# Patient Record
Sex: FEMALE | Race: WHITE | Employment: FULL TIME | RURAL
[De-identification: names, ages, dates, MRNs, and addresses within clinical notes are randomized per-mention and may not be internally consistent; named-entity substitution may affect disease eponyms.]

---

## 2017-01-01 PROBLEM — R79.89 POSITIVE D DIMER: Status: ACTIVE | Noted: 2017-01-01

## 2017-01-02 PROBLEM — D64.9 ANEMIA: Status: ACTIVE | Noted: 2017-01-02

## 2017-01-02 PROBLEM — R93.89 ABNORMAL CT SCAN, CHEST: Status: ACTIVE | Noted: 2017-01-02

## 2017-01-04 RX ORDER — GABAPENTIN 300 MG/1
600 CAPSULE ORAL 3 TIMES DAILY
Qty: 90 CAPSULE | Refills: 0 | Status: SHIPPED | OUTPATIENT
Start: 2017-01-04 | End: 2017-01-11 | Stop reason: DRUGHIGH

## 2017-01-05 ENCOUNTER — HOSPITAL ENCOUNTER (OUTPATIENT)
Dept: PHYSICAL THERAPY | Age: 52
Discharge: HOME | End: 2017-01-05
Admitting: ORTHOPAEDIC SURGERY

## 2017-01-09 ENCOUNTER — HOSPITAL ENCOUNTER (OUTPATIENT)
Dept: PHYSICAL THERAPY | Age: 52
Discharge: HOME | End: 2017-01-09
Admitting: ORTHOPAEDIC SURGERY

## 2017-01-09 RX ORDER — GABAPENTIN 100 MG/1
CAPSULE ORAL
Qty: 120 CAPSULE | Refills: 2 | OUTPATIENT
Start: 2017-01-09

## 2017-01-11 ENCOUNTER — OFFICE VISIT (OUTPATIENT)
Dept: ORTHOPEDIC SURGERY | Facility: CLINIC | Age: 52
End: 2017-01-11

## 2017-01-11 VITALS — RESPIRATION RATE: 16 BRPM | WEIGHT: 115.8 LBS | HEIGHT: 66 IN | BODY MASS INDEX: 18.61 KG/M2

## 2017-01-11 DIAGNOSIS — G57.71 COMPLEX REGIONAL PAIN SYNDROME TYPE 2 OF RIGHT LOWER EXTREMITY: ICD-10-CM

## 2017-01-11 DIAGNOSIS — M17.32 POST-TRAUMATIC OSTEOARTHRITIS OF LEFT KNEE: ICD-10-CM

## 2017-01-11 DIAGNOSIS — Z96.651 STATUS POST TOTAL RIGHT KNEE REPLACEMENT USING CEMENT: Primary | ICD-10-CM

## 2017-01-11 DIAGNOSIS — G62.9 NEUROPATHY: ICD-10-CM

## 2017-01-11 DIAGNOSIS — M51.36 LUMBAR DEGENERATIVE DISC DISEASE: ICD-10-CM

## 2017-01-11 PROCEDURE — 99024 POSTOP FOLLOW-UP VISIT: CPT | Performed by: ORTHOPAEDIC SURGERY

## 2017-01-11 RX ORDER — MELOXICAM 7.5 MG/1
TABLET ORAL
Refills: 1 | COMMUNITY
Start: 2016-12-08 | End: 2017-02-28

## 2017-01-11 RX ORDER — OXYCODONE HYDROCHLORIDE 10 MG/1
10 TABLET ORAL EVERY 8 HOURS PRN
Qty: 60 TABLET | Refills: 0 | Status: SHIPPED | OUTPATIENT
Start: 2017-01-11 | End: 2017-01-23 | Stop reason: SDUPTHER

## 2017-01-11 RX ORDER — FOLIC ACID 1 MG/1
TABLET ORAL
COMMUNITY
Start: 2017-01-02 | End: 2017-02-28

## 2017-01-11 RX ORDER — LISINOPRIL 20 MG/1
TABLET ORAL
Refills: 0 | COMMUNITY
Start: 2017-01-03 | End: 2017-02-28

## 2017-01-11 RX ORDER — OXYCODONE AND ACETAMINOPHEN 10; 325 MG/1; MG/1
TABLET ORAL
COMMUNITY
End: 2017-02-20

## 2017-01-11 RX ORDER — GABAPENTIN 100 MG/1
CAPSULE ORAL
Refills: 2 | COMMUNITY
Start: 2016-12-08 | End: 2017-01-11 | Stop reason: DRUGHIGH

## 2017-01-11 RX ORDER — ATENOLOL 50 MG/1
TABLET ORAL
COMMUNITY
Start: 2017-01-02

## 2017-01-11 RX ORDER — GABAPENTIN 100 MG/1
CAPSULE ORAL
COMMUNITY
Start: 2014-11-12 | End: 2017-01-11 | Stop reason: DRUGHIGH

## 2017-01-11 RX ORDER — INDOMETHACIN 25 MG/1
CAPSULE ORAL
Refills: 0 | COMMUNITY
Start: 2016-12-20 | End: 2017-02-28

## 2017-01-11 RX ORDER — AMLODIPINE BESYLATE 5 MG/1
TABLET ORAL
COMMUNITY
Start: 2017-01-02 | End: 2017-02-28

## 2017-01-11 RX ORDER — GABAPENTIN 400 MG/1
400 CAPSULE ORAL 3 TIMES DAILY
Qty: 90 CAPSULE | Refills: 0 | Status: SHIPPED | OUTPATIENT
Start: 2017-01-11 | End: 2017-02-28

## 2017-01-11 RX ORDER — FLUOXETINE 10 MG/1
CAPSULE ORAL
COMMUNITY
End: 2017-04-10

## 2017-01-11 RX ORDER — LISINOPRIL 20 MG/1
TABLET ORAL
COMMUNITY
Start: 2017-01-02 | End: 2017-04-10

## 2017-01-11 RX ORDER — AMLODIPINE BESYLATE 5 MG/1
TABLET ORAL
Refills: 0 | COMMUNITY
Start: 2017-01-03 | End: 2017-02-28

## 2017-01-11 NOTE — MR AVS SNAPSHOT
Anca Andrade   1/11/2017 11:15 AM   Office Visit    Dept Phone:  [phone]   Encounter #:  [id number]    Provider:  Pete Willis MD   Department:  [facility]IC[facility]                Your Full Care Plan              Today's Medication Changes          These changes are accurate as of: 1/11/17 11:45 AM.  If you have any questions, ask your nurse or doctor.               Medication(s)that have changed:     gabapentin 400 MG capsule   Commonly known as:  NEURONTIN   Take 1 capsule by mouth 3 (Three) Times a Day.   What changed:    - medication strength  - how much to take  - Another medication with the same name was removed. Continue taking this medication, and follow the directions you see here.   Changed by:  Pete Willis MD       oxyCODONE 10 MG tablet   Commonly known as:  ROXICODONE   Take 1 tablet by mouth Every 8 (Eight) Hours As Needed (prn pain).   What changed:    - how much to take  - how to take this  - when to take this  - reasons to take this   Changed by:  Pete Willis MD            Where to Get Your Medications      These medications were sent to [facility] - [address] - [phone]  - [phone] [address] [address]     Phone:  [phone]     gabapentin 400 MG capsule         You can get these medications from any pharmacy     Bring a paper prescription for each of these medications     oxyCODONE 10 MG tablet                  Your Updated Medication List          This list is accurate as of: 1/11/17 11:45 AM.  Always use your most recent med list.                * amLODIPine 5 MG tablet   Commonly known as:  NORVASC       * amLODIPine 5 MG tablet   Commonly known as:  NORVASC       * atenolol 50 MG tablet   Commonly known as:  TENORMIN       * atenolol 50 MG tablet   Commonly known as:  TENORMIN       dicyclomine 20 MG tablet   Commonly known as:   BENTYL       estradiol 2 MG tablet   Commonly known as:  ESTRACE       * FLUoxetine 20 MG capsule   Commonly known as:  PROzac       * FLUoxetine 10 MG capsule   Commonly known as:  PROzac       * folic acid 400 MCG tablet   Commonly known as:  FOLVITE       * folic acid 1 MG tablet   Commonly known as:  FOLVITE       gabapentin 400 MG capsule   Commonly known as:  NEURONTIN   Take 1 capsule by mouth 3 (Three) Times a Day.       hydrOXYzine 25 MG capsule   Commonly known as:  VISTARIL       indomethacin 25 MG capsule   Commonly known as:  INDOCIN       * lisinopril 20 MG tablet   Commonly known as:  PRINIVIL,ZESTRIL       * lisinopril 20 MG tablet   Commonly known as:  PRINIVIL,ZESTRIL       lisinopril-hydrochlorothiazide 20-12.5 MG per tablet   Commonly known as:  PRINZIDE,ZESTORETIC       meloxicam 7.5 MG tablet   Commonly known as:  MOBIC       methocarbamol 750 MG tablet   Commonly known as:  ROBAXIN   Take 1 tablet by mouth 2 (Two) Times a Day As Needed for muscle spasms.       metoclopramide 10 MG tablet   Commonly known as:  REGLAN       ondansetron ODT 4 MG disintegrating tablet   Commonly known as:  ZOFRAN-ODT       oxyCODONE 10 MG tablet   Commonly known as:  ROXICODONE   Take 1 tablet by mouth Every 8 (Eight) Hours As Needed (prn pain).       * oxyCODONE-acetaminophen  MG per tablet   Commonly known as:  PERCOCET   Take 1 tablet by mouth Every 8 (Eight) Hours As Needed (PRN PAIN).       * oxyCODONE-acetaminophen  MG per tablet   Commonly known as:  PERCOCET       pantoprazole 40 MG EC tablet   Commonly known as:  PROTONIX       * Notice:  This list has 12 medication(s) that are the same as other medications prescribed for you. Read the directions carefully, and ask your doctor or other care provider to review them with you.            We Performed the Following     EMG 2 Limbs     NM Bone Scan 3 Phase       You Were Diagnosed With        Codes Comments    Status post total right knee  replacement using cement    -  Primary ICD-10-CM: Z96.651  ICD-9-CM: V43.65     Complex regional pain syndrome type 2 of right lower extremity     ICD-10-CM: G57.71  ICD-9-CM: 355.71     Lumbar degenerative disc disease     ICD-10-CM: M51.36  ICD-9-CM: 722.52     Neuropathy     ICD-10-CM: G62.9  ICD-9-CM: 355.9     Post-traumatic osteoarthritis of left knee     ICD-10-CM: M17.32  ICD-9-CM: 715.26       Instructions     None    Patient Instructions History      Upcoming Appointments     Visit Type Date Time Department    OFFICE VISIT 2017 11:15 AM [facility]    OFFICE VISIT 2017  1:00 PM [facility]    OFFICE VISIT 3/6/2017  1:30 PM [facility]      MyChart Signup     [facility] [employer] allows you to send messages to your doctor, view your test results, renew your prescriptions, schedule appointments, and more. To sign up, go to [employer] and click on the Sign Up Now link in the New User? box. Enter your [employer] Activation Code exactly as it appears below along with the last four digits of your Social Security Number and your Date of Birth () to complete the sign-up process. If you do not sign up before the expiration date, you must request a new code.    [employer] Activation Code: [email or web address]  Expires: 2017  3:08 PM    If you have questions, you can email [email or web address] or call [phone] to talk to our [employer] staff. Remember, [employer] is NOT to be used for urgent needs. For medical emergencies, dial 911.               Other Info from Your Visit           Your Appointments     2017  1:00 PM EST   Office Visit with Pete Willis MD   [facility] (--)    [address] [address]   [phone]           Arrive 15 minutes prior to appointment.            Mar 06, 2017  1:30 PM EST   Office Visit with Pete Willis MD   [facility]  "[facility] (--)    [address] [address]   [phone]           Arrive 15 minutes prior to appointment.              Allergies     Penicillins        Reason for Visit     Right Knee - Pain           Vital Signs     Respirations Height Weight Body Mass Index Smoking Status       16 66\" (167.6 cm) 115 lb 12.8 oz (52.5 kg) 18.69 kg/m2 Current Every Day Smoker       Problems and Diagnoses Noted     Status post total right knee replacement using cement    -  Primary    Complex regional pain syndrome type 2 of right lower extremity        Degeneration of lumbar or lumbosacral intervertebral disc        Neuropathy        Degenerative joint disease of lower leg            "

## 2017-01-11 NOTE — PROGRESS NOTES
"Subjective   Patient ID: Anca Andrade is a 51 y.o. female is here today for a post-operative visit  Pain of the Right Knee        History of Present Illness     Pain controlled: [] no   [x] yes, continued better pain control   Medication refill requested: [] no   [x] yes    Patient compliant with instructions: [] no   [x] yes   Other: Discussed plans to taper medication starting with gabapentin decrease to 400 mg po tid.  She does report a one-time falling episode 1-9-16 and went to [facility] ER and knee assessment and radiographs were fine.     Past Medical History   Diagnosis Date   • Asthma    • GERD (gastroesophageal reflux disease)    • Gout    • H/O corticosteroid therapy      right knee   • Hypertension    • Osteoarthritis    • Rheumatoid arthritis         Past Surgical History   Procedure Laterality Date   • Wrist surgery       Left wrist procedure   • Other surgical history       ORIF right forearm   • Cholecystectomy     • Tooth extraction     • Parathyroidectomy     • Hysterectomy     • Knee acl reconstruction Left    • Knee arthroscopy       Multiple procedures (left x 4 and right x 2)    • Total knee arthroplasty Right 10/18/2016       Allergies   Allergen Reactions   • Penicillins        Review of Systems   Constitutional: Negative for fever.   HENT: Negative for voice change.    Eyes: Negative for visual disturbance.   Respiratory: Negative for shortness of breath.    Cardiovascular: Negative for chest pain.   Gastrointestinal: Negative for abdominal distention and abdominal pain.   Genitourinary: Negative for dysuria.   Musculoskeletal: Positive for arthralgias, back pain and myalgias. Negative for gait problem and joint swelling.   Skin: Negative for rash.   Neurological: Positive for numbness. Negative for speech difficulty.   Hematological: Does not bruise/bleed easily.   Psychiatric/Behavioral: Negative for confusion.       Objective   Visit Vitals   • Resp 16   • Ht 66\" (167.6 " cm)   • Wt 115 lb 12.8 oz (52.5 kg)   • BMI 18.69 kg/m2         Signs of infection: [x] no                    [] yes   Drainage: [x] no                    [] yes   Incision: [] healing well     [x]healed well   Motor exam intact: [] no                    [x] yes   Neurovascular exam intact: [] no                    [x] yes, except diffuse hyperesthesias and causalgia right leg mostly anterolateral lower leg at this point.   Signs of compartment syndrome: [x] no                    [] yes   Signs of DVT: [x] no                    [] yes   Other:      Physical Exam   Constitutional: She appears well-developed. No distress.   Musculoskeletal: She exhibits no deformity.        Right knee: She exhibits no effusion.   Neurological: She is alert. Gait normal.   Skin: Skin is warm and dry. No rash noted. No erythema.   Psychiatric: She has a normal mood and affect. Her speech is normal.   Vitals reviewed.    Right Knee Exam     Tenderness   The patient is experiencing tenderness in the lateral retinaculum.    Range of Motion   Right knee extension: 3 degrees.   Right knee flexion: 119 degrees.     Muscle Strength     The patient has normal right knee strength.    Tests   Varus: negative  Valgus: positive  Patellar Apprehension: negative    Other   Erythema: absent  Right knee scars: well healed.  Sensation: decreased (diffuse anterolateral lower leg hyperesthesias)  Pulse: present  Swelling: mild  Other tests: no effusion present      Left Knee Exam     Tenderness   The patient is experiencing no tenderness (no acute left knee changes.).             Extremity DVT signs are Negative on physical exam with negative Silvia sign, with no calf pain, with no palpable cords and with no skin tone change  Neurologic Exam     Mental Status   Speech: speech is normal   Level of consciousness: alert  Knowledge: good.     Motor Exam   Overall muscle tone: normal    Gait, Coordination, and Reflexes     Gait  Gait: normal (only slight  residual right knee extensor limp.)    Right Knee Exam     Muscle Strength   Normal right knee strength        Assessment/Plan   Independent Review of Radiographic Studies:    No new imaging done today.  Reviewed at a prior visit.  and 1-9-17 knee imaging from [facility] as noted above.  Laboratory and Other Studies:  No new results reviewed today.  Recent results were stable and reviewed with patient.   Medical Decision Making:    Ongoing with residual symptoms.  Continue current management and any additional treatments and workup as outlined in plan.  Sequelae of possible CRPS - complex regional pain syndrome.  Awaiting appointment for possible lumbar sympathetic block, and also planning EMG/NCS and 3PBS evaluations next month.  Patient somewhat better and will taper gabapentin today and plan pain medication taper as possible.   Procedures  [x] No procedures were performed in office today.     Anca was seen today for pain.    Diagnoses and all orders for this visit:    Status post total right knee replacement using cement  -     NM Bone Scan 3 Phase  -     EMG 2 Limbs    Complex regional pain syndrome type 2 of right lower extremity  -     NM Bone Scan 3 Phase  -     EMG 2 Limbs    Lumbar degenerative disc disease  -     NM Bone Scan 3 Phase  -     EMG 2 Limbs    Neuropathy  -     NM Bone Scan 3 Phase  -     EMG 2 Limbs    Post-traumatic osteoarthritis of left knee  -     NM Bone Scan 3 Phase  -     EMG 2 Limbs    Other orders  -     oxyCODONE (ROXICODONE) 10 MG tablet; Take 1 tablet by mouth Every 8 (Eight) Hours As Needed (prn pain).  -     gabapentin (NEURONTIN) 400 MG capsule; Take 1 capsule by mouth 3 (Three) Times a Day.         Recommendations/Plan:     Staples/Sutures removed: []At today's visit     [x]At previous visit   Brace: [x]not provided        []pt provided with:   Physical therapy: []not at this time    []home-based    [x]outpatient referral   Ultrasound: [x]not ordered          []order given to patient   Labs: [x]not ordered         []order given to patient   Weight Bearing status: [x]Full []WBAT []PWB []NWB []Other   Prescriptions: []None given   [x]As Noted   Imaging at next appt: []Yes  [x]No        Additional instructions: Patient agreeable to return sooner for any new concern.     Regular exercise as tolerated  Orthopedic activities reviewed and patient expressed appreciation  Discussion of orthopedic goals  Risk, benefits, and merits of treatment alternatives reviewed with the patient and questions answered  Work status form completed and provided to patient.  We discussed her delayed return to work due to the sequelae and symptoms of her knee replacement recovery.  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  We have filled out forms before and offered to continue to assist her as needed.  Ice, heat, and/or modalities as beneficial  After care and dental prophylaxis for joint replacement prosthesis    Exercise, medications, injections, other patient advice, and return appointment as noted.  Brace: No brace was given at today's visit  Referral: Pain management  Test/Studies: Three phase bone scan both knees (evaluate for first phase increased uptake possibly consistent with reflex sympathetic dystrophy (RSD) or complex regional pain syndrome (CRPS), and EMG both lower extremities to evaluate for CRPS, right sciatica or possible right peroneal neuritis.  Surgery: No surgery proposed at this visit.  Work/Activity Status: May perform usual activities as tolerated, Sedentary duty, No use of involved extremity and No strenuous activity.  Patient states as she continues to improve, she would like to look into at her work the option of regular duty though starting out at 4 hours per day and feels she might be ready for that level of  work in about a month.  Patient is encouraged to call or return for any issues or concerns.    Return in about 2 months (around 3/11/2017) for Review 3PBS and EMG, Recheck.  Patient agreeable to call or return sooner for any concerns.

## 2017-01-12 ENCOUNTER — HOSPITAL ENCOUNTER (OUTPATIENT)
Dept: PHYSICAL THERAPY | Age: 52
Discharge: HOME | End: 2017-01-12
Admitting: ORTHOPAEDIC SURGERY

## 2017-01-16 ENCOUNTER — HOSPITAL ENCOUNTER (OUTPATIENT)
Dept: PHYSICAL THERAPY | Age: 52
Discharge: HOME | End: 2017-01-16
Admitting: ORTHOPAEDIC SURGERY

## 2017-01-19 ENCOUNTER — HOSPITAL ENCOUNTER (OUTPATIENT)
Dept: PHYSICAL THERAPY | Age: 52
Discharge: HOME | End: 2017-01-19
Admitting: ORTHOPAEDIC SURGERY

## 2017-01-23 RX ORDER — OXYCODONE HYDROCHLORIDE 10 MG/1
10 TABLET ORAL EVERY 12 HOURS PRN
Qty: 40 TABLET | Refills: 0 | Status: SHIPPED | OUTPATIENT
Start: 2017-01-23 | End: 2017-02-08 | Stop reason: SDUPTHER

## 2017-01-26 ENCOUNTER — HOSPITAL ENCOUNTER (OUTPATIENT)
Dept: PHYSICAL THERAPY | Age: 52
Discharge: HOME | End: 2017-01-26
Admitting: ORTHOPAEDIC SURGERY

## 2017-01-30 ENCOUNTER — HOSPITAL ENCOUNTER (OUTPATIENT)
Dept: PHYSICAL THERAPY | Age: 52
Discharge: HOME | End: 2017-01-30
Admitting: ORTHOPAEDIC SURGERY

## 2017-02-02 ENCOUNTER — HOSPITAL ENCOUNTER (OUTPATIENT)
Dept: PHYSICAL THERAPY | Age: 52
Discharge: HOME | End: 2017-02-02
Admitting: ORTHOPAEDIC SURGERY

## 2017-02-06 ENCOUNTER — HOSPITAL ENCOUNTER (OUTPATIENT)
Dept: PHYSICAL THERAPY | Age: 52
Discharge: HOME | End: 2017-02-06
Admitting: ORTHOPAEDIC SURGERY

## 2017-02-08 ENCOUNTER — HOSPITAL ENCOUNTER (OUTPATIENT)
Dept: OTHER | Age: 52
Discharge: HOME | End: 2017-02-08
Attending: ORTHOPAEDIC SURGERY | Admitting: ORTHOPAEDIC SURGERY

## 2017-02-08 ENCOUNTER — OFFICE VISIT (OUTPATIENT)
Dept: ORTHOPEDIC SURGERY | Facility: CLINIC | Age: 52
End: 2017-02-08

## 2017-02-08 VITALS — WEIGHT: 117 LBS | RESPIRATION RATE: 18 BRPM | BODY MASS INDEX: 18.8 KG/M2 | HEIGHT: 66 IN

## 2017-02-08 DIAGNOSIS — Z96.651 STATUS POST TOTAL RIGHT KNEE REPLACEMENT USING CEMENT: Primary | ICD-10-CM

## 2017-02-08 DIAGNOSIS — M51.36 LUMBAR DEGENERATIVE DISC DISEASE: ICD-10-CM

## 2017-02-08 DIAGNOSIS — M25.561 ARTHRALGIA OF KNEE, RIGHT: ICD-10-CM

## 2017-02-08 DIAGNOSIS — G57.71 COMPLEX REGIONAL PAIN SYNDROME TYPE 2 OF RIGHT LOWER EXTREMITY: ICD-10-CM

## 2017-02-08 DIAGNOSIS — G62.9 NEUROPATHY: ICD-10-CM

## 2017-02-08 DIAGNOSIS — M17.32 POST-TRAUMATIC OSTEOARTHRITIS OF LEFT KNEE: ICD-10-CM

## 2017-02-08 LAB
BASOPHILS ABSOLUTE: 0 K/UL (ref 0–0.1)
BASOPHILS RELATIVE PERCENT: 0.3 %
C-REACTIVE PROTEIN: 16.8 MG/L (ref 0–5.1)
EOSINOPHILS ABSOLUTE: 0.2 K/UL (ref 0–0.4)
EOSINOPHILS RELATIVE PERCENT: 2.9 %
HCT VFR BLD CALC: 36.7 % (ref 37–47)
HEMOGLOBIN: 11.4 G/DL (ref 11.5–16.5)
LYMPHOCYTES ABSOLUTE: 1.5 K/UL (ref 1.5–4)
LYMPHOCYTES RELATIVE PERCENT: 24.4 % (ref 20–40)
MCH RBC QN AUTO: 28.8 PG (ref 27–32)
MCHC RBC AUTO-ENTMCNC: 31.1 G/DL (ref 31–35)
MCV RBC AUTO: 92.7 FL (ref 80–100)
MONOCYTES ABSOLUTE: 0.3 K/UL (ref 0.2–0.8)
MONOCYTES RELATIVE PERCENT: 4.9 % (ref 3–10)
NEUTROPHILS ABSOLUTE: 4.2 K/UL (ref 2–7.5)
NEUTROPHILS RELATIVE PERCENT: 67.5 %
PDW BLD-RTO: 13.1 % (ref 11–16)
PLATELET # BLD: 287 K/UL (ref 150–400)
PMV BLD AUTO: 9.3 FL (ref 6–10)
RBC # BLD: 3.96 M/UL (ref 3.8–5.8)
SEDIMENTATION RATE, ERYTHROCYTE: 67 MM/HR (ref 0–20)
WBC # BLD: 6.2 K/UL (ref 4–11)

## 2017-02-08 PROCEDURE — 99213 OFFICE O/P EST LOW 20 MIN: CPT | Performed by: ORTHOPAEDIC SURGERY

## 2017-02-08 RX ORDER — GABAPENTIN 400 MG/1
400 CAPSULE ORAL 3 TIMES DAILY
Qty: 90 CAPSULE | Refills: 1 | Status: SHIPPED | OUTPATIENT
Start: 2017-02-08 | End: 2017-03-14 | Stop reason: SDUPTHER

## 2017-02-08 NOTE — PROGRESS NOTES
Subjective   Patient ID: Anca Andrade is a 51 y.o. female is here today for orthopaedic evaluation of recovery progress with treatment. and is here today for a treatment planning discussion.  Pain and Post-op of the Right Knee       History of Present Illness    Progress Note:  Patient reports that with treatments and since the last visit, overall pain is unchanged, strength is improved, and range of motion is unchanged.  Patient is currently still requiring pain medications though both hydrocodone and gabapentin medication have been slowly tapered.  We discussed the options of longer acting pain medication such as lower dose Oxycontin until there is further recovery, and also the option for her to return to pain management clinic.   Physical therapy has been very helpful, and therapists were commended.  Injection therapy has not been given..   Since last visit, patient has been on continued work restrictions.  However she believes she can return to work regular duty in the coming weeks and requests and was provided a letter for return to her usual prior regular duty work on March 27, 2017 as tolerated.  Patient does not  present with labs or studies for review at today’s examination.  Three phase bone scan to further evaluate for complex regional pain syndrome, and EMG with nerve conduction studies are planned.  We also discussed repeating laboratory tests for assurance and her prior serial lab test evaluations have been stable.    Past Medical History   Diagnosis Date   • Asthma    • GERD (gastroesophageal reflux disease)    • Gout    • H/O corticosteroid therapy      right knee   • Hypertension    • Osteoarthritis    • Rheumatoid arthritis         Past Surgical History   Procedure Laterality Date   • Wrist surgery       Left wrist procedure   • Other surgical history       ORIF right forearm   • Cholecystectomy     • Tooth extraction     • Parathyroidectomy     • Hysterectomy     • Knee acl reconstruction  "Left    • Knee arthroscopy       Multiple procedures (left x 4 and right x 2)    • Total knee arthroplasty Right 10/18/2016        Family History   Problem Relation Age of Onset   • Cancer Other    • Other Other      cholesterol problems   • Hyperlipidemia Other    • Cancer Father    • Cancer Brother         Social History     Social History   • Marital status:      Spouse name: N/A   • Number of children: N/A   • Years of education: N/A     Occupational History   • Not on file.     Social History Main Topics   • Smoking status: Current Every Day Smoker     Packs/day: 0.50     Years: 30.00   • Smokeless tobacco: Never Used   • Alcohol use Yes      Comment: socially   • Drug use: No   • Sexual activity: Defer     Other Topics Concern   • Not on file     Social History Narrative       Allergies   Allergen Reactions   • Penicillins        Review of Systems   Constitutional: Negative for fever.   HENT: Negative for voice change.    Eyes: Negative for visual disturbance.   Respiratory: Negative for shortness of breath.    Cardiovascular: Negative for chest pain.   Gastrointestinal: Negative for abdominal distention and abdominal pain.   Genitourinary: Negative for dysuria.   Musculoskeletal: Positive for arthralgias, back pain and myalgias. Negative for gait problem and joint swelling.   Skin: Negative for rash.   Neurological: Negative for speech difficulty.   Hematological: Does not bruise/bleed easily.   Psychiatric/Behavioral: Negative for confusion.         Objective   Visit Vitals   • Resp 18   • Ht 66\" (167.6 cm)   • Wt 117 lb (53.1 kg)   • BMI 18.88 kg/m2       Physical Exam   Constitutional: She appears well-developed. No distress.   Musculoskeletal:        Right knee: She exhibits no effusion.   Neurological: She is alert. Gait normal.   Skin: Skin is warm and dry. No rash noted. No erythema.   Psychiatric: She has a normal mood and affect. Her speech is normal.   Vitals reviewed.    Right Knee Exam "     Tenderness   Right knee tenderness location: diffuse global pain and more anterior than posterior pain.    Range of Motion   Right knee extension: 2-3 degree slight extensor lag.   Right knee flexion: 118 degrees.     Tests   Varus: negative  Valgus: negative  Patellar Apprehension: negative    Other   Erythema: absent  Scars: present (well healed)  Sensation: decreased (nonspecific, nondermatomal and with hyperesthesia and causalgia.)  Pulse: present  Swelling: mild  Other tests: no effusion present      Back Exam     Muscle Strength   Right Quadriceps:  4/5   Left Quadriceps:  5/5   Right Hamstrings:  5/5   Left Hamstrings:  5/5         Extremity DVT signs are Negative on physical exam with negative Silvia sign, with no calf pain, with no palpable cords and with no skin tone change   Neurologic Exam     Mental Status   Attention: normal.   Speech: speech is normal   Level of consciousness: alert  Knowledge: good.     Motor Exam   Overall muscle tone: normal    Strength   Right strength: Right quad gradually improving to 4+/5 with therapy efforts.  Right quadriceps: 4/5  Left quadriceps: 5/5  Right hamstrin/5  Left hamstrin/5    Gait, Coordination, and Reflexes     Gait  Gait: normal    Ortho Exam    Assessment/Plan   Independent Review of Radiographic Studies:    No new imaging done today.  Reviewed at a prior visit.  Laboratory and Other Studies:  No new results reviewed today.   Medical Decision Making:    No complications of procedure and treatments.  Limited progress with persistent and/or progressive symptoms.  Continue current management and any additional treatments and workup as outlined in plan.    Procedures  [x]  No procedures were performed in office today.    Anca was seen today for pain and post-op.    Diagnoses and all orders for this visit:    Status post total right knee replacement using cement  -     CBC & Differential; Future  -     C-reactive Protein; Future  -     Sedimentation  Rate; Future    Complex regional pain syndrome type 2 of right lower extremity  -     CBC & Differential; Future  -     C-reactive Protein; Future  -     Sedimentation Rate; Future    Lumbar degenerative disc disease  -     CBC & Differential; Future  -     C-reactive Protein; Future  -     Sedimentation Rate; Future    Neuropathy  -     CBC & Differential; Future  -     C-reactive Protein; Future  -     Sedimentation Rate; Future    Post-traumatic osteoarthritis of left knee  -     CBC & Differential; Future  -     C-reactive Protein; Future  -     Sedimentation Rate; Future    Arthralgia of knee, right  -     CBC & Differential; Future  -     C-reactive Protein; Future  -     Sedimentation Rate; Future    Other orders  -     gabapentin (NEURONTIN) 400 MG capsule; Take 1 capsule by mouth 3 (Three) Times a Day.  -     oxyCODONE ER (oxyCONTIN) 15 MG tablet extended-release 12 hour; Take 1 tablet by mouth Every 12 (Twelve) Hours As Needed for moderate pain (4-6).  -     oxyCODONE (ROXICODONE) 10 MG tablet; Take 1 tablet by mouth Daily As Needed (prn pain).       Regular exercise as tolerated  Orthopedic activities reviewed and patient expressed appreciation  Discussion of orthopedic goals  Risk, benefits, and merits of treatment alternatives reviewed with the patient and questions answered  Work status form completed and provided to patient  Ice, heat, and/or modalities as beneficial  After care and dental prophylaxis for joint replacement prosthesis    Recommendations/Plan:  Exercise, medications, injections, other patient advice, and return appointment as noted.  Brace: No brace was given at today's visit  Referral: No referrals made at today's visit  Test/Studies: Three phase bone scan knees and EMG both lower extremities  Surgery: No surgery proposed at this visit.  Work/Activity Status: May perform usual activities as tolerated, No strenuous activity and Work Status form completed today with plans for upcoming  return to work regular duty as tolerated.  Patient is encouraged to call or return for any issues or concerns.     Return in about 3 months (around 5/8/2017) for Recheck.  Patient agreeable to call or return sooner for any concerns.

## 2017-02-09 ENCOUNTER — HOSPITAL ENCOUNTER (OUTPATIENT)
Dept: PHYSICAL THERAPY | Age: 52
Discharge: HOME | End: 2017-02-09
Admitting: ORTHOPAEDIC SURGERY

## 2017-02-10 ENCOUNTER — HOSPITAL ENCOUNTER (OUTPATIENT)
Dept: GENERAL RADIOLOGY | Age: 52
Discharge: HOME | End: 2017-02-10
Attending: NURSE PRACTITIONER | Admitting: NURSE PRACTITIONER

## 2017-02-10 DIAGNOSIS — R91.8 OTHER NONSPECIFIC ABNORMAL FINDING OF LUNG FIELD: ICD-10-CM

## 2017-02-10 DIAGNOSIS — R91.8 ABNORMAL CT SCAN OF LUNG: ICD-10-CM

## 2017-02-10 LAB
BUN BLDV-MCNC: 17 MG/DL (ref 6–20)
CREAT SERPL-MCNC: 0.9 MG/DL (ref 0.4–1.2)
GFR AFRICAN AMERICAN: >59
GFR NON-AFRICAN AMERICAN: >60

## 2017-02-14 RX ORDER — OXYCODONE HYDROCHLORIDE 15 MG/1
15 TABLET, FILM COATED, EXTENDED RELEASE ORAL EVERY 12 HOURS PRN
Qty: 60 TABLET | Refills: 0
Start: 2017-02-14 | End: 2017-02-20

## 2017-02-14 RX ORDER — OXYCODONE HYDROCHLORIDE 10 MG/1
10 TABLET ORAL DAILY PRN
Qty: 30 TABLET | Refills: 0
Start: 2017-02-14 | End: 2017-02-20

## 2017-02-15 ENCOUNTER — TELEPHONE (OUTPATIENT)
Dept: ORTHOPEDIC SURGERY | Facility: CLINIC | Age: 52
End: 2017-02-15

## 2017-02-16 ENCOUNTER — HOSPITAL ENCOUNTER (OUTPATIENT)
Dept: PHYSICAL THERAPY | Age: 52
Discharge: HOME | End: 2017-02-16
Admitting: ORTHOPAEDIC SURGERY

## 2017-02-17 NOTE — TELEPHONE ENCOUNTER
Sierra,    Please tell patient I was not aware that the medication requires a precert.  Johann may refill Percocet 7.5mg/325mg in the meantime and I will return to work Monday and help out with the precert at that time.  Thank you for helping this patient and hopefully she will be ok with plan.    Danny    Cc: Johann

## 2017-02-20 ENCOUNTER — LAB REQUISITION (OUTPATIENT)
Dept: LAB | Facility: HOSPITAL | Age: 52
End: 2017-02-20

## 2017-02-20 ENCOUNTER — OFFICE VISIT (OUTPATIENT)
Dept: ORTHOPEDIC SURGERY | Facility: CLINIC | Age: 52
End: 2017-02-20

## 2017-02-20 VITALS — RESPIRATION RATE: 18 BRPM | WEIGHT: 116.6 LBS | BODY MASS INDEX: 18.74 KG/M2 | HEIGHT: 66 IN

## 2017-02-20 DIAGNOSIS — Z96.651 STATUS POST TOTAL RIGHT KNEE REPLACEMENT USING CEMENT: ICD-10-CM

## 2017-02-20 DIAGNOSIS — G57.71 CAUSALGIA OF RIGHT LOWER EXTREMITY: ICD-10-CM

## 2017-02-20 DIAGNOSIS — G62.9 NEUROPATHY: ICD-10-CM

## 2017-02-20 DIAGNOSIS — M17.32 POST-TRAUMATIC OSTEOARTHRITIS OF LEFT KNEE: ICD-10-CM

## 2017-02-20 DIAGNOSIS — G57.71 COMPLEX REGIONAL PAIN SYNDROME TYPE 2 OF RIGHT LOWER EXTREMITY: ICD-10-CM

## 2017-02-20 DIAGNOSIS — Z96.651 PRESENCE OF RIGHT ARTIFICIAL KNEE JOINT: ICD-10-CM

## 2017-02-20 DIAGNOSIS — M51.36 LUMBAR DEGENERATIVE DISC DISEASE: ICD-10-CM

## 2017-02-20 DIAGNOSIS — M51.36 OTHER INTERVERTEBRAL DISC DEGENERATION, LUMBAR REGION: ICD-10-CM

## 2017-02-20 DIAGNOSIS — M25.461 EFFUSION OF RIGHT KNEE: Primary | ICD-10-CM

## 2017-02-20 LAB
APPEARANCE FLD: ABNORMAL
COLOR FLD: ABNORMAL
CRYSTALS FLD MICRO: NORMAL
GLUCOSE FLD-MCNC: 70 MG/DL
GRAM STN SPEC: NORMAL
MONOS+MACROS NFR FLD: 23 %
NEUTROPHILS NFR FLD MANUAL: 77 %
PROT FLD-MCNC: 7.3 G/DL
RBC # FLD AUTO: ABNORMAL /MM3 (ref 0–200000)
WBC # FLD: 1226 /MM3 (ref 0–1000)

## 2017-02-20 PROCEDURE — 87147 CULTURE TYPE IMMUNOLOGIC: CPT

## 2017-02-20 PROCEDURE — 87070 CULTURE OTHR SPECIMN AEROBIC: CPT

## 2017-02-20 PROCEDURE — 20610 DRAIN/INJ JOINT/BURSA W/O US: CPT | Performed by: ORTHOPAEDIC SURGERY

## 2017-02-20 PROCEDURE — 99213 OFFICE O/P EST LOW 20 MIN: CPT | Performed by: ORTHOPAEDIC SURGERY

## 2017-02-20 PROCEDURE — 87077 CULTURE AEROBIC IDENTIFY: CPT

## 2017-02-20 PROCEDURE — 87015 SPECIMEN INFECT AGNT CONCNTJ: CPT

## 2017-02-20 PROCEDURE — 87186 SC STD MICRODIL/AGAR DIL: CPT

## 2017-02-20 RX ORDER — OXYCODONE AND ACETAMINOPHEN 7.5; 325 MG/1; MG/1
TABLET ORAL
Refills: 0 | COMMUNITY
Start: 2017-02-15 | End: 2017-02-20

## 2017-02-20 RX ORDER — OXYCODONE AND ACETAMINOPHEN 10; 325 MG/1; MG/1
1 TABLET ORAL EVERY 8 HOURS PRN
Qty: 40 TABLET | Refills: 0 | Status: SHIPPED | OUTPATIENT
Start: 2017-02-20 | End: 2017-03-01 | Stop reason: SDUPTHER

## 2017-02-21 ENCOUNTER — PROCEDURE VISIT (OUTPATIENT)
Dept: ORTHOPEDIC SURGERY | Facility: CLINIC | Age: 52
End: 2017-02-21

## 2017-02-21 ENCOUNTER — LAB REQUISITION (OUTPATIENT)
Dept: LAB | Facility: HOSPITAL | Age: 52
End: 2017-02-21

## 2017-02-21 ENCOUNTER — HOSPITAL ENCOUNTER (OUTPATIENT)
Dept: NUCLEAR MEDICINE | Facility: HOSPITAL | Age: 52
Discharge: HOME | End: 2017-02-21
Attending: ORTHOPAEDIC SURGERY

## 2017-02-21 DIAGNOSIS — G57.81 SAPHENOUS NEURALGIA, RIGHT: ICD-10-CM

## 2017-02-21 DIAGNOSIS — Z96.651 PRESENCE OF RIGHT ARTIFICIAL KNEE JOINT: ICD-10-CM

## 2017-02-21 DIAGNOSIS — M79.604 RIGHT LEG PAIN: ICD-10-CM

## 2017-02-21 PROCEDURE — A9503 TC99M MEDRONATE: HCPCS

## 2017-02-21 PROCEDURE — 95910 NRV CNDJ TEST 7-8 STUDIES: CPT | Performed by: PHYSICAL THERAPIST

## 2017-02-21 PROCEDURE — 0 TECHNETIUM MEDRONATE KIT

## 2017-02-21 PROCEDURE — 95886 MUSC TEST DONE W/N TEST COMP: CPT | Performed by: PHYSICAL THERAPIST

## 2017-02-21 RX ORDER — TC 99M MEDRONATE 20 MG/10ML
27.2 INJECTION, POWDER, LYOPHILIZED, FOR SOLUTION INTRAVENOUS
Status: DISCONTINUED | OUTPATIENT
Start: 2017-02-21 | End: 2017-02-22 | Stop reason: HOSPADM

## 2017-02-24 LAB
BACTERIA FLD CULT: ABNORMAL
BACTERIA SPEC ANAEROBE CULT: NORMAL

## 2017-02-24 RX ORDER — DOXYCYCLINE HYCLATE 100 MG/1
100 CAPSULE ORAL 2 TIMES DAILY
Qty: 20 CAPSULE | Refills: 0 | Status: SHIPPED | OUTPATIENT
Start: 2017-02-24 | End: 2017-03-28

## 2017-02-24 RX ORDER — RIFAMPIN 300 MG/1
300 CAPSULE ORAL DAILY
Qty: 10 CAPSULE | Refills: 0 | Status: SHIPPED | OUTPATIENT
Start: 2017-02-24 | End: 2017-03-28

## 2017-02-24 NOTE — TELEPHONE ENCOUNTER
Dr. Willis    I received a rejection on Pre Auth for Anca Andrade Oxycontin. A written appeal is requested.

## 2017-02-28 ENCOUNTER — OFFICE VISIT (OUTPATIENT)
Dept: FAMILY MEDICINE CLINIC | Facility: CLINIC | Age: 52
End: 2017-02-28

## 2017-02-28 VITALS
BODY MASS INDEX: 18.64 KG/M2 | RESPIRATION RATE: 16 BRPM | WEIGHT: 116 LBS | OXYGEN SATURATION: 100 % | SYSTOLIC BLOOD PRESSURE: 134 MMHG | HEART RATE: 66 BPM | TEMPERATURE: 97.5 F | DIASTOLIC BLOOD PRESSURE: 69 MMHG | HEIGHT: 66 IN

## 2017-02-28 DIAGNOSIS — T84.53XA INFECTION OF PROSTHETIC RIGHT KNEE JOINT (HCC): Primary | ICD-10-CM

## 2017-02-28 LAB
ALBUMIN SERPL-MCNC: 4.2 G/DL (ref 3.5–5)
ALBUMIN/GLOB SERPL: 1.1 G/DL (ref 1–2)
ALP SERPL-CCNC: 128 U/L (ref 38–126)
ALT SERPL-CCNC: 26 U/L (ref 13–69)
AST SERPL-CCNC: 18 U/L (ref 15–46)
BASOPHILS # BLD AUTO: 0.05 10*3/MM3 (ref 0–0.2)
BASOPHILS NFR BLD AUTO: 1 % (ref 0–2.5)
BILIRUB SERPL-MCNC: 0.4 MG/DL (ref 0.2–1.3)
BUN SERPL-MCNC: 20 MG/DL (ref 7–20)
BUN/CREAT SERPL: 22.2 (ref 7.1–23.5)
CALCIUM SERPL-MCNC: 9.8 MG/DL (ref 8.4–10.2)
CHLORIDE SERPL-SCNC: 100 MMOL/L (ref 98–107)
CO2 SERPL-SCNC: 30 MMOL/L (ref 26–30)
CREAT SERPL-MCNC: 0.9 MG/DL (ref 0.6–1.3)
CRP SERPL-MCNC: 1.5 MG/DL (ref 0–1)
EOSINOPHIL # BLD AUTO: 0.22 10*3/MM3 (ref 0–0.7)
EOSINOPHIL NFR BLD AUTO: 4.3 % (ref 0–7)
ERYTHROCYTE [DISTWIDTH] IN BLOOD BY AUTOMATED COUNT: 12.8 % (ref 11.5–14.5)
ERYTHROCYTE [SEDIMENTATION RATE] IN BLOOD BY WESTERGREN METHOD: 35 MM/HR (ref 0–20)
GLOBULIN SER CALC-MCNC: 3.9 GM/DL
GLUCOSE SERPL-MCNC: 98 MG/DL (ref 74–98)
HCT VFR BLD AUTO: 40.2 % (ref 37–47)
HGB BLD-MCNC: 12.6 G/DL (ref 12–16)
IMM GRANULOCYTES # BLD: 0.01 10*3/MM3 (ref 0–0.06)
IMM GRANULOCYTES NFR BLD: 0.2 % (ref 0–0.6)
LYMPHOCYTES # BLD AUTO: 1.78 10*3/MM3 (ref 0.6–3.4)
LYMPHOCYTES NFR BLD AUTO: 35.2 % (ref 10–50)
MCH RBC QN AUTO: 28.8 PG (ref 27–31)
MCHC RBC AUTO-ENTMCNC: 31.3 G/DL (ref 30–37)
MCV RBC AUTO: 91.8 FL (ref 81–99)
MONOCYTES # BLD AUTO: 0.33 10*3/MM3 (ref 0–0.9)
MONOCYTES NFR BLD AUTO: 6.5 % (ref 0–12)
NEUTROPHILS # BLD AUTO: 2.67 10*3/MM3 (ref 2–6.9)
NEUTROPHILS NFR BLD AUTO: 52.8 % (ref 37–80)
NRBC BLD AUTO-RTO: 0 /100 WBC (ref 0–0)
PLATELET # BLD AUTO: 301 10*3/MM3 (ref 130–400)
POTASSIUM SERPL-SCNC: 5 MMOL/L (ref 3.5–5.1)
PROT SERPL-MCNC: 8.1 G/DL (ref 6.3–8.2)
RBC # BLD AUTO: 4.38 10*6/MM3 (ref 4.2–5.4)
SODIUM SERPL-SCNC: 141 MMOL/L (ref 137–145)
WBC # BLD AUTO: 5.06 10*3/MM3 (ref 4.8–10.8)

## 2017-02-28 PROCEDURE — 99213 OFFICE O/P EST LOW 20 MIN: CPT | Performed by: INTERNAL MEDICINE

## 2017-03-01 ENCOUNTER — RESULTS ENCOUNTER (OUTPATIENT)
Dept: FAMILY MEDICINE CLINIC | Facility: CLINIC | Age: 52
End: 2017-03-01

## 2017-03-01 ENCOUNTER — HOSPITAL ENCOUNTER (OUTPATIENT)
Dept: OTHER | Age: 52
Discharge: HOME | End: 2017-03-01
Attending: ORTHOPAEDIC SURGERY | Admitting: ORTHOPAEDIC SURGERY

## 2017-03-01 ENCOUNTER — OFFICE VISIT (OUTPATIENT)
Dept: ORTHOPEDIC SURGERY | Facility: CLINIC | Age: 52
End: 2017-03-01

## 2017-03-01 VITALS — WEIGHT: 117.9 LBS | TEMPERATURE: 98.7 F | RESPIRATION RATE: 18 BRPM | HEIGHT: 66 IN | BODY MASS INDEX: 18.95 KG/M2

## 2017-03-01 DIAGNOSIS — T84.53XA INFECTION OF PROSTHETIC RIGHT KNEE JOINT (HCC): ICD-10-CM

## 2017-03-01 DIAGNOSIS — G57.71 COMPLEX REGIONAL PAIN SYNDROME TYPE 2 OF RIGHT LOWER EXTREMITY: ICD-10-CM

## 2017-03-01 DIAGNOSIS — M51.36 LUMBAR DEGENERATIVE DISC DISEASE: ICD-10-CM

## 2017-03-01 DIAGNOSIS — M25.561 ARTHRALGIA OF KNEE, RIGHT: ICD-10-CM

## 2017-03-01 DIAGNOSIS — Z96.651 STATUS POST TOTAL RIGHT KNEE REPLACEMENT USING CEMENT: Primary | ICD-10-CM

## 2017-03-01 PROCEDURE — 99213 OFFICE O/P EST LOW 20 MIN: CPT | Performed by: ORTHOPAEDIC SURGERY

## 2017-03-01 RX ORDER — OXYCODONE AND ACETAMINOPHEN 10; 325 MG/1; MG/1
1 TABLET ORAL EVERY 8 HOURS PRN
Qty: 60 TABLET | Refills: 0 | Status: SHIPPED | OUTPATIENT
Start: 2017-03-01 | End: 2017-03-01 | Stop reason: ALTCHOICE

## 2017-03-01 RX ORDER — FLUCONAZOLE 150 MG/1
150 TABLET ORAL WEEKLY
Qty: 4 TABLET | Refills: 0 | Status: SHIPPED | OUTPATIENT
Start: 2017-03-01 | End: 2017-04-10

## 2017-03-01 RX ORDER — OXYCODONE HYDROCHLORIDE 10 MG/1
10 TABLET ORAL EVERY 8 HOURS PRN
Qty: 60 TABLET | Refills: 0 | Status: SHIPPED | OUTPATIENT
Start: 2017-03-01 | End: 2017-03-20 | Stop reason: SDUPTHER

## 2017-03-01 RX ORDER — OXYCODONE HYDROCHLORIDE 10 MG/1
10 TABLET ORAL EVERY 8 HOURS PRN
Qty: 60 TABLET | Refills: 0 | Status: CANCELLED
Start: 2017-03-01

## 2017-03-02 ENCOUNTER — HOSPITAL ENCOUNTER (OUTPATIENT)
Dept: OTHER | Age: 52
Discharge: HOME | End: 2017-03-02
Attending: ORTHOPAEDIC SURGERY | Admitting: ORTHOPAEDIC SURGERY

## 2017-03-02 NOTE — PROGRESS NOTES
Subjective   Anca Andrade is a 51 y.o. female.     Chief Complaint   Patient presents with   • Prosthetic Joint Infection     Patient was referred by Dr. Willis for infection in right knee post knee replacement in October       History of Present Illness   Patient is a 52 yo F with Osteoarthritis, who underwent right total knee replacement on October 2016. Patient c/o persisting pain on the right knee since after surgery radiating to right leg and hip.  She has also noticed intermittent edema and erythema of the right knee. She underwent right knee aspiration on 2/20 with abnormal synovial fluid analysis showing 1226 WBC, 77% N. Synovial fluid culture grew Staphylococcus epidermidis.  Patient was referred to Infectious Disease for further evaluation.    The following portions of the patient's history were reviewed and updated as appropriate: allergies, current medications, past family history, past medical history, past social history, past surgical history and problem list.    Past Medical History   Diagnosis Date   • Asthma    • GERD (gastroesophageal reflux disease)    • Gout    • H/O corticosteroid therapy      right knee   • Hypertension    • Osteoarthritis    • Rheumatoid arthritis        Past Surgical History   Procedure Laterality Date   • Wrist surgery       Left wrist procedure   • Other surgical history       ORIF right forearm   • Cholecystectomy     • Tooth extraction     • Parathyroidectomy     • Hysterectomy     • Knee acl reconstruction Left    • Knee arthroscopy       Multiple procedures (left x 4 and right x 2)    • Total knee arthroplasty Right 10/18/2016       Current Outpatient Prescriptions on File Prior to Visit   Medication Sig Dispense Refill   • atenolol (TENORMIN) 50 MG tablet Take 0.5 tablets by mouth daily     • doxycycline (VIBRAMYCIN) 100 MG capsule Take 1 capsule by mouth 2 (Two) Times a Day. 20 capsule 0   • estradiol (ESTRACE) 2 MG tablet   12   • FLUoxetine (PROzac) 10 MG  "capsule Take 10 mg by mouth daily     • gabapentin (NEURONTIN) 400 MG capsule Take 1 capsule by mouth 3 (Three) Times a Day. 90 capsule 1   • lisinopril (PRINIVIL,ZESTRIL) 20 MG tablet Take 1 tablet by mouth daily     • rifAMPin (RIFADIN) 300 MG capsule Take 1 capsule by mouth Daily. 10 capsule 0     No current facility-administered medications on file prior to visit.        Social History     Social History   • Marital status:      Spouse name: N/A   • Number of children: N/A   • Years of education: N/A     Occupational History   • Not on file.     Social History Main Topics   • Smoking status: Current Every Day Smoker     Packs/day: 0.50     Years: 30.00   • Smokeless tobacco: Never Used   • Alcohol use Yes      Comment: socially   • Drug use: No   • Sexual activity: Defer     Other Topics Concern   • Not on file     Social History Narrative       Review of Systems   Constitutional: Negative for chills, fatigue and fever.   HENT: Negative for congestion, ear pain, rhinorrhea, sinus pressure and sore throat.    Eyes: Negative for visual disturbance.   Respiratory: Negative for cough, chest tightness, shortness of breath and wheezing.    Cardiovascular: Negative for chest pain, palpitations and leg swelling.   Gastrointestinal: Negative for abdominal pain, blood in stool, constipation, diarrhea, nausea and vomiting.   Endocrine: Negative for polydipsia and polyuria.   Genitourinary: Negative for dysuria and hematuria.   Musculoskeletal: Positive for arthralgias. Negative for back pain.        Right knee pain   Skin: Negative for rash.   Neurological: Negative for dizziness, light-headedness, numbness and headaches.   Psychiatric/Behavioral: Negative for dysphoric mood and sleep disturbance. The patient is not nervous/anxious.        Objective   Blood pressure 134/69, pulse 66, temperature 97.5 °F (36.4 °C), temperature source Oral, resp. rate 16, height 66\" (167.6 cm), weight 116 lb (52.6 kg), SpO2 100 " %.    Physical Exam   Constitutional: She is oriented to person, place, and time. She appears well-nourished. No distress.   HENT:   Head: Atraumatic.   Right Ear: External ear normal.   Left Ear: External ear normal.   Eyes: Conjunctivae are normal. Right eye exhibits no discharge. Left eye exhibits no discharge.   Neck: Normal range of motion. Neck supple.   Cardiovascular: Normal rate and regular rhythm.    No murmur heard.  Pulmonary/Chest: Effort normal and breath sounds normal. She has no wheezes. She has no rales.   Abdominal: Soft. Bowel sounds are normal. She exhibits no distension. There is no tenderness.   Musculoskeletal:   Right knee tenderness, normal range of motion   Neurological: She is alert and oriented to person, place, and time.   Skin: No rash noted. She is not diaphoretic.   Psychiatric: She has a normal mood and affect.   Nursing note and vitals reviewed.      Assessment/Plan   Anca was seen today for prosthetic joint infection.    Diagnoses and all orders for this visit:    Infection of prosthetic right knee joint  -     Sedimentation Rate; Future  -     C-reactive protein; Future  -     CBC w AUTO Differential; Future  -     Comprehensive Metabolic Panel  -     Sedimentation Rate  -     C-reactive protein  -     CBC w AUTO Differential  -     Comprehensive Metabolic Panel        - Prosthetic right knee infection  Will arrange for PICC line placement and start    Will start Vancomycin IV for a duration of six weeks in combination with Rifampin.  Labs today.  Patient to return in 4 weeks.      Return in about 4 weeks (around 3/28/2017).    There are no Patient Instructions on file for this visit.

## 2017-03-05 ENCOUNTER — RESULTS ENCOUNTER (OUTPATIENT)
Dept: FAMILY MEDICINE CLINIC | Facility: CLINIC | Age: 52
End: 2017-03-05

## 2017-03-05 DIAGNOSIS — T84.53XA INFECTION OF PROSTHETIC RIGHT KNEE JOINT (HCC): ICD-10-CM

## 2017-03-06 ENCOUNTER — OFFICE VISIT (OUTPATIENT)
Dept: ORTHOPEDIC SURGERY | Facility: CLINIC | Age: 52
End: 2017-03-06

## 2017-03-06 VITALS — HEIGHT: 66 IN | RESPIRATION RATE: 18 BRPM | WEIGHT: 118 LBS | BODY MASS INDEX: 18.96 KG/M2

## 2017-03-06 DIAGNOSIS — Z96.651 STATUS POST TOTAL RIGHT KNEE REPLACEMENT USING CEMENT: Primary | ICD-10-CM

## 2017-03-06 DIAGNOSIS — M25.561 ARTHRALGIA OF KNEE, RIGHT: ICD-10-CM

## 2017-03-06 DIAGNOSIS — G57.71 COMPLEX REGIONAL PAIN SYNDROME TYPE 2 OF RIGHT LOWER EXTREMITY: ICD-10-CM

## 2017-03-06 DIAGNOSIS — M51.36 LUMBAR DEGENERATIVE DISC DISEASE: ICD-10-CM

## 2017-03-06 DIAGNOSIS — G62.9 NEUROPATHY: ICD-10-CM

## 2017-03-06 PROCEDURE — 99212 OFFICE O/P EST SF 10 MIN: CPT | Performed by: ORTHOPAEDIC SURGERY

## 2017-03-07 ENCOUNTER — TELEPHONE (OUTPATIENT)
Dept: FAMILY MEDICINE CLINIC | Facility: CLINIC | Age: 52
End: 2017-03-07

## 2017-03-07 ENCOUNTER — HOSPITAL ENCOUNTER (OUTPATIENT)
Dept: OTHER | Age: 52
Discharge: HOME | End: 2017-03-07
Attending: INTERNAL MEDICINE | Admitting: INTERNAL MEDICINE

## 2017-03-07 DIAGNOSIS — I82.622 DEEP VEIN THROMBOSIS (DVT) OF LEFT UPPER EXTREMITY, UNSPECIFIED CHRONICITY, UNSPECIFIED VEIN (HCC): ICD-10-CM

## 2017-03-07 NOTE — TELEPHONE ENCOUNTER
Starla with [facility] called her Vancomycin trough was 27.8 and patient is ahving pain in arm where PICC is per Dr. Harsh hoang for ultrasound of the arm and also hold Vanc for 2 days and have Vanc trough redrawn on Thursday

## 2017-03-09 ENCOUNTER — HOSPITAL ENCOUNTER (OUTPATIENT)
Dept: OTHER | Age: 52
Discharge: HOME | End: 2017-03-09
Attending: INTERNAL MEDICINE | Admitting: INTERNAL MEDICINE

## 2017-03-09 ENCOUNTER — TELEPHONE (OUTPATIENT)
Dept: FAMILY MEDICINE CLINIC | Facility: CLINIC | Age: 52
End: 2017-03-09

## 2017-03-09 LAB
A/G RATIO: 1.3 (ref 0.8–2)
ALBUMIN SERPL-MCNC: 4.3 G/DL (ref 3.4–4.8)
ALP BLD-CCNC: 125 U/L (ref 25–100)
ALT SERPL-CCNC: 15 U/L (ref 4–36)
ANION GAP SERPL CALCULATED.3IONS-SCNC: 10 MMOL/L (ref 3–16)
AST SERPL-CCNC: 20 U/L (ref 8–33)
BILIRUB SERPL-MCNC: <0.2 MG/DL (ref 0.3–1.2)
BUN BLDV-MCNC: 17 MG/DL (ref 6–20)
CALCIUM SERPL-MCNC: 9.9 MG/DL (ref 8.5–10.5)
CHLORIDE BLD-SCNC: 95 MMOL/L (ref 98–107)
CO2: 32 MMOL/L (ref 20–30)
CREAT SERPL-MCNC: 0.9 MG/DL (ref 0.4–1.2)
GFR AFRICAN AMERICAN: >59
GFR NON-AFRICAN AMERICAN: >60
GLOBULIN: 3.3 G/DL
GLUCOSE BLD-MCNC: 84 MG/DL (ref 74–106)
HCT VFR BLD CALC: 41.7 % (ref 37–47)
HEMOGLOBIN: 12.9 G/DL (ref 11.5–16.5)
MCH RBC QN AUTO: 28.3 PG (ref 27–32)
MCHC RBC AUTO-ENTMCNC: 30.9 G/DL (ref 31–35)
MCV RBC AUTO: 91.4 FL (ref 80–100)
PDW BLD-RTO: 13.6 % (ref 11–16)
PLATELET # BLD: 306 K/UL (ref 150–400)
PMV BLD AUTO: 9.1 FL (ref 6–10)
POTASSIUM SERPL-SCNC: 4.5 MMOL/L (ref 3.4–5.1)
RBC # BLD: 4.56 M/UL (ref 3.8–5.8)
SODIUM BLD-SCNC: 137 MMOL/L (ref 136–145)
TOTAL PROTEIN: 7.6 G/DL (ref 6.4–8.3)
VANCOMYCIN TROUGH: 4.8 UG/ML (ref 5–15)
WBC # BLD: 6.2 K/UL (ref 4–11)

## 2017-03-13 ENCOUNTER — TELEPHONE (OUTPATIENT)
Dept: FAMILY MEDICINE CLINIC | Facility: CLINIC | Age: 52
End: 2017-03-13

## 2017-03-13 NOTE — TELEPHONE ENCOUNTER
Patient's Vanc trough was 28.0 BUN 24 discussed with Dr. House patient to hold Vanc for 2 days and recheck level on Wed 3/15   Patient and hospital informed

## 2017-03-14 RX ORDER — GABAPENTIN 400 MG/1
CAPSULE ORAL
Qty: 90 CAPSULE | Refills: 1 | Status: SHIPPED | OUTPATIENT
Start: 2017-03-14 | End: 2017-04-07 | Stop reason: SDUPTHER

## 2017-03-20 ENCOUNTER — OFFICE VISIT (OUTPATIENT)
Dept: ORTHOPEDIC SURGERY | Facility: CLINIC | Age: 52
End: 2017-03-20

## 2017-03-20 VITALS — BODY MASS INDEX: 19.13 KG/M2 | TEMPERATURE: 98 F | HEIGHT: 66 IN | RESPIRATION RATE: 17 BRPM | WEIGHT: 119 LBS

## 2017-03-20 DIAGNOSIS — G62.9 NEUROPATHY: ICD-10-CM

## 2017-03-20 DIAGNOSIS — S86.111A RUPTURE OF RIGHT POSTERIOR TIBIALIS TENDON, INITIAL ENCOUNTER: ICD-10-CM

## 2017-03-20 DIAGNOSIS — Z96.651 STATUS POST TOTAL RIGHT KNEE REPLACEMENT USING CEMENT: ICD-10-CM

## 2017-03-20 DIAGNOSIS — M79.671 RIGHT FOOT PAIN: Primary | ICD-10-CM

## 2017-03-20 DIAGNOSIS — M17.32 POST-TRAUMATIC OSTEOARTHRITIS OF LEFT KNEE: ICD-10-CM

## 2017-03-20 DIAGNOSIS — G57.71 COMPLEX REGIONAL PAIN SYNDROME TYPE 2 OF RIGHT LOWER EXTREMITY: ICD-10-CM

## 2017-03-20 DIAGNOSIS — M51.36 LUMBAR DEGENERATIVE DISC DISEASE: ICD-10-CM

## 2017-03-20 PROCEDURE — 73630 X-RAY EXAM OF FOOT: CPT | Performed by: ORTHOPAEDIC SURGERY

## 2017-03-20 PROCEDURE — 99214 OFFICE O/P EST MOD 30 MIN: CPT | Performed by: ORTHOPAEDIC SURGERY

## 2017-03-20 RX ORDER — LISINOPRIL AND HYDROCHLOROTHIAZIDE 20; 12.5 MG/1; MG/1
TABLET ORAL
Refills: 1 | COMMUNITY
Start: 2017-03-10 | End: 2017-09-20

## 2017-03-20 RX ORDER — OXYCODONE HYDROCHLORIDE 10 MG/1
10 TABLET ORAL EVERY 8 HOURS PRN
Qty: 60 TABLET | Refills: 0 | Status: SHIPPED | OUTPATIENT
Start: 2017-03-20 | End: 2017-04-10 | Stop reason: SDUPTHER

## 2017-03-22 ENCOUNTER — HOSPITAL ENCOUNTER (OUTPATIENT)
Dept: MRI IMAGING | Age: 52
Discharge: HOME | End: 2017-03-22
Attending: ORTHOPAEDIC SURGERY | Admitting: ORTHOPAEDIC SURGERY

## 2017-03-22 DIAGNOSIS — M79.671 RIGHT FOOT PAIN: ICD-10-CM

## 2017-03-22 DIAGNOSIS — S86.111A RUPTURE OF POSTERIOR TIBIALIS TENDON, RIGHT, INITIAL ENCOUNTER: ICD-10-CM

## 2017-03-22 DIAGNOSIS — M79.671 PAIN OF RIGHT FOOT: ICD-10-CM

## 2017-03-28 ENCOUNTER — OFFICE VISIT (OUTPATIENT)
Dept: FAMILY MEDICINE CLINIC | Facility: CLINIC | Age: 52
End: 2017-03-28

## 2017-03-28 VITALS
DIASTOLIC BLOOD PRESSURE: 84 MMHG | HEART RATE: 70 BPM | HEIGHT: 66 IN | OXYGEN SATURATION: 100 % | TEMPERATURE: 97.6 F | WEIGHT: 119 LBS | BODY MASS INDEX: 19.13 KG/M2 | SYSTOLIC BLOOD PRESSURE: 140 MMHG | RESPIRATION RATE: 16 BRPM

## 2017-03-28 DIAGNOSIS — T84.53XA INFECTION OF PROSTHETIC RIGHT KNEE JOINT (HCC): Primary | ICD-10-CM

## 2017-03-28 PROCEDURE — 99213 OFFICE O/P EST LOW 20 MIN: CPT | Performed by: INTERNAL MEDICINE

## 2017-03-29 LAB
ALBUMIN SERPL-MCNC: 4.3 G/DL (ref 3.5–5)
ALBUMIN/GLOB SERPL: 1.2 G/DL (ref 1–2)
ALP SERPL-CCNC: 98 U/L (ref 38–126)
ALT SERPL-CCNC: 18 U/L (ref 13–69)
AST SERPL-CCNC: 22 U/L (ref 15–46)
BASOPHILS # BLD AUTO: 0.03 10*3/MM3 (ref 0–0.2)
BASOPHILS NFR BLD AUTO: 0.6 % (ref 0–2.5)
BILIRUB SERPL-MCNC: 0.5 MG/DL (ref 0.2–1.3)
BUN SERPL-MCNC: 19 MG/DL (ref 7–20)
BUN/CREAT SERPL: 14.6 (ref 7.1–23.5)
CALCIUM SERPL-MCNC: 9.5 MG/DL (ref 8.4–10.2)
CHLORIDE SERPL-SCNC: 99 MMOL/L (ref 98–107)
CK SERPL-CCNC: 101 U/L (ref 30–170)
CO2 SERPL-SCNC: 30 MMOL/L (ref 26–30)
CREAT SERPL-MCNC: 1.3 MG/DL (ref 0.6–1.3)
CRP SERPL-MCNC: 2.3 MG/DL (ref 0–1)
EOSINOPHIL # BLD AUTO: 0.26 10*3/MM3 (ref 0–0.7)
EOSINOPHIL NFR BLD AUTO: 4.9 % (ref 0–7)
ERYTHROCYTE [DISTWIDTH] IN BLOOD BY AUTOMATED COUNT: 12.8 % (ref 11.5–14.5)
ERYTHROCYTE [SEDIMENTATION RATE] IN BLOOD BY WESTERGREN METHOD: 45 MM/HR (ref 0–20)
GLOBULIN SER CALC-MCNC: 3.7 GM/DL
GLUCOSE SERPL-MCNC: 108 MG/DL (ref 74–98)
HCT VFR BLD AUTO: 34 % (ref 37–47)
HGB BLD-MCNC: 10.8 G/DL (ref 12–16)
IMM GRANULOCYTES # BLD: 0.02 10*3/MM3 (ref 0–0.06)
IMM GRANULOCYTES NFR BLD: 0.4 % (ref 0–0.6)
LYMPHOCYTES # BLD AUTO: 1.69 10*3/MM3 (ref 0.6–3.4)
LYMPHOCYTES NFR BLD AUTO: 31.8 % (ref 10–50)
MCH RBC QN AUTO: 28.9 PG (ref 27–31)
MCHC RBC AUTO-ENTMCNC: 31.8 G/DL (ref 30–37)
MCV RBC AUTO: 90.9 FL (ref 81–99)
MONOCYTES # BLD AUTO: 0.43 10*3/MM3 (ref 0–0.9)
MONOCYTES NFR BLD AUTO: 8.1 % (ref 0–12)
NEUTROPHILS # BLD AUTO: 2.88 10*3/MM3 (ref 2–6.9)
NEUTROPHILS NFR BLD AUTO: 54.2 % (ref 37–80)
NRBC BLD AUTO-RTO: 0 /100 WBC (ref 0–0)
PLATELET # BLD AUTO: 222 10*3/MM3 (ref 130–400)
POTASSIUM SERPL-SCNC: 4.8 MMOL/L (ref 3.5–5.1)
PROT SERPL-MCNC: 8 G/DL (ref 6.3–8.2)
RBC # BLD AUTO: 3.74 10*6/MM3 (ref 4.2–5.4)
SODIUM SERPL-SCNC: 137 MMOL/L (ref 137–145)
WBC # BLD AUTO: 5.31 10*3/MM3 (ref 4.8–10.8)

## 2017-03-30 NOTE — PROGRESS NOTES
Subjective   Anca Andrade is a 51 y.o. female.     Chief Complaint   Patient presents with   • Follow-up     Patient here to follow-up on infected knee and IV antibiotics       History of Present Illness   Patient comes in for f/u.  She is currently on Daptomycin for right prosthetic knee infection (4 th week of IV antibiotic therapy).  She still c/o pain on the right knee and ankle.  Denies right knee swelling. No fever, chills.    The following portions of the patient's history were reviewed and updated as appropriate: allergies, current medications, past family history, past medical history, past social history, past surgical history and problem list.    Past Medical History:   Diagnosis Date   • Asthma    • GERD (gastroesophageal reflux disease)    • Gout    • H/O corticosteroid therapy     right knee   • Hypertension    • Osteoarthritis    • Rheumatoid arthritis        Past Surgical History:   Procedure Laterality Date   • CHOLECYSTECTOMY     • HYSTERECTOMY     • KNEE ACL RECONSTRUCTION Left    • KNEE ARTHROSCOPY      Multiple procedures (left x 4 and right x 2)    • OTHER SURGICAL HISTORY      ORIF right forearm   • PARATHYROIDECTOMY     • TOOTH EXTRACTION     • TOTAL KNEE ARTHROPLASTY Right 10/18/2016   • WRIST SURGERY      Left wrist procedure       Current Outpatient Prescriptions on File Prior to Visit   Medication Sig Dispense Refill   • atenolol (TENORMIN) 50 MG tablet Take 0.5 tablets by mouth daily     • estradiol (ESTRACE) 2 MG tablet   12   • fluconazole (DIFLUCAN) 150 MG tablet Take 1 tablet by mouth 1 (One) Time Per Week. 4 tablet 0   • FLUoxetine (PROzac) 10 MG capsule Take 10 mg by mouth daily     • gabapentin (NEURONTIN) 400 MG capsule TAKE 1 CAPSULE BY MOUTH 3 TIMES A DAY. 90 capsule 1   • lisinopril (PRINIVIL,ZESTRIL) 20 MG tablet Take 1 tablet by mouth daily     • lisinopril-hydrochlorothiazide (PRINZIDE,ZESTORETIC) 20-12.5 MG per tablet   1   • oxyCODONE (ROXICODONE) 10 MG tablet  "Take 1 tablet by mouth Every 8 (Eight) Hours As Needed (prn pain). 60 tablet 0     No current facility-administered medications on file prior to visit.        Social History     Social History   • Marital status:      Spouse name: N/A   • Number of children: N/A   • Years of education: N/A     Occupational History   • Not on file.     Social History Main Topics   • Smoking status: Current Every Day Smoker     Packs/day: 0.50     Years: 30.00   • Smokeless tobacco: Never Used   • Alcohol use Yes      Comment: socially   • Drug use: No   • Sexual activity: Defer     Other Topics Concern   • Not on file     Social History Narrative       Review of Systems   Constitutional: Negative for chills, fatigue and fever.   HENT: Negative for congestion, ear pain, rhinorrhea, sinus pressure and sore throat.    Eyes: Negative for visual disturbance.   Respiratory: Negative for cough, chest tightness, shortness of breath and wheezing.    Cardiovascular: Negative for chest pain, palpitations and leg swelling.   Gastrointestinal: Negative for abdominal pain, blood in stool, constipation, diarrhea, nausea and vomiting.   Endocrine: Negative for polydipsia and polyuria.   Genitourinary: Negative for dysuria and hematuria.   Musculoskeletal: Positive for arthralgias. Negative for back pain.        Right knee pain   Skin: Negative for rash.   Neurological: Negative for dizziness, light-headedness, numbness and headaches.   Psychiatric/Behavioral: Negative for dysphoric mood and sleep disturbance. The patient is not nervous/anxious.        Objective   Blood pressure 140/84, pulse 70, temperature 97.6 °F (36.4 °C), temperature source Oral, resp. rate 16, height 66\" (167.6 cm), weight 119 lb (54 kg), SpO2 100 %.    Physical Exam   Constitutional: She is oriented to person, place, and time. She appears well-nourished. No distress.   HENT:   Head: Atraumatic.   Right Ear: External ear normal.   Left Ear: External ear normal.   Eyes: " Conjunctivae are normal. Right eye exhibits no discharge. Left eye exhibits no discharge.   Neck: Normal range of motion. Neck supple.   Cardiovascular: Normal rate and regular rhythm.    No murmur heard.  Pulmonary/Chest: Effort normal and breath sounds normal. She has no wheezes. She has no rales.   Abdominal: Soft. Bowel sounds are normal. She exhibits no distension. There is no tenderness.   Musculoskeletal:   Right knee tenderness    Neurological: She is alert and oriented to person, place, and time.   Skin: No rash noted. She is not diaphoretic.   Psychiatric: She has a normal mood and affect.   Nursing note and vitals reviewed.    Assessment/Plan   Anca was seen today for follow-up.    Diagnoses and all orders for this visit:    Infection of prosthetic right knee joint  -     CBC w AUTO Differential  -     Comprehensive Metabolic Panel  -     CK  -     Sedimentation Rate  -     C-reactive protein    Will complete 6 weeks of IV antibiotic therapy with Daptomycin (Cx grew Staph epidermidis).  Will f/u in 2 weeks.            Return in about 2 weeks (around 4/11/2017).    There are no Patient Instructions on file for this visit.

## 2017-04-10 ENCOUNTER — OFFICE VISIT (OUTPATIENT)
Dept: FAMILY MEDICINE CLINIC | Facility: CLINIC | Age: 52
End: 2017-04-10

## 2017-04-10 VITALS
HEART RATE: 68 BPM | TEMPERATURE: 98.1 F | DIASTOLIC BLOOD PRESSURE: 70 MMHG | WEIGHT: 117 LBS | RESPIRATION RATE: 16 BRPM | HEIGHT: 66 IN | OXYGEN SATURATION: 100 % | SYSTOLIC BLOOD PRESSURE: 124 MMHG | BODY MASS INDEX: 18.8 KG/M2

## 2017-04-10 DIAGNOSIS — T84.53XA INFECTION OF PROSTHETIC RIGHT KNEE JOINT (HCC): Primary | ICD-10-CM

## 2017-04-10 PROCEDURE — 99213 OFFICE O/P EST LOW 20 MIN: CPT | Performed by: INTERNAL MEDICINE

## 2017-04-10 RX ORDER — GABAPENTIN 400 MG/1
400 CAPSULE ORAL 3 TIMES DAILY
Qty: 90 CAPSULE | Refills: 1 | Status: SHIPPED | OUTPATIENT
Start: 2017-04-10 | End: 2017-05-19 | Stop reason: SDUPTHER

## 2017-04-10 RX ORDER — OXYCODONE HYDROCHLORIDE 10 MG/1
10 TABLET ORAL EVERY 12 HOURS PRN
Qty: 40 TABLET | Refills: 0 | Status: SHIPPED | OUTPATIENT
Start: 2017-04-10 | End: 2017-04-17 | Stop reason: SDUPTHER

## 2017-04-10 RX ORDER — DOXYCYCLINE HYCLATE 100 MG
100 TABLET ORAL 2 TIMES DAILY
Qty: 30 TABLET | Refills: 2 | Status: SHIPPED | OUTPATIENT
Start: 2017-04-10 | End: 2017-06-14

## 2017-04-10 RX ORDER — FLUOXETINE HYDROCHLORIDE 20 MG/1
CAPSULE ORAL
Refills: 12 | COMMUNITY
Start: 2017-03-31 | End: 2017-06-14

## 2017-04-11 ENCOUNTER — TELEPHONE (OUTPATIENT)
Dept: ORTHOPEDIC SURGERY | Facility: CLINIC | Age: 52
End: 2017-04-11

## 2017-04-12 NOTE — PROGRESS NOTES
Subjective   Anca Andrade is a 51 y.o. female.     Chief Complaint   Patient presents with   • Follow-up     Patient here to follow-up on right knee joint       History of Present Illness   Patient is here for f/u. Patient has completed 6 weeks of IV antibiotic therapy (Daptomycin) for treatment of right prosthetic knee infection (Synovial fluid cultures grew Staph Epidermidis).  Patient reports resolution of right knee swelling, redness and some improvement of right knee pain.   The following portions of the patient's history were reviewed and updated as appropriate: allergies, current medications, past family history, past medical history, past social history, past surgical history and problem list.    Past Medical History:   Diagnosis Date   • Asthma    • GERD (gastroesophageal reflux disease)    • Gout    • H/O corticosteroid therapy     right knee   • Hypertension    • Osteoarthritis    • Rheumatoid arthritis        Past Surgical History:   Procedure Laterality Date   • CHOLECYSTECTOMY     • HYSTERECTOMY     • KNEE ACL RECONSTRUCTION Left    • KNEE ARTHROSCOPY      Multiple procedures (left x 4 and right x 2)    • OTHER SURGICAL HISTORY      ORIF right forearm   • PARATHYROIDECTOMY     • TOOTH EXTRACTION     • TOTAL KNEE ARTHROPLASTY Right 10/18/2016   • WRIST SURGERY      Left wrist procedure       Current Outpatient Prescriptions on File Prior to Visit   Medication Sig Dispense Refill   • atenolol (TENORMIN) 50 MG tablet Take 0.5 tablets by mouth daily     • estradiol (ESTRACE) 2 MG tablet   12   • gabapentin (NEURONTIN) 400 MG capsule Take 1 capsule by mouth 3 (Three) Times a Day. 90 capsule 1   • lisinopril-hydrochlorothiazide (PRINZIDE,ZESTORETIC) 20-12.5 MG per tablet   1     No current facility-administered medications on file prior to visit.        Social History     Social History   • Marital status:      Spouse name: N/A   • Number of children: N/A   • Years of education: N/A  "    Occupational History   • Not on file.     Social History Main Topics   • Smoking status: Current Every Day Smoker     Packs/day: 0.50     Years: 30.00   • Smokeless tobacco: Never Used   • Alcohol use Yes      Comment: socially   • Drug use: No   • Sexual activity: Defer     Other Topics Concern   • Not on file     Social History Narrative       Review of Systems   Constitutional: Negative for chills, fatigue and fever.   HENT: Negative for congestion, ear pain, rhinorrhea, sinus pressure and sore throat.    Eyes: Negative for visual disturbance.   Respiratory: Negative for cough, chest tightness, shortness of breath and wheezing.    Cardiovascular: Negative for chest pain, palpitations and leg swelling.   Gastrointestinal: Negative for abdominal pain, blood in stool, constipation, diarrhea, nausea and vomiting.   Endocrine: Negative for polydipsia and polyuria.   Genitourinary: Negative for dysuria and hematuria.   Musculoskeletal: Positive for arthralgias. Negative for back pain.        Right knee pain   Skin: Negative for rash.   Neurological: Negative for dizziness, light-headedness, numbness and headaches.   Psychiatric/Behavioral: Negative for dysphoric mood and sleep disturbance. The patient is not nervous/anxious.        Objective   Blood pressure 124/70, pulse 68, temperature 98.1 °F (36.7 °C), temperature source Oral, resp. rate 16, height 66\" (167.6 cm), weight 117 lb (53.1 kg), SpO2 100 %.    Physical Exam   Constitutional: She is oriented to person, place, and time. She appears well-nourished. No distress.   HENT:   Head: Atraumatic.   Right Ear: External ear normal.   Left Ear: External ear normal.   Eyes: Conjunctivae are normal. Right eye exhibits no discharge. Left eye exhibits no discharge.   Neck: Normal range of motion. Neck supple.   Cardiovascular: Normal rate and regular rhythm.    No murmur heard.  Pulmonary/Chest: Effort normal and breath sounds normal. She has no wheezes. She has no " rales.   Abdominal: Soft. Bowel sounds are normal. She exhibits no distension. There is no tenderness.   Musculoskeletal:   Right knee surgical incision healed, no edema or erythema   Neurological: She is alert and oriented to person, place, and time.   Skin: She is not diaphoretic.   Psychiatric: She has a normal mood and affect.   Nursing note and vitals reviewed.    Assessment/Plan   Anca was seen today for follow-up.    Diagnoses and all orders for this visit:    Infection of prosthetic right knee joint    Other orders  -     doxycycline (VIBRAMYICN) 100 MG tablet; Take 1 tablet by mouth 2 (Two) Times a Day.        S/P completion of 6 weeks of IV Daptomycin therapy.  PICC line was removed in the office.  Patient to f/u with Dr. Willis as scheduled.  Return to ID clinic for f/u in 2 weeks.         Return in about 2 weeks (around 4/24/2017).    There are no Patient Instructions on file for this visit.

## 2017-04-17 ENCOUNTER — OFFICE VISIT (OUTPATIENT)
Dept: ORTHOPEDIC SURGERY | Facility: CLINIC | Age: 52
End: 2017-04-17

## 2017-04-17 VITALS — RESPIRATION RATE: 18 BRPM | HEIGHT: 66 IN | WEIGHT: 118 LBS | BODY MASS INDEX: 18.96 KG/M2

## 2017-04-17 DIAGNOSIS — Z96.651 STATUS POST TOTAL RIGHT KNEE REPLACEMENT USING CEMENT: Primary | ICD-10-CM

## 2017-04-17 DIAGNOSIS — M79.671 RIGHT FOOT PAIN: ICD-10-CM

## 2017-04-17 DIAGNOSIS — G62.9 NEUROPATHY: ICD-10-CM

## 2017-04-17 DIAGNOSIS — M79.604 RIGHT LEG PAIN: ICD-10-CM

## 2017-04-17 DIAGNOSIS — G57.71 COMPLEX REGIONAL PAIN SYNDROME TYPE 2 OF RIGHT LOWER EXTREMITY: ICD-10-CM

## 2017-04-17 DIAGNOSIS — M25.561 RIGHT KNEE PAIN, UNSPECIFIED CHRONICITY: Primary | ICD-10-CM

## 2017-04-17 DIAGNOSIS — M51.36 LUMBAR DEGENERATIVE DISC DISEASE: ICD-10-CM

## 2017-04-17 DIAGNOSIS — S86.111A RUPTURE OF RIGHT POSTERIOR TIBIALIS TENDON, INITIAL ENCOUNTER: ICD-10-CM

## 2017-04-17 PROCEDURE — 73560 X-RAY EXAM OF KNEE 1 OR 2: CPT | Performed by: ORTHOPAEDIC SURGERY

## 2017-04-17 PROCEDURE — 99213 OFFICE O/P EST LOW 20 MIN: CPT | Performed by: ORTHOPAEDIC SURGERY

## 2017-04-17 RX ORDER — OXYCODONE HYDROCHLORIDE 10 MG/1
10 TABLET ORAL EVERY 12 HOURS PRN
Qty: 40 TABLET | Refills: 0 | Status: SHIPPED | OUTPATIENT
Start: 2017-04-17 | End: 2017-05-10 | Stop reason: SDUPTHER

## 2017-04-17 RX ORDER — DOXYCYCLINE HYCLATE 100 MG/1
CAPSULE ORAL
Refills: 2 | COMMUNITY
Start: 2017-04-10 | End: 2017-06-14

## 2017-04-17 RX ORDER — OXYCODONE HYDROCHLORIDE 5 MG/1
5 TABLET ORAL DAILY PRN
Qty: 30 TABLET | Refills: 0 | Status: SHIPPED | OUTPATIENT
Start: 2017-04-17 | End: 2017-05-10 | Stop reason: SDUPTHER

## 2017-04-17 NOTE — PROGRESS NOTES
Subjective   Patient ID: Anca Andrade is a 51 y.o. female is here today for orthopaedic evaluation of recovery progress with treatment. and is here today for a treatment planning discussion.  Follow-up and Pain of the Right Knee       History of Present Illness    Progress Note:  Patient reports that with treatments and since the last visit, overall pain is decreased, strength is unchanged, and range of motion is improving.  Patient is currently using pain medication that has been slowly tapered.   Physical therapy for her knee and foot has been effective.  Since last visit, patient has been able to return to regular duty work that started 3-27-17.  She states that has been a form of exercise for her knee being active at work, and fortunately with no increased pain, swelling or difficulty at this point.  Patient does  present with MRI of the right foot for review at today.  Studies reviewed and patient's questions answered.    Past Medical History:   Diagnosis Date   • Asthma    • GERD (gastroesophageal reflux disease)    • Gout    • H/O corticosteroid therapy     right knee   • Hypertension    • Osteoarthritis    • Rheumatoid arthritis         Past Surgical History:   Procedure Laterality Date   • CHOLECYSTECTOMY     • HYSTERECTOMY     • KNEE ACL RECONSTRUCTION Left    • KNEE ARTHROSCOPY      Multiple procedures (left x 4 and right x 2)    • OTHER SURGICAL HISTORY      ORIF right forearm   • PARATHYROIDECTOMY     • TOOTH EXTRACTION     • TOTAL KNEE ARTHROPLASTY Right 10/18/2016   • WRIST SURGERY      Left wrist procedure        Family History   Problem Relation Age of Onset   • Cancer Other    • Other Other      cholesterol problems   • Hyperlipidemia Other    • Cancer Father    • Cancer Brother         Social History     Social History   • Marital status:      Spouse name: N/A   • Number of children: N/A   • Years of education: N/A     Occupational History   • Not on file.     Social History Main  "Topics   • Smoking status: Current Every Day Smoker     Packs/day: 0.50     Years: 30.00   • Smokeless tobacco: Never Used   • Alcohol use Yes      Comment: socially   • Drug use: No   • Sexual activity: Defer     Other Topics Concern   • Not on file     Social History Narrative       Allergies   Allergen Reactions   • Morphine And Related      Headaches due to morphine only, related meds ok   • Penicillins        Review of Systems   Constitutional: Negative for fever.   HENT: Negative for voice change.    Eyes: Negative for visual disturbance.   Respiratory: Negative for shortness of breath.    Cardiovascular: Negative for chest pain.   Gastrointestinal: Negative for abdominal distention and abdominal pain.   Genitourinary: Negative for dysuria.   Musculoskeletal: Positive for arthralgias. Negative for gait problem and joint swelling.   Skin: Negative for rash.   Neurological: Negative for speech difficulty.   Hematological: Does not bruise/bleed easily.   Psychiatric/Behavioral: Negative for confusion.         Objective   Resp 18  Ht 66\" (167.6 cm)  Wt 118 lb (53.5 kg)  BMI 19.05 kg/m2    Physical Exam   Constitutional: She appears well-developed. No distress.   Musculoskeletal:        Right knee: She exhibits no effusion.   Neurological: She is alert. Gait normal.   Skin: Skin is warm and dry. No rash noted. No erythema.   Psychiatric: She has a normal mood and affect. Her speech is normal.   Vitals reviewed.    Right Knee Exam     Tenderness   Right knee tenderness location: anterior.    Range of Motion   Extension: 0   Right knee flexion: 115 degrees.     Muscle Strength     The patient has normal right knee strength.    Tests   Drawer:       Anterior - 1+    Posterior - 2+ (there is a little posterior translation of the rigth proximal tibia that might suggest posterior cruciate ligament insufficiency that can contribute to pain and stiffness in knee flexion.  She is encouraged on strenghening exercised and " we will monitor)  Varus: negative  Valgus: negative  Patellar Apprehension: negative    Other   Erythema: absent  Scars: present (well healed)  Pulse: present  Swelling: none  Other tests: no effusion present        Extremity DVT signs are Negative on physical exam with negative Silvia sign, with no calf pain, with no palpable cords, with no increased pain with passive stretch/extension and with no skin tone change   Neurologic Exam     Mental Status   Attention: normal.   Speech: speech is normal   Level of consciousness: alert  Knowledge: good.     Motor Exam   Overall muscle tone: normal    Gait, Coordination, and Reflexes     Gait  Gait: normal    Right Knee Exam     Muscle Strength   Normal right knee strength        Assessment/Plan   Independent Review of Radiographic Studies:    Indication to evaluate status of prosthesis and joint, and compared with prior imaging, shows no acute fracture or dislocation. Good position and alignment of prosthesis with no radiographic signs of loosening.   Laboratory and Other Studies:  MRI right foot from 3-20-17 shows a partial tear of the posterior tibialis tendon as suspected.  Her right foot has improved gradually with time and therapy.   Medical Decision Making:    Ongoing with residual symptoms.  Continue current management and any additional treatments and workup as outlined in plan.    Procedures  [x]  No procedures were performed in office today.    Anca was seen today for follow-up and pain.    Diagnoses and all orders for this visit:    Status post total right knee replacement using cement    Complex regional pain syndrome type 2 of right lower extremity    Lumbar degenerative disc disease    Rupture of right posterior tibialis tendon, initial encounter    Neuropathy    Right leg pain    Right foot pain    Other orders  -     oxyCODONE (ROXICODONE) 10 MG tablet; Take 1 tablet by mouth Every 12 (Twelve) Hours As Needed (prn pain). NOTICE:  NOT due to be filled  until 4-23-17  -     oxyCODONE (ROXICODONE) 5 MG immediate release tablet; Take 1 tablet by mouth Daily As Needed (prn breakthrough pain).       Regular exercise as tolerated  Orthopedic activities reviewed and patient expressed appreciation  Discussion of orthopedic goals  Risk, benefits, and merits of treatment alternatives reviewed with the patient and questions answered  Physical therapy referral given  Take prescribed medications as instructed only as tolerated  Ice, heat, and/or modalities as beneficial  After care and dental prophylaxis for joint replacement prosthesis    Recommendations/Plan:  Exercise, medications, injections, other patient advice, and return appointment as noted.  Brace: No brace was given at today's visit  Referral: No referrals made at today's visit  Test/Studies: No additional studies ordered.  Surgery: No surgery proposed at this visit.  Work/Activity Status: May perform usual activities as tolerated and Regular duty  Patient is encouraged to call or return for any issues or concerns.     Return in about 3 months (around 7/17/2017) for Recheck.  Patient agreeable to call or return sooner for any concerns.

## 2017-04-23 NOTE — PROGRESS NOTES
"Subjective   Patient ID: Anca Andrade is a 51 y.o. female is here today for follow-up for right total knee replacement.  Follow-up and Pain of the Right Knee        History of Present Illness     Pain controlled: [] no   [x] yes, better in recent weeks.   Medication refill requested: [] no   [x] yes    Patient compliant with instructions: [] no   [x] yes   Other:      Past Medical History:   Diagnosis Date   • Asthma    • GERD (gastroesophageal reflux disease)    • Gout    • H/O corticosteroid therapy     right knee   • Hypertension    • Osteoarthritis    • Rheumatoid arthritis         Past Surgical History:   Procedure Laterality Date   • CHOLECYSTECTOMY     • HYSTERECTOMY     • KNEE ACL RECONSTRUCTION Left    • KNEE ARTHROSCOPY      Multiple procedures (left x 4 and right x 2)    • OTHER SURGICAL HISTORY      ORIF right forearm   • PARATHYROIDECTOMY     • TOOTH EXTRACTION     • TOTAL KNEE ARTHROPLASTY Right 10/18/2016   • WRIST SURGERY      Left wrist procedure       Allergies   Allergen Reactions   • Morphine And Related      Headaches due to morphine only, related meds ok   • Penicillins        Review of Systems   Constitutional: Negative for fever.   Musculoskeletal: Positive for arthralgias. Negative for gait problem.   All other systems reviewed and are negative.      Objective   Resp 18  Ht 66\" (167.6 cm)  Wt 118 lb (53.5 kg)  BMI 19.05 kg/m2      Signs of infection: [x] no                    [] yes   Drainage: [x] no                    [] yes   Incision: [] healing well     [x]healed well   Motor exam intact: [] no                    [x] yes   Neurovascular exam intact: [] no                    [x] yes   Signs of compartment syndrome: [x] no                    [] yes   Signs of DVT: [x] no                    [] yes   Other:      Physical Exam   Constitutional: She appears well-developed. No distress.   Neurological: She is alert.   Skin: Skin is warm and dry. No rash noted. No erythema. "   Psychiatric: She has a normal mood and affect.   Vitals reviewed.    Ortho Exam   Extremity DVT signs are Negative by clinical screen.  Neurologic Exam  Ortho Exam   No acute knee exam changes.  Gait stable.    Assessment/Plan   Independent Review of Radiographic Studies:    No new imaging done today.  Reviewed at a prior visit.  Laboratory and Other Studies:  No new results reviewed today.   Medical Decision Making:    Ongoing with residual symptoms.  Continue current management and any additional treatments and workup as outlined in plan.     Procedures  [x] No procedures were performed in office today.     Anca was seen today for follow-up and pain.    Diagnoses and all orders for this visit:    Status post total right knee replacement using cement    Complex regional pain syndrome type 2 of right lower extremity    Arthralgia of knee, right    Lumbar degenerative disc disease    Neuropathy       Recommendations/Plan:     Brace: [x]not provided        []pt provided with:   Physical therapy: []not at this time    []home-based    [x]outpatient referral   Ultrasound: [x]not ordered         []order given to patient   Labs: [x]not ordered         []order given to patient   Weight Bearing status: [x]Full []WBAT []PWB []NWB []Other   Prescriptions: []None given today  [x]As Noted   Imaging at next appt: []Yes  [x]No           Additional instructions: Patient agreeable to return sooner for any new concern.     Regular exercise as tolerated  Orthopedic activities reviewed and patient expressed appreciation  Discussion of orthopedic goals  Risk, benefits, and merits of treatment alternatives reviewed with the patient and questions answered  Physical therapy referral given  Take prescribed medications as instructed only as tolerated  Work status form completed and provided to patient  Reduced physical activity as appropriate  Ice, heat, and/or modalities as beneficial  After care and dental prophylaxis for joint  replacement prosthesis  Watch for signs and symptoms of infection      Exercise, medications, injections, other patient advice, and return appointment as noted.  Brace: No brace was given at today's visit  Referral: No referrals made at today's visit  Test/Studies: No additional studies ordered.  Surgery: No surgery proposed at this visit.  Work/Activity Status: May perform usual activities as tolerated and No strenuous activity  Patient is encouraged to call or return for any issues or concerns.    Return in about 2 weeks (around 3/20/2017) for Recheck.  Patient agreeable to call or return sooner for any concerns.

## 2017-04-26 ENCOUNTER — OFFICE VISIT (OUTPATIENT)
Dept: ORTHOPEDIC SURGERY | Facility: CLINIC | Age: 52
End: 2017-04-26

## 2017-04-26 VITALS — RESPIRATION RATE: 18 BRPM | BODY MASS INDEX: 18.8 KG/M2 | HEIGHT: 66 IN | WEIGHT: 117 LBS

## 2017-04-26 DIAGNOSIS — Z96.651 STATUS POST TOTAL RIGHT KNEE REPLACEMENT USING CEMENT: Primary | ICD-10-CM

## 2017-04-26 DIAGNOSIS — M17.32 POST-TRAUMATIC OSTEOARTHRITIS OF LEFT KNEE: ICD-10-CM

## 2017-04-26 DIAGNOSIS — S86.111D RUPTURE OF RIGHT POSTERIOR TIBIALIS TENDON, SUBSEQUENT ENCOUNTER: ICD-10-CM

## 2017-04-26 DIAGNOSIS — G62.9 NEUROPATHY: ICD-10-CM

## 2017-04-26 DIAGNOSIS — Z98.890 S/P ACL RECONSTRUCTION: ICD-10-CM

## 2017-04-26 DIAGNOSIS — M51.36 LUMBAR DEGENERATIVE DISC DISEASE: ICD-10-CM

## 2017-04-26 PROCEDURE — 99213 OFFICE O/P EST LOW 20 MIN: CPT | Performed by: ORTHOPAEDIC SURGERY

## 2017-04-26 RX ORDER — MELOXICAM 15 MG/1
15 TABLET ORAL DAILY
Qty: 30 TABLET | Refills: 3 | Status: SHIPPED | OUTPATIENT
Start: 2017-04-26 | End: 2017-06-28 | Stop reason: SDUPTHER

## 2017-05-01 ENCOUNTER — TELEPHONE (OUTPATIENT)
Dept: ORTHOPEDIC SURGERY | Facility: CLINIC | Age: 52
End: 2017-05-01

## 2017-05-05 RX ORDER — METHOCARBAMOL 750 MG/1
750 TABLET, FILM COATED ORAL 3 TIMES DAILY
Qty: 30 TABLET | Refills: 0 | Status: SHIPPED | OUTPATIENT
Start: 2017-05-05 | End: 2017-06-14

## 2017-05-05 RX ORDER — OXYCODONE HYDROCHLORIDE 10 MG/1
10 TABLET ORAL EVERY 12 HOURS PRN
Qty: 40 TABLET | Refills: 0 | Status: CANCELLED | OUTPATIENT
Start: 2017-05-05

## 2017-05-10 RX ORDER — OXYCODONE HYDROCHLORIDE 5 MG/1
5 TABLET ORAL DAILY PRN
Qty: 30 TABLET | Refills: 0
Start: 2017-05-10 | End: 2017-05-31 | Stop reason: SDUPTHER

## 2017-05-10 RX ORDER — OXYCODONE HYDROCHLORIDE 10 MG/1
10 TABLET ORAL EVERY 12 HOURS PRN
Qty: 40 TABLET | Refills: 0
Start: 2017-05-10 | End: 2017-05-31 | Stop reason: SDUPTHER

## 2017-05-19 RX ORDER — GABAPENTIN 400 MG/1
400 CAPSULE ORAL 3 TIMES DAILY
Qty: 90 CAPSULE | Refills: 1 | Status: SHIPPED | OUTPATIENT
Start: 2017-05-19 | End: 2017-06-14 | Stop reason: SDUPTHER

## 2017-05-19 RX ORDER — OXYCODONE HYDROCHLORIDE 5 MG/1
5 TABLET ORAL DAILY
Qty: 30 TABLET | Refills: 0 | OUTPATIENT
Start: 2017-05-19

## 2017-06-01 RX ORDER — OXYCODONE HYDROCHLORIDE 5 MG/1
5 TABLET ORAL DAILY PRN
Qty: 30 TABLET | Refills: 0 | Status: SHIPPED | OUTPATIENT
Start: 2017-06-01 | End: 2017-06-14 | Stop reason: SDUPTHER

## 2017-06-01 RX ORDER — OXYCODONE HYDROCHLORIDE 10 MG/1
10 TABLET ORAL EVERY 12 HOURS PRN
Qty: 40 TABLET | Refills: 0 | Status: SHIPPED | OUTPATIENT
Start: 2017-06-01 | End: 2017-06-14 | Stop reason: SDUPTHER

## 2017-06-14 ENCOUNTER — HOSPITAL ENCOUNTER (OUTPATIENT)
Dept: OTHER | Age: 52
Discharge: HOME | End: 2017-06-14
Attending: ORTHOPAEDIC SURGERY | Admitting: ORTHOPAEDIC SURGERY

## 2017-06-14 ENCOUNTER — OFFICE VISIT (OUTPATIENT)
Dept: ORTHOPEDIC SURGERY | Facility: CLINIC | Age: 52
End: 2017-06-14

## 2017-06-14 VITALS — HEIGHT: 66 IN | BODY MASS INDEX: 18.96 KG/M2 | RESPIRATION RATE: 16 BRPM | WEIGHT: 118 LBS

## 2017-06-14 DIAGNOSIS — G62.9 NEUROPATHY: ICD-10-CM

## 2017-06-14 DIAGNOSIS — M25.561 ARTHRALGIA OF RIGHT KNEE: ICD-10-CM

## 2017-06-14 DIAGNOSIS — M51.36 LUMBAR DEGENERATIVE DISC DISEASE: ICD-10-CM

## 2017-06-14 DIAGNOSIS — M11.20 PSEUDOGOUT: ICD-10-CM

## 2017-06-14 DIAGNOSIS — M17.32 POST-TRAUMATIC OSTEOARTHRITIS OF LEFT KNEE: ICD-10-CM

## 2017-06-14 DIAGNOSIS — G57.71 COMPLEX REGIONAL PAIN SYNDROME TYPE 2 OF RIGHT LOWER EXTREMITY: ICD-10-CM

## 2017-06-14 DIAGNOSIS — Z96.651 STATUS POST TOTAL RIGHT KNEE REPLACEMENT USING CEMENT: Primary | ICD-10-CM

## 2017-06-14 LAB
APPEARANCE FLUID: NORMAL
BASOPHILS ABSOLUTE: 0 K/UL (ref 0–0.1)
BASOPHILS RELATIVE PERCENT: 0.5 %
C-REACTIVE PROTEIN: 20.2 MG/L (ref 0–5.1)
CELL COUNT FLUID TYPE: NORMAL
CLOT EVALUATION: NORMAL
COLOR FLUID: NORMAL
EOSINOPHILS ABSOLUTE: 0.2 K/UL (ref 0–0.4)
EOSINOPHILS RELATIVE PERCENT: 3.6 %
FLUID TYPE: NORMAL
HCT VFR BLD CALC: 36.9 % (ref 37–47)
HEMOGLOBIN: 11.6 G/DL (ref 11.5–16.5)
LYMPHOCYTES ABSOLUTE: 1.6 K/UL (ref 1.5–4)
LYMPHOCYTES RELATIVE PERCENT: 28.7 % (ref 20–40)
LYMPHOCYTES, BODY FLUID: 6 %
MACROPHAGE FLUID: 1 %
MCH RBC QN AUTO: 28.1 PG (ref 27–32)
MCHC RBC AUTO-ENTMCNC: 31.4 G/DL (ref 31–35)
MCV RBC AUTO: 89.3 FL (ref 80–100)
MONOCYTES ABSOLUTE: 0.3 K/UL (ref 0.2–0.8)
MONOCYTES RELATIVE PERCENT: 5.8 % (ref 3–10)
NEUTROPHIL, FLUID: 93 %
NEUTROPHILS ABSOLUTE: 3.4 K/UL (ref 2–7.5)
NEUTROPHILS RELATIVE PERCENT: 61.4 %
NUCLEATED CELLS FLUID: 1250 /CUMM
NUMBER OF CELLS COUNTED FLUID: 100
PDW BLD-RTO: 13.9 % (ref 11–16)
PLATELET # BLD: 253 K/UL (ref 150–400)
PMV BLD AUTO: 9.5 FL (ref 6–10)
RBC # BLD: 4.13 M/UL (ref 3.8–5.8)
RBC FLUID: NORMAL /CUMM
SEDIMENTATION RATE, ERYTHROCYTE: 40 MM/HR (ref 0–20)
URIC ACID, BODY FLUID: 5.9 MG/DL
URIC ACID, SERUM: 6 MG/DL (ref 2.5–7.1)
WBC # BLD: 5.5 K/UL (ref 4–11)

## 2017-06-14 PROCEDURE — 99213 OFFICE O/P EST LOW 20 MIN: CPT | Performed by: ORTHOPAEDIC SURGERY

## 2017-06-14 PROCEDURE — 20610 DRAIN/INJ JOINT/BURSA W/O US: CPT | Performed by: ORTHOPAEDIC SURGERY

## 2017-06-14 RX ORDER — GABAPENTIN 400 MG/1
400 CAPSULE ORAL 3 TIMES DAILY
Qty: 90 CAPSULE | Refills: 1 | Status: SHIPPED | OUTPATIENT
Start: 2017-06-14 | End: 2017-06-28 | Stop reason: SDUPTHER

## 2017-06-14 RX ORDER — OXYCODONE HYDROCHLORIDE 10 MG/1
10 TABLET ORAL EVERY 12 HOURS PRN
Qty: 40 TABLET | Refills: 0 | Status: SHIPPED | OUTPATIENT
Start: 2017-06-14 | End: 2017-06-28 | Stop reason: SDUPTHER

## 2017-06-14 RX ORDER — INDOMETHACIN 50 MG/1
50 CAPSULE ORAL 2 TIMES DAILY WITH MEALS
Qty: 60 CAPSULE | Refills: 0 | Status: SHIPPED | OUTPATIENT
Start: 2017-06-14 | End: 2017-09-29 | Stop reason: HOSPADM

## 2017-06-14 RX ORDER — OXYCODONE HYDROCHLORIDE 5 MG/1
5 TABLET ORAL DAILY PRN
Qty: 30 TABLET | Refills: 0 | Status: SHIPPED | OUTPATIENT
Start: 2017-06-14 | End: 2017-08-25 | Stop reason: SDUPTHER

## 2017-06-14 NOTE — PROGRESS NOTES
Subjective   Patient ID: Anca Andrade is a 51 y.o. female is here today for orthopaedic evaluation of recovery progress with treatment.  Follow-up and Pain of the Right Knee       History of Present Illness    Progress Note:  Patient reports that with treatments and since the last visit, overall pain and swelling are increased, strength is unchanged, and range of motion is unchanged.  Patient is currently using a pain medication regimen with good adherence to prescribing instructions.  Physical therapy has been helpful.  Injection therapy has not been given..   Since last visit, patient has been working regular duty. Patient does not  present with recent labs or studies for review.    Past Medical History:   Diagnosis Date   • Asthma    • GERD (gastroesophageal reflux disease)    • Gout    • H/O corticosteroid therapy     right knee   • Hypertension    • Osteoarthritis    • Rheumatoid arthritis         Past Surgical History:   Procedure Laterality Date   • CHOLECYSTECTOMY     • HYSTERECTOMY     • KNEE ACL RECONSTRUCTION Left    • KNEE ARTHROSCOPY      Multiple procedures (left x 4 and right x 2)    • OTHER SURGICAL HISTORY      ORIF right forearm   • PARATHYROIDECTOMY     • TOOTH EXTRACTION     • TOTAL KNEE ARTHROPLASTY Right 10/18/2016   • WRIST SURGERY      Left wrist procedure        Family History   Problem Relation Age of Onset   • Cancer Other    • Other Other      cholesterol problems   • Hyperlipidemia Other    • Cancer Father    • Cancer Brother         Social History     Social History   • Marital status:      Spouse name: N/A   • Number of children: N/A   • Years of education: N/A     Occupational History   • Not on file.     Social History Main Topics   • Smoking status: Current Every Day Smoker     Packs/day: 0.50     Years: 30.00   • Smokeless tobacco: Never Used   • Alcohol use Yes      Comment: socially   • Drug use: No   • Sexual activity: Not on file     Other Topics Concern   • Not  "on file     Social History Narrative       Allergies   Allergen Reactions   • Morphine And Related      Headaches due to morphine only, related meds ok   • Penicillins        Review of Systems   Constitutional: Negative for fever.   HENT: Negative for voice change.    Eyes: Negative for visual disturbance.   Respiratory: Negative for shortness of breath.    Cardiovascular: Negative for chest pain.   Gastrointestinal: Negative for abdominal distention and abdominal pain.   Genitourinary: Negative for dysuria.   Musculoskeletal: Positive for arthralgias. Negative for gait problem and joint swelling.   Skin: Negative for rash.   Neurological: Negative for speech difficulty.   Hematological: Does not bruise/bleed easily.   Psychiatric/Behavioral: Negative for confusion.   All other systems reviewed and are negative.        Objective   Resp 16  Ht 66\" (167.6 cm)  Wt 118 lb (53.5 kg)  BMI 19.05 kg/m2    Physical Exam   Constitutional: She appears well-developed. No distress.   Musculoskeletal:        Right knee: She exhibits effusion (1+ soft effusion).   Neurological: She is alert.   Skin: Skin is warm and dry. No rash noted. No erythema.   Psychiatric: She has a normal mood and affect. Her speech is normal.   Vitals reviewed.    Right Knee Exam     Tenderness   Right knee tenderness location: diffuse knee pain and can radiate usually proximal to hip and groin.    Range of Motion   Extension: -5 (5 degree extensor lag)   Right knee flexion: 115 degrees.     Tests   Varus: negative  Valgus: negative  Patellar Apprehension: negative    Other   Erythema: absent  Scars: present (well healed pristine)  Pulse: present  Swelling: moderate  Other tests: effusion (1+ soft effusion) present      Back Exam     Muscle Strength   Right Quadriceps:  5/5   Left Quadriceps:  5/5   Right Hamstrings:  5/5   Left Hamstrings:  5/5         Extremity DVT signs are Negative on physical exam with negative Silvia sign, with no calf pain, with " no palpable cords and with no skin tone change   Neurologic Exam     Mental Status   Attention: normal.   Speech: speech is normal   Level of consciousness: alert  Knowledge: good.     Motor Exam   Overall muscle tone: normal    Strength   Right quadriceps: 5/5  Left quadriceps: 5/5  Right hamstrin/5  Left hamstrin/5    Gait, Coordination, and Reflexes     Gait  Gait: (Mild extensor lag right knee limp.  Stable independent ambulation.)    Ortho Exam      Assessment/Plan   Independent Review of Radiographic Studies:    No new imaging done today.  Reviewed at a prior visit.  Laboratory and Other Studies:  New serum labs ordered and ne right knee joint fluid analysis sent for studies.   Medical Decision Making:    Limited progress with persistent and/or progressive symptoms.  Continue current management and any additional treatments and workup as outlined in plan.    Large Joint Arthrocentesis  Date/Time: 2017 9:23 AM  Consent given by: patient  Site marked: site marked  Timeout: Immediately prior to procedure a time out was called to verify the correct patient, procedure, equipment, support staff and site/side marked as required   Supporting Documentation  Indications: pain, joint swelling and diagnostic evaluation   Procedure Details  Location: knee - R knee  Preparation: Patient was prepped and draped in the usual sterile fashion  Needle size: 22 G  Approach: superior  Aspirate amount: 9 mL  Aspirate: yellow and blood-tinged  Analysis: fluid sample sent for laboratory analysis  Patient tolerance: patient tolerated the procedure well with no immediate complications        Anca was seen today for follow-up and pain.    Diagnoses and all orders for this visit:    Status post total right knee replacement using cement  -     C-reactive Protein; Future  -     Sedimentation Rate; Future  -     CBC & Differential; Future  -     Uric Acid; Future  -     Anaerobic Culture  -     Anaerobic culture  -      Culture, Routine; Future  -     Gram Stain  -     Body Fluid Cell Count With Differential  -     indomethacin (INDOCIN) 50 MG capsule; Take 1 capsule by mouth 2 (Two) Times a Day With Meals.  -     Body Fluid Culture  -     Uric Acid, Body Fluid  -     Large Joint Arthrocentesis    Lumbar degenerative disc disease  -     C-reactive Protein; Future  -     Sedimentation Rate; Future  -     CBC & Differential; Future  -     Uric Acid; Future  -     Anaerobic Culture  -     Anaerobic culture  -     Culture, Routine; Future  -     Gram Stain  -     Body Fluid Cell Count With Differential  -     indomethacin (INDOCIN) 50 MG capsule; Take 1 capsule by mouth 2 (Two) Times a Day With Meals.  -     Body Fluid Culture  -     Uric Acid, Body Fluid    Neuropathy  -     C-reactive Protein; Future  -     Sedimentation Rate; Future  -     CBC & Differential; Future  -     Uric Acid; Future  -     Anaerobic Culture  -     Anaerobic culture  -     Culture, Routine; Future  -     Gram Stain  -     Body Fluid Cell Count With Differential  -     indomethacin (INDOCIN) 50 MG capsule; Take 1 capsule by mouth 2 (Two) Times a Day With Meals.  -     Body Fluid Culture  -     Uric Acid, Body Fluid    Post-traumatic osteoarthritis of left knee  -     C-reactive Protein; Future  -     Sedimentation Rate; Future  -     CBC & Differential; Future  -     Uric Acid; Future  -     Anaerobic Culture  -     Anaerobic culture  -     Culture, Routine; Future  -     Gram Stain  -     Body Fluid Cell Count With Differential  -     indomethacin (INDOCIN) 50 MG capsule; Take 1 capsule by mouth 2 (Two) Times a Day With Meals.  -     Body Fluid Culture  -     Uric Acid, Body Fluid    Complex regional pain syndrome type 2 of right lower extremity  -     C-reactive Protein; Future  -     Sedimentation Rate; Future  -     CBC & Differential; Future  -     Uric Acid; Future  -     Anaerobic Culture  -     Anaerobic culture  -     Culture, Routine; Future  -      Gram Stain  -     Body Fluid Cell Count With Differential  -     indomethacin (INDOCIN) 50 MG capsule; Take 1 capsule by mouth 2 (Two) Times a Day With Meals.  -     Body Fluid Culture  -     Uric Acid, Body Fluid    Pseudogout  -     Large Joint Arthrocentesis    Arthralgia of right knee  -     Large Joint Arthrocentesis    Other orders  -     gabapentin (NEURONTIN) 400 MG capsule; Take 1 capsule by mouth 3 (Three) Times a Day.  -     oxyCODONE (ROXICODONE) 10 MG tablet; Take 1 tablet by mouth Every 12 (Twelve) Hours As Needed (prn pain).  -     oxyCODONE (ROXICODONE) 5 MG immediate release tablet; Take 1 tablet by mouth Daily As Needed (prn breakthrough pain).       Regular exercise as tolerated  Orthopedic activities reviewed and patient expressed appreciation  Discussion of orthopedic goals  Risk, benefits, and merits of treatment alternatives reviewed with the patient and questions answered  The nature of the proposed surgery reviewed with the patient including risks, benefits, rehabilitation, recovery timeframe, and outcome expectations  Take prescribed medications as instructed only as tolerated  Ice, heat, and/or modalities as beneficial  Watch for signs and symptoms of infection  Guided on proper techniques for mobility, strength, agility and/or conditioning exercises    Recommendations/Plan:  Exercise, medications, injections, other patient advice, and return appointment as noted.  Brace: No brace was given at today's visit  Referral: No referrals made at today's visit  Test/Studies: Labs serum labs and right knee joint fluid analysis.  Surgery: Surgery proposed at this visit as noted., For intractable painful limiting condition, patient to consider elective surgical options. and Discussed with patient that differential diagnosis includes inflammatory (pseudogout) or infectious process.  Depending on further clinical progress and findings of studies, considerations include one or two-stage revision of knee  replacement.  Grant understood and her questions answered.  Work/Activity Status: May perform usual activities as tolerated and Regular duty  Patient is encouraged to call or return for any issues or concerns.     Return in about 2 weeks (around 6/28/2017) for pre-op right knee.  Patient agreeable to call or return sooner for any concerns.

## 2017-06-20 ENCOUNTER — TELEPHONE (OUTPATIENT)
Dept: ORTHOPEDIC SURGERY | Facility: CLINIC | Age: 52
End: 2017-06-20

## 2017-06-20 NOTE — TELEPHONE ENCOUNTER
Patient called in regarding her lab work, I reviewed them with her and told her we would call her as soon as the others came in.

## 2017-06-23 LAB
CULTURE, JOINT AEROBIC: ABNORMAL
CULTURE, JOINT ANAEROBIC: ABNORMAL
GRAM STAIN RESULT: ABNORMAL
ORGANISM: ABNORMAL

## 2017-06-28 ENCOUNTER — OFFICE VISIT (OUTPATIENT)
Dept: ORTHOPEDIC SURGERY | Facility: CLINIC | Age: 52
End: 2017-06-28

## 2017-06-28 VITALS — RESPIRATION RATE: 16 BRPM | BODY MASS INDEX: 18.96 KG/M2 | HEIGHT: 66 IN | WEIGHT: 118 LBS

## 2017-06-28 DIAGNOSIS — M17.32 POST-TRAUMATIC OSTEOARTHRITIS OF LEFT KNEE: ICD-10-CM

## 2017-06-28 DIAGNOSIS — S86.111D RUPTURE OF RIGHT POSTERIOR TIBIALIS TENDON, SUBSEQUENT ENCOUNTER: ICD-10-CM

## 2017-06-28 DIAGNOSIS — G62.9 NEUROPATHY: ICD-10-CM

## 2017-06-28 DIAGNOSIS — Z98.890 S/P ACL RECONSTRUCTION: ICD-10-CM

## 2017-06-28 DIAGNOSIS — M25.561 ARTHRALGIA OF RIGHT KNEE: ICD-10-CM

## 2017-06-28 DIAGNOSIS — M11.20 PSEUDOGOUT: ICD-10-CM

## 2017-06-28 DIAGNOSIS — Z96.651 STATUS POST TOTAL RIGHT KNEE REPLACEMENT USING CEMENT: Primary | ICD-10-CM

## 2017-06-28 DIAGNOSIS — M51.36 LUMBAR DEGENERATIVE DISC DISEASE: ICD-10-CM

## 2017-06-28 PROCEDURE — 99213 OFFICE O/P EST LOW 20 MIN: CPT | Performed by: ORTHOPAEDIC SURGERY

## 2017-06-28 RX ORDER — GABAPENTIN 400 MG/1
400 CAPSULE ORAL 3 TIMES DAILY
Qty: 90 CAPSULE | Refills: 1 | Status: SHIPPED | OUTPATIENT
Start: 2017-06-28 | End: 2017-08-25 | Stop reason: SDUPTHER

## 2017-06-28 RX ORDER — MELOXICAM 15 MG/1
15 TABLET ORAL DAILY
Qty: 30 TABLET | Refills: 3 | Status: SHIPPED | OUTPATIENT
Start: 2017-06-28 | End: 2017-08-25 | Stop reason: SDUPTHER

## 2017-06-29 RX ORDER — OXYCODONE HYDROCHLORIDE 10 MG/1
10 TABLET ORAL EVERY 12 HOURS PRN
Qty: 40 TABLET | Refills: 0
Start: 2017-06-29 | End: 2017-08-25 | Stop reason: SDUPTHER

## 2017-07-03 ENCOUNTER — HOSPITAL ENCOUNTER (OUTPATIENT)
Dept: OTHER | Age: 52
Discharge: HOME | End: 2017-07-03

## 2017-07-03 DIAGNOSIS — M25.512 ACUTE PAIN OF LEFT SHOULDER: ICD-10-CM

## 2017-07-03 DIAGNOSIS — Z12.39 BREAST CANCER SCREENING: ICD-10-CM

## 2017-07-04 NOTE — PROGRESS NOTES
Subjective   Patient ID: Anca Andrade is a 51 y.o. female is here today for orthopaedic evaluation of recovery progress with treatment. and is here today for a treatment planning discussion.  Follow-up and Pain of the Right Knee       History of Present Illness    Progress Note:  Patient reports that with treatments and since the last visit, overall pain is increased, strength is unchanged, and range of motion is unchanged.  Patient is currently using a multi-modal pain medication regimen.   Physical therapy has been helpful.  Since last visit, patient has been working regular duty. Patient does  present with recent labs or studies knee aspiration labs and cultures) for review.  Studies reviewed and patient's questions answered.    Past Medical History:   Diagnosis Date   • Asthma    • GERD (gastroesophageal reflux disease)    • Gout    • H/O corticosteroid therapy     right knee   • Hypertension    • Osteoarthritis    • Rheumatoid arthritis         Past Surgical History:   Procedure Laterality Date   • CHOLECYSTECTOMY     • HYSTERECTOMY     • KNEE ACL RECONSTRUCTION Left    • KNEE ARTHROSCOPY      Multiple procedures (left x 4 and right x 2)    • OTHER SURGICAL HISTORY      ORIF right forearm   • PARATHYROIDECTOMY     • TOOTH EXTRACTION     • TOTAL KNEE ARTHROPLASTY Right 10/18/2016   • WRIST SURGERY      Left wrist procedure        Family History   Problem Relation Age of Onset   • Cancer Other    • Other Other      cholesterol problems   • Hyperlipidemia Other    • Cancer Father    • Cancer Brother         Social History     Social History   • Marital status:      Spouse name: N/A   • Number of children: N/A   • Years of education: N/A     Occupational History   • Not on file.     Social History Main Topics   • Smoking status: Current Every Day Smoker     Packs/day: 0.50     Years: 30.00   • Smokeless tobacco: Never Used   • Alcohol use Yes      Comment: socially   • Drug use: No   • Sexual  "activity: Not on file     Other Topics Concern   • Not on file     Social History Narrative       Allergies   Allergen Reactions   • Morphine And Related      Headaches due to morphine only, related meds ok   • Penicillins        Review of Systems   Constitutional: Negative for fever.   HENT: Negative for voice change.    Eyes: Negative for visual disturbance.   Respiratory: Negative for shortness of breath.    Cardiovascular: Negative for chest pain.   Gastrointestinal: Negative for abdominal distention and abdominal pain.   Genitourinary: Negative for dysuria.   Musculoskeletal: Positive for arthralgias. Negative for gait problem and joint swelling.   Skin: Negative for rash.   Neurological: Negative for speech difficulty.   Hematological: Does not bruise/bleed easily.   Psychiatric/Behavioral: Negative for confusion.         Objective   Resp 16  Ht 66\" (167.6 cm)  Wt 118 lb (53.5 kg)  BMI 19.05 kg/m2    Physical Exam   Constitutional: She appears well-developed. No distress.   Neurological: She is alert.   Skin: Skin is warm and dry. No rash noted. No erythema.   Psychiatric: She has a normal mood and affect. Her speech is normal.   Vitals reviewed.    Right Knee Exam     Tenderness   Right knee tenderness location: diffuse knee and leg pain.    Range of Motion   Right knee extension: 2-3 degrees lack of extension.   Right knee flexion: 116 degrees active knee flexion.     Muscle Strength     The patient has normal right knee strength.    Tests   Drawer:       Anterior - 1+    Posterior - 2+  Varus: negative  Valgus: negative  Patellar Apprehension: negative    Other   Erythema: absent  Scars: present (well healed)  Sensation: decreased  Pulse: present  Swelling: mild      Left Knee Exam     Tenderness   The patient is experiencing no tenderness.         Range of Motion   Extension: 0   Left knee flexion: 135 degrees active flexion.     Muscle Strength     The patient has normal left knee strength.    Other "   Erythema: absent  Scars: present (well healed)  Pulse: present  Swelling: none      Back Exam     Muscle Strength   Right Quadriceps:  5/5   Left Quadriceps:  5/5   Right Hamstrings:  5/5   Left Hamstrings:  5/5         Extremity DVT signs are Negative on physical exam with negative Silvia sign, with no calf pain, with no palpable cords and with no skin tone change   Neurologic Exam     Mental Status   Attention: normal.   Speech: speech is normal   Level of consciousness: alert  Knowledge: good.     Motor Exam   Overall muscle tone: normal    Strength   Right quadriceps: 5/5  Left quadriceps: 5/5  Right hamstrin/5  Left hamstrin/5    Gait, Coordination, and Reflexes     Gait  Gait: (antalgic right knee limp)    Left Knee Exam     Muscle Strength   Normal left knee strength    Right Knee Exam     Muscle Strength   Normal right knee strength            Assessment/Plan   Independent Review of Radiographic Studies:    No new imaging done today.  Reviewed at a prior visit.  Laboratory and Other Studies:  Recent results were stable and reviewed with patient.  Laboratory results show no signs of infection or acute process.  Cultures were negative.  17 serum labs:   WBC 5.5 with no shift, H/H 11.6/36.9, Platelets 253,000     ESR 40 (0 - 20 normal), CRP 20.2 (0 - 5.1 normal)     Uric acid 6.0.  17 R knee joint fluid:  WBC 1,250 (93% neutrophils, 6% lymphocytes, 1% macrophage)           Gram stain 1+ WBC and no organisms.            Aerobic culture - No growth at 48 hours,         Holding in broth for 3 weeks and pending.            Anaerobic culture - No growth final.  Note:  cultures to be also held in broth for 3 weeks and these results still pending.   Medical Decision Making:    Limited progress with persistent and/or progressive symptoms.  Continue current management and any additional treatments and workup as outlined in plan.  Discussed relative risks, benefits and merits of revision knee  arthroplasty and her questions answered.  She is agreeable to proceed with a repeat three phase bone scan, and if unchanged or worsened, then consideration to proceed with single stage knee revision to modular implant, likely with an AS or PS design, and likely with vancomycin antibiotic cement,  If the bone scan is normal, and given the lab results and negative cultures, reviewed with patient that it is usually not advised to proceed with revision knee replacement when the only finding is chronic pain.  Given her history of lumbar disc degenerative changes and neuropathy the symptom complex can be due to multiple factors.  We also again reviewed the option of proceeding at any time with a second opinion orthopaedic surgery evaluation.    Procedures  [x]  No procedures were performed in office today.    Anca was seen today for follow-up and pain.    Diagnoses and all orders for this visit:    Status post total right knee replacement using cement  -     NM Bone Scan 3 Phase    Arthralgia of right knee    Lumbar degenerative disc disease    Neuropathy    Pseudogout    Post-traumatic osteoarthritis of left knee    S/P ACL reconstruction  Comments:  left knee    Rupture of right posterior tibialis tendon, subsequent encounter    Other orders  -     meloxicam (MOBIC) 15 MG tablet; Take 1 tablet by mouth Daily.  -     gabapentin (NEURONTIN) 400 MG capsule; Take 1 capsule by mouth 3 (Three) Times a Day.  -     oxyCODONE (ROXICODONE) 10 MG tablet; Take 1 tablet by mouth Every 12 (Twelve) Hours As Needed (prn pain).       Regular exercise as tolerated  Orthopedic activities reviewed and patient expressed appreciation  Discussion of orthopedic goals  Risk, benefits, and merits of treatment alternatives reviewed with the patient and questions answered  The nature of the proposed surgery reviewed with the patient including risks, benefits, rehabilitation, recovery timeframe, and outcome expectations  Take prescribed  medications as instructed only as tolerated  Ice, heat, and/or modalities as beneficial  After care and dental prophylaxis for joint replacement prosthesis  Guided on proper techniques for mobility, strength, agility and/or conditioning exercises    Recommendations/Plan:  Exercise, medications, injections, other patient advice, and return appointment as noted.  Brace: No brace was given at today's visit  Referral: Option of second opinion orthopaedic surgery evaluation.  Test/Studies: Three phase bone scan  of the lumbar, hips and knees.  Surgery: Surgery proposed at this visit as noted. and For intractable painful limiting condition, patient to consider elective surgical options.  Work/Activity Status: May perform usual activities as tolerated, No strenuous activity. and Regular duty  Patient is encouraged to call or return for any issues or concerns.     Return in about 4 weeks (around 7/26/2017) for Pre-op one-stage knee revision, Recheck.  Patient agreeable to call or return sooner for any concerns.

## 2017-07-05 ENCOUNTER — LAB REQUISITION (OUTPATIENT)
Dept: LAB | Facility: HOSPITAL | Age: 52
End: 2017-07-05

## 2017-07-05 DIAGNOSIS — C44.619 BASAL CELL CARCINOMA OF SKIN OF LEFT UPPER LIMB, INCLUDING SHOULDER: ICD-10-CM

## 2017-07-05 PROCEDURE — 88305 TISSUE EXAM BY PATHOLOGIST: CPT | Performed by: SURGERY

## 2017-07-06 ENCOUNTER — HOSPITAL ENCOUNTER (OUTPATIENT)
Dept: NUCLEAR MEDICINE | Facility: HOSPITAL | Age: 52
Discharge: HOME | End: 2017-07-06
Attending: ORTHOPAEDIC SURGERY

## 2017-07-06 LAB
CYTO UR: NORMAL
LAB AP CASE REPORT: NORMAL
LAB AP CLINICAL INFORMATION: NORMAL
Lab: NORMAL
PATH REPORT.FINAL DX SPEC: NORMAL
PATH REPORT.GROSS SPEC: NORMAL

## 2017-07-06 PROCEDURE — A9503 TC99M MEDRONATE: HCPCS | Performed by: ORTHOPAEDIC SURGERY

## 2017-07-06 PROCEDURE — 0 TECHNETIUM MEDRONATE KIT: Performed by: ORTHOPAEDIC SURGERY

## 2017-07-06 PROCEDURE — 78315 BONE IMAGING 3 PHASE: CPT

## 2017-07-06 RX ORDER — TC 99M MEDRONATE 20 MG/10ML
24.3 INJECTION, POWDER, LYOPHILIZED, FOR SOLUTION INTRAVENOUS
Status: COMPLETED | OUTPATIENT
Start: 2017-07-06 | End: 2017-07-06

## 2017-07-06 RX ADMIN — TC 99M MEDRONATE 24.3 MILLICURIE: 20 INJECTION, POWDER, LYOPHILIZED, FOR SOLUTION INTRAVENOUS at 10:20

## 2017-07-10 ENCOUNTER — TELEPHONE (OUTPATIENT)
Dept: ORTHOPEDIC SURGERY | Facility: CLINIC | Age: 52
End: 2017-07-10

## 2017-07-11 ENCOUNTER — HOSPITAL ENCOUNTER (OUTPATIENT)
Dept: GENERAL RADIOLOGY | Age: 52
Discharge: HOME | End: 2017-07-11

## 2017-07-11 DIAGNOSIS — R92.8 ABNORMAL MAMMOGRAM, UNSPECIFIED: ICD-10-CM

## 2017-07-11 DIAGNOSIS — R92.8 OTHER ABNORMAL AND INCONCLUSIVE FINDINGS ON DIAGNOSTIC IMAGING OF BREAST: ICD-10-CM

## 2017-07-12 NOTE — TELEPHONE ENCOUNTER
April,    I called the patient this morning, we reviewed the bone scan result and discussed treatment options for her knee and the option of second opinion evaluation.  She plans to return for her next appointment at our [facility] in two weeks.    Danny

## 2017-07-26 ENCOUNTER — OFFICE VISIT (OUTPATIENT)
Dept: ORTHOPEDIC SURGERY | Facility: CLINIC | Age: 52
End: 2017-07-26

## 2017-07-26 VITALS — BODY MASS INDEX: 18.96 KG/M2 | WEIGHT: 118 LBS | RESPIRATION RATE: 16 BRPM | HEIGHT: 66 IN

## 2017-07-26 DIAGNOSIS — M51.36 LUMBAR DEGENERATIVE DISC DISEASE: ICD-10-CM

## 2017-07-26 DIAGNOSIS — M17.32 POST-TRAUMATIC OSTEOARTHRITIS OF LEFT KNEE: ICD-10-CM

## 2017-07-26 DIAGNOSIS — Z96.651 STATUS POST TOTAL RIGHT KNEE REPLACEMENT USING CEMENT: ICD-10-CM

## 2017-07-26 DIAGNOSIS — G62.9 NEUROPATHY: ICD-10-CM

## 2017-07-26 DIAGNOSIS — M25.561 ARTHRALGIA OF RIGHT KNEE: Primary | ICD-10-CM

## 2017-07-26 DIAGNOSIS — Z98.890 S/P ACL RECONSTRUCTION: ICD-10-CM

## 2017-07-26 DIAGNOSIS — M11.20 PSEUDOGOUT: ICD-10-CM

## 2017-07-26 PROCEDURE — 99212 OFFICE O/P EST SF 10 MIN: CPT | Performed by: ORTHOPAEDIC SURGERY

## 2017-07-26 RX ORDER — LISINOPRIL AND HYDROCHLOROTHIAZIDE 12.5; 1 MG/1; MG/1
TABLET ORAL
Refills: 3 | COMMUNITY
Start: 2017-07-14 | End: 2017-08-25

## 2017-07-26 RX ORDER — IBUPROFEN 800 MG/1
TABLET ORAL
Refills: 3 | COMMUNITY
Start: 2017-07-14 | End: 2017-08-25

## 2017-07-26 NOTE — PROGRESS NOTES
"Anca Andrade is a 51 y.o. {16TC Arm Dominance:89286} female referred by [unfilled]  {16TC Visit Reason H&P:72646::\"is here today for a discussion of treatment plans, surgery, and rehabilitation.\"}  Follow-up, Pain, and Pre-op Exam of the Right Knee       History of Present Illness      {16TC HPI:06988}    PAST MEDICAL HISTORY:    Past Medical History:   Diagnosis Date   • Asthma    • GERD (gastroesophageal reflux disease)    • Gout    • H/O corticosteroid therapy     right knee   • Hypertension    • Osteoarthritis    • Rheumatoid arthritis          Current Outpatient Prescriptions:   •  atenolol (TENORMIN) 50 MG tablet, Take 0.5 tablets by mouth daily, Disp: , Rfl:   •  estradiol (ESTRACE) 2 MG tablet, , Disp: , Rfl: 12  •  gabapentin (NEURONTIN) 400 MG capsule, Take 1 capsule by mouth 3 (Three) Times a Day., Disp: 90 capsule, Rfl: 1  •  ibuprofen (ADVIL,MOTRIN) 800 MG tablet, , Disp: , Rfl: 3  •  indomethacin (INDOCIN) 50 MG capsule, Take 1 capsule by mouth 2 (Two) Times a Day With Meals., Disp: 60 capsule, Rfl: 0  •  lisinopril-hydrochlorothiazide (PRINZIDE,ZESTORETIC) 10-12.5 MG per tablet, , Disp: , Rfl: 3  •  lisinopril-hydrochlorothiazide (PRINZIDE,ZESTORETIC) 20-12.5 MG per tablet, , Disp: , Rfl: 1  •  meloxicam (MOBIC) 15 MG tablet, Take 1 tablet by mouth Daily., Disp: 30 tablet, Rfl: 3  •  oxyCODONE (ROXICODONE) 10 MG tablet, Take 1 tablet by mouth Every 12 (Twelve) Hours As Needed (prn pain)., Disp: 40 tablet, Rfl: 0  •  oxyCODONE (ROXICODONE) 5 MG immediate release tablet, Take 1 tablet by mouth Daily As Needed (prn breakthrough pain)., Disp: 30 tablet, Rfl: 0    Past Surgical History:   Procedure Laterality Date   • CHOLECYSTECTOMY     • HYSTERECTOMY     • KNEE ACL RECONSTRUCTION Left    • KNEE ARTHROSCOPY      Multiple procedures (left x 4 and right x 2)    • OTHER SURGICAL HISTORY      ORIF right forearm   • PARATHYROIDECTOMY     • TOOTH EXTRACTION     • TOTAL KNEE ARTHROPLASTY Right " "10/18/2016   • WRIST SURGERY      Left wrist procedure       Family History   Problem Relation Age of Onset   • Cancer Other    • Other Other      cholesterol problems   • Hyperlipidemia Other    • Cancer Father    • Cancer Brother        Social History     Social History   • Marital status:      Spouse name: N/A   • Number of children: N/A   • Years of education: N/A     Occupational History   • Not on file.     Social History Main Topics   • Smoking status: Current Every Day Smoker     Packs/day: 0.50     Years: 30.00   • Smokeless tobacco: Never Used   • Alcohol use Yes      Comment: socially   • Drug use: No   • Sexual activity: Not on file     Other Topics Concern   • Not on file     Social History Narrative        Allergies   Allergen Reactions   • Morphine And Related      Headaches due to morphine only, related meds ok   • Penicillins        Review of Systems   Constitutional: Negative for fever.   HENT: Negative for voice change.    Eyes: Negative for visual disturbance.   Respiratory: Negative for shortness of breath.    Cardiovascular: Negative for chest pain.   Gastrointestinal: Negative for abdominal distention and abdominal pain.   Genitourinary: Negative for dysuria.   Musculoskeletal: Positive for arthralgias. Negative for gait problem and joint swelling.   Skin: Negative for rash.   Neurological: Negative for speech difficulty.   Hematological: Does not bruise/bleed easily.   Psychiatric/Behavioral: Negative for confusion.   All other systems reviewed and are negative.      PHYSICAL EXAMINATION:       Resp 16  Ht 66\" (167.6 cm)  Wt 118 lb (53.5 kg)  BMI 19.05 kg/m2    GENERAL [x] Well developed  []Ill appearing [x] No acute distress    HEENT [x]No acute changes [x] Normocephalic, atraumatic    NECK [x]Supple  [] No midline tenderness    LUNGS [x]Clear bilaterally [x]No wheezes []Rhonchi [] Rales    HEART [x] Regular rate  [x] Regular rhythm [] Irregular    ABDOMEN [x] Soft [x] Not tender " "[x] Not distended [x] Normal sounds    VAS/EXT [x] Normal Pulses []Edema []Cyanosis             SKIN [x] Warm [x]Dry []Pink []Ecchymosis []Cool    NEURO [x] Sensation Intact [x] Motor Intact [x] Pulse intact  [] Dysesthesias:_________  []Weakness:__________    MUSCULOSKELETAL []          Physical Exam  Ortho Exam  Extremity DVT signs are {[facility] Silvia:77723}   Neurologic Exam  Ortho Exam      DVT Screening: No Yes   Smoker [] []   Personal/Family History of DVT or PE [] []   Sedentary Lifestyle [] []   Overweight [] []      {[facility] TXA Screen:57547}    Previous or Active DVT/PE: NO  Cardiac Stent within 1 year: NO  Embolic/Ischemic stroke within 1 year: NO  Creatinine less than 30ml/min: NO  Congenital Thrombophilia: NO  Hypersensitivity to TXA: NO  Color Blindness: NO  NOTE:  TXA  tranexemic acid summary: THIS PATIENT IS A CANDIDATE FOR IV TXA DURING SURGERY.    Independent Review of Radiographic Studies:    {TC Imagin}  Laboratory and Other Studies:  {[facility] Lab Studies:40638}   Medical Decision Making:    {[facility] Clinical Progress, Decision Making, and Outcome:94164}        *SPECIAL INSTRUCTIONS:  ***      Risks, benefits, and alternative treatments discussed with the patient: [x] Yes [] No    {[facility] Risk Benefit:15873::\"Risk benefits and merits of the proposed surgery were discussed and the patient's questions were answered risks discussed including and not limited to:\",\"Anesthesia reactions\",\"Infection\",\"Deep venous thrombosis and pulmonary embolus\",\"Nerve, vascular or tendon injury\",\"Fracture\",\"Deformity\",\"Stiffness\",\"Weakness\",\"Skin necrosis\",\"Revision surgery or further treatment\",\"Recurrence of problem and condition\"}     Informed consent: [] Signed  [x] To be obtained at hospital  [] Both    There are no diagnoses linked to this encounter.     Recommendations/Plan:    {16TC Patient Advice:70581::\"Regular exercise as tolerated\",\"Orthopedic activities reviewed and patient expressed appreciation\",\"Discussion of " "orthopedic goals\",\"Risk, benefits, and merits of treatment alternatives reviewed with the patient and questions answered\"}    {16TC Recommendations/Plan:26780::\"Exercise, medications, injections, other patient advice, and return appointment as noted.\",\"Patient is encouraged to call or return for any issues or concerns.\"}    PLANNED SURGICAL PROCEDURE: ***    No Follow-up on file.  "

## 2017-07-31 NOTE — PROGRESS NOTES
Subjective   Patient ID: Anca Andrade is a 51 y.o. female is here today for orthopaedic evaluation of recovery progress with treatment. and is here today for a treatment planning discussion.  Follow-up and Pain of the Right Knee       History of Present Illness    She reports that her work situation call for her to if possible defer her knee revision surgery until a few months from now to reach 1250 hours worked since her prior temporary disability period.  She states with the current symptoms and treatment she believes she can manage with her knee and work until that time.  As such her history and physical planned for today is deferred until 10-11-17 and she would like to have knee revision surgery on 10-24-17.    Past Medical History:   Diagnosis Date   • Asthma    • GERD (gastroesophageal reflux disease)    • Gout    • H/O corticosteroid therapy     right knee   • Hypertension    • Osteoarthritis    • Rheumatoid arthritis         Past Surgical History:   Procedure Laterality Date   • CHOLECYSTECTOMY     • HYSTERECTOMY     • KNEE ACL RECONSTRUCTION Left    • KNEE ARTHROSCOPY      Multiple procedures (left x 4 and right x 2)    • OTHER SURGICAL HISTORY      ORIF right forearm   • PARATHYROIDECTOMY     • TOOTH EXTRACTION     • TOTAL KNEE ARTHROPLASTY Right 10/18/2016   • WRIST SURGERY      Left wrist procedure        Family History   Problem Relation Age of Onset   • Cancer Other    • Other Other      cholesterol problems   • Hyperlipidemia Other    • Cancer Father    • Cancer Brother         Social History     Social History   • Marital status:      Spouse name: N/A   • Number of children: N/A   • Years of education: N/A     Occupational History   • Not on file.     Social History Main Topics   • Smoking status: Current Every Day Smoker     Packs/day: 0.50     Years: 30.00   • Smokeless tobacco: Never Used   • Alcohol use Yes      Comment: socially   • Drug use: No   • Sexual activity: Not on file  "    Other Topics Concern   • Not on file     Social History Narrative       Allergies   Allergen Reactions   • Morphine And Related      Headaches due to morphine only, related meds ok   • Penicillins        Review of Systems   Constitutional: Negative for fever.   HENT: Negative for voice change.    Eyes: Negative for visual disturbance.   Respiratory: Negative for shortness of breath.    Cardiovascular: Negative for chest pain.   Gastrointestinal: Negative for abdominal distention and abdominal pain.   Genitourinary: Negative for dysuria.   Musculoskeletal: Positive for arthralgias, back pain, joint swelling (mild right knee) and myalgias. Negative for gait problem.   Skin: Negative for rash.   Neurological: Negative for speech difficulty.   Hematological: Does not bruise/bleed easily.   Psychiatric/Behavioral: Negative for confusion.         Objective   Resp 16  Ht 66\" (167.6 cm)  Wt 118 lb (53.5 kg)  BMI 19.05 kg/m2    Physical Exam   Constitutional: She appears well-developed. No distress.   Neurological: She is alert.   Skin: Skin is warm and dry. No rash noted. No erythema.   Psychiatric: She has a normal mood and affect. Her speech is normal.   Vitals reviewed.    Ortho Exam  Neurologic Exam     Mental Status   Attention: normal.   Speech: speech is normal   Level of consciousness: alert  Knowledge: good.     Motor Exam   Overall muscle tone: normal    Gait, Coordination, and Reflexes     Gait  Gait: (capable stable independent ambulation with mild right knee limp)    Ortho Exam      Assessment/Plan   Independent Review of Radiographic Studies:    No new imaging done today.  Reviewed at a prior visit.  Three phase bone scan with increased uptake that can represent post-operative change within first year after surgery, though cannot exclude bone stress reaction, loosening or infection.  Agree with radiologist interpretation and advises correlation with plain radiographs.  In my review of the serial plain " radiographs, do not note any evident changes of implant loosening or infection.   Laboratory and Other Studies:  Reviewed cultures held in broth for three weeks that did grow trace Staph epidermidis sensitive to vancomycin.  Question of false positive versus actual low grade infection.  Patient with no fever and stable WBC count on multiple prior evaluations, and prior WBC count in joint was low, however inflammatory markers mildly elevated.  Reviewed complexity of result findings and she is encouraged to monitor for fever or any other signs of knee infection to return sooner.  Also reviewed plan for per- and perioperative IV Vancomycin and intra-operative Vancomycin antibiotic pulsed irrigation and use of Vancomycin knee cement to thoroughly address any potential residual infection.  Also reviewed single stage revision versus two-stage revision alternatives and evidence that supports both approaches.  Given lower virulent Staph epi only identified trace in broth, and without any further signs of infection (such and continued afebrile and no leucocytosis of blood or knee joint, then can proceed with single stage revision.  Patient also naturally would much prefer one operation.   Medical Decision Making:    Ongoing with residual symptoms.  Continue current management and any additional treatments and workup as outlined in plan.    Procedures  [x]  No procedures were performed in office today.    Anca was seen today for follow-up and pain.    Diagnoses and all orders for this visit:    Arthralgia of right knee    Status post total right knee replacement using cement    Lumbar degenerative disc disease    Neuropathy    Pseudogout    Post-traumatic osteoarthritis of left knee    S/P ACL reconstruction  Comments:  left knee       Regular exercise as tolerated  Orthopedic activities reviewed and patient expressed appreciation  Discussion of orthopedic goals  Risk, benefits, and merits of treatment alternatives  reviewed with the patient and questions answered  The nature of the proposed surgery reviewed with the patient including risks, benefits, rehabilitation, recovery timeframe, and outcome expectations  After care and dental prophylaxis for joint replacement prosthesis  Watch for signs and symptoms of infection  Guided on proper techniques for mobility, strength, agility and/or conditioning exercises    Recommendations/Plan:  Exercise, medications, injections, other patient advice, and return appointment as noted.  Brace: No brace was given at today's visit  Referral: No referrals made at today's visit  Test/Studies: No additional studies ordered.  Surgery: Surgery proposed at this visit as noted.  Work/Activity Status: May perform usual activities as tolerated and Regular duty  Patient is encouraged to call or return for any issues or concerns.     Return in about 11 weeks (around 10/11/2017) for Pre-op revision R TKR, Recheck.  Patient agreeable to call or return sooner for any concerns.

## 2017-08-11 ENCOUNTER — HOSPITAL ENCOUNTER (OUTPATIENT)
Dept: OTHER | Age: 52
Discharge: HOME | End: 2017-08-11

## 2017-08-25 ENCOUNTER — OFFICE VISIT (OUTPATIENT)
Dept: ORTHOPEDIC SURGERY | Facility: CLINIC | Age: 52
End: 2017-08-25

## 2017-08-25 VITALS — WEIGHT: 116.3 LBS | BODY MASS INDEX: 18.69 KG/M2 | RESPIRATION RATE: 16 BRPM | HEIGHT: 66 IN

## 2017-08-25 DIAGNOSIS — M25.561 ARTHRALGIA OF RIGHT KNEE: ICD-10-CM

## 2017-08-25 DIAGNOSIS — M75.42 IMPINGEMENT SYNDROME OF LEFT SHOULDER: ICD-10-CM

## 2017-08-25 DIAGNOSIS — Z96.651 STATUS POST TOTAL RIGHT KNEE REPLACEMENT USING CEMENT: ICD-10-CM

## 2017-08-25 DIAGNOSIS — S86.111D RUPTURE OF RIGHT POSTERIOR TIBIALIS TENDON, SUBSEQUENT ENCOUNTER: ICD-10-CM

## 2017-08-25 DIAGNOSIS — M11.20 PSEUDOGOUT: ICD-10-CM

## 2017-08-25 DIAGNOSIS — M17.32 POST-TRAUMATIC OSTEOARTHRITIS OF LEFT KNEE: ICD-10-CM

## 2017-08-25 DIAGNOSIS — Z01.818 PRE-OP EVALUATION: ICD-10-CM

## 2017-08-25 DIAGNOSIS — M51.36 LUMBAR DEGENERATIVE DISC DISEASE: ICD-10-CM

## 2017-08-25 DIAGNOSIS — Z98.890 S/P ACL RECONSTRUCTION: ICD-10-CM

## 2017-08-25 DIAGNOSIS — M25.512 ARTHRALGIA OF LEFT SHOULDER REGION: Primary | ICD-10-CM

## 2017-08-25 DIAGNOSIS — M79.10 MYALGIA: ICD-10-CM

## 2017-08-25 DIAGNOSIS — Z01.818 PRE-OP TESTING: ICD-10-CM

## 2017-08-25 DIAGNOSIS — M79.604 PAIN OF RIGHT LOWER EXTREMITY: ICD-10-CM

## 2017-08-25 DIAGNOSIS — G62.9 NEUROPATHY: ICD-10-CM

## 2017-08-25 DIAGNOSIS — M75.82 ROTATOR CUFF TENDINITIS, LEFT: ICD-10-CM

## 2017-08-25 PROCEDURE — 93000 ELECTROCARDIOGRAM COMPLETE: CPT | Performed by: INTERNAL MEDICINE

## 2017-08-25 PROCEDURE — 99213 OFFICE O/P EST LOW 20 MIN: CPT | Performed by: ORTHOPAEDIC SURGERY

## 2017-08-25 RX ORDER — OXYCODONE HYDROCHLORIDE 5 MG/1
5 TABLET ORAL DAILY PRN
Qty: 21 TABLET | Refills: 0 | Status: SHIPPED | OUTPATIENT
Start: 2017-08-25 | End: 2017-09-12 | Stop reason: SDUPTHER

## 2017-08-25 RX ORDER — GABAPENTIN 400 MG/1
400 CAPSULE ORAL 3 TIMES DAILY
Qty: 90 CAPSULE | Refills: 1 | Status: SHIPPED | OUTPATIENT
Start: 2017-08-25 | End: 2017-10-11 | Stop reason: SDUPTHER

## 2017-08-25 RX ORDER — MELOXICAM 15 MG/1
15 TABLET ORAL DAILY
Qty: 30 TABLET | Refills: 3 | Status: SHIPPED | OUTPATIENT
Start: 2017-08-25 | End: 2018-02-09 | Stop reason: SDUPTHER

## 2017-08-25 RX ORDER — OXYCODONE HYDROCHLORIDE 10 MG/1
10 TABLET ORAL EVERY 12 HOURS PRN
Qty: 42 TABLET | Refills: 0 | Status: SHIPPED | OUTPATIENT
Start: 2017-08-25 | End: 2017-09-18 | Stop reason: SDUPTHER

## 2017-08-25 NOTE — PROGRESS NOTES
Subjective   Patient ID: Anca Andrade is a 51 y.o. right hand dominant female is being seen for orthopaedic evaluation today for left shoulder pain Pain of the Left Shoulder         Upper Extremity Issue   This is a chronic problem. The current episode started more than 1 month ago (no injury or trauma, left shoulder pain for about 4 months.). The problem occurs intermittently. The problem has been gradually worsening. Associated symptoms include arthralgias. Pertinent negatives include no abdominal pain, chest pain, fever, joint swelling or rash. The symptoms are aggravated by stress (straightening, reaching, lifting). She has tried rest for the symptoms. The treatment provided mild relief.       Pain Score: 7  Pain Location: Shoulder  Pain Orientation: Left  Pain Descriptors: Aching, Radiating  Pain Frequency: Intermittent  Pain Onset: Gradual  Date Pain First Started: 05/14/17  Aggravating Factors: Straightening  Pain Intervention(s): Rest  Result of Injury: No  Work-Related Injury: No         Past Medical History:   Diagnosis Date   • Asthma    • GERD (gastroesophageal reflux disease)    • Gout    • H/O corticosteroid therapy     right knee   • Hypertension    • Osteoarthritis    • Rheumatoid arthritis         Past Surgical History:   Procedure Laterality Date   • CHOLECYSTECTOMY     • HYSTERECTOMY     • KNEE ACL RECONSTRUCTION Left    • KNEE ARTHROSCOPY      Multiple procedures (left x 4 and right x 2)    • OTHER SURGICAL HISTORY      ORIF right forearm   • PARATHYROIDECTOMY     • TOOTH EXTRACTION     • TOTAL KNEE ARTHROPLASTY Right 10/18/2016   • WRIST SURGERY      Left wrist procedure       Family History   Problem Relation Age of Onset   • Cancer Other    • Other Other      cholesterol problems   • Hyperlipidemia Other    • Cancer Father    • Cancer Brother        Social History     Social History   • Marital status:      Spouse name: N/A   • Number of children: N/A   • Years of education:  "N/A     Occupational History   • Not on file.     Social History Main Topics   • Smoking status: Current Every Day Smoker     Packs/day: 0.50     Years: 30.00   • Smokeless tobacco: Never Used   • Alcohol use Yes      Comment: socially   • Drug use: No   • Sexual activity: Not on file     Other Topics Concern   • Not on file     Social History Narrative       Allergies   Allergen Reactions   • Morphine And Related      Headaches due to morphine only, related meds ok   • Penicillins        Review of Systems   Constitutional: Positive for activity change. Negative for fever.   HENT: Negative for voice change.    Eyes: Negative for visual disturbance.   Respiratory: Negative for shortness of breath.    Cardiovascular: Negative for chest pain.   Gastrointestinal: Negative for abdominal distention and abdominal pain.   Genitourinary: Negative for dysuria.   Musculoskeletal: Positive for arthralgias. Negative for gait problem and joint swelling.   Skin: Negative for rash.   Neurological: Negative for speech difficulty.   Hematological: Does not bruise/bleed easily.   Psychiatric/Behavioral: Negative for confusion.   All other systems reviewed and are negative.      Objective   Resp 16  Ht 66\" (167.6 cm)  Wt 116 lb 4.8 oz (52.8 kg)  BMI 18.77 kg/m2   Physical Exam   Constitutional: She appears well-developed. No distress.   Musculoskeletal: She exhibits no deformity.   Neurological: She is alert.   Skin: Skin is warm and dry. No rash noted. No erythema.   Psychiatric: She has a normal mood and affect.   Vitals reviewed.    Right Shoulder Exam     Tenderness   The patient is experiencing no tenderness.        Range of Motion   Active Abduction: 150   Forward Flexion: 160   External Rotation: 90   Internal Rotation 0 degrees: T6     Muscle Strength   The patient has normal right shoulder strength.      Left Shoulder Exam     Tenderness   The patient is experiencing tenderness in the acromion and biceps tendon.    Range of " Motion   Active Abduction: 130   Forward Flexion: 140   External Rotation: 80   Internal Rotation 0 degrees: T12     Muscle Strength   Abduction: 4/5   Internal Rotation: 5/5   External Rotation: 5/5   Biceps: 5/5     Tests   Apprehension: negative  Cross Arm: negative  Drop Arm: negative  Hawkin's test: positive  Impingement: positive    Other   Erythema: absent  Sensation: normal  Pulse: present         Extremity DVT signs are Negative by clinical screen.   Neurologic Exam   Right Shoulder Exam     Muscle Strength   Normal right shoulder strength         Assessment/Plan   Independent Review of Radiographic Studies:    Reviewed outside MRI from [facility] that shows rotator cuff tendinitis without tear, mild subacromial bursitis, long head of biceps intact, irregularity of anterosuperior labrum consistent with small degenerative tear.  No acute fracture or bone edema.  Laboratory and Other Studies:  No new results reviewed today.   Medical Decision Making:    Ongoing with residual symptoms.  Continue current management and any additional treatments and workup as outlined in plan.    Procedures  [x] No procedures were performed in office today.     Anca was seen today for pain.    Diagnoses and all orders for this visit:    Arthralgia of left shoulder region    Impingement syndrome of left shoulder    Rotator cuff tendinitis, left    Arthralgia of right knee    Status post total right knee replacement using cement    Lumbar degenerative disc disease    Neuropathy    Pseudogout    Post-traumatic osteoarthritis of left knee    S/P ACL reconstruction  Comments:  left    Rupture of right posterior tibialis tendon, subsequent encounter    Other orders  -     gabapentin (NEURONTIN) 400 MG capsule; Take 1 capsule by mouth 3 (Three) Times a Day.  -     oxyCODONE (ROXICODONE) 10 MG tablet; Take 1 tablet by mouth Every 12 (Twelve) Hours As Needed (prn pain).  -     oxyCODONE (ROXICODONE) 5 MG immediate release  tablet; Take 1 tablet by mouth Daily As Needed (prn breakthrough pain).  -     meloxicam (MOBIC) 15 MG tablet; Take 1 tablet by mouth Daily.       Regular exercise as tolerated  Orthopedic activities reviewed and patient expressed appreciation  Discussion of orthopedic goals  Risk, benefits, and merits of treatment alternatives reviewed with the patient and questions answered  Take prescribed medications as instructed only as tolerated  Reduced physical activity as appropriate  Avoid offending activity  Ice, heat, and/or modalities as beneficial  Guided on proper techniques for mobility, strength, agility and/or conditioning exercises  Discussed use of topical creams for pain    Recommendations/Plan:  Exercise, medications, injections, other patient advice, and return appointment as noted.  Brace: No brace was given at today's visit  Referral: No referrals made at today's visit  Test/Studies: No additional studies ordered.  Surgery: No surgery proposed at this visit.  Work/Activity Status: May perform usual activities as tolerated and No strenuous activity.  Patient is encouraged to call or return for any issues or concerns.    Return in about 4 weeks (around 9/22/2017) for Pro-op revision R TKR.  Patient agreeable to call or return sooner for any concerns.

## 2017-09-12 RX ORDER — OXYCODONE HYDROCHLORIDE 5 MG/1
5 TABLET ORAL DAILY PRN
Qty: 21 TABLET | Refills: 0 | Status: SHIPPED | OUTPATIENT
Start: 2017-09-12 | End: 2017-09-29 | Stop reason: HOSPADM

## 2017-09-15 ENCOUNTER — TELEPHONE (OUTPATIENT)
Dept: ORTHOPEDIC SURGERY | Facility: CLINIC | Age: 52
End: 2017-09-15

## 2017-09-18 RX ORDER — OXYCODONE HYDROCHLORIDE 10 MG/1
10 TABLET ORAL EVERY 12 HOURS PRN
Qty: 30 TABLET | Refills: 0 | Status: SHIPPED | OUTPATIENT
Start: 2017-09-18 | End: 2017-09-29 | Stop reason: HOSPADM

## 2017-09-20 ENCOUNTER — OFFICE VISIT (OUTPATIENT)
Dept: ORTHOPEDIC SURGERY | Facility: CLINIC | Age: 52
End: 2017-09-20

## 2017-09-20 VITALS — BODY MASS INDEX: 18.64 KG/M2 | HEIGHT: 66 IN | WEIGHT: 116 LBS | TEMPERATURE: 97.7 F | RESPIRATION RATE: 16 BRPM

## 2017-09-20 DIAGNOSIS — M25.461 EFFUSION OF RIGHT KNEE: Primary | ICD-10-CM

## 2017-09-20 DIAGNOSIS — Z96.651 STATUS POST TOTAL RIGHT KNEE REPLACEMENT USING CEMENT: ICD-10-CM

## 2017-09-20 DIAGNOSIS — M17.32 POST-TRAUMATIC OSTEOARTHRITIS OF LEFT KNEE: ICD-10-CM

## 2017-09-20 DIAGNOSIS — M11.20 PSEUDOGOUT: ICD-10-CM

## 2017-09-20 DIAGNOSIS — S86.111D RUPTURE OF RIGHT POSTERIOR TIBIALIS TENDON, SUBSEQUENT ENCOUNTER: ICD-10-CM

## 2017-09-20 DIAGNOSIS — M75.42 IMPINGEMENT SYNDROME OF LEFT SHOULDER: ICD-10-CM

## 2017-09-20 DIAGNOSIS — Z98.890 S/P ACL RECONSTRUCTION: ICD-10-CM

## 2017-09-20 DIAGNOSIS — G62.9 NEUROPATHY: ICD-10-CM

## 2017-09-20 DIAGNOSIS — M25.561 ARTHRALGIA OF RIGHT KNEE: ICD-10-CM

## 2017-09-20 DIAGNOSIS — M51.36 LUMBAR DEGENERATIVE DISC DISEASE: ICD-10-CM

## 2017-09-20 LAB
APPEARANCE FLD: ABNORMAL
COLOR FLD: ABNORMAL
CRYSTALS FLD MICRO: NORMAL
GLUCOSE FLD-MCNC: NORMAL MG/DL
GRAM STN SPEC: NORMAL
GRAM STN SPEC: NORMAL
MONOS+MACROS NFR FLD: 8 %
NEUTROPHILS NFR FLD MANUAL: 92 %
PROT FLD-MCNC: NORMAL G/DL
RBC # FLD AUTO: ABNORMAL /MM3 (ref 0–200000)
WBC # FLD: ABNORMAL /MM3 (ref 0–1000)

## 2017-09-20 PROCEDURE — 89051 BODY FLUID CELL COUNT: CPT | Performed by: ORTHOPAEDIC SURGERY

## 2017-09-20 PROCEDURE — 87070 CULTURE OTHR SPECIMN AEROBIC: CPT | Performed by: ORTHOPAEDIC SURGERY

## 2017-09-20 PROCEDURE — 87205 SMEAR GRAM STAIN: CPT | Performed by: ORTHOPAEDIC SURGERY

## 2017-09-20 PROCEDURE — 87075 CULTR BACTERIA EXCEPT BLOOD: CPT | Performed by: ORTHOPAEDIC SURGERY

## 2017-09-20 PROCEDURE — 82945 GLUCOSE OTHER FLUID: CPT | Performed by: ORTHOPAEDIC SURGERY

## 2017-09-20 PROCEDURE — 84157 ASSAY OF PROTEIN OTHER: CPT | Performed by: ORTHOPAEDIC SURGERY

## 2017-09-20 PROCEDURE — 87015 SPECIMEN INFECT AGNT CONCNTJ: CPT | Performed by: ORTHOPAEDIC SURGERY

## 2017-09-20 PROCEDURE — 89060 EXAM SYNOVIAL FLUID CRYSTALS: CPT | Performed by: ORTHOPAEDIC SURGERY

## 2017-09-20 PROCEDURE — 99213 OFFICE O/P EST LOW 20 MIN: CPT | Performed by: ORTHOPAEDIC SURGERY

## 2017-09-20 RX ORDER — IBUPROFEN 800 MG/1
800 TABLET ORAL EVERY 6 HOURS PRN
COMMUNITY
Start: 2017-09-13 | End: 2017-09-29 | Stop reason: HOSPADM

## 2017-09-20 RX ORDER — LISINOPRIL AND HYDROCHLOROTHIAZIDE 12.5; 1 MG/1; MG/1
1 TABLET ORAL DAILY
Status: ON HOLD | COMMUNITY
Start: 2017-09-13 | End: 2021-04-27 | Stop reason: SDUPTHER

## 2017-09-20 NOTE — PROGRESS NOTES
Subjective   Patient ID: Anca Andrade is a 51 y.o. right hand dominant female is here today for orthopaedic evaluation of recovery progress with treatment. and is here today for a treatment planning discussion.  Follow-up and Edema of the Right Knee       History of Present Illness    Progress Note:  Patient reports that with treatments and since the last visit, overall pain is      , strength is      , and range of motion is        Patient is currently using medication      .   Physical therapy       been        Injection therapy       been      .   Since last visit, patient has been       Patient       present with recent labs or studies for review.           Past Medical History:   Diagnosis Date   • Asthma    • GERD (gastroesophageal reflux disease)    • Gout    • H/O corticosteroid therapy     right knee   • Hypertension    • Osteoarthritis    • Rheumatoid arthritis         Past Surgical History:   Procedure Laterality Date   • CHOLECYSTECTOMY     • HYSTERECTOMY     • KNEE ACL RECONSTRUCTION Left    • KNEE ARTHROSCOPY      Multiple procedures (left x 4 and right x 2)    • OTHER SURGICAL HISTORY      ORIF right forearm   • PARATHYROIDECTOMY     • TOOTH EXTRACTION     • TOTAL KNEE ARTHROPLASTY Right 10/18/2016   • WRIST SURGERY      Left wrist procedure        Family History   Problem Relation Age of Onset   • Cancer Other    • Other Other      cholesterol problems   • Hyperlipidemia Other    • Cancer Father    • Cancer Brother         Social History     Social History   • Marital status:      Spouse name: N/A   • Number of children: N/A   • Years of education: N/A     Occupational History   • Not on file.     Social History Main Topics   • Smoking status: Current Every Day Smoker     Packs/day: 0.50     Years: 30.00   • Smokeless tobacco: Never Used   • Alcohol use Yes      Comment: socially   • Drug use: No   • Sexual activity: Not on file     Other Topics Concern   • Not on file     Social  "History Narrative       Allergies   Allergen Reactions   • Morphine And Related      Headaches due to morphine only, related meds ok   • Penicillins        Review of Systems   Constitutional: Negative for fever.   HENT: Negative for voice change.    Eyes: Negative for visual disturbance.   Respiratory: Negative for shortness of breath.    Cardiovascular: Negative for chest pain.   Gastrointestinal: Negative for abdominal distention and abdominal pain.   Genitourinary: Negative for dysuria.   Musculoskeletal: Positive for arthralgias. Negative for gait problem and joint swelling.   Skin: Negative for rash.   Neurological: Negative for speech difficulty.   Hematological: Does not bruise/bleed easily.   Psychiatric/Behavioral: Negative for confusion.   All other systems reviewed and are negative.        Objective   Resp 16  Ht 66\" (167.6 cm)  Wt 116 lb (52.6 kg)  BMI 18.72 kg/m2    Physical Exam  Ortho Exam  Extremity DVT signs are Negative by clinical screen.   Neurologic Exam  Ortho Exam      Assessment/Plan   Independent Review of Radiographic Studies:    { Imagin}  Laboratory and Other Studies:  {[facility] Lab Studies:97951}   Medical Decision Making:    {[facility] Clinical Progress, Decision Making, and Outcome:61367}    Procedures  []  No procedures were performed in office today.    There are no diagnoses linked to this encounter.   {[facility] Patient Advice:17792::\"Regular exercise as tolerated\",\"Orthopedic activities reviewed and patient expressed appreciation\",\"Discussion of orthopedic goals\",\"Risk, benefits, and merits of treatment alternatives reviewed with the patient and questions answered\"}    Recommendations/Plan:  {[facility] Recommendations/Plan:64017::\"Exercise, medications, injections, other patient advice, and return appointment as noted.\",\"Patient is encouraged to call or return for any issues or concerns.\"}     No Follow-up on file.  Patient agreeable to call or return sooner for any concerns.     "

## 2017-09-22 ENCOUNTER — HOSPITAL ENCOUNTER (OUTPATIENT)
Dept: GENERAL RADIOLOGY | Facility: HOSPITAL | Age: 52
Discharge: HOME | End: 2017-09-22
Attending: ORTHOPAEDIC SURGERY | Admitting: ORTHOPAEDIC SURGERY

## 2017-09-22 ENCOUNTER — APPOINTMENT (OUTPATIENT)
Dept: PREADMISSION TESTING | Facility: HOSPITAL | Age: 52
End: 2017-09-22
Attending: ORTHOPAEDIC SURGERY

## 2017-09-22 VITALS
SYSTOLIC BLOOD PRESSURE: 101 MMHG | WEIGHT: 116 LBS | BODY MASS INDEX: 18.64 KG/M2 | DIASTOLIC BLOOD PRESSURE: 43 MMHG | HEIGHT: 66 IN

## 2017-09-22 PROBLEM — M25.512 ARTHRALGIA OF LEFT SHOULDER REGION: Status: ACTIVE | Noted: 2017-09-22

## 2017-09-22 PROBLEM — M11.20 PSEUDOGOUT: Status: ACTIVE | Noted: 2017-09-22

## 2017-09-22 PROBLEM — Z96.651 STATUS POST TOTAL RIGHT KNEE REPLACEMENT USING CEMENT: Status: ACTIVE | Noted: 2017-09-22

## 2017-09-22 PROBLEM — M75.80 ROTATOR CUFF TENDINITIS: Status: ACTIVE | Noted: 2017-09-22

## 2017-09-22 PROBLEM — M75.42 IMPINGEMENT SYNDROME OF LEFT SHOULDER: Status: ACTIVE | Noted: 2017-09-22

## 2017-09-22 PROBLEM — G62.9 NEUROPATHY: Status: ACTIVE | Noted: 2017-09-22

## 2017-09-22 PROBLEM — Z01.818 PRE-OP TESTING: Status: ACTIVE | Noted: 2017-09-22

## 2017-09-22 PROBLEM — Z98.890 S/P ACL RECONSTRUCTION: Status: ACTIVE | Noted: 2017-09-22

## 2017-09-22 PROBLEM — M79.10 MYALGIA: Status: ACTIVE | Noted: 2017-09-22

## 2017-09-22 PROBLEM — M17.32 POST-TRAUMATIC OSTEOARTHRITIS OF LEFT KNEE: Status: ACTIVE | Noted: 2017-09-22

## 2017-09-22 PROBLEM — M25.561 ARTHRALGIA OF RIGHT KNEE: Status: ACTIVE | Noted: 2017-09-22

## 2017-09-22 PROBLEM — S86.111A: Status: ACTIVE | Noted: 2017-09-22

## 2017-09-22 PROBLEM — M51.36 LUMBAR DEGENERATIVE DISC DISEASE: Status: ACTIVE | Noted: 2017-09-22

## 2017-09-22 PROBLEM — Z01.818 PRE-OP EVALUATION: Status: ACTIVE | Noted: 2017-09-22

## 2017-09-22 PROBLEM — M79.604 PAIN OF RIGHT LOWER EXTREMITY: Status: ACTIVE | Noted: 2017-09-22

## 2017-09-22 LAB
ALBUMIN SERPL-MCNC: 4.3 G/DL (ref 3.5–5)
ALBUMIN/GLOB SERPL: 1.2 G/DL (ref 1–2)
ALP SERPL-CCNC: 114 U/L (ref 38–126)
ALT SERPL W P-5'-P-CCNC: 29 U/L (ref 13–69)
ANION GAP SERPL CALCULATED.3IONS-SCNC: 16.1 MMOL/L
APTT PPP: 27.1 SECONDS (ref 25–36)
AST SERPL-CCNC: 23 U/L (ref 15–46)
BACTERIA UR QL AUTO: ABNORMAL /HPF
BASOPHILS # BLD AUTO: 0.05 10*3/MM3 (ref 0–0.2)
BASOPHILS NFR BLD AUTO: 0.7 % (ref 0–2.5)
BILIRUB SERPL-MCNC: 0.3 MG/DL (ref 0.2–1.3)
BILIRUB UR QL STRIP: NEGATIVE
BUN BLD-MCNC: 22 MG/DL (ref 7–20)
BUN/CREAT SERPL: 20 (ref 7.1–23.5)
CALCIUM SPEC-SCNC: 9.4 MG/DL (ref 8.4–10.2)
CHLORIDE SERPL-SCNC: 100 MMOL/L (ref 98–107)
CLARITY UR: CLEAR
CO2 SERPL-SCNC: 30 MMOL/L (ref 26–30)
COLOR UR: YELLOW
CREAT BLD-MCNC: 1.1 MG/DL (ref 0.6–1.3)
DEPRECATED RDW RBC AUTO: 48.5 FL (ref 37–54)
EOSINOPHIL # BLD AUTO: 0.33 10*3/MM3 (ref 0–0.7)
EOSINOPHIL NFR BLD AUTO: 4.7 % (ref 0–7)
ERYTHROCYTE [DISTWIDTH] IN BLOOD BY AUTOMATED COUNT: 14.6 % (ref 11.5–14.5)
GFR SERPL CREATININE-BSD FRML MDRD: 52 ML/MIN/1.73
GLOBULIN UR ELPH-MCNC: 3.5 GM/DL
GLUCOSE BLD-MCNC: 87 MG/DL (ref 74–98)
GLUCOSE UR STRIP-MCNC: NEGATIVE MG/DL
HCT VFR BLD AUTO: 35.2 % (ref 37–47)
HGB BLD-MCNC: 10.9 G/DL (ref 12–16)
HGB UR QL STRIP.AUTO: NEGATIVE
HYALINE CASTS UR QL AUTO: ABNORMAL /LPF
IMM GRANULOCYTES # BLD: 0.02 10*3/MM3 (ref 0–0.06)
IMM GRANULOCYTES NFR BLD: 0.3 % (ref 0–0.6)
INR PPP: 1 (ref 0.9–1.1)
KETONES UR QL STRIP: NEGATIVE
LEUKOCYTE ESTERASE UR QL STRIP.AUTO: ABNORMAL
LYMPHOCYTES # BLD AUTO: 1.8 10*3/MM3 (ref 0.6–3.4)
LYMPHOCYTES NFR BLD AUTO: 25.8 % (ref 10–50)
MCH RBC QN AUTO: 27.7 PG (ref 27–31)
MCHC RBC AUTO-ENTMCNC: 31 G/DL (ref 30–37)
MCV RBC AUTO: 89.6 FL (ref 81–99)
MONOCYTES # BLD AUTO: 0.38 10*3/MM3 (ref 0–0.9)
MONOCYTES NFR BLD AUTO: 5.4 % (ref 0–12)
NEUTROPHILS # BLD AUTO: 4.41 10*3/MM3 (ref 2–6.9)
NEUTROPHILS NFR BLD AUTO: 63.1 % (ref 37–80)
NITRITE UR QL STRIP: NEGATIVE
NRBC BLD MANUAL-RTO: 0 /100 WBC (ref 0–0)
PH UR STRIP.AUTO: 5.5 [PH] (ref 5–8)
PLATELET # BLD AUTO: 260 10*3/MM3 (ref 130–400)
PMV BLD AUTO: 9.9 FL (ref 6–12)
POTASSIUM BLD-SCNC: 4.1 MMOL/L (ref 3.5–5.1)
PROT SERPL-MCNC: 7.8 G/DL (ref 6.3–8.2)
PROT UR QL STRIP: NEGATIVE
PROTHROMBIN TIME: 11 SECONDS (ref 9.3–12.1)
RBC # BLD AUTO: 3.93 10*6/MM3 (ref 4.2–5.4)
RBC # UR: ABNORMAL /HPF
REF LAB TEST METHOD: ABNORMAL
SODIUM BLD-SCNC: 142 MMOL/L (ref 137–145)
SP GR UR STRIP: 1.02 (ref 1–1.03)
SQUAMOUS #/AREA URNS HPF: ABNORMAL /HPF
UROBILINOGEN UR QL STRIP: ABNORMAL
WBC NRBC COR # BLD: 6.99 10*3/MM3 (ref 4.8–10.8)
WBC UR QL AUTO: ABNORMAL /HPF

## 2017-09-22 PROCEDURE — 87081 CULTURE SCREEN ONLY: CPT | Performed by: ORTHOPAEDIC SURGERY

## 2017-09-22 PROCEDURE — 85730 THROMBOPLASTIN TIME PARTIAL: CPT | Performed by: ORTHOPAEDIC SURGERY

## 2017-09-22 PROCEDURE — 71020 HC CHEST PA AND LATERAL: CPT

## 2017-09-22 PROCEDURE — 81001 URINALYSIS AUTO W/SCOPE: CPT | Performed by: ORTHOPAEDIC SURGERY

## 2017-09-22 PROCEDURE — 87086 URINE CULTURE/COLONY COUNT: CPT | Performed by: ORTHOPAEDIC SURGERY

## 2017-09-22 PROCEDURE — 85610 PROTHROMBIN TIME: CPT | Performed by: ORTHOPAEDIC SURGERY

## 2017-09-22 PROCEDURE — 85025 COMPLETE CBC W/AUTO DIFF WBC: CPT | Performed by: ORTHOPAEDIC SURGERY

## 2017-09-22 PROCEDURE — 80053 COMPREHEN METABOLIC PANEL: CPT | Performed by: ORTHOPAEDIC SURGERY

## 2017-09-22 PROCEDURE — 36415 COLL VENOUS BLD VENIPUNCTURE: CPT | Performed by: ORTHOPAEDIC SURGERY

## 2017-09-22 RX ORDER — SODIUM CHLORIDE 0.9 % (FLUSH) 0.9 %
1-10 SYRINGE (ML) INJECTION AS NEEDED
Status: CANCELLED | OUTPATIENT
Start: 2017-09-22

## 2017-09-22 ASSESSMENT — KOOS JR: KOOS JR SCORE: 20

## 2017-09-24 LAB
BACTERIA FLD CULT: NO GROWTH
BACTERIA SPEC AEROBE CULT: NO GROWTH
BACTERIA SPEC ANAEROBE CULT: NO GROWTH

## 2017-09-25 LAB — MRSA SPEC QL CULT: NORMAL

## 2017-09-25 NOTE — PROGRESS NOTES
Anca Andrade is a 51 y.o.  female is here today for a discussion of treatment plans, surgery, and rehabilitation.  Follow-up and Edema of the Right Knee       History of Present Illness      Patient is planned for upcoming knee replacement revision for early loosening versus inflammation (pseudogout) versus infectious process of knee.  We recently considered single stage knee revision with use of thorough antibiotic pulse lavage, modular components and vancomycin antibiotic cement to fix knee components.  Recently she notes newer increased swelling and warmth that is more distal and medial below the right knee joint line when compared with prior knee swelling.  No skin changes.  We reviewed potential for infectious process of knee not well established with prior work-up and knee aspiration studies.  Recommended repeat knee aspiration that was performed today, and if any further signs of infection noted, consideration of two stage right knee replacement revision (initially with an antibiotic knee articulating spacer) rather than one stage knee revision and as we have also discussed these treatment options before and her questions answered, she understands the knee treatment options well.    PAST MEDICAL HISTORY:    Past Medical History:   Diagnosis Date   • Asthma    • Body piercing     BILATERAL EARS   • Depression    • GERD (gastroesophageal reflux disease)    • H/O corticosteroid therapy     right knee   • History of being tatooed    • Hypertension    • Osteoarthritis    • Pseudogout    • Rheumatoid arthritis    • Skin cancer     LEFT SHOULDER         Current Outpatient Prescriptions:   •  atenolol (TENORMIN) 50 MG tablet, Take 0.5 tablets by mouth daily, Disp: , Rfl:   •  gabapentin (NEURONTIN) 400 MG capsule, Take 1 capsule by mouth 3 (Three) Times a Day., Disp: 90 capsule, Rfl: 1  •  ibuprofen (ADVIL,MOTRIN) 800 MG tablet, Take 800 mg by mouth Every 6 (Six) Hours As Needed., Disp: , Rfl:   •   indomethacin (INDOCIN) 50 MG capsule, Take 1 capsule by mouth 2 (Two) Times a Day With Meals., Disp: 60 capsule, Rfl: 0  •  lisinopril-hydrochlorothiazide (PRINZIDE,ZESTORETIC) 10-12.5 MG per tablet, Take 1 tablet by mouth Daily., Disp: , Rfl:   •  meloxicam (MOBIC) 15 MG tablet, Take 1 tablet by mouth Daily., Disp: 30 tablet, Rfl: 3  •  oxyCODONE (ROXICODONE) 10 MG tablet, Take 1 tablet by mouth Every 12 (Twelve) Hours As Needed (prn pain)., Disp: 30 tablet, Rfl: 0  •  oxyCODONE (ROXICODONE) 5 MG immediate release tablet, Take 1 tablet by mouth Daily As Needed (prn breakthrough pain)., Disp: 21 tablet, Rfl: 0    Past Surgical History:   Procedure Laterality Date   • CHOLECYSTECTOMY     • COLONOSCOPY  2015   • ENDOSCOPY     • HYSTERECTOMY     • KNEE ACL RECONSTRUCTION Left    • KNEE ARTHROSCOPY      Multiple procedures (left x 4 and right x 2)    • OTHER SURGICAL HISTORY      ORIF right forearm   • PARATHYROIDECTOMY     • TOOTH EXTRACTION     • TOTAL KNEE ARTHROPLASTY Right 10/18/2016   • WRIST SURGERY      Left wrist procedure       Family History   Problem Relation Age of Onset   • Cancer Other    • Other Other      cholesterol problems   • Hyperlipidemia Other    • Cancer Father    • Cancer Brother        Social History     Social History   • Marital status:      Spouse name: N/A   • Number of children: N/A   • Years of education: N/A     Occupational History   • Not on file.     Social History Main Topics   • Smoking status: Current Every Day Smoker     Packs/day: 0.50     Years: 30.00   • Smokeless tobacco: Never Used   • Alcohol use Yes      Comment: socially   • Drug use: No   • Sexual activity: Defer     Other Topics Concern   • Not on file     Social History Narrative        Allergies   Allergen Reactions   • Morphine And Related      Headaches due to morphine only, related meds ok   • Penicillins Other (See Comments)     UNSURE OF REACTION         Review of Systems   Constitutional: Negative for  "fever.   HENT: Negative for voice change.    Eyes: Negative for visual disturbance.   Respiratory: Negative for shortness of breath.    Cardiovascular: Negative for chest pain.   Gastrointestinal: Negative for abdominal distention and abdominal pain.   Genitourinary: Negative for dysuria.   Musculoskeletal: Positive for arthralgias (knee persistent, left shoulder somewhat better.), back pain (chronic, no change.), gait problem and joint swelling.   Skin: Negative for rash.   Neurological: Negative for speech difficulty.   Hematological: Does not bruise/bleed easily.   Psychiatric/Behavioral: Negative for confusion.       PHYSICAL EXAMINATION:       Temp 97.7 °F (36.5 °C)  Resp 16  Ht 66\" (167.6 cm)  Wt 116 lb (52.6 kg)  BMI 18.72 kg/m2    GENERAL [x] Well developed  []Ill appearing [x] No acute distress    HEENT [x]No acute changes [x] Normocephalic, atraumatic    NECK [x]Supple  [] No midline tenderness    LUNGS [x]Clear bilaterally [x]No wheezes []Rhonchi [] Rales    HEART [x] Regular rate  [x] Regular rhythm [] Irregular    ABDOMEN [x] Soft [x] Not tender [x] Not distended [x] Normal sounds    VAS/EXT [x] Normal Pulses []Edema []Cyanosis             SKIN [x] Warm [x]Dry []Pink []Ecchymosis []Cool    NEURO [x] Sensation Intact [x] Motor Intact [x] Pulse intact    MUSCULOSKELETAL [x]  2+ right knee effusion with new more distal swelling and warmth over pes bursae area.        Physical Exam   Constitutional: She appears well-developed. No distress.   HENT:   Head: Normocephalic and atraumatic.   Eyes: EOM are normal. Pupils are equal, round, and reactive to light.   Neck: Neck supple. No tracheal deviation present.   Cardiovascular: Normal rate and regular rhythm.    Pulmonary/Chest: Breath sounds normal. No respiratory distress. She has no wheezes.   Abdominal: Soft. Bowel sounds are normal. She exhibits no distension. There is no tenderness.   Musculoskeletal:        Right knee: She exhibits effusion (2+).     "    Left knee: She exhibits no effusion.   Neurological: She is alert.   Skin: Skin is warm and dry.   Psychiatric: She has a normal mood and affect. Her speech is normal.   Vitals reviewed.    Right Knee Exam     Tenderness   The patient is experiencing tenderness in the medial retinaculum, medial joint line, lateral retinaculum, lateral joint line and patella.    Range of Motion   Extension: 5   Flexion: 110     Muscle Strength     The patient has normal right knee strength.    Tests   Varus: negative  Valgus: negative  Patellar Apprehension: negative    Other   Erythema: absent (moderate warmth)  Scars: present (well healed)  Swelling: moderate  Other tests: effusion (2+) present      Left Knee Exam     Tenderness   The patient is experiencing no tenderness.         Range of Motion   Extension: 0   Flexion: 130     Muscle Strength     The patient has normal left knee strength.    Other   Erythema: absent  Scars: present (well healed)  Swelling: none  Effusion: no effusion present      Back Exam     Muscle Strength   Right Quadriceps:  5/5   Left Quadriceps:  5/5   Right Hamstrings:  5/5   Left Hamstrings:  5/5         Extremity DVT signs are Negative on physical exam with negative Silvia sign, with no calf pain, with no palpable cords and with no skin tone change   Neurologic Exam     Mental Status   Attention: normal.   Speech: speech is normal   Level of consciousness: alert  Knowledge: good.     Cranial Nerves     CN III, IV, VI   Pupils are equal, round, and reactive to light.  Extraocular motions are normal.     Motor Exam   Overall muscle tone: normal    Strength   Right quadriceps: 5/5  Left quadriceps: 5/5  Right hamstrin/5  Left hamstrin/5    Gait, Coordination, and Reflexes     Gait  Gait: (antalgic right knee limp)    Left Knee Exam     Muscle Strength   Normal left knee strength    Right Knee Exam     Muscle Strength   Normal right knee strength          DVT Screening: No Yes   Smoker [] [x]    Personal/Family History of DVT or PE [x] []   Sedentary Lifestyle [x] []   Overweight [x] []        Previous or Active DVT/PE: NO  Cardiac Stent within 1 year: NO  Embolic/Ischemic stroke within 1 year: NO  Creatinine less than 30ml/min: NO  Congenital Thrombophilia: NO  Hypersensitivity to TXA: NO  Color Blindness: NO  NOTE:  TXA  tranexemic acid summary:     ** NO IV TRANEXAMIC ACID DUE TO KNEE PROBABLE INFECTION AND NOT FORMALLY STUDIED IN THIS SETTING.    Independent Review of Radiographic Studies:    No new imaging done today.  Reviewed at a prior visit.  Laboratory and Other Studies:  New right knee aspiration fluid sent for studies.   Medical Decision Making:    Limited progress with persistent and/or progressive symptoms.  Continue current management and any additional treatments and workup as outlined in plan.  Sequelae -- persistent pain, swelling and limitations after right knee replacement        *SPECIAL INSTRUCTIONS:  ** DO NOT USE TRANEXAMIC ACID, MULTI-MODAL ANALGESIA, MULTI-MODAL DVT PRIOPHYLAXIS, ARRANGEMENTS FOR KNEE ARTICULATING ANTIBIOTIC SPACER.      Risks, benefits, and alternative treatments discussed with the patient: [x] Yes [] No    Risk benefits and merits of the proposed surgery were discussed and the patient's questions were answered risks discussed including and not limited to:  Anesthesia reactions  Blood loss and possible transfusion  Infection  Deep venous thrombosis and pulmonary embolus  Nerve, vascular or tendon injury  Fracture  Deformity  Stiffness  Weakness  Prosthesis and implant issues such as loosening, material wear or dislocation  Skin necrosis  Revision surgery or further treatment  Recurrence of problem and condition     Informed consent: [] Signed  [x] To be obtained at hospital  [] Both    Anca was seen today for follow-up and edema.    Diagnoses and all orders for this visit:    Effusion of right knee  -     Anaerobic culture  -     Cancel: Culture, Routine  -      Gram Stain  -     Body Fluid Cell Count With Differential  -     Cancel: Crystal Exam, Fluid  -     Glucose, Body Fluid  -     Protein, Body Fluid  -     Body fluid cell count  -     Body Fluid Culture; Future  -     Body Fluid Culture  -     Body fluid differential; Future  -     Body fluid differential  -     Synovial Crystals Screen    Arthralgia of right knee    Status post total right knee replacement using cement    Pseudogout    Lumbar degenerative disc disease    Neuropathy    Post-traumatic osteoarthritis of left knee    S/P ACL reconstruction    Rupture of right posterior tibialis tendon, subsequent encounter    Impingement syndrome of left shoulder         Recommendations/Plan:    Regular exercise as tolerated  Orthopedic activities reviewed and patient expressed appreciation  Discussion of orthopedic goals  Risk, benefits, and merits of treatment alternatives reviewed with the patient and questions answered  The nature of the proposed surgery reviewed with the patient including risks, benefits, rehabilitation, recovery timeframe, and outcome expectations  Take prescribed medications as instructed only as tolerated  Ice, heat, and/or modalities as beneficial  Watch for signs and symptoms of infection  Guided on proper techniques for mobility, strength, agility and/or conditioning exercises    Exercise, medications, injections, other patient advice, and return appointment as noted.  Brace: No brace was given at today's visit  Referral: No referrals made at today's visit  Test/Studies: Labs Right knee  Surgery: Surgery proposed at this visit as noted.  Work/Activity Status: May perform usual activities as tolerated and No strenuous activity.  Patient is encouraged to call or return for any issues or concerns.    ** PLANNED SURGICAL PROCEDURE:  RIGHT KNEE FIRST STAGE OF REVISION USING KNEE ARTICULATING ANTIBIOTIC SPACER TO ADDRESS PERIPROSTHETIC INFECTION.    Return for Post-op R knee, Recheck.

## 2017-09-26 ENCOUNTER — APPOINTMENT (OUTPATIENT)
Dept: GENERAL RADIOLOGY | Facility: HOSPITAL | Age: 52
End: 2017-09-26

## 2017-09-26 ENCOUNTER — HOSPITAL ENCOUNTER (INPATIENT)
Facility: HOSPITAL | Age: 52
LOS: 3 days | Discharge: HOME HEALTH | End: 2017-09-29
Attending: ORTHOPAEDIC SURGERY | Admitting: ORTHOPAEDIC SURGERY

## 2017-09-26 ENCOUNTER — ANESTHESIA EVENT (OUTPATIENT)
Dept: PERIOP | Facility: HOSPITAL | Age: 52
End: 2017-09-26

## 2017-09-26 ENCOUNTER — ANESTHESIA (OUTPATIENT)
Dept: PERIOP | Facility: HOSPITAL | Age: 52
End: 2017-09-26

## 2017-09-26 DIAGNOSIS — M17.32 POST-TRAUMATIC OSTEOARTHRITIS OF LEFT KNEE: ICD-10-CM

## 2017-09-26 DIAGNOSIS — M25.512 ARTHRALGIA OF LEFT SHOULDER REGION: ICD-10-CM

## 2017-09-26 DIAGNOSIS — Z74.09 IMPAIRED FUNCTIONAL MOBILITY, BALANCE, GAIT, AND ENDURANCE: Primary | ICD-10-CM

## 2017-09-26 DIAGNOSIS — M11.20 PSEUDOGOUT: ICD-10-CM

## 2017-09-26 DIAGNOSIS — M79.10 MYALGIA: ICD-10-CM

## 2017-09-26 DIAGNOSIS — Z96.651 STATUS POST TOTAL RIGHT KNEE REPLACEMENT USING CEMENT: ICD-10-CM

## 2017-09-26 DIAGNOSIS — Z01.818 PRE-OP TESTING: ICD-10-CM

## 2017-09-26 DIAGNOSIS — M25.561 ARTHRALGIA OF RIGHT KNEE: ICD-10-CM

## 2017-09-26 DIAGNOSIS — S86.111D RUPTURE OF RIGHT POSTERIOR TIBIALIS TENDON, SUBSEQUENT ENCOUNTER: ICD-10-CM

## 2017-09-26 DIAGNOSIS — Z01.818 PRE-OP EVALUATION: ICD-10-CM

## 2017-09-26 DIAGNOSIS — M75.42 IMPINGEMENT SYNDROME OF LEFT SHOULDER: ICD-10-CM

## 2017-09-26 DIAGNOSIS — Z98.890 S/P ACL RECONSTRUCTION: ICD-10-CM

## 2017-09-26 DIAGNOSIS — G62.9 NEUROPATHY: ICD-10-CM

## 2017-09-26 DIAGNOSIS — M75.82 ROTATOR CUFF TENDINITIS, LEFT: ICD-10-CM

## 2017-09-26 DIAGNOSIS — M51.36 LUMBAR DEGENERATIVE DISC DISEASE: ICD-10-CM

## 2017-09-26 DIAGNOSIS — M79.604 PAIN OF RIGHT LOWER EXTREMITY: ICD-10-CM

## 2017-09-26 LAB
APPEARANCE FLD: ABNORMAL
COLOR FLD: ABNORMAL
CRYSTALS FLD MICRO: NORMAL
MONOS+MACROS NFR FLD: 24 %
NEUTROPHILS NFR FLD MANUAL: 76 %
RBC # FLD AUTO: ABNORMAL /MM3 (ref 0–200000)
WBC # FLD: ABNORMAL /MM3 (ref 0–1000)

## 2017-09-26 PROCEDURE — 25010000002 DEXAMETHASONE SODIUM PHOSPHATE 10 MG/ML SOLUTION: Performed by: NURSE ANESTHETIST, CERTIFIED REGISTERED

## 2017-09-26 PROCEDURE — C1776 JOINT DEVICE (IMPLANTABLE): HCPCS | Performed by: ORTHOPAEDIC SURGERY

## 2017-09-26 PROCEDURE — 87075 CULTR BACTERIA EXCEPT BLOOD: CPT | Performed by: ORTHOPAEDIC SURGERY

## 2017-09-26 PROCEDURE — 0SHC08Z INSERTION OF SPACER INTO RIGHT KNEE JOINT, OPEN APPROACH: ICD-10-PCS | Performed by: ORTHOPAEDIC SURGERY

## 2017-09-26 PROCEDURE — 25010000002 MIDAZOLAM PER 1 MG

## 2017-09-26 PROCEDURE — C1713 ANCHOR/SCREW BN/BN,TIS/BN: HCPCS | Performed by: ORTHOPAEDIC SURGERY

## 2017-09-26 PROCEDURE — 25010000002 HYDROMORPHONE PER 4 MG: Performed by: ORTHOPAEDIC SURGERY

## 2017-09-26 PROCEDURE — 0SPC0JZ REMOVAL OF SYNTHETIC SUBSTITUTE FROM RIGHT KNEE JOINT, OPEN APPROACH: ICD-10-PCS | Performed by: ORTHOPAEDIC SURGERY

## 2017-09-26 PROCEDURE — 99221 1ST HOSP IP/OBS SF/LOW 40: CPT | Performed by: INTERNAL MEDICINE

## 2017-09-26 PROCEDURE — 89051 BODY FLUID CELL COUNT: CPT | Performed by: ORTHOPAEDIC SURGERY

## 2017-09-26 PROCEDURE — 89060 EXAM SYNOVIAL FLUID CRYSTALS: CPT | Performed by: ORTHOPAEDIC SURGERY

## 2017-09-26 PROCEDURE — 87070 CULTURE OTHR SPECIMN AEROBIC: CPT | Performed by: ORTHOPAEDIC SURGERY

## 2017-09-26 PROCEDURE — 25010000002 PROPOFOL 200 MG/20ML EMULSION: Performed by: NURSE ANESTHETIST, CERTIFIED REGISTERED

## 2017-09-26 PROCEDURE — 97162 PT EVAL MOD COMPLEX 30 MIN: CPT

## 2017-09-26 PROCEDURE — 25010000002 PROPOFOL 1000 MG/100ML EMULSION: Performed by: NURSE ANESTHETIST, CERTIFIED REGISTERED

## 2017-09-26 PROCEDURE — 25010000002 HYDROMORPHONE PER 4 MG: Performed by: NURSE ANESTHETIST, CERTIFIED REGISTERED

## 2017-09-26 PROCEDURE — 25010000002 FENTANYL CITRATE (PF) 100 MCG/2ML SOLUTION: Performed by: NURSE ANESTHETIST, CERTIFIED REGISTERED

## 2017-09-26 PROCEDURE — 94799 UNLISTED PULMONARY SVC/PX: CPT

## 2017-09-26 PROCEDURE — 25010000002 ONDANSETRON PER 1 MG: Performed by: NURSE ANESTHETIST, CERTIFIED REGISTERED

## 2017-09-26 PROCEDURE — 73560 X-RAY EXAM OF KNEE 1 OR 2: CPT

## 2017-09-26 PROCEDURE — 25010000002 MIDAZOLAM PER 1 MG: Performed by: NURSE ANESTHETIST, CERTIFIED REGISTERED

## 2017-09-26 PROCEDURE — 27488 REMOVAL OF KNEE PROSTHESIS: CPT | Performed by: ORTHOPAEDIC SURGERY

## 2017-09-26 DEVICE — IMPLANTABLE DEVICE: Type: IMPLANTABLE DEVICE | Status: FUNCTIONAL

## 2017-09-26 RX ORDER — ATENOLOL 25 MG/1
50 TABLET ORAL
Status: DISCONTINUED | OUTPATIENT
Start: 2017-09-26 | End: 2017-09-26

## 2017-09-26 RX ORDER — BUPIVACAINE HYDROCHLORIDE 2.5 MG/ML
INJECTION, SOLUTION EPIDURAL; INFILTRATION; INTRACAUDAL
Status: DISPENSED
Start: 2017-09-26 | End: 2017-09-27

## 2017-09-26 RX ORDER — DEXAMETHASONE SODIUM PHOSPHATE 10 MG/ML
INJECTION, SOLUTION INTRAMUSCULAR; INTRAVENOUS
Status: DISPENSED
Start: 2017-09-26 | End: 2017-09-27

## 2017-09-26 RX ORDER — HYDROMORPHONE HCL 110MG/55ML
PATIENT CONTROLLED ANALGESIA SYRINGE INTRAVENOUS AS NEEDED
Status: DISCONTINUED | OUTPATIENT
Start: 2017-09-26 | End: 2017-09-26 | Stop reason: SURG

## 2017-09-26 RX ORDER — MELOXICAM 7.5 MG/1
15 TABLET ORAL DAILY
Status: DISCONTINUED | OUTPATIENT
Start: 2017-09-26 | End: 2017-09-29 | Stop reason: HOSPADM

## 2017-09-26 RX ORDER — MIDAZOLAM HYDROCHLORIDE 1 MG/ML
INJECTION INTRAMUSCULAR; INTRAVENOUS AS NEEDED
Status: DISCONTINUED | OUTPATIENT
Start: 2017-09-26 | End: 2017-09-26 | Stop reason: SURG

## 2017-09-26 RX ORDER — ZOLPIDEM TARTRATE 5 MG/1
5 TABLET ORAL NIGHTLY PRN
Status: DISCONTINUED | OUTPATIENT
Start: 2017-09-26 | End: 2017-09-29 | Stop reason: HOSPADM

## 2017-09-26 RX ORDER — SODIUM CHLORIDE 0.9 % (FLUSH) 0.9 %
3 SYRINGE (ML) INJECTION AS NEEDED
Status: DISCONTINUED | OUTPATIENT
Start: 2017-09-26 | End: 2017-09-26 | Stop reason: HOSPADM

## 2017-09-26 RX ORDER — CLINDAMYCIN PHOSPHATE 900 MG/50ML
900 INJECTION, SOLUTION INTRAVENOUS
Status: COMPLETED | OUTPATIENT
Start: 2017-09-27 | End: 2017-09-26

## 2017-09-26 RX ORDER — PROPOFOL 10 MG/ML
INJECTION, EMULSION INTRAVENOUS AS NEEDED
Status: DISCONTINUED | OUTPATIENT
Start: 2017-09-26 | End: 2017-09-26 | Stop reason: SURG

## 2017-09-26 RX ORDER — GABAPENTIN 400 MG/1
400 CAPSULE ORAL EVERY 12 HOURS SCHEDULED
Status: DISCONTINUED | OUTPATIENT
Start: 2017-09-26 | End: 2017-09-26

## 2017-09-26 RX ORDER — SODIUM CHLORIDE, SODIUM LACTATE, POTASSIUM CHLORIDE, CALCIUM CHLORIDE 600; 310; 30; 20 MG/100ML; MG/100ML; MG/100ML; MG/100ML
1000 INJECTION, SOLUTION INTRAVENOUS CONTINUOUS PRN
Status: DISCONTINUED | OUTPATIENT
Start: 2017-09-26 | End: 2017-09-26 | Stop reason: HOSPADM

## 2017-09-26 RX ORDER — MIDAZOLAM HYDROCHLORIDE 1 MG/ML
INJECTION INTRAMUSCULAR; INTRAVENOUS
Status: COMPLETED
Start: 2017-09-26 | End: 2017-09-26

## 2017-09-26 RX ORDER — MEPERIDINE HYDROCHLORIDE 25 MG/ML
INJECTION INTRAMUSCULAR; INTRAVENOUS; SUBCUTANEOUS
Status: COMPLETED
Start: 2017-09-26 | End: 2017-09-26

## 2017-09-26 RX ORDER — GABAPENTIN 400 MG/1
400 CAPSULE ORAL EVERY 8 HOURS SCHEDULED
Status: DISCONTINUED | OUTPATIENT
Start: 2017-09-26 | End: 2017-09-29 | Stop reason: HOSPADM

## 2017-09-26 RX ORDER — ONDANSETRON 4 MG/1
4 TABLET, FILM COATED ORAL EVERY 6 HOURS PRN
Status: DISCONTINUED | OUTPATIENT
Start: 2017-09-26 | End: 2017-09-29 | Stop reason: HOSPADM

## 2017-09-26 RX ORDER — LIDOCAINE HYDROCHLORIDE 20 MG/ML
INJECTION, SOLUTION INFILTRATION; PERINEURAL AS NEEDED
Status: DISCONTINUED | OUTPATIENT
Start: 2017-09-26 | End: 2017-09-26 | Stop reason: SURG

## 2017-09-26 RX ORDER — DEXAMETHASONE SODIUM PHOSPHATE 10 MG/ML
INJECTION, SOLUTION INTRAMUSCULAR; INTRAVENOUS AS NEEDED
Status: DISCONTINUED | OUTPATIENT
Start: 2017-09-26 | End: 2017-09-26 | Stop reason: SURG

## 2017-09-26 RX ORDER — SODIUM CHLORIDE 0.9 % (FLUSH) 0.9 %
1-10 SYRINGE (ML) INJECTION AS NEEDED
Status: DISCONTINUED | OUTPATIENT
Start: 2017-09-26 | End: 2017-09-26 | Stop reason: HOSPADM

## 2017-09-26 RX ORDER — MEPERIDINE HYDROCHLORIDE 25 MG/ML
25 INJECTION INTRAMUSCULAR; INTRAVENOUS; SUBCUTANEOUS
Status: DISCONTINUED | OUTPATIENT
Start: 2017-09-26 | End: 2017-09-26 | Stop reason: HOSPADM

## 2017-09-26 RX ORDER — CLINDAMYCIN PHOSPHATE 150 MG/ML
INJECTION, SOLUTION INTRAVENOUS AS NEEDED
Status: DISCONTINUED | OUTPATIENT
Start: 2017-09-26 | End: 2017-09-26 | Stop reason: HOSPADM

## 2017-09-26 RX ORDER — VANCOMYCIN HYDROCHLORIDE 500 MG/10ML
INJECTION, POWDER, LYOPHILIZED, FOR SOLUTION INTRAVENOUS
Status: DISPENSED
Start: 2017-09-26 | End: 2017-09-27

## 2017-09-26 RX ORDER — MIDAZOLAM HYDROCHLORIDE 1 MG/ML
2 INJECTION INTRAMUSCULAR; INTRAVENOUS ONCE AS NEEDED
Status: COMPLETED | OUTPATIENT
Start: 2017-09-26 | End: 2017-09-26

## 2017-09-26 RX ORDER — PROPOFOL 10 MG/ML
INJECTION, EMULSION INTRAVENOUS CONTINUOUS PRN
Status: DISCONTINUED | OUTPATIENT
Start: 2017-09-26 | End: 2017-09-26 | Stop reason: SURG

## 2017-09-26 RX ORDER — FAMOTIDINE 10 MG/ML
INJECTION, SOLUTION INTRAVENOUS
Status: COMPLETED
Start: 2017-09-26 | End: 2017-09-26

## 2017-09-26 RX ORDER — OXYCODONE HYDROCHLORIDE 5 MG/1
10 TABLET ORAL EVERY 4 HOURS PRN
Status: DISCONTINUED | OUTPATIENT
Start: 2017-09-26 | End: 2017-09-29 | Stop reason: HOSPADM

## 2017-09-26 RX ORDER — SODIUM CHLORIDE, SODIUM LACTATE, POTASSIUM CHLORIDE, CALCIUM CHLORIDE 600; 310; 30; 20 MG/100ML; MG/100ML; MG/100ML; MG/100ML
100 INJECTION, SOLUTION INTRAVENOUS CONTINUOUS
Status: DISCONTINUED | OUTPATIENT
Start: 2017-09-26 | End: 2017-09-28

## 2017-09-26 RX ORDER — BISACODYL 10 MG
10 SUPPOSITORY, RECTAL RECTAL DAILY PRN
Status: DISCONTINUED | OUTPATIENT
Start: 2017-09-26 | End: 2017-09-29 | Stop reason: HOSPADM

## 2017-09-26 RX ORDER — ONDANSETRON 2 MG/ML
INJECTION INTRAMUSCULAR; INTRAVENOUS AS NEEDED
Status: DISCONTINUED | OUTPATIENT
Start: 2017-09-26 | End: 2017-09-26 | Stop reason: SURG

## 2017-09-26 RX ORDER — ATENOLOL 25 MG/1
25 TABLET ORAL
Status: DISCONTINUED | OUTPATIENT
Start: 2017-09-27 | End: 2017-09-29 | Stop reason: HOSPADM

## 2017-09-26 RX ORDER — NALOXONE HCL 0.4 MG/ML
0.1 VIAL (ML) INJECTION
Status: DISCONTINUED | OUTPATIENT
Start: 2017-09-26 | End: 2017-09-29 | Stop reason: HOSPADM

## 2017-09-26 RX ORDER — DOCUSATE SODIUM 100 MG/1
100 CAPSULE, LIQUID FILLED ORAL 2 TIMES DAILY PRN
Status: DISCONTINUED | OUTPATIENT
Start: 2017-09-26 | End: 2017-09-29 | Stop reason: HOSPADM

## 2017-09-26 RX ORDER — LIDOCAINE HYDROCHLORIDE 20 MG/ML
INJECTION, SOLUTION INFILTRATION; PERINEURAL
Status: DISPENSED
Start: 2017-09-26 | End: 2017-09-27

## 2017-09-26 RX ORDER — CLINDAMYCIN PHOSPHATE 150 MG/ML
INJECTION, SOLUTION INTRAVENOUS
Status: DISPENSED
Start: 2017-09-26 | End: 2017-09-27

## 2017-09-26 RX ORDER — BUPIVACAINE HYDROCHLORIDE 7.5 MG/ML
INJECTION, SOLUTION INTRASPINAL AS NEEDED
Status: DISCONTINUED | OUTPATIENT
Start: 2017-09-26 | End: 2017-09-26 | Stop reason: SURG

## 2017-09-26 RX ORDER — ONDANSETRON 2 MG/ML
4 INJECTION INTRAMUSCULAR; INTRAVENOUS EVERY 6 HOURS PRN
Status: DISCONTINUED | OUTPATIENT
Start: 2017-09-26 | End: 2017-09-29 | Stop reason: HOSPADM

## 2017-09-26 RX ORDER — SODIUM CHLORIDE 0.9 % (FLUSH) 0.9 %
1-10 SYRINGE (ML) INJECTION AS NEEDED
Status: DISCONTINUED | OUTPATIENT
Start: 2017-09-26 | End: 2017-09-29 | Stop reason: HOSPADM

## 2017-09-26 RX ORDER — BUPIVACAINE HYDROCHLORIDE 2.5 MG/ML
INJECTION, SOLUTION EPIDURAL; INFILTRATION; INTRACAUDAL AS NEEDED
Status: DISCONTINUED | OUTPATIENT
Start: 2017-09-26 | End: 2017-09-26 | Stop reason: SURG

## 2017-09-26 RX ORDER — CLINDAMYCIN PHOSPHATE 600 MG/50ML
600 INJECTION, SOLUTION INTRAVENOUS EVERY 8 HOURS
Status: COMPLETED | OUTPATIENT
Start: 2017-09-26 | End: 2017-09-27

## 2017-09-26 RX ORDER — FONDAPARINUX SODIUM 2.5 MG/.5ML
2.5 INJECTION SUBCUTANEOUS
Status: DISCONTINUED | OUTPATIENT
Start: 2017-09-27 | End: 2017-09-29 | Stop reason: HOSPADM

## 2017-09-26 RX ORDER — HYDROMORPHONE HYDROCHLORIDE 4 MG/ML
1 INJECTION, SOLUTION INTRAMUSCULAR; INTRAVENOUS; SUBCUTANEOUS
Status: DISCONTINUED | OUTPATIENT
Start: 2017-09-26 | End: 2017-09-27 | Stop reason: CLARIF

## 2017-09-26 RX ORDER — FENTANYL CITRATE 50 UG/ML
INJECTION, SOLUTION INTRAMUSCULAR; INTRAVENOUS AS NEEDED
Status: DISCONTINUED | OUTPATIENT
Start: 2017-09-26 | End: 2017-09-26 | Stop reason: SURG

## 2017-09-26 RX ORDER — ONDANSETRON 4 MG/1
4 TABLET, ORALLY DISINTEGRATING ORAL EVERY 6 HOURS PRN
Status: DISCONTINUED | OUTPATIENT
Start: 2017-09-26 | End: 2017-09-29 | Stop reason: HOSPADM

## 2017-09-26 RX ORDER — LISINOPRIL AND HYDROCHLOROTHIAZIDE 20; 12.5 MG/1; MG/1
1 TABLET ORAL
Status: DISCONTINUED | OUTPATIENT
Start: 2017-09-26 | End: 2017-09-29 | Stop reason: HOSPADM

## 2017-09-26 RX ORDER — ONDANSETRON 2 MG/ML
4 INJECTION INTRAMUSCULAR; INTRAVENOUS ONCE AS NEEDED
Status: ACTIVE | OUTPATIENT
Start: 2017-09-26 | End: 2017-09-27

## 2017-09-26 RX ADMIN — PROPOFOL 70 MG: 10 INJECTION, EMULSION INTRAVENOUS at 15:30

## 2017-09-26 RX ADMIN — PROPOFOL 30 MG: 10 INJECTION, EMULSION INTRAVENOUS at 14:43

## 2017-09-26 RX ADMIN — FENTANYL CITRATE 50 MCG: 50 INJECTION, SOLUTION INTRAMUSCULAR; INTRAVENOUS at 14:20

## 2017-09-26 RX ADMIN — MEPERIDINE HYDROCHLORIDE 25 MG: 25 INJECTION INTRAMUSCULAR; INTRAVENOUS; SUBCUTANEOUS at 17:35

## 2017-09-26 RX ADMIN — SODIUM CHLORIDE, POTASSIUM CHLORIDE, SODIUM LACTATE AND CALCIUM CHLORIDE 100 ML/HR: 600; 310; 30; 20 INJECTION, SOLUTION INTRAVENOUS at 19:14

## 2017-09-26 RX ADMIN — EPHEDRINE SULFATE 10 MG: 50 INJECTION INTRAMUSCULAR; INTRAVENOUS; SUBCUTANEOUS at 16:17

## 2017-09-26 RX ADMIN — SODIUM CHLORIDE, POTASSIUM CHLORIDE, SODIUM LACTATE AND CALCIUM CHLORIDE 1000 ML: 600; 310; 30; 20 INJECTION, SOLUTION INTRAVENOUS at 12:40

## 2017-09-26 RX ADMIN — PROPOFOL 100 MG: 10 INJECTION, EMULSION INTRAVENOUS at 16:35

## 2017-09-26 RX ADMIN — BUPIVACAINE HYDROCHLORIDE IN DEXTROSE 1.8 ML: 7.5 INJECTION, SOLUTION SUBARACHNOID at 14:32

## 2017-09-26 RX ADMIN — HYDROMORPHONE HYDROCHLORIDE 1 MG: 2 INJECTION, SOLUTION INTRAMUSCULAR; INTRAVENOUS; SUBCUTANEOUS at 17:00

## 2017-09-26 RX ADMIN — MIDAZOLAM HYDROCHLORIDE 2 MG: 1 INJECTION INTRAMUSCULAR; INTRAVENOUS at 12:56

## 2017-09-26 RX ADMIN — MEPERIDINE HYDROCHLORIDE 25 MG: 25 INJECTION, SOLUTION INTRAMUSCULAR; INTRAVENOUS; SUBCUTANEOUS at 17:55

## 2017-09-26 RX ADMIN — HYDROMORPHONE HYDROCHLORIDE 1 MG: 1 INJECTION, SOLUTION INTRAMUSCULAR; INTRAVENOUS; SUBCUTANEOUS at 20:18

## 2017-09-26 RX ADMIN — MIDAZOLAM HYDROCHLORIDE 1 MG: 1 INJECTION, SOLUTION INTRAMUSCULAR; INTRAVENOUS at 14:20

## 2017-09-26 RX ADMIN — HYDROMORPHONE HYDROCHLORIDE 1 MG: 2 INJECTION, SOLUTION INTRAMUSCULAR; INTRAVENOUS; SUBCUTANEOUS at 17:15

## 2017-09-26 RX ADMIN — DEXAMETHASONE SODIUM PHOSPHATE 10 MG: 10 INJECTION, SOLUTION INTRAMUSCULAR; INTRAVENOUS at 17:19

## 2017-09-26 RX ADMIN — ZOLPIDEM TARTRATE 5 MG: 5 TABLET ORAL at 21:38

## 2017-09-26 RX ADMIN — MEPERIDINE HYDROCHLORIDE 25 MG: 25 INJECTION INTRAMUSCULAR; INTRAVENOUS; SUBCUTANEOUS at 17:55

## 2017-09-26 RX ADMIN — BUPIVACAINE HYDROCHLORIDE 15 ML: 2.5 INJECTION, SOLUTION EPIDURAL; INFILTRATION; INTRACAUDAL; PERINEURAL at 17:19

## 2017-09-26 RX ADMIN — GABAPENTIN 400 MG: 400 CAPSULE ORAL at 21:14

## 2017-09-26 RX ADMIN — OXYCODONE HYDROCHLORIDE 10 MG: 5 TABLET ORAL at 19:37

## 2017-09-26 RX ADMIN — CLINDAMYCIN PHOSPHATE 900 MG: 900 INJECTION, SOLUTION INTRAVENOUS at 14:20

## 2017-09-26 RX ADMIN — LIDOCAINE HYDROCHLORIDE 5 ML: 20 INJECTION, SOLUTION INFILTRATION; PERINEURAL at 17:15

## 2017-09-26 RX ADMIN — MIDAZOLAM HYDROCHLORIDE 2 MG: 1 INJECTION, SOLUTION INTRAMUSCULAR; INTRAVENOUS at 12:56

## 2017-09-26 RX ADMIN — OXYCODONE HYDROCHLORIDE 10 MG: 5 TABLET ORAL at 23:19

## 2017-09-26 RX ADMIN — MEPERIDINE HYDROCHLORIDE 25 MG: 25 INJECTION, SOLUTION INTRAMUSCULAR; INTRAVENOUS; SUBCUTANEOUS at 17:35

## 2017-09-26 RX ADMIN — DOCUSATE SODIUM 100 MG: 100 CAPSULE, LIQUID FILLED ORAL at 20:19

## 2017-09-26 RX ADMIN — PROPOFOL 50 MG: 10 INJECTION, EMULSION INTRAVENOUS at 15:00

## 2017-09-26 RX ADMIN — FAMOTIDINE 20 MG: 10 INJECTION, SOLUTION INTRAVENOUS at 12:39

## 2017-09-26 RX ADMIN — MELOXICAM 15 MG: 7.5 TABLET ORAL at 20:18

## 2017-09-26 RX ADMIN — PROPOFOL 100 MCG/KG/MIN: 10 INJECTION, EMULSION INTRAVENOUS at 14:44

## 2017-09-26 RX ADMIN — CLINDAMYCIN PHOSPHATE 600 MG: 600 INJECTION, SOLUTION INTRAVENOUS at 23:19

## 2017-09-26 RX ADMIN — SODIUM CHLORIDE, POTASSIUM CHLORIDE, SODIUM LACTATE AND CALCIUM CHLORIDE: 600; 310; 30; 20 INJECTION, SOLUTION INTRAVENOUS at 15:33

## 2017-09-26 RX ADMIN — PROPOFOL 100 MG: 10 INJECTION, EMULSION INTRAVENOUS at 16:44

## 2017-09-26 RX ADMIN — ONDANSETRON 4 MG: 2 INJECTION INTRAMUSCULAR; INTRAVENOUS at 16:18

## 2017-09-26 ASSESSMENT — KOOS JR: KOOS JR SCORE: 27

## 2017-09-26 NOTE — ANESTHESIA POSTPROCEDURE EVALUATION
Patient: Anca Andrade    Procedure Summary     Date Anesthesia Start Anesthesia Stop Room / Location    09/26/17 1420   [facility] /  [facility]       Procedure Diagnosis Surgeon Provider    RIGHT TOTAL KNEE ARTHROPLASTY REVISION STAGE ONE WITH ANTIBIOTIC SPACER FOR PERIPROSTHETIC INFECTION (Right Knee) Pseudogout; Myalgia; Neuropathy; Pre-op testing; S/P ACL reconstruction; Pre-op evaluation; Arthralgia of left shoulder region; Lumbar degenerative disc disease; Impingement syndrome of left shoulder; Post-traumatic osteoarthritis of left knee; Rotator cuff tendinitis, left; Arthralgia of right knee; Status post total right knee replacement using cement; Rupture of right posterior tibialis tendon, subsequent encounter; Pain of right lower extremity  (Pseudogout [M11.20]; Myalgia [M79.1]; Neuropathy [G62.9]; Pre-op testing [Z01.818]; S/P ACL reconstruction [Z98.890]; Pre-op evaluation [Z01.818]; Arthralgia of left shoulder region [M25.512]; Lumbar degenerative disc disease [M51.36]; Impingement syndrome of left shoulder [M75.42]; Post-traumatic osteoarthritis of left knee [M17.32]; Rotator cuff tendinitis, left [M75.82]; Arthralgia of right knee [M25.561]; Status post total right knee replacement using cement [Z96.651]; Rupture of right posterior tibialis tendon, subsequent encounter [S96.811D]; Pain of right lower extremity [M79.604]) MD Shashank Woods CRNA          Anesthesia Type: spinal  Last vitals  BP        Temp        Pulse       Resp        SpO2          Post Anesthesia Care and Evaluation    Patient location during evaluation: PACU  Patient participation: complete - patient participated  Level of consciousness: awake and alert  Pain score: 8  Pain management: satisfactory to patient  Airway patency: patent  Anesthetic complications: No anesthetic complications  PONV Status: none  Cardiovascular status: acceptable and stable  Respiratory status: nasal cannula  Hydration status:  acceptable

## 2017-09-26 NOTE — ANESTHESIA PROCEDURE NOTES
Peripheral Block    Patient location during procedure: post-op  Start time: 9/26/2017 5:15 PM  Stop time: 9/26/2017 5:19 PM  Reason for block: procedure for pain, at surgeon's request and post-op pain management  Performed by  CRNA: BRANDON KATZ  Preanesthetic Checklist  Completed: patient identified, site marked, surgical consent, pre-op evaluation, timeout performed, IV checked, risks and benefits discussed and monitors and equipment checked  Prep:  Pt Position: supine  Sterile barriers:cap and gloves  Prep: ChloraPrep  Patient monitoring: blood pressure monitoring, continuous pulse oximetry and EKG  Procedure  Sedation:yes  Performed under: spinal  Guidance:ultrasound guided  ULTRASOUND INTERPRETATION.  Using ultrasound guidance a 20 G gauge needle was placed in close proximity to the femoral nerve, at which point, under ultrasound guidance anesthetic was injected in the area of the nerve and spread of the anesthesia was seen on ultrasound in close proximity thereto.  There were no abnormalities seen on ultrasound; a digital image was taken; and the patient tolerated the procedure with no complications. Images:still images obtained    Laterality:right  Block Type:adductor canal block  Injection Technique:single-shot  Needle Type:echogenic  Needle Gauge:20 G  Resistance on Injection: none  Medications  Comment:Decadron 10mg adjunct  Local Injected:bupivacaine 0.25% Local Amount Injected:15mL  Post Assessment  Injection Assessment: negative aspiration for heme, no paresthesia on injection and incremental injection  Patient Tolerance:comfortable throughout block  Complications:yes  Additional Notes   Incremental injection with negative aspirate. No pain on injection. Normal injection resistance.  Vascular puncture avoided. Nerves and surrounding tissues identified with local spread around nerves visualized.

## 2017-09-26 NOTE — ANESTHESIA PROCEDURE NOTES
Spinal Block    Patient location during procedure: OR  Indication:at surgeon's request, post-op pain management and procedure for pain  Performed By  CRNA: BRANDON KATZ  Preanesthetic Checklist  Completed: patient identified, site marked, surgical consent, pre-op evaluation, timeout performed, IV checked, risks and benefits discussed and monitors and equipment checked  Spinal Block Prep:  Patient Position:sitting  Sterile Tech:cap, gloves, mask and sterile barriers  Prep:Chloraprep  Patient Monitoring:blood pressure monitoring, continuous pulse oximetry and EKG  Spinal Block Procedure  Approach:midline  Guidance:landmark technique and palpation technique  Location:L3-L4  Needle Type:Pencan  Needle Gauge:25 G  Placement of Spinal needle event:cerebrospinal fluid aspirated    Fluid Appearance:clear  Post Assessment  Patient Tolerance:patient tolerated the procedure well with no apparent complications  Complications no  Additional Notes  Risks discussed , including but not limited to:  Headache, itching, n&v, infection, failure, decreased bp. Permanent chronic back pain, nerve damage, paralysis, etc.

## 2017-09-26 NOTE — ANESTHESIA PREPROCEDURE EVALUATION
Anesthesia Evaluation     Patient summary reviewed and Nursing notes reviewed   NPO Solid Status: > 8 hours  NPO Liquid Status: > 8 hours     Airway   Mallampati: I  TM distance: >3 FB  Neck ROM: full  no difficulty expected  Dental - normal exam     Pulmonary - normal exam   (+) a smoker (1\4 PPD X 30) Current Abstained day of surgery, asthma (CHILDHOOD),   Cardiovascular - normal exam    Patient on routine beta blocker  Rhythm: regular  Rate: normal    (+) hypertension (TAKES BP MEDS PRN) well controlled,       Neuro/Psych  (+) psychiatric history Anxiety and Depression,    GI/Hepatic/Renal/Endo    (+)  GERD well controlled,     Musculoskeletal     (+) arthralgias, chronic pain,   Abdominal  - normal exam    Abdomen: soft.  Bowel sounds: normal.   Substance History - negative use     OB/GYN negative ob/gyn ROS         Other   (+) arthritis   history of cancer (SKIN) remission                                  Anesthesia Plan    ASA 3     spinal   (Risks discussed , including but not limited to:  Headache, itching, n&v, infection, failure, decreased blood pressure, permanent/chronic back pain, nerve damage, paralysis, etc. All questions answered and informed consent obtained.     Postoperative adductor canal onQ or single shot pnb if needed)  intravenous induction   Anesthetic plan and risks discussed with patient.

## 2017-09-26 NOTE — ANESTHESIA PROCEDURE NOTES
Peripheral Block    Patient location during procedure: post-op  Start time: 9/26/2017 5:10 PM  Stop time: 9/26/2017 5:15 PM  Reason for block: procedure for pain, at surgeon's request and post-op pain management  Performed by  CRNA: BRANDON KATZ  Preanesthetic Checklist  Completed: patient identified, site marked, surgical consent, pre-op evaluation, timeout performed, IV checked, risks and benefits discussed and monitors and equipment checked  Prep:  Pt Position: supine  Sterile barriers:cap and gloves  Prep: ChloraPrep  Patient monitoring: blood pressure monitoring, continuous pulse oximetry and EKG  Procedure  Sedation:yes  Performed under: spinal  Guidance:ultrasound guided  ULTRASOUND INTERPRETATION. Using ultrasound guidance a gauge needle was placed in close proximity to the femoral nerve, at which point, under ultrasound guidance anesthetic was injected in the area of the nerve and spread of the anesthesia was seen on ultrasound in close proximity thereto.  There were no abnormalities seen on ultrasound; a digital image was taken; and the patient tolerated the procedure with no complications. Images:still images obtained  Loss of twitch: 0.5 mA  Laterality:right  Block Type:femoral  Injection Technique:single-shot  Needle Type:echogenic  Needle Gauge:20 G  Resistance on Injection: none  Medications  Local Injected:lidocaine 2% Local Amount Injected:5mL  Post Assessment  Injection Assessment: negative aspiration for heme, no paresthesia on injection and incremental injection  Patient Tolerance:comfortable throughout block  Complications:yes  Additional Notes   Incremental injection with negative aspirate. No pain on injection. Normal injection resistance.  Vascular puncture avoided. Nerves and surrounding tissues identified with local spread around nerves visualized.

## 2017-09-27 LAB
ANION GAP SERPL CALCULATED.3IONS-SCNC: 13.4 MMOL/L
BASOPHILS # BLD AUTO: 0.03 10*3/MM3 (ref 0–0.2)
BASOPHILS NFR BLD AUTO: 0.3 % (ref 0–2.5)
BUN BLD-MCNC: 19 MG/DL (ref 7–20)
BUN/CREAT SERPL: 23.8 (ref 7.1–23.5)
CALCIUM SPEC-SCNC: 8.2 MG/DL (ref 8.4–10.2)
CHLORIDE SERPL-SCNC: 104 MMOL/L (ref 98–107)
CO2 SERPL-SCNC: 27 MMOL/L (ref 26–30)
CREAT BLD-MCNC: 0.8 MG/DL (ref 0.6–1.3)
DEPRECATED RDW RBC AUTO: 45.7 FL (ref 37–54)
EOSINOPHIL # BLD AUTO: 0 10*3/MM3 (ref 0–0.7)
EOSINOPHIL NFR BLD AUTO: 0 % (ref 0–7)
ERYTHROCYTE [DISTWIDTH] IN BLOOD BY AUTOMATED COUNT: 14.1 % (ref 11.5–14.5)
GFR SERPL CREATININE-BSD FRML MDRD: 75 ML/MIN/1.73
GLUCOSE BLD-MCNC: 182 MG/DL (ref 74–98)
HCT VFR BLD AUTO: 28 % (ref 37–47)
HGB BLD-MCNC: 8.7 G/DL (ref 12–16)
IMM GRANULOCYTES # BLD: 0.06 10*3/MM3 (ref 0–0.06)
IMM GRANULOCYTES NFR BLD: 0.7 % (ref 0–0.6)
LYMPHOCYTES # BLD AUTO: 0.71 10*3/MM3 (ref 0.6–3.4)
LYMPHOCYTES NFR BLD AUTO: 8 % (ref 10–50)
MCH RBC QN AUTO: 28 PG (ref 27–31)
MCHC RBC AUTO-ENTMCNC: 31.1 G/DL (ref 30–37)
MCV RBC AUTO: 90 FL (ref 81–99)
MONOCYTES # BLD AUTO: 0.32 10*3/MM3 (ref 0–0.9)
MONOCYTES NFR BLD AUTO: 3.6 % (ref 0–12)
NEUTROPHILS # BLD AUTO: 7.81 10*3/MM3 (ref 2–6.9)
NEUTROPHILS NFR BLD AUTO: 87.4 % (ref 37–80)
NRBC BLD MANUAL-RTO: 0 /100 WBC (ref 0–0)
PLATELET # BLD AUTO: 200 10*3/MM3 (ref 130–400)
PMV BLD AUTO: 9.6 FL (ref 6–12)
POTASSIUM BLD-SCNC: 4.4 MMOL/L (ref 3.5–5.1)
RBC # BLD AUTO: 3.11 10*6/MM3 (ref 4.2–5.4)
SODIUM BLD-SCNC: 140 MMOL/L (ref 137–145)
WBC NRBC COR # BLD: 8.93 10*3/MM3 (ref 4.8–10.8)

## 2017-09-27 PROCEDURE — 99024 POSTOP FOLLOW-UP VISIT: CPT | Performed by: PHYSICIAN ASSISTANT

## 2017-09-27 PROCEDURE — 25010000002 HYDROMORPHONE PER 4 MG: Performed by: INTERNAL MEDICINE

## 2017-09-27 PROCEDURE — 25010000002 HYDROMORPHONE PER 4 MG: Performed by: ORTHOPAEDIC SURGERY

## 2017-09-27 PROCEDURE — 85025 COMPLETE CBC W/AUTO DIFF WBC: CPT | Performed by: ORTHOPAEDIC SURGERY

## 2017-09-27 PROCEDURE — 99232 SBSQ HOSP IP/OBS MODERATE 35: CPT | Performed by: INTERNAL MEDICINE

## 2017-09-27 PROCEDURE — 80048 BASIC METABOLIC PNL TOTAL CA: CPT | Performed by: ORTHOPAEDIC SURGERY

## 2017-09-27 PROCEDURE — 97116 GAIT TRAINING THERAPY: CPT

## 2017-09-27 PROCEDURE — 94799 UNLISTED PULMONARY SVC/PX: CPT

## 2017-09-27 PROCEDURE — 97110 THERAPEUTIC EXERCISES: CPT

## 2017-09-27 PROCEDURE — 25010000002 FONDAPARINUX PER 0.5 MG: Performed by: ORTHOPAEDIC SURGERY

## 2017-09-27 PROCEDURE — 25010000002 VANCOMYCIN PER 500 MG: Performed by: ORTHOPAEDIC SURGERY

## 2017-09-27 RX ORDER — HYDROMORPHONE HYDROCHLORIDE 4 MG/ML
1 INJECTION, SOLUTION INTRAMUSCULAR; INTRAVENOUS; SUBCUTANEOUS
Status: DISCONTINUED | OUTPATIENT
Start: 2017-09-27 | End: 2017-09-27

## 2017-09-27 RX ADMIN — HYDROMORPHONE HYDROCHLORIDE 1 MG: 4 INJECTION, SOLUTION INTRAMUSCULAR; INTRAVENOUS; SUBCUTANEOUS at 06:33

## 2017-09-27 RX ADMIN — OXYCODONE HYDROCHLORIDE 10 MG: 5 TABLET ORAL at 22:31

## 2017-09-27 RX ADMIN — OXYCODONE HYDROCHLORIDE 10 MG: 5 TABLET ORAL at 08:26

## 2017-09-27 RX ADMIN — HYDROMORPHONE HYDROCHLORIDE 1 MG: 4 INJECTION, SOLUTION INTRAMUSCULAR; INTRAVENOUS; SUBCUTANEOUS at 00:35

## 2017-09-27 RX ADMIN — FONDAPARINUX SODIUM 2.5 MG: 2.5 INJECTION, SOLUTION SUBCUTANEOUS at 08:54

## 2017-09-27 RX ADMIN — ZOLPIDEM TARTRATE 5 MG: 5 TABLET ORAL at 20:36

## 2017-09-27 RX ADMIN — SODIUM CHLORIDE, POTASSIUM CHLORIDE, SODIUM LACTATE AND CALCIUM CHLORIDE 100 ML/HR: 600; 310; 30; 20 INJECTION, SOLUTION INTRAVENOUS at 15:30

## 2017-09-27 RX ADMIN — OXYCODONE HYDROCHLORIDE 10 MG: 5 TABLET ORAL at 17:51

## 2017-09-27 RX ADMIN — CLINDAMYCIN PHOSPHATE 600 MG: 600 INJECTION, SOLUTION INTRAVENOUS at 06:33

## 2017-09-27 RX ADMIN — HYDROMORPHONE HYDROCHLORIDE 1 MG: 4 INJECTION, SOLUTION INTRAMUSCULAR; INTRAVENOUS; SUBCUTANEOUS at 09:55

## 2017-09-27 RX ADMIN — HYDROMORPHONE HYDROCHLORIDE 1 MG: 1 INJECTION, SOLUTION INTRAMUSCULAR; INTRAVENOUS; SUBCUTANEOUS at 20:36

## 2017-09-27 RX ADMIN — GABAPENTIN 400 MG: 400 CAPSULE ORAL at 21:00

## 2017-09-27 RX ADMIN — HYDROMORPHONE HYDROCHLORIDE 1 MG: 4 INJECTION, SOLUTION INTRAMUSCULAR; INTRAVENOUS; SUBCUTANEOUS at 17:09

## 2017-09-27 RX ADMIN — HYDROMORPHONE HYDROCHLORIDE 1 MG: 4 INJECTION, SOLUTION INTRAMUSCULAR; INTRAVENOUS; SUBCUTANEOUS at 03:02

## 2017-09-27 RX ADMIN — SODIUM CHLORIDE, POTASSIUM CHLORIDE, SODIUM LACTATE AND CALCIUM CHLORIDE 100 ML/HR: 600; 310; 30; 20 INJECTION, SOLUTION INTRAVENOUS at 03:02

## 2017-09-27 RX ADMIN — MELOXICAM 15 MG: 7.5 TABLET ORAL at 08:17

## 2017-09-27 RX ADMIN — HYDROMORPHONE HYDROCHLORIDE 1 MG: 4 INJECTION, SOLUTION INTRAMUSCULAR; INTRAVENOUS; SUBCUTANEOUS at 13:50

## 2017-09-27 RX ADMIN — OXYCODONE HYDROCHLORIDE 10 MG: 5 TABLET ORAL at 12:41

## 2017-09-27 RX ADMIN — GABAPENTIN 400 MG: 400 CAPSULE ORAL at 06:33

## 2017-09-27 RX ADMIN — OXYCODONE HYDROCHLORIDE 10 MG: 5 TABLET ORAL at 04:13

## 2017-09-27 RX ADMIN — GABAPENTIN 400 MG: 400 CAPSULE ORAL at 13:56

## 2017-09-27 RX ADMIN — VANCOMYCIN HYDROCHLORIDE 1000 MG: 1 INJECTION, POWDER, LYOPHILIZED, FOR SOLUTION INTRAVENOUS at 18:41

## 2017-09-28 PROCEDURE — 97116 GAIT TRAINING THERAPY: CPT

## 2017-09-28 PROCEDURE — 94799 UNLISTED PULMONARY SVC/PX: CPT

## 2017-09-28 PROCEDURE — 25010000002 ERTAPENEM PER 500 MG: Performed by: ORTHOPAEDIC SURGERY

## 2017-09-28 PROCEDURE — 99024 POSTOP FOLLOW-UP VISIT: CPT | Performed by: PHYSICIAN ASSISTANT

## 2017-09-28 PROCEDURE — 25010000002 HYDROMORPHONE PER 4 MG: Performed by: ORTHOPAEDIC SURGERY

## 2017-09-28 PROCEDURE — 25010000002 FONDAPARINUX PER 0.5 MG: Performed by: ORTHOPAEDIC SURGERY

## 2017-09-28 PROCEDURE — 25010000002 HYDROMORPHONE PER 4 MG: Performed by: PHYSICIAN ASSISTANT

## 2017-09-28 PROCEDURE — C1751 CATH, INF, PER/CENT/MIDLINE: HCPCS

## 2017-09-28 PROCEDURE — C1894 INTRO/SHEATH, NON-LASER: HCPCS

## 2017-09-28 PROCEDURE — 99232 SBSQ HOSP IP/OBS MODERATE 35: CPT | Performed by: INTERNAL MEDICINE

## 2017-09-28 PROCEDURE — 97110 THERAPEUTIC EXERCISES: CPT

## 2017-09-28 RX ORDER — SODIUM CHLORIDE 0.9 % (FLUSH) 0.9 %
10 SYRINGE (ML) INJECTION AS NEEDED
Status: DISCONTINUED | OUTPATIENT
Start: 2017-09-28 | End: 2017-09-29 | Stop reason: HOSPADM

## 2017-09-28 RX ADMIN — HYDROMORPHONE HYDROCHLORIDE 1 MG: 1 INJECTION, SOLUTION INTRAMUSCULAR; INTRAVENOUS; SUBCUTANEOUS at 08:40

## 2017-09-28 RX ADMIN — HYDROMORPHONE HYDROCHLORIDE 1 MG: 1 INJECTION, SOLUTION INTRAMUSCULAR; INTRAVENOUS; SUBCUTANEOUS at 22:29

## 2017-09-28 RX ADMIN — LISINOPRIL AND HYDROCHLOROTHIAZIDE 1 TABLET: 12.5; 2 TABLET ORAL at 08:05

## 2017-09-28 RX ADMIN — GABAPENTIN 400 MG: 400 CAPSULE ORAL at 15:19

## 2017-09-28 RX ADMIN — HYDROMORPHONE HYDROCHLORIDE 1 MG: 1 INJECTION, SOLUTION INTRAMUSCULAR; INTRAVENOUS; SUBCUTANEOUS at 15:20

## 2017-09-28 RX ADMIN — MELOXICAM 15 MG: 7.5 TABLET ORAL at 08:00

## 2017-09-28 RX ADMIN — HYDROMORPHONE HYDROCHLORIDE 1 MG: 1 INJECTION, SOLUTION INTRAMUSCULAR; INTRAVENOUS; SUBCUTANEOUS at 20:00

## 2017-09-28 RX ADMIN — HYDROMORPHONE HYDROCHLORIDE 1 MG: 1 INJECTION, SOLUTION INTRAMUSCULAR; INTRAVENOUS; SUBCUTANEOUS at 08:00

## 2017-09-28 RX ADMIN — HYDROMORPHONE HYDROCHLORIDE 1 MG: 1 INJECTION, SOLUTION INTRAMUSCULAR; INTRAVENOUS; SUBCUTANEOUS at 02:58

## 2017-09-28 RX ADMIN — HYDROMORPHONE HYDROCHLORIDE 1 MG: 1 INJECTION, SOLUTION INTRAMUSCULAR; INTRAVENOUS; SUBCUTANEOUS at 05:54

## 2017-09-28 RX ADMIN — HYDROMORPHONE HYDROCHLORIDE 1 MG: 1 INJECTION, SOLUTION INTRAMUSCULAR; INTRAVENOUS; SUBCUTANEOUS at 10:41

## 2017-09-28 RX ADMIN — GABAPENTIN 400 MG: 400 CAPSULE ORAL at 05:50

## 2017-09-28 RX ADMIN — OXYCODONE HYDROCHLORIDE 10 MG: 5 TABLET ORAL at 12:00

## 2017-09-28 RX ADMIN — ZOLPIDEM TARTRATE 5 MG: 5 TABLET ORAL at 22:04

## 2017-09-28 RX ADMIN — OXYCODONE HYDROCHLORIDE 10 MG: 5 TABLET ORAL at 07:35

## 2017-09-28 RX ADMIN — FONDAPARINUX SODIUM 2.5 MG: 2.5 INJECTION, SOLUTION SUBCUTANEOUS at 08:00

## 2017-09-28 RX ADMIN — ATENOLOL 25 MG: 25 TABLET ORAL at 08:05

## 2017-09-28 RX ADMIN — OXYCODONE HYDROCHLORIDE 10 MG: 5 TABLET ORAL at 03:25

## 2017-09-28 RX ADMIN — OXYCODONE HYDROCHLORIDE 10 MG: 5 TABLET ORAL at 18:01

## 2017-09-28 RX ADMIN — GABAPENTIN 400 MG: 400 CAPSULE ORAL at 22:04

## 2017-09-28 RX ADMIN — HYDROMORPHONE HYDROCHLORIDE 1 MG: 1 INJECTION, SOLUTION INTRAMUSCULAR; INTRAVENOUS; SUBCUTANEOUS at 13:14

## 2017-09-28 RX ADMIN — SODIUM CHLORIDE, POTASSIUM CHLORIDE, SODIUM LACTATE AND CALCIUM CHLORIDE 100 ML/HR: 600; 310; 30; 20 INJECTION, SOLUTION INTRAVENOUS at 02:00

## 2017-09-28 RX ADMIN — SODIUM CHLORIDE 1 G: 9 INJECTION, SOLUTION INTRAVENOUS at 10:36

## 2017-09-28 RX ADMIN — OXYCODONE HYDROCHLORIDE 10 MG: 5 TABLET ORAL at 22:03

## 2017-09-29 VITALS
BODY MASS INDEX: 20.25 KG/M2 | SYSTOLIC BLOOD PRESSURE: 127 MMHG | WEIGHT: 126 LBS | DIASTOLIC BLOOD PRESSURE: 72 MMHG | OXYGEN SATURATION: 99 % | RESPIRATION RATE: 14 BRPM | TEMPERATURE: 98.1 F | HEIGHT: 66 IN | HEART RATE: 68 BPM

## 2017-09-29 PROCEDURE — 25010000002 HYDROMORPHONE PER 4 MG: Performed by: PHYSICIAN ASSISTANT

## 2017-09-29 PROCEDURE — 99024 POSTOP FOLLOW-UP VISIT: CPT | Performed by: ORTHOPAEDIC SURGERY

## 2017-09-29 PROCEDURE — 25010000002 FONDAPARINUX PER 0.5 MG: Performed by: ORTHOPAEDIC SURGERY

## 2017-09-29 PROCEDURE — 25010000002 ERTAPENEM PER 500 MG: Performed by: ORTHOPAEDIC SURGERY

## 2017-09-29 PROCEDURE — 94799 UNLISTED PULMONARY SVC/PX: CPT

## 2017-09-29 RX ORDER — PSEUDOEPHEDRINE HCL 30 MG
100 TABLET ORAL 2 TIMES DAILY PRN
Qty: 30 CAPSULE | Refills: 1 | Status: ON HOLD | OUTPATIENT
Start: 2017-09-29 | End: 2021-04-27

## 2017-09-29 RX ORDER — OXYCODONE HYDROCHLORIDE 10 MG/1
10 TABLET ORAL EVERY 4 HOURS PRN
Qty: 84 TABLET | Refills: 0 | Status: SHIPPED | OUTPATIENT
Start: 2017-09-29 | End: 2017-10-11 | Stop reason: SDUPTHER

## 2017-09-29 RX ORDER — OXYCODONE HYDROCHLORIDE 10 MG/1
10 TABLET ORAL EVERY 4 HOURS PRN
Qty: 84 TABLET | Refills: 0 | Status: SHIPPED | OUTPATIENT
Start: 2017-09-29 | End: 2017-09-29 | Stop reason: HOSPADM

## 2017-09-29 RX ORDER — OXYCODONE HYDROCHLORIDE 5 MG/1
5-10 TABLET ORAL EVERY 6 HOURS PRN
Qty: 60 TABLET | Refills: 0 | Status: SHIPPED | OUTPATIENT
Start: 2017-09-29 | End: 2017-09-29 | Stop reason: HOSPADM

## 2017-09-29 RX ORDER — OXYCODONE HYDROCHLORIDE 10 MG/1
10 TABLET ORAL EVERY 6 HOURS PRN
Qty: 60 TABLET | Refills: 0 | OUTPATIENT
Start: 2017-09-29

## 2017-09-29 RX ADMIN — FONDAPARINUX SODIUM 2.5 MG: 2.5 INJECTION, SOLUTION SUBCUTANEOUS at 08:16

## 2017-09-29 RX ADMIN — HYDROMORPHONE HYDROCHLORIDE 1 MG: 1 INJECTION, SOLUTION INTRAMUSCULAR; INTRAVENOUS; SUBCUTANEOUS at 03:10

## 2017-09-29 RX ADMIN — HYDROMORPHONE HYDROCHLORIDE 1 MG: 1 INJECTION, SOLUTION INTRAMUSCULAR; INTRAVENOUS; SUBCUTANEOUS at 05:41

## 2017-09-29 RX ADMIN — HYDROMORPHONE HYDROCHLORIDE 1 MG: 1 INJECTION, SOLUTION INTRAMUSCULAR; INTRAVENOUS; SUBCUTANEOUS at 11:16

## 2017-09-29 RX ADMIN — HYDROMORPHONE HYDROCHLORIDE 1 MG: 1 INJECTION, SOLUTION INTRAMUSCULAR; INTRAVENOUS; SUBCUTANEOUS at 00:34

## 2017-09-29 RX ADMIN — OXYCODONE HYDROCHLORIDE 10 MG: 5 TABLET ORAL at 10:22

## 2017-09-29 RX ADMIN — OXYCODONE HYDROCHLORIDE 10 MG: 5 TABLET ORAL at 14:58

## 2017-09-29 RX ADMIN — HYDROMORPHONE HYDROCHLORIDE 1 MG: 1 INJECTION, SOLUTION INTRAMUSCULAR; INTRAVENOUS; SUBCUTANEOUS at 13:23

## 2017-09-29 RX ADMIN — MELOXICAM 15 MG: 7.5 TABLET ORAL at 08:15

## 2017-09-29 RX ADMIN — HYDROMORPHONE HYDROCHLORIDE 1 MG: 1 INJECTION, SOLUTION INTRAMUSCULAR; INTRAVENOUS; SUBCUTANEOUS at 08:16

## 2017-09-29 RX ADMIN — OXYCODONE HYDROCHLORIDE 10 MG: 5 TABLET ORAL at 02:03

## 2017-09-29 RX ADMIN — HYDROMORPHONE HYDROCHLORIDE 1 MG: 1 INJECTION, SOLUTION INTRAMUSCULAR; INTRAVENOUS; SUBCUTANEOUS at 15:43

## 2017-09-29 RX ADMIN — SODIUM CHLORIDE 1 G: 9 INJECTION, SOLUTION INTRAVENOUS at 10:20

## 2017-09-29 RX ADMIN — GABAPENTIN 400 MG: 400 CAPSULE ORAL at 13:26

## 2017-09-29 RX ADMIN — GABAPENTIN 400 MG: 400 CAPSULE ORAL at 05:41

## 2017-09-29 RX ADMIN — OXYCODONE HYDROCHLORIDE 10 MG: 5 TABLET ORAL at 06:46

## 2017-10-02 ENCOUNTER — APPOINTMENT (OUTPATIENT)
Dept: LAB | Facility: HOSPITAL | Age: 52
End: 2017-10-02
Attending: INTERNAL MEDICINE

## 2017-10-02 ENCOUNTER — LAB (OUTPATIENT)
Dept: LAB | Facility: HOSPITAL | Age: 52
End: 2017-10-02

## 2017-10-02 ENCOUNTER — TRANSCRIBE ORDERS (OUTPATIENT)
Dept: LAB | Facility: HOSPITAL | Age: 52
End: 2017-10-02

## 2017-10-02 DIAGNOSIS — M05.761 RHEUMATOID ARTHRITIS INVOLVING RIGHT KNEE WITH POSITIVE RHEUMATOID FACTOR (HCC): ICD-10-CM

## 2017-10-02 DIAGNOSIS — Z88.0 PERSONAL HISTORY OF ALLERGY TO PENICILLIN: ICD-10-CM

## 2017-10-02 DIAGNOSIS — M05.761 RHEUMATOID ARTHRITIS INVOLVING RIGHT KNEE WITH POSITIVE RHEUMATOID FACTOR (HCC): Primary | ICD-10-CM

## 2017-10-02 LAB
ALBUMIN SERPL-MCNC: 3.4 G/DL (ref 3.2–4.8)
ALBUMIN/GLOB SERPL: 1.2 G/DL (ref 1.5–2.5)
ALP SERPL-CCNC: 99 U/L (ref 25–100)
ALT SERPL W P-5'-P-CCNC: 12 U/L (ref 7–40)
ANION GAP SERPL CALCULATED.3IONS-SCNC: 2 MMOL/L (ref 3–11)
AST SERPL-CCNC: 15 U/L (ref 0–33)
BACTERIA SPEC AEROBE CULT: NO GROWTH
BASOPHILS # BLD AUTO: 0.03 10*3/MM3 (ref 0–0.2)
BASOPHILS NFR BLD AUTO: 0.6 % (ref 0–1)
BILIRUB SERPL-MCNC: 0.4 MG/DL (ref 0.3–1.2)
BUN BLD-MCNC: 18 MG/DL (ref 9–23)
BUN/CREAT SERPL: 20 (ref 7–25)
CALCIUM SPEC-SCNC: 8.7 MG/DL (ref 8.7–10.4)
CHLORIDE SERPL-SCNC: 102 MMOL/L (ref 99–109)
CO2 SERPL-SCNC: 34 MMOL/L (ref 20–31)
CREAT BLD-MCNC: 0.9 MG/DL (ref 0.6–1.3)
CRP SERPL-MCNC: 3.37 MG/DL (ref 0–1)
DEPRECATED RDW RBC AUTO: 49 FL (ref 37–54)
EOSINOPHIL # BLD AUTO: 0.53 10*3/MM3 (ref 0–0.3)
EOSINOPHIL NFR BLD AUTO: 10.5 % (ref 0–3)
ERYTHROCYTE [DISTWIDTH] IN BLOOD BY AUTOMATED COUNT: 15 % (ref 11.3–14.5)
ERYTHROCYTE [SEDIMENTATION RATE] IN BLOOD: 38 MM/HR (ref 0–30)
GFR SERPL CREATININE-BSD FRML MDRD: 66 ML/MIN/1.73
GLOBULIN UR ELPH-MCNC: 2.9 GM/DL
GLUCOSE BLD-MCNC: 75 MG/DL (ref 70–100)
HCT VFR BLD AUTO: 24.9 % (ref 34.5–44)
HGB BLD-MCNC: 7.8 G/DL (ref 11.5–15.5)
IMM GRANULOCYTES # BLD: 0.01 10*3/MM3 (ref 0–0.03)
IMM GRANULOCYTES NFR BLD: 0.2 % (ref 0–0.6)
LYMPHOCYTES # BLD AUTO: 1.25 10*3/MM3 (ref 0.6–4.8)
LYMPHOCYTES NFR BLD AUTO: 24.7 % (ref 24–44)
MCH RBC QN AUTO: 28.3 PG (ref 27–31)
MCHC RBC AUTO-ENTMCNC: 31.3 G/DL (ref 32–36)
MCV RBC AUTO: 90.2 FL (ref 80–99)
MONOCYTES # BLD AUTO: 0.35 10*3/MM3 (ref 0–1)
MONOCYTES NFR BLD AUTO: 6.9 % (ref 0–12)
NEUTROPHILS # BLD AUTO: 2.89 10*3/MM3 (ref 1.5–8.3)
NEUTROPHILS NFR BLD AUTO: 57.1 % (ref 41–71)
PLATELET # BLD AUTO: 226 10*3/MM3 (ref 150–450)
PMV BLD AUTO: 9.2 FL (ref 6–12)
POTASSIUM BLD-SCNC: 4.5 MMOL/L (ref 3.5–5.5)
PROT SERPL-MCNC: 6.3 G/DL (ref 5.7–8.2)
RBC # BLD AUTO: 2.76 10*6/MM3 (ref 3.89–5.14)
SODIUM BLD-SCNC: 138 MMOL/L (ref 132–146)
WBC NRBC COR # BLD: 5.06 10*3/MM3 (ref 3.5–10.8)

## 2017-10-02 PROCEDURE — 85025 COMPLETE CBC W/AUTO DIFF WBC: CPT | Performed by: INTERNAL MEDICINE

## 2017-10-02 PROCEDURE — 85652 RBC SED RATE AUTOMATED: CPT | Performed by: INTERNAL MEDICINE

## 2017-10-02 PROCEDURE — 36415 COLL VENOUS BLD VENIPUNCTURE: CPT | Performed by: INTERNAL MEDICINE

## 2017-10-02 PROCEDURE — 86140 C-REACTIVE PROTEIN: CPT | Performed by: INTERNAL MEDICINE

## 2017-10-02 PROCEDURE — 80053 COMPREHEN METABOLIC PANEL: CPT | Performed by: INTERNAL MEDICINE

## 2017-10-03 ENCOUNTER — TELEPHONE (OUTPATIENT)
Dept: ORTHOPEDIC SURGERY | Facility: CLINIC | Age: 52
End: 2017-10-03

## 2017-10-04 ENCOUNTER — OFFICE VISIT (OUTPATIENT)
Dept: ORTHOPEDIC SURGERY | Facility: CLINIC | Age: 52
End: 2017-10-04

## 2017-10-04 VITALS — RESPIRATION RATE: 16 BRPM | TEMPERATURE: 98.5 F | HEIGHT: 66 IN | WEIGHT: 126 LBS | BODY MASS INDEX: 20.25 KG/M2

## 2017-10-04 DIAGNOSIS — M51.36 LUMBAR DEGENERATIVE DISC DISEASE: ICD-10-CM

## 2017-10-04 DIAGNOSIS — M25.461 EFFUSION OF RIGHT KNEE: Primary | ICD-10-CM

## 2017-10-04 DIAGNOSIS — M17.32 POST-TRAUMATIC OSTEOARTHRITIS OF LEFT KNEE: ICD-10-CM

## 2017-10-04 DIAGNOSIS — M25.561 RIGHT KNEE PAIN, UNSPECIFIED CHRONICITY: Primary | ICD-10-CM

## 2017-10-04 DIAGNOSIS — M11.20 PSEUDOGOUT: ICD-10-CM

## 2017-10-04 DIAGNOSIS — M25.561 ARTHRALGIA OF KNEE, RIGHT: ICD-10-CM

## 2017-10-04 DIAGNOSIS — G62.9 NEUROPATHY: ICD-10-CM

## 2017-10-04 DIAGNOSIS — T84.53XA INFECTION ASSOCIATED WITH INTERNAL RIGHT KNEE PROSTHESIS, INITIAL ENCOUNTER (HCC): ICD-10-CM

## 2017-10-04 PROCEDURE — 99024 POSTOP FOLLOW-UP VISIT: CPT | Performed by: ORTHOPAEDIC SURGERY

## 2017-10-04 PROCEDURE — 73560 X-RAY EXAM OF KNEE 1 OR 2: CPT | Performed by: ORTHOPAEDIC SURGERY

## 2017-10-04 NOTE — PROGRESS NOTES
Subjective   Patient ID: Anca Andrade is a 51 y.o. right hand dominant female is here today for a post-operative visit  Post-op of the Right Knee        History of Present Illness     Pain controlled: [x] no   [] yes   Medication refill requested: [x] no   [] yes    Patient compliant with instructions: [] no   [x] yes   Other: Patient notes increased pain in her knee cap area since surgery. No redness or drainage noted at incision site. No fever or other acute changes.  Knee immobilizer (short style provided improvement in control of leg, leg raising and ambulation.  She can also remove it for gentle active knee range of motion exercises and she understands.     Past Medical History:   Diagnosis Date   • Asthma    • Body piercing     BILATERAL EARS   • Depression    • GERD (gastroesophageal reflux disease)    • H/O corticosteroid therapy     right knee   • History of being tatooed    • Hypertension    • Osteoarthritis    • Pseudogout    • Rheumatoid arthritis    • Skin cancer     LEFT SHOULDER        Past Surgical History:   Procedure Laterality Date   • CHOLECYSTECTOMY     • COLONOSCOPY  2015   • ENDOSCOPY     • HYSTERECTOMY     • KNEE ACL RECONSTRUCTION Left    • KNEE ARTHROSCOPY      Multiple procedures (left x 4 and right x 2)    • OTHER SURGICAL HISTORY      ORIF right forearm   • PARATHYROIDECTOMY     • AR REVISE KNEE JOINT REPLACE,1 PART Right 9/26/2017    Procedure: RIGHT TOTAL KNEE ARTHROPLASTY REVISION STAGE ONE WITH ANTIBIOTIC SPACER FOR PERIPROSTHETIC INFECTION;  Surgeon: Pete Willis MD;  Location: [facility];  Service: Orthopedics   • TOOTH EXTRACTION     • TOTAL KNEE ARTHROPLASTY Right 10/18/2016   • WRIST SURGERY      Left wrist procedure       Allergies   Allergen Reactions   • Morphine And Related      Headaches due to morphine only, related meds ok   • Penicillins Other (See Comments)     UNSURE OF REACTION         Review of Systems   Constitutional: Negative for fever.   HENT:  "Negative for voice change.    Eyes: Negative for visual disturbance.   Respiratory: Negative for shortness of breath.    Cardiovascular: Negative for chest pain.   Gastrointestinal: Negative for abdominal distention and abdominal pain.   Genitourinary: Negative for dysuria.   Musculoskeletal: Positive for arthralgias and joint swelling. Negative for gait problem.   Skin: Negative for rash.   Neurological: Negative for speech difficulty.   Hematological: Does not bruise/bleed easily.   Psychiatric/Behavioral: Negative for confusion.       Objective   Temp 98.5 °F (36.9 °C)  Resp 16  Ht 66\" (167.6 cm)  Wt 126 lb (57.2 kg)  BMI 20.34 kg/m2      Signs of infection: [x] no                    [] yes   Drainage: [x] no                    [] yes   Incision: [x] healing well     []healed well   Motor exam intact: [] no                    [x] yes   Neurovascular exam intact: [] no                    [x] yes   Signs of compartment syndrome: [x] no                    [] yes   Signs of DVT: [x] no                    [] yes   Other:      Physical Exam   Constitutional: She appears well-developed. No distress.   Musculoskeletal:        Right knee: She exhibits effusion (1+).   Neurological: She is alert.   Skin: Skin is warm and dry. No rash noted. No erythema.   Psychiatric: She has a normal mood and affect. Her speech is normal and behavior is normal. Judgment and thought content normal.   Vitals reviewed.    Right Knee Exam     Tenderness   The patient is experiencing tenderness in the patella and patellar tendon.    Range of Motion   Extension: 0   Flexion: 60     Tests   Varus: negative  Valgus: negative  Patellar Apprehension: negative    Other   Erythema: absent  Scars: present (healing stable with no redness, mild warmth, no drainage.)  Pulse: present  Other tests: effusion (1+) present      Back Exam     Muscle Strength   Right Quadriceps:  4/5   Left Quadriceps:  5/5   Right Hamstrings:  5/5   Left Hamstrings:  5/5 "         Extremity DVT signs are Negative on physical exam with negative Silvia sign, with no calf pain, with no palpable cords, with no increased pain with passive stretch/extension and with no skin tone change  Neurologic Exam     Mental Status   Attention: normal.   Speech: speech is normal   Level of consciousness: alert  Knowledge: good.     Motor Exam   Overall muscle tone: normal    Strength   Right quadriceps: 4/5  Left quadriceps: 5/5  Right hamstrin/5  Left hamstrin/5    Gait, Coordination, and Reflexes     Gait  Gait: (Independent walker assist gait improved with use of short knee immobilizer.)    Ortho Exam    Circumference of right supra-patella knee effusion measured at top staple was 40 cm today.  Can compare with measurement at next visit to assess for expected decreased swelling.  Plan staple removal at next visit as well.    Assessment/Plan   Independent Review of Radiographic Studies:    Indication to evaluate status of prosthesis and joint, and compared with prior imaging, shows no acute fracture or dislocation. In particular patella with no obvious fracture and no patella damian or baja.  Good position and alignment of temporary antibiotic spacers prosthesis with no radiographic signs of loosening.   Laboratory and Other Studies:  Recent results were stable and reviewed with patient.  Cultures were negative thus far.  Anaerobic cultures being held for 14 days per Dr. EFRAIN Hurtado MD of Infectious Diseases.   Medical Decision Making:    Stable post-operative exam and expected early progress.  Limited progress with persistent and/or progressive symptoms.  Continue current management and any additional treatments and workup as outlined in plan.     Procedures  [x] No procedures were performed in office today.     Anca was seen today for post-op.    Diagnoses and all orders for this visit:    Effusion of right knee    Arthralgia of knee, right    Infection associated with internal right knee  prosthesis, initial encounter    Pseudogout    Neuropathy    Post-traumatic osteoarthritis of left knee    Lumbar degenerative disc disease         Recommendations/Plan:     Staples [] Removed today  [] At prior visit  [x] Plan removal later   Splint/cast applied: [x]no []SAC []LAC []SLC []LLC []PTB []Splint:    Brace: []not provided        [x]pt provided with: short knee immob.   Physical therapy: []not at this time    [x]home-based    []outpatient referral   Ultrasound: [x]not ordered         []order given to patient   Labs: [x]not ordered         [x] await further cultures and sensitivities.   Weight Bearing status: []Full [x]WBAT with walker and knee brace []PWB []NWB   Work/Activity status: []Regular []Medium []Light []Sedentary [x]No use extr   Prescriptions: [x]None given today  []As Noted   Imaging at next appt: []Yes  [x]No        Additional instructions: Patient agreeable to return sooner for any new concern.     Regular exercise as tolerated  Orthopedic activities reviewed and patient expressed appreciation  Discussion of orthopedic goals  Risk, benefits, and merits of treatment alternatives reviewed with the patient and questions answered  Physical therapy referral given  Take prescribed medications as instructed only as tolerated  Use brace as instructed  Work status form completed and provided to patient  Reduced physical activity as appropriate  Ice, heat, and/or modalities as beneficial  Watch for signs and symptoms of infection  Guided on proper techniques for mobility, strength, agility and/or conditioning exercises      Exercise, medications, injections, other patient advice, and return appointment as noted.  Brace: Knee immobilizer  Referral: No referrals made at today's visit  Test/Studies: No additional studies ordered.  Surgery: No surgery proposed at this visit. and Planned second stage of knee revision in a 2 - 3 months.  Work/Activity Status: May perform usual activities as tolerated, No  strenuous activity. and No use of involved extremity  Patient is encouraged to call or return for any issues or concerns.    Return in about 1 week (around 10/11/2017) for Recheck.  Patient agreeable to call or return sooner for any concerns.      Scribed for Pete Willis MD by Elsa Soto R.N.. 10/9/2017  7:48 AM

## 2017-10-05 ENCOUNTER — TELEPHONE (OUTPATIENT)
Dept: ORTHOPEDIC SURGERY | Facility: CLINIC | Age: 52
End: 2017-10-05

## 2017-10-10 LAB — BACTERIA SPEC ANAEROBE CULT: NO GROWTH

## 2017-10-11 ENCOUNTER — OFFICE VISIT (OUTPATIENT)
Dept: ORTHOPEDIC SURGERY | Facility: CLINIC | Age: 52
End: 2017-10-11

## 2017-10-11 VITALS — BODY MASS INDEX: 20.25 KG/M2 | HEIGHT: 66 IN | RESPIRATION RATE: 16 BRPM | WEIGHT: 126 LBS

## 2017-10-11 DIAGNOSIS — M17.32 POST-TRAUMATIC OSTEOARTHRITIS OF LEFT KNEE: ICD-10-CM

## 2017-10-11 DIAGNOSIS — M25.561 ARTHRALGIA OF KNEE, RIGHT: ICD-10-CM

## 2017-10-11 DIAGNOSIS — M11.20 PSEUDOGOUT: ICD-10-CM

## 2017-10-11 DIAGNOSIS — M51.36 LUMBAR DEGENERATIVE DISC DISEASE: ICD-10-CM

## 2017-10-11 DIAGNOSIS — T84.53XA INFECTION ASSOCIATED WITH INTERNAL RIGHT KNEE PROSTHESIS, INITIAL ENCOUNTER (HCC): Primary | ICD-10-CM

## 2017-10-11 DIAGNOSIS — G62.9 NEUROPATHY: ICD-10-CM

## 2017-10-11 DIAGNOSIS — Z98.890 S/P ACL RECONSTRUCTION: ICD-10-CM

## 2017-10-11 PROCEDURE — 99024 POSTOP FOLLOW-UP VISIT: CPT | Performed by: ORTHOPAEDIC SURGERY

## 2017-10-11 NOTE — PROGRESS NOTES
Subjective   Patient ID: Anca Andrade is a 51 y.o. right hand dominant female is here today for a post-operative visit  Post-op of the Right Knee        History of Present Illness     Pain controlled: [] no   [x] yes, with current pain regimen.   Medication refill requested: [] no   [x] yes    Patient compliant with instructions: [] no   [x] yes   Other: She went to ER night before last for knee pain and had evaluation including labs that were normal with normal WBC count, and ultrasound of leg with no DVT.  Low dose Ativan added though she did not take after one time as it caused drowsiness otherwise did not help.  Since then pain control is a little better, swelling is decreased, incision line is healing stable, and knee movement and strength of straight leg raise are better.     Past Medical History:   Diagnosis Date   • Asthma    • Body piercing     BILATERAL EARS   • Depression    • GERD (gastroesophageal reflux disease)    • H/O corticosteroid therapy     right knee   • History of being tatooed    • Hypertension    • Osteoarthritis    • Pseudogout    • Rheumatoid arthritis    • Skin cancer     LEFT SHOULDER        Past Surgical History:   Procedure Laterality Date   • CHOLECYSTECTOMY     • COLONOSCOPY  2015   • ENDOSCOPY     • HYSTERECTOMY     • KNEE ACL RECONSTRUCTION Left    • KNEE ARTHROSCOPY      Multiple procedures (left x 4 and right x 2)    • OTHER SURGICAL HISTORY      ORIF right forearm   • PARATHYROIDECTOMY     • MD REVISE KNEE JOINT REPLACE,1 PART Right 9/26/2017    Procedure: RIGHT TOTAL KNEE ARTHROPLASTY REVISION STAGE ONE WITH ANTIBIOTIC SPACER FOR PERIPROSTHETIC INFECTION;  Surgeon: Pete Willis MD;  Location: [facility];  Service: Orthopedics   • TOOTH EXTRACTION     • TOTAL KNEE ARTHROPLASTY Right 10/18/2016   • WRIST SURGERY      Left wrist procedure       Allergies   Allergen Reactions   • Ativan [Lorazepam] Mental Status Change   • Morphine And Related      Headaches due to  "morphine only, related meds ok   • Penicillins Other (See Comments)     UNSURE OF REACTION         Review of Systems   Constitutional: Negative for fever.   HENT: Negative for voice change.    Eyes: Negative for visual disturbance.   Respiratory: Negative for shortness of breath.    Cardiovascular: Negative for chest pain.   Gastrointestinal: Negative for abdominal distention and abdominal pain.   Genitourinary: Negative for dysuria.   Musculoskeletal: Positive for arthralgias, back pain (chronic), gait problem, joint swelling (has decreased considerably.) and myalgias.   Skin: Negative for rash.   Neurological: Negative for speech difficulty.   Hematological: Does not bruise/bleed easily.   Psychiatric/Behavioral: Negative for confusion.       Objective   Resp 16  Ht 66\" (167.6 cm)  Wt 126 lb (57.2 kg)  BMI 20.34 kg/m2      Signs of infection: [x] no                    [] yes   Drainage: [x] no                    [] yes   Incision: [x] healing well     []healed well   Motor exam intact: [] no                    [x] yes   Neurovascular exam intact: [] no                    [x] yes   Signs of compartment syndrome: [x] no                    [] yes   Signs of DVT: [x] no                    [] yes   Other:      Physical Exam   Constitutional: She appears well-developed. No distress.   Musculoskeletal:        Right knee: She exhibits effusion (1+).   Neurological: She is alert.   Skin: Skin is warm and dry. No rash noted. No erythema.   Psychiatric: She has a normal mood and affect. Her speech is normal.   Vitals reviewed.    Right Knee Exam     Tenderness   Right knee tenderness location: decreased diffuse knee pain.    Range of Motion   Extension: 0   Flexion: 90     Tests   Varus: negative  Valgus: negative  Patellar Apprehension: negative    Other   Erythema: absent  Scars: present (healing stable)  Pulse: present  Swelling: mild (swelling measured at same location (circumference of knee at top staple, measures " 36.1 cm today (was 40 cm last visit))  Other tests: effusion (1+) present      Left Knee Exam     Tenderness   The patient is experiencing no tenderness.         Range of Motion   Extension: 0   Flexion: 130     Muscle Strength     The patient has normal left knee strength.      Back Exam     Muscle Strength   Right Quadriceps:  4/5   Left Quadriceps:  5/5   Right Hamstrings:  4/5   Left Hamstrings:  5/5         Extremity DVT signs are Negative on physical exam with negative Silvia sign, with no calf pain, with no palpable cords, with no increased pain with passive stretch/extension and with no skin tone change  Neurologic Exam     Mental Status   Attention: normal.   Speech: speech is normal   Level of consciousness: alert  Knowledge: good.     Motor Exam   Overall muscle tone: normal    Strength   Right quadriceps: 4/5  Left quadriceps: 5/5  Right hamstrin/5  Left hamstrin/5    Gait, Coordination, and Reflexes     Gait  Gait: (stable walker assist ambulation with knee immobilizer.)    Left Knee Exam     Muscle Strength   Normal left knee strength        Assessment/Plan   Independent Review of Radiographic Studies:    No new imaging done today.  Reviewed at a prior visit.  Laboratory and Other Studies:  Recent results were stable and reviewed with patient. Anaerobic cultures that were held for 14 days are negative no growth.  Medical Decision Making:    Fair progress though ongoing with residual symptoms.  Continue current management and any additional treatments and workup as outlined in plan.     Procedures  [x] No procedures were performed in office today.     Anca was seen today for post-op.    Diagnoses and all orders for this visit:    Infection associated with internal right knee prosthesis, initial encounter    Arthralgia of knee, right    Neuropathy    Pseudogout    Post-traumatic osteoarthritis of left knee    Lumbar degenerative disc disease    S/P ACL reconstruction    Other orders  -      oxyCODONE (ROXICODONE) 10 MG tablet; Take 1 tablet by mouth Every 4 (Four) Hours As Needed (Pain).  -     gabapentin (NEURONTIN) 400 MG capsule; Take 1 capsule by mouth 3 (Three) Times a Day.         Recommendations/Plan:     Staples [x] Removed today, Steri strips applied  [] At prior visit   Splint/cast applied: [x]no []SAC []LAC []SLC []LLC []PTB []Splint:    Brace: []not provided        [x]pt provided with knee immobilizer at previous visit   Physical therapy: [x]not at this time    []home-based    []outpatient referral   Ultrasound: [x]not ordered, recent result 10-9-17 was normal   Labs: [x]not ordered, results 10-9-19 from ER stable   Weight Bearing status: []Full [x]WBAT []PWB []NWB []Other   Prescriptions: []None given today  [x]As Noted   Imaging at next appt: []Yes  [x]No          Additional instructions: Patient agreeable to return sooner for any new concern.     Regular exercise as tolerated  Orthopedic activities reviewed and patient expressed appreciation  Discussion of orthopedic goals  Risk, benefits, and merits of treatment alternatives reviewed with the patient and questions answered  Physical therapy referral given  Use brace as instructed  Work status form completed and provided to patient  After care and dental prophylaxis for joint replacement prosthesis  Watch for signs and symptoms of infection  Guided on proper techniques for mobility, strength, agility and/or conditioning exercises      Exercise, medications, injections, other patient advice, and return appointment as noted.  Brace: No brace was given at today's visit  Referral: No referrals made at today's visit  Test/Studies: No additional studies ordered.  Surgery: No surgery proposed at this visit. and Planning for second stage of right knee revision.  Work/Activity Status: off work temporarily.  Patient is encouraged to call or return for any issues or concerns.    Return in about 4 weeks (around 11/8/2017) for Recheck.  Patient  agreeable to call or return sooner for any concerns.      Scribed for Pete Willis MD by Elsa Soto R.N.. 10/16/2017  7:55 AM

## 2017-10-12 RX ORDER — OXYCODONE HYDROCHLORIDE 10 MG/1
10 TABLET ORAL EVERY 4 HOURS PRN
Qty: 84 TABLET | Refills: 0
Start: 2017-10-12 | End: 2017-10-24 | Stop reason: SDUPTHER

## 2017-10-12 RX ORDER — GABAPENTIN 400 MG/1
400 CAPSULE ORAL 3 TIMES DAILY
Qty: 90 CAPSULE | Refills: 0
Start: 2017-10-12 | End: 2018-01-03 | Stop reason: SDUPTHER

## 2017-10-19 ENCOUNTER — LAB (OUTPATIENT)
Dept: LAB | Facility: HOSPITAL | Age: 52
End: 2017-10-19

## 2017-10-19 DIAGNOSIS — I10 ESSENTIAL HYPERTENSION, MALIGNANT: ICD-10-CM

## 2017-10-19 DIAGNOSIS — T84.53XA INFECTION OF TOTAL RIGHT KNEE REPLACEMENT, INITIAL ENCOUNTER (HCC): Primary | ICD-10-CM

## 2017-10-19 DIAGNOSIS — M06.9 RHEUMATOID ARTHRITIS, INVOLVING UNSPECIFIED SITE, UNSPECIFIED RHEUMATOID FACTOR PRESENCE: ICD-10-CM

## 2017-10-19 LAB
ALBUMIN SERPL-MCNC: 3.8 G/DL (ref 3.2–4.8)
ALBUMIN/GLOB SERPL: 1.3 G/DL (ref 1.5–2.5)
ALP SERPL-CCNC: 114 U/L (ref 25–100)
ALT SERPL W P-5'-P-CCNC: 15 U/L (ref 7–40)
ANION GAP SERPL CALCULATED.3IONS-SCNC: -1 MMOL/L (ref 3–11)
AST SERPL-CCNC: 22 U/L (ref 0–33)
BASOPHILS # BLD AUTO: 0.03 10*3/MM3 (ref 0–0.2)
BASOPHILS NFR BLD AUTO: 0.6 % (ref 0–1)
BILIRUB SERPL-MCNC: 0.2 MG/DL (ref 0.3–1.2)
BUN BLD-MCNC: 19 MG/DL (ref 9–23)
BUN/CREAT SERPL: 21.1 (ref 7–25)
CALCIUM SPEC-SCNC: 9 MG/DL (ref 8.7–10.4)
CHLORIDE SERPL-SCNC: 106 MMOL/L (ref 99–109)
CO2 SERPL-SCNC: 34 MMOL/L (ref 20–31)
CREAT BLD-MCNC: 0.9 MG/DL (ref 0.6–1.3)
CRP SERPL-MCNC: 0.07 MG/DL (ref 0–1)
DEPRECATED RDW RBC AUTO: 53.7 FL (ref 37–54)
EOSINOPHIL # BLD AUTO: 0.47 10*3/MM3 (ref 0–0.3)
EOSINOPHIL NFR BLD AUTO: 10.2 % (ref 0–3)
ERYTHROCYTE [DISTWIDTH] IN BLOOD BY AUTOMATED COUNT: 15.8 % (ref 11.3–14.5)
ERYTHROCYTE [SEDIMENTATION RATE] IN BLOOD: 17 MM/HR (ref 0–30)
GFR SERPL CREATININE-BSD FRML MDRD: 66 ML/MIN/1.73
GLOBULIN UR ELPH-MCNC: 3 GM/DL
GLUCOSE BLD-MCNC: 94 MG/DL (ref 70–100)
HCT VFR BLD AUTO: 34.7 % (ref 34.5–44)
HGB BLD-MCNC: 10.6 G/DL (ref 11.5–15.5)
IMM GRANULOCYTES # BLD: 0.01 10*3/MM3 (ref 0–0.03)
IMM GRANULOCYTES NFR BLD: 0.2 % (ref 0–0.6)
LYMPHOCYTES # BLD AUTO: 1.22 10*3/MM3 (ref 0.6–4.8)
LYMPHOCYTES NFR BLD AUTO: 26.4 % (ref 24–44)
MCH RBC QN AUTO: 28.2 PG (ref 27–31)
MCHC RBC AUTO-ENTMCNC: 30.5 G/DL (ref 32–36)
MCV RBC AUTO: 92.3 FL (ref 80–99)
MONOCYTES # BLD AUTO: 0.22 10*3/MM3 (ref 0–1)
MONOCYTES NFR BLD AUTO: 4.8 % (ref 0–12)
NEUTROPHILS # BLD AUTO: 2.67 10*3/MM3 (ref 1.5–8.3)
NEUTROPHILS NFR BLD AUTO: 57.8 % (ref 41–71)
PLATELET # BLD AUTO: 274 10*3/MM3 (ref 150–450)
PMV BLD AUTO: 9.5 FL (ref 6–12)
POTASSIUM BLD-SCNC: 4.9 MMOL/L (ref 3.5–5.5)
PROT SERPL-MCNC: 6.8 G/DL (ref 5.7–8.2)
RBC # BLD AUTO: 3.76 10*6/MM3 (ref 3.89–5.14)
SODIUM BLD-SCNC: 139 MMOL/L (ref 132–146)
WBC NRBC COR # BLD: 4.62 10*3/MM3 (ref 3.5–10.8)

## 2017-10-19 PROCEDURE — 86140 C-REACTIVE PROTEIN: CPT | Performed by: INTERNAL MEDICINE

## 2017-10-19 PROCEDURE — 80053 COMPREHEN METABOLIC PANEL: CPT | Performed by: INTERNAL MEDICINE

## 2017-10-19 PROCEDURE — 85025 COMPLETE CBC W/AUTO DIFF WBC: CPT | Performed by: INTERNAL MEDICINE

## 2017-10-19 PROCEDURE — 85652 RBC SED RATE AUTOMATED: CPT | Performed by: INTERNAL MEDICINE

## 2017-10-24 RX ORDER — OXYCODONE HYDROCHLORIDE 10 MG/1
10 TABLET ORAL EVERY 4 HOURS PRN
Qty: 84 TABLET | Refills: 0
Start: 2017-10-24 | End: 2017-11-06 | Stop reason: SDUPTHER

## 2017-10-26 ENCOUNTER — LAB (OUTPATIENT)
Dept: LAB | Facility: HOSPITAL | Age: 52
End: 2017-10-26

## 2017-10-26 ENCOUNTER — TRANSCRIBE ORDERS (OUTPATIENT)
Dept: LAB | Facility: HOSPITAL | Age: 52
End: 2017-10-26

## 2017-10-26 DIAGNOSIS — T84.53XS INFECTION OF TOTAL RIGHT KNEE REPLACEMENT, SEQUELA: ICD-10-CM

## 2017-10-26 DIAGNOSIS — T84.53XS INFECTION OF TOTAL RIGHT KNEE REPLACEMENT, SEQUELA: Primary | ICD-10-CM

## 2017-10-26 LAB
ALBUMIN SERPL-MCNC: 4 G/DL (ref 3.2–4.8)
ALBUMIN/GLOB SERPL: 1.4 G/DL (ref 1.5–2.5)
ALP SERPL-CCNC: 109 U/L (ref 25–100)
ALT SERPL W P-5'-P-CCNC: 21 U/L (ref 7–40)
ANION GAP SERPL CALCULATED.3IONS-SCNC: 6 MMOL/L (ref 3–11)
AST SERPL-CCNC: 30 U/L (ref 0–33)
BASOPHILS # BLD AUTO: 0.04 10*3/MM3 (ref 0–0.2)
BASOPHILS NFR BLD AUTO: 0.7 % (ref 0–1)
BILIRUB SERPL-MCNC: 0.4 MG/DL (ref 0.3–1.2)
BUN BLD-MCNC: 21 MG/DL (ref 9–23)
BUN/CREAT SERPL: 23.3 (ref 7–25)
CALCIUM SPEC-SCNC: 8.9 MG/DL (ref 8.7–10.4)
CHLORIDE SERPL-SCNC: 101 MMOL/L (ref 99–109)
CO2 SERPL-SCNC: 31 MMOL/L (ref 20–31)
CREAT BLD-MCNC: 0.9 MG/DL (ref 0.6–1.3)
CRP SERPL-MCNC: 0.1 MG/DL (ref 0–1)
DEPRECATED RDW RBC AUTO: 51.3 FL (ref 37–54)
EOSINOPHIL # BLD AUTO: 0.8 10*3/MM3 (ref 0–0.3)
EOSINOPHIL NFR BLD AUTO: 14.7 % (ref 0–3)
ERYTHROCYTE [DISTWIDTH] IN BLOOD BY AUTOMATED COUNT: 15.4 % (ref 11.3–14.5)
ERYTHROCYTE [SEDIMENTATION RATE] IN BLOOD: 14 MM/HR (ref 0–30)
GFR SERPL CREATININE-BSD FRML MDRD: 66 ML/MIN/1.73
GLOBULIN UR ELPH-MCNC: 2.8 GM/DL
GLUCOSE BLD-MCNC: 71 MG/DL (ref 70–100)
HCT VFR BLD AUTO: 35.2 % (ref 34.5–44)
HGB BLD-MCNC: 11 G/DL (ref 11.5–15.5)
IMM GRANULOCYTES # BLD: 0.01 10*3/MM3 (ref 0–0.03)
IMM GRANULOCYTES NFR BLD: 0.2 % (ref 0–0.6)
LYMPHOCYTES # BLD AUTO: 1.93 10*3/MM3 (ref 0.6–4.8)
LYMPHOCYTES NFR BLD AUTO: 35.3 % (ref 24–44)
MCH RBC QN AUTO: 28.4 PG (ref 27–31)
MCHC RBC AUTO-ENTMCNC: 31.3 G/DL (ref 32–36)
MCV RBC AUTO: 90.7 FL (ref 80–99)
MONOCYTES # BLD AUTO: 0.3 10*3/MM3 (ref 0–1)
MONOCYTES NFR BLD AUTO: 5.5 % (ref 0–12)
NEUTROPHILS # BLD AUTO: 2.38 10*3/MM3 (ref 1.5–8.3)
NEUTROPHILS NFR BLD AUTO: 43.6 % (ref 41–71)
PLATELET # BLD AUTO: 221 10*3/MM3 (ref 150–450)
PMV BLD AUTO: 10 FL (ref 6–12)
POTASSIUM BLD-SCNC: 4.4 MMOL/L (ref 3.5–5.5)
PROT SERPL-MCNC: 6.8 G/DL (ref 5.7–8.2)
RBC # BLD AUTO: 3.88 10*6/MM3 (ref 3.89–5.14)
SODIUM BLD-SCNC: 138 MMOL/L (ref 132–146)
WBC NRBC COR # BLD: 5.46 10*3/MM3 (ref 3.5–10.8)

## 2017-10-26 PROCEDURE — 80053 COMPREHEN METABOLIC PANEL: CPT | Performed by: INTERNAL MEDICINE

## 2017-10-26 PROCEDURE — 36415 COLL VENOUS BLD VENIPUNCTURE: CPT | Performed by: INTERNAL MEDICINE

## 2017-10-26 PROCEDURE — 86140 C-REACTIVE PROTEIN: CPT | Performed by: INTERNAL MEDICINE

## 2017-10-26 PROCEDURE — 85025 COMPLETE CBC W/AUTO DIFF WBC: CPT | Performed by: INTERNAL MEDICINE

## 2017-11-02 ENCOUNTER — LAB (OUTPATIENT)
Dept: LAB | Facility: HOSPITAL | Age: 52
End: 2017-11-02

## 2017-11-02 ENCOUNTER — TRANSCRIBE ORDERS (OUTPATIENT)
Dept: LAB | Facility: HOSPITAL | Age: 52
End: 2017-11-02

## 2017-11-02 DIAGNOSIS — Z89.521 ACQUIRED ABSENCE OF RIGHT KNEE: ICD-10-CM

## 2017-11-02 DIAGNOSIS — M06.9 RHEUMATOID ARTHRITIS, INVOLVING UNSPECIFIED SITE, UNSPECIFIED RHEUMATOID FACTOR PRESENCE: ICD-10-CM

## 2017-11-02 DIAGNOSIS — F17.210 CIGARETTE SMOKER: ICD-10-CM

## 2017-11-02 DIAGNOSIS — I10 ESSENTIAL HYPERTENSION, MALIGNANT: ICD-10-CM

## 2017-11-02 DIAGNOSIS — T84.53XS INFECTION OF TOTAL RIGHT KNEE REPLACEMENT, SEQUELA: ICD-10-CM

## 2017-11-02 DIAGNOSIS — T84.53XS INFECTION OF TOTAL RIGHT KNEE REPLACEMENT, SEQUELA: Primary | ICD-10-CM

## 2017-11-02 LAB
ALBUMIN SERPL-MCNC: 4.1 G/DL (ref 3.2–4.8)
ALBUMIN/GLOB SERPL: 1.5 G/DL (ref 1.5–2.5)
ALP SERPL-CCNC: 123 U/L (ref 25–100)
ALT SERPL W P-5'-P-CCNC: 22 U/L (ref 7–40)
ANION GAP SERPL CALCULATED.3IONS-SCNC: 4 MMOL/L (ref 3–11)
AST SERPL-CCNC: 27 U/L (ref 0–33)
BASOPHILS # BLD AUTO: 0.05 10*3/MM3 (ref 0–0.2)
BASOPHILS NFR BLD AUTO: 1 % (ref 0–1)
BILIRUB SERPL-MCNC: 0.2 MG/DL (ref 0.3–1.2)
BUN BLD-MCNC: 19 MG/DL (ref 9–23)
BUN/CREAT SERPL: 23.8 (ref 7–25)
CALCIUM SPEC-SCNC: 9.1 MG/DL (ref 8.7–10.4)
CHLORIDE SERPL-SCNC: 105 MMOL/L (ref 99–109)
CO2 SERPL-SCNC: 31 MMOL/L (ref 20–31)
CREAT BLD-MCNC: 0.8 MG/DL (ref 0.6–1.3)
CRP SERPL-MCNC: 0.12 MG/DL (ref 0–1)
DEPRECATED RDW RBC AUTO: 48.7 FL (ref 37–54)
EOSINOPHIL # BLD AUTO: 0.46 10*3/MM3 (ref 0–0.3)
EOSINOPHIL NFR BLD AUTO: 9.3 % (ref 0–3)
ERYTHROCYTE [DISTWIDTH] IN BLOOD BY AUTOMATED COUNT: 14.5 % (ref 11.3–14.5)
ERYTHROCYTE [SEDIMENTATION RATE] IN BLOOD: 20 MM/HR (ref 0–30)
GFR SERPL CREATININE-BSD FRML MDRD: 75 ML/MIN/1.73
GLOBULIN UR ELPH-MCNC: 2.7 GM/DL
GLUCOSE BLD-MCNC: 75 MG/DL (ref 70–100)
HCT VFR BLD AUTO: 38.9 % (ref 34.5–44)
HGB BLD-MCNC: 13 G/DL (ref 11.5–15.5)
IMM GRANULOCYTES # BLD: 0 10*3/MM3 (ref 0–0.03)
IMM GRANULOCYTES NFR BLD: 0 % (ref 0–0.6)
LYMPHOCYTES # BLD AUTO: 1.41 10*3/MM3 (ref 0.6–4.8)
LYMPHOCYTES NFR BLD AUTO: 28.4 % (ref 24–44)
MCH RBC QN AUTO: 30.4 PG (ref 27–31)
MCHC RBC AUTO-ENTMCNC: 33.4 G/DL (ref 32–36)
MCV RBC AUTO: 91.1 FL (ref 80–99)
MONOCYTES # BLD AUTO: 0.23 10*3/MM3 (ref 0–1)
MONOCYTES NFR BLD AUTO: 4.6 % (ref 0–12)
NEUTROPHILS # BLD AUTO: 2.81 10*3/MM3 (ref 1.5–8.3)
NEUTROPHILS NFR BLD AUTO: 56.7 % (ref 41–71)
PLATELET # BLD AUTO: 222 10*3/MM3 (ref 150–450)
PMV BLD AUTO: 10.1 FL (ref 6–12)
POTASSIUM BLD-SCNC: 4.5 MMOL/L (ref 3.5–5.5)
PROT SERPL-MCNC: 6.8 G/DL (ref 5.7–8.2)
RBC # BLD AUTO: 4.27 10*6/MM3 (ref 3.89–5.14)
SODIUM BLD-SCNC: 140 MMOL/L (ref 132–146)
WBC NRBC COR # BLD: 4.96 10*3/MM3 (ref 3.5–10.8)

## 2017-11-02 PROCEDURE — 80053 COMPREHEN METABOLIC PANEL: CPT | Performed by: INTERNAL MEDICINE

## 2017-11-02 PROCEDURE — 85025 COMPLETE CBC W/AUTO DIFF WBC: CPT | Performed by: INTERNAL MEDICINE

## 2017-11-02 PROCEDURE — 86140 C-REACTIVE PROTEIN: CPT | Performed by: INTERNAL MEDICINE

## 2017-11-02 PROCEDURE — 36415 COLL VENOUS BLD VENIPUNCTURE: CPT

## 2017-11-02 PROCEDURE — 85652 RBC SED RATE AUTOMATED: CPT | Performed by: INTERNAL MEDICINE

## 2017-11-06 RX ORDER — OXYCODONE HYDROCHLORIDE 10 MG/1
10 TABLET ORAL EVERY 4 HOURS PRN
Qty: 84 TABLET | Refills: 0 | Status: SHIPPED | OUTPATIENT
Start: 2017-11-06 | End: 2017-12-08

## 2017-11-06 RX ORDER — OXYCODONE HYDROCHLORIDE 10 MG/1
10 TABLET ORAL EVERY 4 HOURS PRN
Qty: 84 TABLET | Refills: 0
Start: 2017-11-06 | End: 2017-11-06 | Stop reason: SDUPTHER

## 2017-11-10 ENCOUNTER — OFFICE VISIT (OUTPATIENT)
Dept: ORTHOPEDIC SURGERY | Facility: CLINIC | Age: 52
End: 2017-11-10

## 2017-11-10 VITALS — BODY MASS INDEX: 19.29 KG/M2 | WEIGHT: 120 LBS | RESPIRATION RATE: 18 BRPM | HEIGHT: 66 IN

## 2017-11-10 DIAGNOSIS — T84.53XA INFECTION ASSOCIATED WITH INTERNAL RIGHT KNEE PROSTHESIS, INITIAL ENCOUNTER (HCC): Primary | ICD-10-CM

## 2017-11-10 DIAGNOSIS — M17.32 POST-TRAUMATIC OSTEOARTHRITIS OF LEFT KNEE: ICD-10-CM

## 2017-11-10 DIAGNOSIS — M25.561 ARTHRALGIA OF KNEE, RIGHT: ICD-10-CM

## 2017-11-10 DIAGNOSIS — G62.9 NEUROPATHY: ICD-10-CM

## 2017-11-10 DIAGNOSIS — M51.36 LUMBAR DEGENERATIVE DISC DISEASE: ICD-10-CM

## 2017-11-10 PROCEDURE — 99024 POSTOP FOLLOW-UP VISIT: CPT | Performed by: ORTHOPAEDIC SURGERY

## 2017-11-10 RX ORDER — IBUPROFEN 800 MG/1
TABLET ORAL EVERY 6 HOURS PRN
COMMUNITY
Start: 2017-10-11 | End: 2017-12-21 | Stop reason: HOSPADM

## 2017-11-13 NOTE — PROGRESS NOTES
Subjective   Patient ID: Anca Andrade is a 52 y.o. right hand dominant female is here today for a post-operative visit  Post-op and Pain of the Right Knee     History of Present Illness     Pain controlled: [] no   [x] yes   Medication refill requested: [x] no   [] yes    Patient compliant with instructions: [] no   [x] yes   Other: Knee pain control better.  She is willing to taper pain medication gradually and in preparation for next knee procedure and anticipated repeat acute pain management.     Past Medical History:   Diagnosis Date   • Asthma    • Body piercing     BILATERAL EARS   • Depression    • GERD (gastroesophageal reflux disease)    • H/O corticosteroid therapy     right knee   • History of being tatooed    • Hypertension    • Osteoarthritis    • Pseudogout    • Rheumatoid arthritis    • Skin cancer     LEFT SHOULDER        Past Surgical History:   Procedure Laterality Date   • CHOLECYSTECTOMY     • COLONOSCOPY  2015   • ENDOSCOPY     • HYSTERECTOMY     • KNEE ACL RECONSTRUCTION Left    • KNEE ARTHROSCOPY      Multiple procedures (left x 4 and right x 2)    • OTHER SURGICAL HISTORY      ORIF right forearm   • PARATHYROIDECTOMY     • MN REVISE KNEE JOINT REPLACE,1 PART Right 9/26/2017    Procedure: RIGHT TOTAL KNEE ARTHROPLASTY REVISION STAGE ONE WITH ANTIBIOTIC SPACER FOR PERIPROSTHETIC INFECTION;  Surgeon: Peet Willis MD;  Location: [facility];  Service: Orthopedics   • TOOTH EXTRACTION     • TOTAL KNEE ARTHROPLASTY Right 10/18/2016   • WRIST SURGERY      Left wrist procedure       Allergies   Allergen Reactions   • Ativan [Lorazepam] Mental Status Change   • Morphine And Related      Headaches due to morphine only, related meds ok   • Penicillins Other (See Comments)     UNSURE OF REACTION         Review of Systems   Constitutional: Negative for fever.   HENT: Negative for voice change.    Eyes: Negative for visual disturbance.   Respiratory: Negative for shortness of breath.   "  Cardiovascular: Negative for chest pain.   Gastrointestinal: Negative for abdominal distention and abdominal pain.   Genitourinary: Negative for dysuria.   Musculoskeletal: Positive for arthralgias. Negative for gait problem and joint swelling.   Skin: Negative for rash.   Neurological: Negative for speech difficulty.   Hematological: Does not bruise/bleed easily.   Psychiatric/Behavioral: Negative for confusion.       Objective   Resp 18  Ht 66\" (167.6 cm)  Wt 120 lb (54.4 kg)  BMI 19.37 kg/m2      Signs of infection: [x] no                    [] yes   Drainage: [x] no                    [] yes   Incision: [] healing well     [x]healed well   Motor exam intact: [] no                    [x] yes   Neurovascular exam intact: [] no                    [x] yes   Signs of compartment syndrome: [x] no                    [] yes   Signs of DVT: [x] no                    [] yes   Other:      Physical Exam   Constitutional: She appears well-developed. No distress.   Musculoskeletal:        Right knee: She exhibits no effusion.   Neurological: She is alert.   Skin: Skin is warm and dry. No rash noted. No erythema.   Psychiatric: She has a normal mood and affect. Her speech is normal.   Vitals reviewed.    Right Knee Exam     Tenderness   The patient is experiencing tenderness in the patella.    Range of Motion   Extension: 0   Right knee flexion: 95 degrees.     Tests   Varus: negative  Valgus: negative  Patellar Apprehension: negative    Other   Erythema: absent  Scars: present (well healed)  Pulse: present  Right knee swelling: trace.  Other tests: no effusion present      Left Knee Exam     Tenderness   The patient is experiencing no tenderness.           Back Exam     Muscle Strength   Right Quadriceps:  4/5   Left Quadriceps:  5/5   Right Hamstrings:  5/5   Left Hamstrings:  5/5         Extremity DVT signs are Negative on physical exam with negative Silvia sign, with no calf pain, with no palpable cords and with no " skin tone change  Neurologic Exam     Mental Status   Attention: normal.   Speech: speech is normal   Level of consciousness: alert  Knowledge: good.     Motor Exam   Overall muscle tone: normal    Strength   Right quadriceps: 4/5  Left quadriceps: 5/5  Right hamstrin/5  Left hamstrin/5    Gait, Coordination, and Reflexes     Gait  Gait: (milder right knee limp and stable independent gait without assistive device.)    Ortho Exam    Assessment/Plan   Independent Review of Radiographic Studies:    No new imaging done today.  Reviewed at a prior visit.  Laboratory and Other Studies:  Recent results were stable and reviewed with patient.  Laboratory results show no signs of infection or acute process.   Medical Decision Making:    Good progress, significantly improved.  Continue current plan, and management.  Reviewed with Dr. Evaristo Christine of Infectious Diseases plan for treatment as we move toward second stage right knee revision replacement.  Spoke to him earlier this week and reviewed the recommendations with patient today and her questions answered.  PICC line removed two days ago and patient now off of antibiotics.  Plan to aspirate the right knee in 10 - 14 days.  Will await cultures and plan to hold  the anaerobic culture for 14 days for late growth and or P. Acnes.  Then if labs and cultures remain normal can plan right knee surgery tentatively 17.  Then post-op Dr. Christine plans to consider two weeks of IV antibiotics followed by six months of oral antibiotics.     Procedures  [x] No procedures were performed in office today.     Anca was seen today for post-op and pain.    Diagnoses and all orders for this visit:    Infection associated with internal right knee prosthesis, initial encounter    Arthralgia of knee, right    Neuropathy    Post-traumatic osteoarthritis of left knee    Lumbar degenerative disc disease       Recommendations/Plan:     Sutures Staples or Pins [] Removed today  [x]  At prior visit  [] Plan removal later   Splint/cast applied: [x]no []SAC []LAC []SLC []LLC []PTB []Splint:    Brace: [x]not provided        []pt provided with:   Physical therapy: [x] home-based self program at this time    []outpatient referral   Ultrasound: [x]not ordered         []order given to patient   Labs: [x]not ordered         []order given to patient   Weight Bearing status: []Full [x]WBAT []PWB []NWB []Other   Prescriptions: []None given today  [x]As Noted   Imaging at next appt: []Yes  [x]No         Additional instructions: Patient agreeable to return sooner for any new concern.     Regular exercise as tolerated  Orthopedic activities reviewed and patient expressed appreciation  Discussion of orthopedic goals  Risk, benefits, and merits of treatment alternatives reviewed with the patient and questions answered  The nature of the proposed surgery reviewed with the patient including risks, benefits, rehabilitation, recovery timeframe, and outcome expectations  Take prescribed medications as instructed only as tolerated  After care and dental prophylaxis for joint replacement prosthesis  Watch for signs and symptoms of infection  Guided on proper techniques for mobility, strength, agility and/or conditioning exercises      Exercise, medications, injections, other patient advice, and return appointment as noted.  Brace: No brace was given at today's visit  Referral: Infectious diseases  Test/Studies: No additional studies ordered. and Plan knee aspiration and cultures next visit.  Surgery: Surgery proposed at this visit as noted. and Planned second stage of right knee revision replacement for periprosthetic joint infection.  Work/Activity Status: May perform usual activities as tolerated and No strenuous activity.  Patient is encouraged to call or return for any issues or concerns.    Return in about 2 weeks (around 11/24/2017) for Plan R knee aspiration, pre-op H&P, Recheck.  Patient agreeable to call or  return sooner for any concerns.

## 2017-11-15 ENCOUNTER — TELEPHONE (OUTPATIENT)
Dept: ORTHOPEDIC SURGERY | Facility: CLINIC | Age: 52
End: 2017-11-15

## 2017-11-15 NOTE — TELEPHONE ENCOUNTER
Elsa,    Her knee second stage revision is 12-19-17.  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  I would say sometime around and between 4-1-18 and 5-1-18.    Danny

## 2017-11-22 ENCOUNTER — HOSPITAL ENCOUNTER (OUTPATIENT)
Dept: OTHER | Age: 52
Discharge: HOME | End: 2017-11-22
Attending: ORTHOPAEDIC SURGERY | Admitting: ORTHOPAEDIC SURGERY

## 2017-11-22 ENCOUNTER — OFFICE VISIT (OUTPATIENT)
Dept: ORTHOPEDIC SURGERY | Facility: CLINIC | Age: 52
End: 2017-11-22

## 2017-11-22 VITALS — HEIGHT: 66 IN | RESPIRATION RATE: 18 BRPM | WEIGHT: 120 LBS | BODY MASS INDEX: 19.29 KG/M2

## 2017-11-22 DIAGNOSIS — M17.32 POST-TRAUMATIC OSTEOARTHRITIS OF LEFT KNEE: ICD-10-CM

## 2017-11-22 DIAGNOSIS — M11.20 PSEUDOGOUT: ICD-10-CM

## 2017-11-22 DIAGNOSIS — T84.53XA INFECTION ASSOCIATED WITH INTERNAL RIGHT KNEE PROSTHESIS, INITIAL ENCOUNTER (HCC): Primary | ICD-10-CM

## 2017-11-22 DIAGNOSIS — G62.9 NEUROPATHY: ICD-10-CM

## 2017-11-22 DIAGNOSIS — M51.36 LUMBAR DEGENERATIVE DISC DISEASE: ICD-10-CM

## 2017-11-22 DIAGNOSIS — M25.561 ARTHRALGIA OF KNEE, RIGHT: ICD-10-CM

## 2017-11-22 PROCEDURE — 20610 DRAIN/INJ JOINT/BURSA W/O US: CPT | Performed by: ORTHOPAEDIC SURGERY

## 2017-11-22 NOTE — PROGRESS NOTES
Subjective   Patient ID: Anca Andrade is a 52 y.o. female is here today for orthopaedic evaluation of recovery progress with treatment. and is here today for a treatment planning discussion.  Pain and Post-op of the Right Knee     History of Present Illness    Patient presents for planned right knee aspiration for cell count and cultures as she is now 12 days off of antibiotic therapy.  No new symptoms or complaints.     Past Medical History:   Diagnosis Date   • Asthma    • Body piercing     BILATERAL EARS   • Depression    • GERD (gastroesophageal reflux disease)    • H/O corticosteroid therapy     right knee   • History of being tatooed    • Hypertension    • Osteoarthritis    • Pseudogout    • Rheumatoid arthritis    • Skin cancer     LEFT SHOULDER        Past Surgical History:   Procedure Laterality Date   • CHOLECYSTECTOMY     • COLONOSCOPY  2015   • ENDOSCOPY     • HYSTERECTOMY     • KNEE ACL RECONSTRUCTION Left    • KNEE ARTHROSCOPY      Multiple procedures (left x 4 and right x 2)    • OTHER SURGICAL HISTORY      ORIF right forearm   • PARATHYROIDECTOMY     • [place] REVISE KNEE JOINT REPLACE,1 PART Right 9/26/2017    Procedure: RIGHT TOTAL KNEE ARTHROPLASTY REVISION STAGE ONE WITH ANTIBIOTIC SPACER FOR PERIPROSTHETIC INFECTION;  Surgeon: Pete Willis MD;  Location: [facility];  Service: Orthopedics   • TOOTH EXTRACTION     • TOTAL KNEE ARTHROPLASTY Right 10/18/2016   • WRIST SURGERY      Left wrist procedure        Family History   Problem Relation Age of Onset   • Cancer Other    • Other Other      cholesterol problems   • Hyperlipidemia Other    • Cancer Father    • Cancer Brother         Social History     Social History   • Marital status:      Spouse name: N/A   • Number of children: N/A   • Years of education: N/A     Occupational History   • Not on file.     Social History Main Topics   • Smoking status: Current Every Day Smoker     Packs/day: 0.50     Years: 30.00   • Smokeless  "tobacco: Never Used   • Alcohol use Yes      Comment: socially   • Drug use: No   • Sexual activity: Defer     Other Topics Concern   • Not on file     Social History Narrative       Allergies   Allergen Reactions   • Ativan [Lorazepam] Mental Status Change   • Morphine And Related      Headaches due to morphine only, related meds ok   • Penicillins Other (See Comments)     UNSURE OF REACTION         Review of Systems   Constitutional: Negative for fever.   HENT: Negative for voice change.    Eyes: Negative for visual disturbance.   Respiratory: Negative for shortness of breath.    Cardiovascular: Negative for chest pain.   Gastrointestinal: Negative for abdominal distention and abdominal pain.   Genitourinary: Negative for dysuria.   Musculoskeletal: Positive for arthralgias. Negative for gait problem and joint swelling.   Skin: Negative for rash.   Neurological: Negative for speech difficulty.   Hematological: Does not bruise/bleed easily.   Psychiatric/Behavioral: Negative for confusion.         Objective   Resp 18  Ht 66\" (167.6 cm)  Wt 120 lb (54.4 kg)  BMI 19.37 kg/m2    Physical Exam   Constitutional: She appears well-developed. No distress.   Neurological: She is alert.   Skin: Skin is warm and dry. No rash noted. No erythema.   Psychiatric: She has a normal mood and affect. Her speech is normal.   Vitals reviewed.    Ortho Exam  Extremity DVT signs are Negative on physical exam with negative Silvia sign, with no calf pain, with no palpable cords and with no skin tone change   Neurologic Exam     Mental Status   Attention: normal.   Speech: speech is normal   Level of consciousness: alert  Knowledge: good.     Motor Exam   Overall muscle tone: normal    Gait, Coordination, and Reflexes     Gait  Gait: (stable independent gait with temporary right knee implants.)    Ortho Exam    Assessment/Plan   Independent Review of Radiographic Studies:    No new imaging done today.  Reviewed at a prior " visit.  Laboratory and Other Studies:  Recent results were stable and reviewed with patient.  New right knee joint fluid analysis was sent to lab today including cell count, Gram stain, aerobic culture and anaerobic culture to be held for 14 days.   Medical Decision Making:    Good progress, significantly improved.  Continue current plan, and management.    Large Joint Arthrocentesis  Date/Time: 11/22/2017 9:34 AM  Consent given by: patient  Site marked: site marked  Timeout: Immediately prior to procedure a time out was called to verify the correct patient, procedure, equipment, support staff and site/side marked as required   Supporting Documentation  Indications: diagnostic evaluation   Procedure Details  Location: knee - R knee  Preparation: Patient was prepped and draped in the usual sterile fashion  Needle size: 18 G  Approach: superior  Aspirate amount: 5 mL  Aspirate: blood-tinged and clear  Analysis: fluid sample sent for laboratory analysis  Patient tolerance: patient tolerated the procedure well with no immediate complications        Anca was seen today for pain and post-op.    Diagnoses and all orders for this visit:    Infection associated with internal right knee prosthesis, initial encounter  -     Gram Stain - Swab, Knee, Right  -     Body Fluid Cell Count With Differential - Body Fluid, Knee, Right  -     Anaerobic Culture - , Knee, Right  -     Culture, Routine - , Knee, Right  -     Large Joint Arthrocentesis    Arthralgia of knee, right    Post-traumatic osteoarthritis of left knee    Lumbar degenerative disc disease    Pseudogout    Neuropathy       Regular exercise as tolerated  Orthopedic activities reviewed and patient expressed appreciation  Discussion of orthopedic goals  Risk, benefits, and merits of treatment alternatives reviewed with the patient and questions answered  Take prescribed medications as instructed only as tolerated  Guided on proper techniques for mobility, strength,  agility and/or conditioning exercises    Recommendations/Plan:  Exercise, medications, injections, other patient advice, and return appointment as noted.  Brace: No brace was given at today's visit  Referral: No referrals made at today's visit  Test/Studies: Labs as noted.  Surgery: No surgery proposed at this visit. and Plan for second stage right knee revision replacement tentative scheduled for 12-19-17.  Work/Activity Status: May perform usual activities as tolerated and No strenuous activity.  Patient is encouraged to call or return for any issues or concerns.     Return in about 2 weeks (around 12/6/2017) for Pre-op for 2nd stage right knee revision, Recheck.  Patient agreeable to call or return sooner for any concerns.

## 2017-11-24 LAB
APPEARANCE FLUID: NORMAL
CELL COUNT FLUID TYPE: NORMAL
CLOT EVALUATION: NORMAL
COLOR FLUID: NORMAL
EOSINOPHIL FLUID: 6 %
LYMPHOCYTES, BODY FLUID: 15 %
MACROPHAGE FLUID: 12 %
MESOTHELIAL FLUID: 1 %
NEUTROPHIL, FLUID: 66 %
NUCLEATED CELLS FLUID: 700 /CUMM
NUMBER OF CELLS COUNTED FLUID: 100
RBC FLUID: NORMAL /CUMM

## 2017-12-08 ENCOUNTER — APPOINTMENT (OUTPATIENT)
Dept: PREADMISSION TESTING | Facility: HOSPITAL | Age: 52
End: 2017-12-08

## 2017-12-08 ENCOUNTER — OFFICE VISIT (OUTPATIENT)
Dept: ORTHOPEDIC SURGERY | Facility: CLINIC | Age: 52
End: 2017-12-08

## 2017-12-08 ENCOUNTER — HOSPITAL ENCOUNTER (OUTPATIENT)
Dept: GENERAL RADIOLOGY | Facility: HOSPITAL | Age: 52
Discharge: HOME | End: 2017-12-08
Admitting: ORTHOPAEDIC SURGERY

## 2017-12-08 VITALS — BODY MASS INDEX: 19.29 KG/M2 | HEIGHT: 66 IN | WEIGHT: 120 LBS

## 2017-12-08 VITALS — RESPIRATION RATE: 18 BRPM | HEIGHT: 66 IN | BODY MASS INDEX: 19.29 KG/M2 | WEIGHT: 120 LBS

## 2017-12-08 DIAGNOSIS — M25.561 ARTHRALGIA OF KNEE, RIGHT: ICD-10-CM

## 2017-12-08 DIAGNOSIS — M17.32 POST-TRAUMATIC OSTEOARTHRITIS OF LEFT KNEE: ICD-10-CM

## 2017-12-08 DIAGNOSIS — M51.36 LUMBAR DEGENERATIVE DISC DISEASE: ICD-10-CM

## 2017-12-08 DIAGNOSIS — Z96.651 STATUS POST TOTAL RIGHT KNEE REPLACEMENT USING CEMENT: ICD-10-CM

## 2017-12-08 DIAGNOSIS — M11.20 PSEUDOGOUT: ICD-10-CM

## 2017-12-08 DIAGNOSIS — T84.53XA INFECTION ASSOCIATED WITH INTERNAL RIGHT KNEE PROSTHESIS, INITIAL ENCOUNTER (HCC): ICD-10-CM

## 2017-12-08 DIAGNOSIS — G62.9 NEUROPATHY: ICD-10-CM

## 2017-12-08 DIAGNOSIS — T84.53XA INFECTION ASSOCIATED WITH INTERNAL RIGHT KNEE PROSTHESIS, INITIAL ENCOUNTER (HCC): Primary | ICD-10-CM

## 2017-12-08 LAB
ALBUMIN SERPL-MCNC: 4.3 G/DL (ref 3.5–5)
ALBUMIN/GLOB SERPL: 1.3 G/DL (ref 1–2)
ALP SERPL-CCNC: 90 U/L (ref 38–126)
ALT SERPL W P-5'-P-CCNC: 28 U/L (ref 13–69)
ANION GAP SERPL CALCULATED.3IONS-SCNC: 15.3 MMOL/L
APTT PPP: 29.7 SECONDS (ref 25–36)
AST SERPL-CCNC: 30 U/L (ref 15–46)
BASOPHILS # BLD AUTO: 0.05 10*3/MM3 (ref 0–0.2)
BASOPHILS NFR BLD AUTO: 1.1 % (ref 0–2.5)
BILIRUB SERPL-MCNC: 0.2 MG/DL (ref 0.2–1.3)
BILIRUB UR QL STRIP: NEGATIVE
BUN BLD-MCNC: 18 MG/DL (ref 7–20)
BUN/CREAT SERPL: 22.5 (ref 7.1–23.5)
CALCIUM SPEC-SCNC: 9.6 MG/DL (ref 8.4–10.2)
CHLORIDE SERPL-SCNC: 100 MMOL/L (ref 98–107)
CLARITY UR: CLEAR
CO2 SERPL-SCNC: 29 MMOL/L (ref 26–30)
COLOR UR: YELLOW
CREAT BLD-MCNC: 0.8 MG/DL (ref 0.6–1.3)
CRP SERPL-MCNC: <0.5 MG/DL (ref 0–1)
DEPRECATED RDW RBC AUTO: 43.6 FL (ref 37–54)
EOSINOPHIL # BLD AUTO: 0.19 10*3/MM3 (ref 0–0.7)
EOSINOPHIL NFR BLD AUTO: 4.2 % (ref 0–7)
ERYTHROCYTE [DISTWIDTH] IN BLOOD BY AUTOMATED COUNT: 13.4 % (ref 11.5–14.5)
ERYTHROCYTE [SEDIMENTATION RATE] IN BLOOD: 8 MM/HR (ref 0–20)
GFR SERPL CREATININE-BSD FRML MDRD: 75 ML/MIN/1.73
GLOBULIN UR ELPH-MCNC: 3.2 GM/DL
GLUCOSE BLD-MCNC: 89 MG/DL (ref 74–98)
GLUCOSE UR STRIP-MCNC: NEGATIVE MG/DL
HCT VFR BLD AUTO: 37.3 % (ref 37–47)
HGB BLD-MCNC: 11.9 G/DL (ref 12–16)
HGB UR QL STRIP.AUTO: NEGATIVE
IMM GRANULOCYTES # BLD: 0 10*3/MM3 (ref 0–0.06)
IMM GRANULOCYTES NFR BLD: 0 % (ref 0–0.6)
INR PPP: 0.94 (ref 0.9–1.1)
KETONES UR QL STRIP: NEGATIVE
LEUKOCYTE ESTERASE UR QL STRIP.AUTO: NEGATIVE
LYMPHOCYTES # BLD AUTO: 1.63 10*3/MM3 (ref 0.6–3.4)
LYMPHOCYTES NFR BLD AUTO: 35.8 % (ref 10–50)
MCH RBC QN AUTO: 28.3 PG (ref 27–31)
MCHC RBC AUTO-ENTMCNC: 31.9 G/DL (ref 30–37)
MCV RBC AUTO: 88.8 FL (ref 81–99)
MONOCYTES # BLD AUTO: 0.33 10*3/MM3 (ref 0–0.9)
MONOCYTES NFR BLD AUTO: 7.3 % (ref 0–12)
NEUTROPHILS # BLD AUTO: 2.35 10*3/MM3 (ref 2–6.9)
NEUTROPHILS NFR BLD AUTO: 51.6 % (ref 37–80)
NITRITE UR QL STRIP: NEGATIVE
NRBC BLD MANUAL-RTO: 0 /100 WBC (ref 0–0)
PH UR STRIP.AUTO: <=5 [PH] (ref 5–8)
PLATELET # BLD AUTO: 220 10*3/MM3 (ref 130–400)
PMV BLD AUTO: 9.9 FL (ref 6–12)
POTASSIUM BLD-SCNC: 4.3 MMOL/L (ref 3.5–5.1)
PROT SERPL-MCNC: 7.5 G/DL (ref 6.3–8.2)
PROT UR QL STRIP: NEGATIVE
PROTHROMBIN TIME: 10.5 SECONDS (ref 9.3–12.1)
RBC # BLD AUTO: 4.2 10*6/MM3 (ref 4.2–5.4)
SODIUM BLD-SCNC: 140 MMOL/L (ref 137–145)
SP GR UR STRIP: 1.01 (ref 1–1.03)
UROBILINOGEN UR QL STRIP: NORMAL
WBC NRBC COR # BLD: 4.55 10*3/MM3 (ref 4.8–10.8)

## 2017-12-08 PROCEDURE — S0260 H&P FOR SURGERY: HCPCS | Performed by: ORTHOPAEDIC SURGERY

## 2017-12-08 PROCEDURE — 81003 URINALYSIS AUTO W/O SCOPE: CPT | Performed by: ORTHOPAEDIC SURGERY

## 2017-12-08 PROCEDURE — 85025 COMPLETE CBC W/AUTO DIFF WBC: CPT | Performed by: ORTHOPAEDIC SURGERY

## 2017-12-08 PROCEDURE — 86140 C-REACTIVE PROTEIN: CPT | Performed by: ORTHOPAEDIC SURGERY

## 2017-12-08 PROCEDURE — 36415 COLL VENOUS BLD VENIPUNCTURE: CPT

## 2017-12-08 PROCEDURE — 80053 COMPREHEN METABOLIC PANEL: CPT | Performed by: ORTHOPAEDIC SURGERY

## 2017-12-08 PROCEDURE — 71020 HC CHEST PA AND LATERAL: CPT

## 2017-12-08 PROCEDURE — 85730 THROMBOPLASTIN TIME PARTIAL: CPT | Performed by: ORTHOPAEDIC SURGERY

## 2017-12-08 PROCEDURE — 87081 CULTURE SCREEN ONLY: CPT | Performed by: ORTHOPAEDIC SURGERY

## 2017-12-08 PROCEDURE — 85651 RBC SED RATE NONAUTOMATED: CPT | Performed by: ORTHOPAEDIC SURGERY

## 2017-12-08 PROCEDURE — 85610 PROTHROMBIN TIME: CPT | Performed by: ORTHOPAEDIC SURGERY

## 2017-12-08 RX ORDER — OXYCODONE HYDROCHLORIDE 10 MG/1
10 TABLET ORAL EVERY 8 HOURS PRN
Qty: 30 TABLET | Refills: 0 | Status: SHIPPED | OUTPATIENT
Start: 2017-12-08 | End: 2017-12-21 | Stop reason: HOSPADM

## 2017-12-08 NOTE — PROGRESS NOTES
Anca Andrade is a 52 y.o. right hand dominant female is here today for a discussion of treatment plans, surgery, and rehabilitation.  Pre-op Exam and Pain of the Right Knee       History of Present Illness      Patient is doing well and she presents today to review recent right knee joint aspiration studies and cultures, and for pre-operative history and physical for planned second stage of right knee revision replacement for soumya-prosthetic infection.    PAST MEDICAL HISTORY:    Past Medical History:   Diagnosis Date   • Asthma    • Body piercing     BILATERAL EARS   • Depression    • GERD (gastroesophageal reflux disease)    • H/O corticosteroid therapy     right knee   • History of being tatooed    • Hypertension    • Osteoarthritis    • Pseudogout    • Rheumatoid arthritis    • Skin cancer     LEFT SHOULDER         Current Outpatient Prescriptions:   •  aspirin (ECOTRIN) 325 MG EC tablet, Take 1 tablet by mouth Daily., Disp: 30 tablet, Rfl: 0  •  atenolol (TENORMIN) 50 MG tablet, Take 0.5 tablets by mouth daily, patient states BP fluctuates and she doesn't take this everyday, Disp: , Rfl:   •  docusate sodium 100 MG capsule, Take 1 capsule by mouth 2 (Two) Times a Day As Needed for Constipation., Disp: 30 capsule, Rfl: 1  •  ertapenem (INVanz) 1 g in 0.9% sodium chloride IVPB, Infuse 1 g into a venous catheter Daily. Indications: Bone and/or Joint Infection, Disp: 28 vial, Rfl: 0  •  gabapentin (NEURONTIN) 400 MG capsule, Take 1 capsule by mouth 3 (Three) Times a Day., Disp: 90 capsule, Rfl: 0  •  ibuprofen (ADVIL,MOTRIN) 800 MG tablet, , Disp: , Rfl:   •  lisinopril-hydrochlorothiazide (PRINZIDE,ZESTORETIC) 10-12.5 MG per tablet, Take 1 tablet by mouth Daily. Patient states she doesn't take this every day., Disp: , Rfl:   •  meloxicam (MOBIC) 15 MG tablet, Take 1 tablet by mouth Daily., Disp: 30 tablet, Rfl: 3  •  oxyCODONE (ROXICODONE) 10 MG tablet, Take 1 tablet by mouth Every 8 (Eight) Hours As  Needed., Disp: 30 tablet, Rfl: 0    Past Surgical History:   Procedure Laterality Date   • CHOLECYSTECTOMY     • COLONOSCOPY  2015   • ENDOSCOPY     • HYSTERECTOMY     • KNEE ACL RECONSTRUCTION Left    • KNEE ARTHROSCOPY      Multiple procedures (left x 4 and right x 2)    • OTHER SURGICAL HISTORY      ORIF right forearm   • PARATHYROIDECTOMY     • TN REVISE KNEE JOINT REPLACE,1 PART Right 9/26/2017    Procedure: RIGHT TOTAL KNEE ARTHROPLASTY REVISION STAGE ONE WITH ANTIBIOTIC SPACER FOR PERIPROSTHETIC INFECTION;  Surgeon: Pete Willis MD;  Location: [facility];  Service: Orthopedics   • TOOTH EXTRACTION     • TOTAL KNEE ARTHROPLASTY Right 10/18/2016   • WRIST SURGERY      Left wrist procedure       Family History   Problem Relation Age of Onset   • Cancer Other    • Other Other      cholesterol problems   • Hyperlipidemia Other    • Cancer Father    • Cancer Brother        Social History     Social History   • Marital status:      Spouse name: N/A   • Number of children: N/A   • Years of education: N/A     Occupational History   • Not on file.     Social History Main Topics   • Smoking status: Current Every Day Smoker     Packs/day: 0.50     Years: 30.00   • Smokeless tobacco: Never Used   • Alcohol use Yes      Comment: socially   • Drug use: No   • Sexual activity: Defer     Other Topics Concern   • Not on file     Social History Narrative        Allergies   Allergen Reactions   • Ativan [Lorazepam] Mental Status Change   • Morphine And Related      Headaches due to morphine only, related meds ok   • Penicillins Other (See Comments)     UNSURE OF REACTION         Review of Systems   Constitutional: Negative for fever.   HENT: Negative for voice change.    Eyes: Negative for visual disturbance.   Respiratory: Negative for shortness of breath.    Cardiovascular: Negative for chest pain.   Gastrointestinal: Negative for abdominal distention and abdominal pain.   Genitourinary: Negative for dysuria.  "  Musculoskeletal: Positive for arthralgias. Negative for gait problem and joint swelling.   Skin: Negative for rash.   Neurological: Negative for speech difficulty.   Hematological: Does not bruise/bleed easily.   Psychiatric/Behavioral: Negative for confusion.       PHYSICAL EXAMINATION:       Resp 18  Ht 167.6 cm (66\")  Wt 54.4 kg (120 lb)  BMI 19.37 kg/m2    GENERAL [x] Well developed  []Ill appearing [x] No acute distress    HEENT [x]No acute changes [x] Normocephalic, atraumatic    NECK [x]Supple  [] No midline tenderness    LUNGS [x]Clear bilaterally [x]No wheezes []Rhonchi [] Rales    HEART [x] Regular rate  [x] Regular rhythm [] Irregular    ABDOMEN [x] Soft [x] Not tender [x] Not distended [x] Normal sounds    VAS/EXT [x] Normal Pulses []Edema []Cyanosis             SKIN [x] Warm [x]Dry []Pink []Ecchymosis []Cool    NEURO [x] Sensation Intact [x] Motor Intact [x] Pulse intact  [] Dysesthesias:_________  []Weakness:__________    MUSCULOSKELETAL []          Physical Exam   Constitutional: She appears well-developed. No distress.   HENT:   Head: Normocephalic and atraumatic.   Eyes: EOM are normal. Pupils are equal, round, and reactive to light.   Neck: Neck supple. No tracheal deviation present.   Cardiovascular: Normal rate and regular rhythm.    Pulmonary/Chest: Breath sounds normal. No respiratory distress. She has no wheezes.   Abdominal: Soft. Bowel sounds are normal. She exhibits no distension. There is no tenderness.   Musculoskeletal: She exhibits no deformity.        Right knee: She exhibits no effusion.   Neurological: She is alert.   Skin: Skin is warm and dry.   Psychiatric: She has a normal mood and affect. Her speech is normal.   Vitals reviewed.    Right Knee Exam     Tenderness   The patient is experiencing tenderness in the patella.    Range of Motion   Extension: 0 (and good independent straight leg raise with right quadricep and she is guided in mobility and strenghtening exercises) "   Flexion: 90     Muscle Strength     The patient has normal right knee strength.    Tests   Varus: negative  Valgus: negative  Patellar Apprehension: negative    Other   Erythema: absent  Scars: present (well healed)  Pulse: present  Other tests: no effusion present      Left Knee Exam     Muscle Strength     The patient has normal left knee strength.      Back Exam     Muscle Strength   Right Quadriceps:  5/5   Left Quadriceps:  5/5   Right Hamstrings:  5/5   Left Hamstrings:  5/5         Extremity DVT signs are Negative on physical exam with negative Silvia sign, with no calf pain, with no palpable cords, with no increased pain with passive stretch/extension and with no skin tone change   Neurologic Exam     Mental Status   Attention: normal.   Speech: speech is normal   Level of consciousness: alert  Knowledge: good.     Cranial Nerves     CN III, IV, VI   Pupils are equal, round, and reactive to light.  Extraocular motions are normal.     Motor Exam   Overall muscle tone: normal    Strength   Right quadriceps: 5/5  Left quadriceps: 5/5  Right hamstrin/5  Left hamstrin/5    Gait, Coordination, and Reflexes     Gait  Gait: (stable independent gait with temporary knee joiint spacer.)    Left Knee Exam     Muscle Strength   Normal left knee strength    Right Knee Exam     Muscle Strength   Normal right knee strength        DVT Screening: No Yes   Smoker [] [x]   Personal/Family History of DVT or PE [x] []   Sedentary Lifestyle [x] []   BMI >30 [x] []      Tranexamic acid TXA criteria for usage during arthroplasty surgery.    Previous or Active DVT/PE: NO  Cardiac Stent within 1 year: NO  Embolic/Ischemic stroke within 1 year: NO  Creatinine less than 30ml/min: NO  Congenital Thrombophilia: NO  Hypersensitivity to TXA: NO  Color Blindness: NO  NOTE:  TXA  tranexamic acid summary: THIS PATIENT IS A CANDIDATE FOR IV TXA DURING SURGERY.    Independent Review of Radiographic Studies:    No new imaging done  today.  Reviewed at a prior visit.  Laboratory and Other Studies:  Recent results were stable and reviewed with patient.  Cultures were negative.  Right knee joint aspiration from 11-22-17 showed normal results,  including WBC count 700 with 66% neutrophils, Gram Stain negative and aerobic and anaerobic cultures negative at a full two weeks.   Medical Decision Making:    Good progress, significantly improved.  Continue current plan, and management.  Right knee joint aspiration findings normal.  Plan rigtht knee second stage revision knee replacement on 12-19-17.  Also to follow Evaristo Christine MD, Infectious Diseases recommendations.        Anca was seen today for pre-op exam and pain.    Diagnoses and all orders for this visit:    Infection associated with internal right knee prosthesis, initial encounter  -     Case Request; Standing  -     Consult Primary Care Physician for Medical Clearance  -     CBC and Differential  -     Comprehensive metabolic panel  -     Protime-INR  -     APTT  -     MRSA Screen Culture - Swab, Nares  -     Urinalysis With / Culture If Indicated - Urine, Clean Catch  -     ECG 12 Lead  -     XR chest 2 vw; Future  -     Tranexamic Acid 544 mg in sodium chloride 0.9 % 100 mL; Infuse 544 mg into a venous catheter 1 (One) Time.  -     Tranexamic Acid 544 mg in sodium chloride 0.9 % 100 mL; Infuse 544 mg into a venous catheter 1 (One) Time.  -     famotidine (PEPCID) injection 20 mg; Infuse 2 mL into a venous catheter 60 Minutes Prior to Surgery.  -     Sedimentation rate; Future  -     C-reactive protein; Future  -     vancomycin (VANCOCIN) 1,000 mg in sodium chloride 0.9 % 250 mL IVPB; Infuse 1,000 mg into a venous catheter 1 (One) Time.  -     Case Request    Arthralgia of knee, right  -     Case Request; Standing  -     Consult Primary Care Physician for Medical Clearance  -     CBC and Differential  -     Comprehensive metabolic panel  -     Protime-INR  -     APTT  -     MRSA Screen  Culture - Swab, Nares  -     Urinalysis With / Culture If Indicated - Urine, Clean Catch  -     ECG 12 Lead  -     XR chest 2 vw; Future  -     Tranexamic Acid 544 mg in sodium chloride 0.9 % 100 mL; Infuse 544 mg into a venous catheter 1 (One) Time.  -     Tranexamic Acid 544 mg in sodium chloride 0.9 % 100 mL; Infuse 544 mg into a venous catheter 1 (One) Time.  -     famotidine (PEPCID) injection 20 mg; Infuse 2 mL into a venous catheter 60 Minutes Prior to Surgery.  -     Sedimentation rate; Future  -     C-reactive protein; Future  -     vancomycin (VANCOCIN) 1,000 mg in sodium chloride 0.9 % 250 mL IVPB; Infuse 1,000 mg into a venous catheter 1 (One) Time.  -     Case Request    Post-traumatic osteoarthritis of left knee  -     Case Request; Standing  -     Consult Primary Care Physician for Medical Clearance  -     CBC and Differential  -     Comprehensive metabolic panel  -     Protime-INR  -     APTT  -     MRSA Screen Culture - Swab, Nares  -     Urinalysis With / Culture If Indicated - Urine, Clean Catch  -     ECG 12 Lead  -     XR chest 2 vw; Future  -     Tranexamic Acid 544 mg in sodium chloride 0.9 % 100 mL; Infuse 544 mg into a venous catheter 1 (One) Time.  -     Tranexamic Acid 544 mg in sodium chloride 0.9 % 100 mL; Infuse 544 mg into a venous catheter 1 (One) Time.  -     famotidine (PEPCID) injection 20 mg; Infuse 2 mL into a venous catheter 60 Minutes Prior to Surgery.  -     Sedimentation rate; Future  -     C-reactive protein; Future  -     vancomycin (VANCOCIN) 1,000 mg in sodium chloride 0.9 % 250 mL IVPB; Infuse 1,000 mg into a venous catheter 1 (One) Time.  -     Case Request    Lumbar degenerative disc disease  -     Case Request; Standing  -     Consult Primary Care Physician for Medical Clearance  -     CBC and Differential  -     Comprehensive metabolic panel  -     Protime-INR  -     APTT  -     MRSA Screen Culture - Swab, Nares  -     Urinalysis With / Culture If Indicated - Urine,  Clean Catch  -     ECG 12 Lead  -     XR chest 2 vw; Future  -     Tranexamic Acid 544 mg in sodium chloride 0.9 % 100 mL; Infuse 544 mg into a venous catheter 1 (One) Time.  -     Tranexamic Acid 544 mg in sodium chloride 0.9 % 100 mL; Infuse 544 mg into a venous catheter 1 (One) Time.  -     famotidine (PEPCID) injection 20 mg; Infuse 2 mL into a venous catheter 60 Minutes Prior to Surgery.  -     Sedimentation rate; Future  -     C-reactive protein; Future  -     vancomycin (VANCOCIN) 1,000 mg in sodium chloride 0.9 % 250 mL IVPB; Infuse 1,000 mg into a venous catheter 1 (One) Time.  -     Case Request    Neuropathy  -     Case Request; Standing  -     Consult Primary Care Physician for Medical Clearance  -     CBC and Differential  -     Comprehensive metabolic panel  -     Protime-INR  -     APTT  -     MRSA Screen Culture - Swab, Nares  -     Urinalysis With / Culture If Indicated - Urine, Clean Catch  -     ECG 12 Lead  -     XR chest 2 vw; Future  -     Tranexamic Acid 544 mg in sodium chloride 0.9 % 100 mL; Infuse 544 mg into a venous catheter 1 (One) Time.  -     Tranexamic Acid 544 mg in sodium chloride 0.9 % 100 mL; Infuse 544 mg into a venous catheter 1 (One) Time.  -     famotidine (PEPCID) injection 20 mg; Infuse 2 mL into a venous catheter 60 Minutes Prior to Surgery.  -     Sedimentation rate; Future  -     C-reactive protein; Future  -     vancomycin (VANCOCIN) 1,000 mg in sodium chloride 0.9 % 250 mL IVPB; Infuse 1,000 mg into a venous catheter 1 (One) Time.  -     Case Request    Pseudogout  -     Case Request; Standing  -     Consult Primary Care Physician for Medical Clearance  -     CBC and Differential  -     Comprehensive metabolic panel  -     Protime-INR  -     APTT  -     MRSA Screen Culture - Swab, Nares  -     Urinalysis With / Culture If Indicated - Urine, Clean Catch  -     ECG 12 Lead  -     XR chest 2 vw; Future  -     Tranexamic Acid 544 mg in sodium chloride 0.9 % 100 mL; Infuse  544 mg into a venous catheter 1 (One) Time.  -     Tranexamic Acid 544 mg in sodium chloride 0.9 % 100 mL; Infuse 544 mg into a venous catheter 1 (One) Time.  -     famotidine (PEPCID) injection 20 mg; Infuse 2 mL into a venous catheter 60 Minutes Prior to Surgery.  -     Sedimentation rate; Future  -     C-reactive protein; Future  -     vancomycin (VANCOCIN) 1,000 mg in sodium chloride 0.9 % 250 mL IVPB; Infuse 1,000 mg into a venous catheter 1 (One) Time.  -     Case Request    Status post total right knee replacement using cement  -     Case Request; Standing  -     Consult Primary Care Physician for Medical Clearance  -     CBC and Differential  -     Comprehensive metabolic panel  -     Protime-INR  -     APTT  -     MRSA Screen Culture - Swab, Nares  -     Urinalysis With / Culture If Indicated - Urine, Clean Catch  -     ECG 12 Lead  -     XR chest 2 vw; Future  -     Tranexamic Acid 544 mg in sodium chloride 0.9 % 100 mL; Infuse 544 mg into a venous catheter 1 (One) Time.  -     Tranexamic Acid 544 mg in sodium chloride 0.9 % 100 mL; Infuse 544 mg into a venous catheter 1 (One) Time.  -     famotidine (PEPCID) injection 20 mg; Infuse 2 mL into a venous catheter 60 Minutes Prior to Surgery.  -     Sedimentation rate; Future  -     C-reactive protein; Future  -     vancomycin (VANCOCIN) 1,000 mg in sodium chloride 0.9 % 250 mL IVPB; Infuse 1,000 mg into a venous catheter 1 (One) Time.  -     Case Request    Other orders  -     oxyCODONE (ROXICODONE) 10 MG tablet; Take 1 tablet by mouth Every 8 (Eight) Hours As Needed.  -     Inpatient Admission; Standing  -     Provide instructions to patient regarding NPO status  -     Obtain informed consent  -     Clorhexidine skin prep  -     Follow anesthesia standing orders.; Standing  -     Verify NPO Status; Standing  -     PAU hose- To be placed on patient in pre-op; Standing  -     SCD (sequential compression device)- to be placed on patient in Pre-op; Standing  -      POC Glucose Fingerstick; Standing  -     Notify physician (specify); Standing         *SPECIAL INSTRUCTIONS:  Post-operative antibiotic protocol guidance from Evaristo Christine MD of Infectious Diseases.  Multi-modal analgesia.  Tranexamic acid IV protocol (see screening parameters this report.  Multi-modal DVT prophylaxis.  Rehabilitation PT/OT protocol with same day walker ambulation with therapist.      Risks, benefits, and alternative treatments discussed with the patient: [x] Yes [] No    Risk benefits and merits of the proposed surgery were discussed and the patient's questions were answered risks discussed including and not limited to:  Anesthesia reactions  Blood loss and possible transfusion  Infection  Deep venous thrombosis and pulmonary embolus  Nerve, vascular or tendon injury  Fracture  Deformity  Stiffness  Weakness  Prosthesis and implant issues such as loosening, material wear or dislocation  Skin necrosis  Revision surgery or further treatment  Recurrence of problem and condition     Informed consent: [] Signed  [x] To be obtained at hospital  [] Both    Recommendations/Plan:    Regular exercise as tolerated  Orthopedic activities reviewed and patient expressed appreciation  Discussion of orthopedic goals  Risk, benefits, and merits of treatment alternatives reviewed with the patient and questions answered  Physical therapy referral given  Take prescribed medications as instructed only as tolerated  After care and dental prophylaxis for joint replacement prosthesis  Watch for signs and symptoms of infection  Guided on proper techniques for mobility, strength, agility and/or conditioning exercises    Exercise, medications, injections, other patient advice, and return appointment as noted.  Brace: No brace was given at today's visit  Referral: Infectious diseases  Test/Studies: No additional studies ordered.  Surgery: Surgery proposed at this visit as noted.  Work/Activity Status: May perform usual  activities as tolerated and No strenuous activity.  Patient is encouraged to call or return for any issues or concerns.    PLANNED SURGICAL PROCEDURE:  Second stage of right total knee replacement revision.    Return in about 4 weeks (around 1/5/2018) for Post-op, Recheck.

## 2017-12-10 LAB — MRSA SPEC QL CULT: NORMAL

## 2017-12-11 LAB
ANAEROBIC CULTURE: NORMAL
GRAM STAIN RESULT: NORMAL
WOUND/ABSCESS: NORMAL

## 2017-12-19 ENCOUNTER — ANESTHESIA (OUTPATIENT)
Dept: PERIOP | Facility: HOSPITAL | Age: 52
End: 2017-12-19

## 2017-12-19 ENCOUNTER — HOSPITAL ENCOUNTER (INPATIENT)
Facility: HOSPITAL | Age: 52
LOS: 2 days | Discharge: HOME HEALTH | End: 2017-12-21
Attending: ORTHOPAEDIC SURGERY | Admitting: ORTHOPAEDIC SURGERY

## 2017-12-19 ENCOUNTER — APPOINTMENT (OUTPATIENT)
Dept: GENERAL RADIOLOGY | Facility: HOSPITAL | Age: 52
End: 2017-12-19

## 2017-12-19 ENCOUNTER — ANESTHESIA EVENT (OUTPATIENT)
Dept: PERIOP | Facility: HOSPITAL | Age: 52
End: 2017-12-19

## 2017-12-19 DIAGNOSIS — M17.32 POST-TRAUMATIC OSTEOARTHRITIS OF LEFT KNEE: ICD-10-CM

## 2017-12-19 DIAGNOSIS — Z96.651 STATUS POST TOTAL RIGHT KNEE REPLACEMENT USING CEMENT: ICD-10-CM

## 2017-12-19 DIAGNOSIS — M11.20 PSEUDOGOUT: ICD-10-CM

## 2017-12-19 DIAGNOSIS — Z74.09 IMPAIRED MOBILITY AND ADLS: ICD-10-CM

## 2017-12-19 DIAGNOSIS — G62.9 NEUROPATHY: ICD-10-CM

## 2017-12-19 DIAGNOSIS — Z74.09 IMPAIRED FUNCTIONAL MOBILITY, BALANCE, GAIT, AND ENDURANCE: Primary | ICD-10-CM

## 2017-12-19 DIAGNOSIS — T84.53XA INFECTION ASSOCIATED WITH INTERNAL RIGHT KNEE PROSTHESIS, INITIAL ENCOUNTER (HCC): ICD-10-CM

## 2017-12-19 DIAGNOSIS — M25.561 ARTHRALGIA OF KNEE, RIGHT: ICD-10-CM

## 2017-12-19 DIAGNOSIS — M51.36 LUMBAR DEGENERATIVE DISC DISEASE: ICD-10-CM

## 2017-12-19 DIAGNOSIS — Z78.9 IMPAIRED MOBILITY AND ADLS: ICD-10-CM

## 2017-12-19 PROCEDURE — 25010000002 HYDROMORPHONE PER 4 MG: Performed by: ORTHOPAEDIC SURGERY

## 2017-12-19 PROCEDURE — C1776 JOINT DEVICE (IMPLANTABLE): HCPCS | Performed by: ORTHOPAEDIC SURGERY

## 2017-12-19 PROCEDURE — 25010000002 VANCOMYCIN PER 500 MG: Performed by: ORTHOPAEDIC SURGERY

## 2017-12-19 PROCEDURE — 27447 TOTAL KNEE ARTHROPLASTY: CPT | Performed by: ORTHOPAEDIC SURGERY

## 2017-12-19 PROCEDURE — 94799 UNLISTED PULMONARY SVC/PX: CPT

## 2017-12-19 PROCEDURE — 99222 1ST HOSP IP/OBS MODERATE 55: CPT | Performed by: INTERNAL MEDICINE

## 2017-12-19 PROCEDURE — 73560 X-RAY EXAM OF KNEE 1 OR 2: CPT

## 2017-12-19 PROCEDURE — 0SPC0JZ REMOVAL OF SYNTHETIC SUBSTITUTE FROM RIGHT KNEE JOINT, OPEN APPROACH: ICD-10-PCS | Performed by: ORTHOPAEDIC SURGERY

## 2017-12-19 PROCEDURE — 25010000002 HYDROMORPHONE PER 4 MG: Performed by: NURSE ANESTHETIST, CERTIFIED REGISTERED

## 2017-12-19 PROCEDURE — 0SRC0J9 REPLACEMENT OF RIGHT KNEE JOINT WITH SYNTHETIC SUBSTITUTE, CEMENTED, OPEN APPROACH: ICD-10-PCS | Performed by: ORTHOPAEDIC SURGERY

## 2017-12-19 PROCEDURE — 25010000002 PROPOFOL 1000 MG/100ML EMULSION: Performed by: NURSE ANESTHETIST, CERTIFIED REGISTERED

## 2017-12-19 PROCEDURE — 25010000002 FENTANYL CITRATE (PF) 100 MCG/2ML SOLUTION: Performed by: NURSE ANESTHETIST, CERTIFIED REGISTERED

## 2017-12-19 PROCEDURE — C1713 ANCHOR/SCREW BN/BN,TIS/BN: HCPCS | Performed by: ORTHOPAEDIC SURGERY

## 2017-12-19 PROCEDURE — 0SPC0JC REMOVAL OF SYNTHETIC SUBSTITUTE FROM RIGHT KNEE JOINT, PATELLAR SURFACE, OPEN APPROACH: ICD-10-PCS | Performed by: ORTHOPAEDIC SURGERY

## 2017-12-19 PROCEDURE — 94762 N-INVAS EAR/PLS OXIMTRY CONT: CPT

## 2017-12-19 PROCEDURE — 25010000002 MIDAZOLAM PER 1 MG: Performed by: NURSE ANESTHETIST, CERTIFIED REGISTERED

## 2017-12-19 PROCEDURE — 25010000002 HYDROMORPHONE PER 4 MG

## 2017-12-19 DEVICE — LEGION REV TIBIA BASEPLATE SZ 2 RIGHT
Type: IMPLANTABLE DEVICE | Status: FUNCTIONAL
Brand: LEGION

## 2017-12-19 DEVICE — LEGION PRESSFIT STEM 12MM X 160MM
Type: IMPLANTABLE DEVICE | Status: FUNCTIONAL
Brand: LEGION

## 2017-12-19 DEVICE — GEN II 7.5MM RESUR PAT 32MM
Type: IMPLANTABLE DEVICE | Status: FUNCTIONAL
Brand: GENESIS II

## 2017-12-19 DEVICE — LEGION OFFSET COUPLER 4MM
Type: IMPLANTABLE DEVICE | Status: FUNCTIONAL
Brand: LEGION

## 2017-12-19 DEVICE — LEGION OXIN CONSTRAINED FEMORAL SZ                                    3 RT
Type: IMPLANTABLE DEVICE | Status: FUNCTIONAL
Brand: LEGION

## 2017-12-19 DEVICE — LEGION OFFSET COUPLER 6MM
Type: IMPLANTABLE DEVICE | Status: FUNCTIONAL
Brand: LEGION

## 2017-12-19 DEVICE — CMT BONE SIMPLEX/P FULL DOSE 10/PK: Type: IMPLANTABLE DEVICE | Status: FUNCTIONAL

## 2017-12-19 DEVICE — GII PS HIGH FLEXION INSERT SZ 1-2 21MM
Type: IMPLANTABLE DEVICE | Status: FUNCTIONAL
Brand: GENESIS II

## 2017-12-19 DEVICE — LEGION PRESSFIT STEM 10MM X 160MM
Type: IMPLANTABLE DEVICE | Status: FUNCTIONAL
Brand: LEGION

## 2017-12-19 RX ORDER — ACETAMINOPHEN 325 MG/1
TABLET ORAL
Status: COMPLETED
Start: 2017-12-19 | End: 2017-12-19

## 2017-12-19 RX ORDER — OXYCODONE HYDROCHLORIDE 5 MG/1
10 TABLET ORAL EVERY 6 HOURS
Status: DISCONTINUED | OUTPATIENT
Start: 2017-12-19 | End: 2017-12-20

## 2017-12-19 RX ORDER — NALOXONE HCL 0.4 MG/ML
0.1 VIAL (ML) INJECTION
Status: DISCONTINUED | OUTPATIENT
Start: 2017-12-19 | End: 2017-12-19

## 2017-12-19 RX ORDER — SODIUM CHLORIDE, SODIUM LACTATE, POTASSIUM CHLORIDE, CALCIUM CHLORIDE 600; 310; 30; 20 MG/100ML; MG/100ML; MG/100ML; MG/100ML
100 INJECTION, SOLUTION INTRAVENOUS CONTINUOUS
Status: DISCONTINUED | OUTPATIENT
Start: 2017-12-19 | End: 2017-12-20

## 2017-12-19 RX ORDER — ASPIRIN 325 MG
325 TABLET, DELAYED RELEASE (ENTERIC COATED) ORAL DAILY
Status: DISCONTINUED | OUTPATIENT
Start: 2017-12-19 | End: 2017-12-20

## 2017-12-19 RX ORDER — MELOXICAM 7.5 MG/1
15 TABLET ORAL DAILY
Status: DISCONTINUED | OUTPATIENT
Start: 2017-12-19 | End: 2017-12-21 | Stop reason: HOSPADM

## 2017-12-19 RX ORDER — ACETAMINOPHEN 325 MG/1
650 TABLET ORAL ONCE
Status: COMPLETED | OUTPATIENT
Start: 2017-12-19 | End: 2017-12-19

## 2017-12-19 RX ORDER — SODIUM CHLORIDE 0.9 % (FLUSH) 0.9 %
1-10 SYRINGE (ML) INJECTION AS NEEDED
Status: DISCONTINUED | OUTPATIENT
Start: 2017-12-19 | End: 2017-12-21 | Stop reason: HOSPADM

## 2017-12-19 RX ORDER — ONDANSETRON 4 MG/1
4 TABLET, FILM COATED ORAL EVERY 6 HOURS PRN
Status: DISCONTINUED | OUTPATIENT
Start: 2017-12-19 | End: 2017-12-21 | Stop reason: HOSPADM

## 2017-12-19 RX ORDER — OXYCODONE AND ACETAMINOPHEN 10; 325 MG/1; MG/1
1 TABLET ORAL ONCE
Status: COMPLETED | OUTPATIENT
Start: 2017-12-19 | End: 2017-12-19

## 2017-12-19 RX ORDER — BISACODYL 10 MG
10 SUPPOSITORY, RECTAL RECTAL DAILY PRN
Status: DISCONTINUED | OUTPATIENT
Start: 2017-12-19 | End: 2017-12-21 | Stop reason: HOSPADM

## 2017-12-19 RX ORDER — DOCUSATE SODIUM 100 MG/1
100 CAPSULE, LIQUID FILLED ORAL 2 TIMES DAILY PRN
Status: DISCONTINUED | OUTPATIENT
Start: 2017-12-19 | End: 2017-12-21 | Stop reason: HOSPADM

## 2017-12-19 RX ORDER — NALOXONE HCL 0.4 MG/ML
0.1 VIAL (ML) INJECTION
Status: DISCONTINUED | OUTPATIENT
Start: 2017-12-19 | End: 2017-12-20

## 2017-12-19 RX ORDER — GABAPENTIN 400 MG/1
400 CAPSULE ORAL 3 TIMES DAILY
Status: DISCONTINUED | OUTPATIENT
Start: 2017-12-19 | End: 2017-12-21 | Stop reason: HOSPADM

## 2017-12-19 RX ORDER — FENTANYL CITRATE 50 UG/ML
INJECTION, SOLUTION INTRAMUSCULAR; INTRAVENOUS AS NEEDED
Status: DISCONTINUED | OUTPATIENT
Start: 2017-12-19 | End: 2017-12-19 | Stop reason: SURG

## 2017-12-19 RX ORDER — ONDANSETRON 4 MG/1
4 TABLET, ORALLY DISINTEGRATING ORAL EVERY 6 HOURS PRN
Status: DISCONTINUED | OUTPATIENT
Start: 2017-12-19 | End: 2017-12-21 | Stop reason: HOSPADM

## 2017-12-19 RX ORDER — ONDANSETRON 2 MG/ML
4 INJECTION INTRAMUSCULAR; INTRAVENOUS EVERY 6 HOURS PRN
Status: DISCONTINUED | OUTPATIENT
Start: 2017-12-19 | End: 2017-12-21 | Stop reason: HOSPADM

## 2017-12-19 RX ORDER — SODIUM CHLORIDE, SODIUM LACTATE, POTASSIUM CHLORIDE, CALCIUM CHLORIDE 600; 310; 30; 20 MG/100ML; MG/100ML; MG/100ML; MG/100ML
1000 INJECTION, SOLUTION INTRAVENOUS CONTINUOUS PRN
Status: DISCONTINUED | OUTPATIENT
Start: 2017-12-19 | End: 2017-12-19 | Stop reason: HOSPADM

## 2017-12-19 RX ORDER — OXYCODONE AND ACETAMINOPHEN 10; 325 MG/1; MG/1
TABLET ORAL
Status: COMPLETED
Start: 2017-12-19 | End: 2017-12-19

## 2017-12-19 RX ORDER — CLINDAMYCIN PHOSPHATE 150 MG/ML
INJECTION, SOLUTION INTRAVENOUS
Status: DISPENSED
Start: 2017-12-19 | End: 2017-12-20

## 2017-12-19 RX ORDER — MEPERIDINE HYDROCHLORIDE 50 MG/ML
25 INJECTION INTRAMUSCULAR; INTRAVENOUS; SUBCUTANEOUS
Status: DISCONTINUED | OUTPATIENT
Start: 2017-12-19 | End: 2017-12-19 | Stop reason: HOSPADM

## 2017-12-19 RX ORDER — ZOLPIDEM TARTRATE 5 MG/1
10 TABLET ORAL ONCE
Status: COMPLETED | OUTPATIENT
Start: 2017-12-19 | End: 2017-12-19

## 2017-12-19 RX ORDER — FAMOTIDINE 10 MG/ML
INJECTION, SOLUTION INTRAVENOUS
Status: COMPLETED
Start: 2017-12-19 | End: 2017-12-19

## 2017-12-19 RX ORDER — PROPOFOL 10 MG/ML
INJECTION, EMULSION INTRAVENOUS CONTINUOUS PRN
Status: DISCONTINUED | OUTPATIENT
Start: 2017-12-19 | End: 2017-12-19 | Stop reason: SURG

## 2017-12-19 RX ORDER — VANCOMYCIN HYDROCHLORIDE 500 MG/10ML
INJECTION, POWDER, LYOPHILIZED, FOR SOLUTION INTRAVENOUS
Status: DISPENSED
Start: 2017-12-19 | End: 2017-12-20

## 2017-12-19 RX ORDER — ZOLPIDEM TARTRATE 5 MG/1
5 TABLET ORAL NIGHTLY PRN
Status: DISCONTINUED | OUTPATIENT
Start: 2017-12-19 | End: 2017-12-19

## 2017-12-19 RX ORDER — ZOLPIDEM TARTRATE 5 MG/1
10 TABLET ORAL NIGHTLY PRN
Status: DISCONTINUED | OUTPATIENT
Start: 2017-12-19 | End: 2017-12-19

## 2017-12-19 RX ORDER — LISINOPRIL AND HYDROCHLOROTHIAZIDE 20; 12.5 MG/1; MG/1
0.5 TABLET ORAL
Status: DISCONTINUED | OUTPATIENT
Start: 2017-12-20 | End: 2017-12-20

## 2017-12-19 RX ORDER — FONDAPARINUX SODIUM 2.5 MG/.5ML
2.5 INJECTION SUBCUTANEOUS
Status: DISCONTINUED | OUTPATIENT
Start: 2017-12-20 | End: 2017-12-21 | Stop reason: HOSPADM

## 2017-12-19 RX ORDER — ATENOLOL 25 MG/1
50 TABLET ORAL
Status: DISCONTINUED | OUTPATIENT
Start: 2017-12-19 | End: 2017-12-20

## 2017-12-19 RX ORDER — MIDAZOLAM HYDROCHLORIDE 1 MG/ML
INJECTION INTRAMUSCULAR; INTRAVENOUS AS NEEDED
Status: DISCONTINUED | OUTPATIENT
Start: 2017-12-19 | End: 2017-12-19 | Stop reason: SURG

## 2017-12-19 RX ORDER — SODIUM CHLORIDE 0.9 % (FLUSH) 0.9 %
3 SYRINGE (ML) INJECTION AS NEEDED
Status: DISCONTINUED | OUTPATIENT
Start: 2017-12-19 | End: 2017-12-19 | Stop reason: HOSPADM

## 2017-12-19 RX ORDER — ONDANSETRON 2 MG/ML
4 INJECTION INTRAMUSCULAR; INTRAVENOUS ONCE
Status: DISCONTINUED | OUTPATIENT
Start: 2017-12-19 | End: 2017-12-19 | Stop reason: HOSPADM

## 2017-12-19 RX ADMIN — MIDAZOLAM HYDROCHLORIDE 2 MG: 1 INJECTION, SOLUTION INTRAMUSCULAR; INTRAVENOUS at 13:54

## 2017-12-19 RX ADMIN — HYDROMORPHONE HYDROCHLORIDE 0.5 MG: 1 INJECTION, SOLUTION INTRAMUSCULAR; INTRAVENOUS; SUBCUTANEOUS at 18:56

## 2017-12-19 RX ADMIN — HYDROMORPHONE HYDROCHLORIDE 0.5 MG: 1 INJECTION, SOLUTION INTRAMUSCULAR; INTRAVENOUS; SUBCUTANEOUS at 18:37

## 2017-12-19 RX ADMIN — SODIUM CHLORIDE, POTASSIUM CHLORIDE, SODIUM LACTATE AND CALCIUM CHLORIDE 1000 ML: 600; 310; 30; 20 INJECTION, SOLUTION INTRAVENOUS at 12:37

## 2017-12-19 RX ADMIN — Medication 100 MCG: at 15:31

## 2017-12-19 RX ADMIN — GABAPENTIN 400 MG: 400 CAPSULE ORAL at 20:21

## 2017-12-19 RX ADMIN — OXYCODONE AND ACETAMINOPHEN 1 TABLET: 10; 325 TABLET ORAL at 12:40

## 2017-12-19 RX ADMIN — FAMOTIDINE 20 MG: 10 INJECTION, SOLUTION INTRAVENOUS at 12:41

## 2017-12-19 RX ADMIN — OXYCODONE HYDROCHLORIDE AND ACETAMINOPHEN 1 TABLET: 10; 325 TABLET ORAL at 12:40

## 2017-12-19 RX ADMIN — SODIUM CHLORIDE, POTASSIUM CHLORIDE, SODIUM LACTATE AND CALCIUM CHLORIDE: 600; 310; 30; 20 INJECTION, SOLUTION INTRAVENOUS at 14:50

## 2017-12-19 RX ADMIN — Medication 100 MCG: at 14:24

## 2017-12-19 RX ADMIN — Medication 100 MCG: at 15:25

## 2017-12-19 RX ADMIN — ZOLPIDEM TARTRATE 10 MG: 5 TABLET ORAL at 22:15

## 2017-12-19 RX ADMIN — ASPIRIN 325 MG: 325 TABLET, DELAYED RELEASE ORAL at 20:22

## 2017-12-19 RX ADMIN — SODIUM CHLORIDE, POTASSIUM CHLORIDE, SODIUM LACTATE AND CALCIUM CHLORIDE 100 ML/HR: 600; 310; 30; 20 INJECTION, SOLUTION INTRAVENOUS at 20:22

## 2017-12-19 RX ADMIN — HYDROMORPHONE HYDROCHLORIDE 0.5 MG: 1 INJECTION, SOLUTION INTRAMUSCULAR; INTRAVENOUS; SUBCUTANEOUS at 18:11

## 2017-12-19 RX ADMIN — HYDROMORPHONE HYDROCHLORIDE 1 MG: 1 INJECTION, SOLUTION INTRAMUSCULAR; INTRAVENOUS; SUBCUTANEOUS at 22:15

## 2017-12-19 RX ADMIN — Medication 100 MCG: at 14:18

## 2017-12-19 RX ADMIN — OXYCODONE HYDROCHLORIDE 10 MG: 5 TABLET ORAL at 20:21

## 2017-12-19 RX ADMIN — MELOXICAM 15 MG: 7.5 TABLET ORAL at 20:21

## 2017-12-19 RX ADMIN — VANCOMYCIN HYDROCHLORIDE 250 ML: 1 INJECTION, POWDER, LYOPHILIZED, FOR SOLUTION INTRAVENOUS at 13:49

## 2017-12-19 RX ADMIN — HYDROMORPHONE HYDROCHLORIDE 0.5 MG: 1 INJECTION, SOLUTION INTRAMUSCULAR; INTRAVENOUS; SUBCUTANEOUS at 19:06

## 2017-12-19 RX ADMIN — ATENOLOL 50 MG: 25 TABLET ORAL at 20:22

## 2017-12-19 RX ADMIN — Medication 100 MCG: at 15:07

## 2017-12-19 RX ADMIN — Medication 100 MCG: at 14:36

## 2017-12-19 RX ADMIN — Medication 100 MCG: at 15:12

## 2017-12-19 RX ADMIN — ACETAMINOPHEN 650 MG: 325 TABLET ORAL at 12:40

## 2017-12-19 RX ADMIN — Medication 6 ML/HR: at 18:32

## 2017-12-19 RX ADMIN — Medication 100 MCG: at 14:30

## 2017-12-19 RX ADMIN — PROPOFOL 140 MCG/KG/MIN: 10 INJECTION, EMULSION INTRAVENOUS at 13:59

## 2017-12-19 RX ADMIN — SODIUM CHLORIDE, POTASSIUM CHLORIDE, SODIUM LACTATE AND CALCIUM CHLORIDE: 600; 310; 30; 20 INJECTION, SOLUTION INTRAVENOUS at 16:05

## 2017-12-19 RX ADMIN — Medication 100 MCG: at 14:41

## 2017-12-19 RX ADMIN — Medication 100 MCG: at 15:18

## 2017-12-19 RX ADMIN — TRANEXAMIC ACID 544 MG: 100 INJECTION, SOLUTION INTRAVENOUS at 16:58

## 2017-12-19 RX ADMIN — FENTANYL CITRATE 100 MCG: 50 INJECTION, SOLUTION INTRAMUSCULAR; INTRAVENOUS at 13:54

## 2017-12-19 RX ADMIN — EPHEDRINE SULFATE 20 MG: 50 INJECTION INTRAMUSCULAR; INTRAVENOUS; SUBCUTANEOUS at 15:45

## 2017-12-19 RX ADMIN — Medication 100 MCG: at 14:12

## 2017-12-19 RX ADMIN — ACETAMINOPHEN 650 MG: 325 TABLET, FILM COATED ORAL at 12:40

## 2017-12-19 RX ADMIN — TRANEXAMIC ACID 544 MG: 100 INJECTION, SOLUTION INTRAVENOUS at 14:19

## 2017-12-19 NOTE — INTERVAL H&P NOTE
H&P reviewed. The patient was examined and there are no changes to the H&P.     /73 (BP Location: Right arm, Patient Position: Lying)  Pulse 72  Temp 97.5 °F (36.4 °C) (Temporal Artery )   Resp 16  SpO2 99%      Pete Willis MD  12/19/2017  1:05 PM

## 2017-12-19 NOTE — ANESTHESIA PROCEDURE NOTES
Spinal Block    Patient location during procedure: OR  Indication:at surgeon's request  Performed By  CRNA: DARIO SUN  Preanesthetic Checklist  Completed: patient identified, site marked, surgical consent, pre-op evaluation, timeout performed, IV checked, risks and benefits discussed and monitors and equipment checked  Spinal Block Prep:  Patient Position:sitting  Sterile Tech:cap, gloves, gown, mask and sterile barriers  Prep:Chloraprep  Patient Monitoring:blood pressure monitoring and continuous pulse oximetry  Spinal Block Procedure  Approach:midline  Guidance:palpation technique  Location:L3-L4  Needle Type:Pencan  Needle Gauge:25 G  Placement of Spinal needle event:cerebrospinal fluid aspirated    Fluid Appearance:clear  Post Assessment  Patient Tolerance:patient tolerated the procedure well with no apparent complications  Complications no  Additional Notes  Epi wash

## 2017-12-19 NOTE — ANESTHESIA PROCEDURE NOTES
Peripheral Block    Patient location during procedure: post-op  Start time: 12/19/2017 5:47 PM  Stop time: 12/19/2017 5:49 PM  Reason for block: at surgeon's request and post-op pain management  Performed by  CRNA: DARIO SUN  Preanesthetic Checklist  Completed: patient identified, site marked, surgical consent, pre-op evaluation, timeout performed, IV checked, risks and benefits discussed and monitors and equipment checked  Prep:  Sterile barriers:cap, gloves, gown, mask and sterile barriers  Prep: ChloraPrep  Patient monitoring: blood pressure monitoring, continuous pulse oximetry and EKG  Procedure  Sedation:yes    Guidance:ultrasound guided, nerve stimulator and landmark technique  Images:still images obtained    Laterality:right  Anesthesia block type: ipac.  Injection Technique:single-shotNeedle Gauge:20 G  Resistance on Injection: less than 15 psi  Medications  Local Injected:bupivacaine 0.25% Local Amount Injected:20mL  Post Assessment  Injection Assessment: negative aspiration for heme, no paresthesia on injection and incremental injection  Patient Tolerance:comfortable throughout block  Complications:no  Additional Notes  No comp noted

## 2017-12-19 NOTE — ANESTHESIA POSTPROCEDURE EVALUATION
Patient: Anca Andrade    Procedure Summary     Date Anesthesia Start Anesthesia Stop Room / Location    12/19/17 0586 [id number]  [facility] /  [facility]       Procedure Diagnosis Surgeon Provider    SECOND STAGE OF RIGHT TOTAL KNEE ARTHROPLASTY REVISION (S&N) (Right Knee) Pseudogout; Neuropathy; Arthralgia of knee, right; Lumbar degenerative disc disease; Post-traumatic osteoarthritis of left knee; Status post total right knee replacement using cement; Infection associated with internal right knee prosthesis, initial encounter  (Pseudogout [M11.20]; Neuropathy [G62.9]; Arthralgia of knee, right [M25.561]; Lumbar degenerative disc disease [M51.36]; Post-traumatic osteoarthritis of left knee [M17.32]; Status post total right knee replacement using cement [Z96.651]; Infection associated with internal right knee prosthesis, initial encounter [T84.53XA]) MD Dominguez Woods CRNA          Anesthesia Type: spinal  Last vitals  BP   102/62 (12/20/17 0456)   Temp   97.7 °F (36.5 °C) (12/20/17 0350)   Pulse   72 (12/20/17 0350)   Resp   18 (12/20/17 0350)     SpO2   100 % (12/20/17 0350)     Post Anesthesia Care and Evaluation    Patient location during evaluation: PACU  Patient participation: complete - patient participated  Level of consciousness: awake  Pain score: 3  Pain management: adequate  Airway patency: patent  Anesthetic complications: No anesthetic complications  PONV Status: controlled  Cardiovascular status: acceptable and stable  Respiratory status: acceptable and face mask  Hydration status: acceptable

## 2017-12-19 NOTE — PLAN OF CARE
Problem: Perioperative Period (Adult)  Goal: Signs and Symptoms of Listed Potential Problems Will be Absent or Manageable (Perioperative Period)  Outcome: Ongoing (interventions implemented as appropriate)    12/19/17 1207   Perioperative Period   Problems Assessed (Perioperative Period) all   Problems Present (Perioperative Period) none

## 2017-12-19 NOTE — ANESTHESIA PROCEDURE NOTES
Peripheral Block    Start time: 12/19/2017 5:38 PM  Stop time: 12/19/2017 5:47 PM  Reason for block: at surgeon's request and post-op pain management  Performed by  CRNA: DARIO SUN  Preanesthetic Checklist  Completed: patient identified, site marked, surgical consent, pre-op evaluation, timeout performed, IV checked, risks and benefits discussed and monitors and equipment checked  Prep:  Sterile barriers:cap, gloves, mask and sterile barriers  Prep: ChloraPrep  Patient monitoring: blood pressure monitoring, continuous pulse oximetry and EKG  Procedure  Performed under: spinal  Guidance:ultrasound guided  Images:still images obtained    Laterality:right  Block Type:adductor canal block  Injection Technique:catheter  Needle Type:echogenic  Needle Gauge:20 G    Catheter size: 21.  Medications  Local Injected:bupivacaine 0.25% Local Amount Injected:10mL  Post Assessment  Injection Assessment: negative aspiration for heme, no paresthesia on injection and incremental injection  Patient Tolerance:comfortable throughout block  Complications:no  Additional Notes  Procedure:          CATHETER ADDUCTOR CANAL BLOCK                                                             CATHETER at skin: 5                                Patient analgesia was achieved with SAB       The pt was placed in the Supine position.  The Insertion site was  prepped and Draped in sterile fashion.  A  I-Flow 18g echogenic needle was then  inserted approximately midline, mid-thigh and advanced In-plane with Ultrasound guidance.  Normal Saline PSF was utilized for hydrodissection of tissue.  The Vastus medialis and Sartorius muscle where visualized and the needle tip was placed in the adductor canal,  lateral to the femoral artery.  LA injection spread was visualized, injection was incremental 1-5 ml, injection pressure was normal or little; no intraneural injection; no vascular injection.  LA dose was injected thru the needle (see dose  above).  I-flow 20g catheter was placed via the needle with ultrasound visualization and confirmation with NS fluid bolus or air injection. The needle was then removed and the skin was sealed with Skin Affiix at catheter insertion site.  Skin was prepped with benzoin or mastisol and the labeled curled catheter was secured with steristrips and a transparent dressing.    loccal infiltrationunder ultrasound of 30cc of .25% bupiv in anterior distal femur between vm vl vi

## 2017-12-19 NOTE — H&P (VIEW-ONLY)
Anca Andrade is a 52 y.o. right hand dominant female is here today for a discussion of treatment plans, surgery, and rehabilitation.  Pre-op Exam and Pain of the Right Knee       History of Present Illness      Patient is doing well and she presents today to review recent right knee joint aspiration studies and cultures, and for pre-operative history and physical for planned second stage of right knee revision replacement for soumya-prosthetic infection.    PAST MEDICAL HISTORY:    Past Medical History:   Diagnosis Date   • Asthma    • Body piercing     BILATERAL EARS   • Depression    • GERD (gastroesophageal reflux disease)    • H/O corticosteroid therapy     right knee   • History of being tatooed    • Hypertension    • Osteoarthritis    • Pseudogout    • Rheumatoid arthritis    • Skin cancer     LEFT SHOULDER         Current Outpatient Prescriptions:   •  aspirin (ECOTRIN) 325 MG EC tablet, Take 1 tablet by mouth Daily., Disp: 30 tablet, Rfl: 0  •  atenolol (TENORMIN) 50 MG tablet, Take 0.5 tablets by mouth daily, patient states BP fluctuates and she doesn't take this everyday, Disp: , Rfl:   •  docusate sodium 100 MG capsule, Take 1 capsule by mouth 2 (Two) Times a Day As Needed for Constipation., Disp: 30 capsule, Rfl: 1  •  ertapenem (INVanz) 1 g in 0.9% sodium chloride IVPB, Infuse 1 g into a venous catheter Daily. Indications: Bone and/or Joint Infection, Disp: 28 vial, Rfl: 0  •  gabapentin (NEURONTIN) 400 MG capsule, Take 1 capsule by mouth 3 (Three) Times a Day., Disp: 90 capsule, Rfl: 0  •  ibuprofen (ADVIL,MOTRIN) 800 MG tablet, , Disp: , Rfl:   •  lisinopril-hydrochlorothiazide (PRINZIDE,ZESTORETIC) 10-12.5 MG per tablet, Take 1 tablet by mouth Daily. Patient states she doesn't take this every day., Disp: , Rfl:   •  meloxicam (MOBIC) 15 MG tablet, Take 1 tablet by mouth Daily., Disp: 30 tablet, Rfl: 3  •  oxyCODONE (ROXICODONE) 10 MG tablet, Take 1 tablet by mouth Every 8 (Eight) Hours As  Needed., Disp: 30 tablet, Rfl: 0    Past Surgical History:   Procedure Laterality Date   • CHOLECYSTECTOMY     • COLONOSCOPY  2015   • ENDOSCOPY     • HYSTERECTOMY     • KNEE ACL RECONSTRUCTION Left    • KNEE ARTHROSCOPY      Multiple procedures (left x 4 and right x 2)    • OTHER SURGICAL HISTORY      ORIF right forearm   • PARATHYROIDECTOMY     • IA REVISE KNEE JOINT REPLACE,1 PART Right 9/26/2017    Procedure: RIGHT TOTAL KNEE ARTHROPLASTY REVISION STAGE ONE WITH ANTIBIOTIC SPACER FOR PERIPROSTHETIC INFECTION;  Surgeon: Pete Willis MD;  Location: [facility];  Service: Orthopedics   • TOOTH EXTRACTION     • TOTAL KNEE ARTHROPLASTY Right 10/18/2016   • WRIST SURGERY      Left wrist procedure       Family History   Problem Relation Age of Onset   • Cancer Other    • Other Other      cholesterol problems   • Hyperlipidemia Other    • Cancer Father    • Cancer Brother        Social History     Social History   • Marital status:      Spouse name: N/A   • Number of children: N/A   • Years of education: N/A     Occupational History   • Not on file.     Social History Main Topics   • Smoking status: Current Every Day Smoker     Packs/day: 0.50     Years: 30.00   • Smokeless tobacco: Never Used   • Alcohol use Yes      Comment: socially   • Drug use: No   • Sexual activity: Defer     Other Topics Concern   • Not on file     Social History Narrative        Allergies   Allergen Reactions   • Ativan [Lorazepam] Mental Status Change   • Morphine And Related      Headaches due to morphine only, related meds ok   • Penicillins Other (See Comments)     UNSURE OF REACTION         Review of Systems   Constitutional: Negative for fever.   HENT: Negative for voice change.    Eyes: Negative for visual disturbance.   Respiratory: Negative for shortness of breath.    Cardiovascular: Negative for chest pain.   Gastrointestinal: Negative for abdominal distention and abdominal pain.   Genitourinary: Negative for dysuria.  "  Musculoskeletal: Positive for arthralgias. Negative for gait problem and joint swelling.   Skin: Negative for rash.   Neurological: Negative for speech difficulty.   Hematological: Does not bruise/bleed easily.   Psychiatric/Behavioral: Negative for confusion.       PHYSICAL EXAMINATION:       Resp 18  Ht 167.6 cm (66\")  Wt 54.4 kg (120 lb)  BMI 19.37 kg/m2    GENERAL [x] Well developed  []Ill appearing [x] No acute distress    HEENT [x]No acute changes [x] Normocephalic, atraumatic    NECK [x]Supple  [] No midline tenderness    LUNGS [x]Clear bilaterally [x]No wheezes []Rhonchi [] Rales    HEART [x] Regular rate  [x] Regular rhythm [] Irregular    ABDOMEN [x] Soft [x] Not tender [x] Not distended [x] Normal sounds    VAS/EXT [x] Normal Pulses []Edema []Cyanosis             SKIN [x] Warm [x]Dry []Pink []Ecchymosis []Cool    NEURO [x] Sensation Intact [x] Motor Intact [x] Pulse intact  [] Dysesthesias:_________  []Weakness:__________    MUSCULOSKELETAL []          Physical Exam   Constitutional: She appears well-developed. No distress.   HENT:   Head: Normocephalic and atraumatic.   Eyes: EOM are normal. Pupils are equal, round, and reactive to light.   Neck: Neck supple. No tracheal deviation present.   Cardiovascular: Normal rate and regular rhythm.    Pulmonary/Chest: Breath sounds normal. No respiratory distress. She has no wheezes.   Abdominal: Soft. Bowel sounds are normal. She exhibits no distension. There is no tenderness.   Musculoskeletal: She exhibits no deformity.        Right knee: She exhibits no effusion.   Neurological: She is alert.   Skin: Skin is warm and dry.   Psychiatric: She has a normal mood and affect. Her speech is normal.   Vitals reviewed.    Right Knee Exam     Tenderness   The patient is experiencing tenderness in the patella.    Range of Motion   Extension: 0 (and good independent straight leg raise with right quadricep and she is guided in mobility and strenghtening exercises) "   Flexion: 90     Muscle Strength     The patient has normal right knee strength.    Tests   Varus: negative  Valgus: negative  Patellar Apprehension: negative    Other   Erythema: absent  Scars: present (well healed)  Pulse: present  Other tests: no effusion present      Left Knee Exam     Muscle Strength     The patient has normal left knee strength.      Back Exam     Muscle Strength   Right Quadriceps:  5/5   Left Quadriceps:  5/5   Right Hamstrings:  5/5   Left Hamstrings:  5/5         Extremity DVT signs are Negative on physical exam with negative Silvia sign, with no calf pain, with no palpable cords, with no increased pain with passive stretch/extension and with no skin tone change   Neurologic Exam     Mental Status   Attention: normal.   Speech: speech is normal   Level of consciousness: alert  Knowledge: good.     Cranial Nerves     CN III, IV, VI   Pupils are equal, round, and reactive to light.  Extraocular motions are normal.     Motor Exam   Overall muscle tone: normal    Strength   Right quadriceps: 5/5  Left quadriceps: 5/5  Right hamstrin/5  Left hamstrin/5    Gait, Coordination, and Reflexes     Gait  Gait: (stable independent gait with temporary knee joiint spacer.)    Left Knee Exam     Muscle Strength   Normal left knee strength    Right Knee Exam     Muscle Strength   Normal right knee strength        DVT Screening: No Yes   Smoker [] [x]   Personal/Family History of DVT or PE [x] []   Sedentary Lifestyle [x] []   BMI >30 [x] []      Tranexamic acid TXA criteria for usage during arthroplasty surgery.    Previous or Active DVT/PE: NO  Cardiac Stent within 1 year: NO  Embolic/Ischemic stroke within 1 year: NO  Creatinine less than 30ml/min: NO  Congenital Thrombophilia: NO  Hypersensitivity to TXA: NO  Color Blindness: NO  NOTE:  TXA  tranexamic acid summary: THIS PATIENT IS A CANDIDATE FOR IV TXA DURING SURGERY.    Independent Review of Radiographic Studies:    No new imaging done  today.  Reviewed at a prior visit.  Laboratory and Other Studies:  Recent results were stable and reviewed with patient.  Cultures were negative.  Right knee joint aspiration from 11-22-17 showed normal results,  including WBC count 700 with 66% neutrophils, Gram Stain negative and aerobic and anaerobic cultures negative at a full two weeks.   Medical Decision Making:    Good progress, significantly improved.  Continue current plan, and management.  Right knee joint aspiration findings normal.  Plan rigtht knee second stage revision knee replacement on 12-19-17.  Also to follow Evaristo Christine MD, Infectious Diseases recommendations.        Anca was seen today for pre-op exam and pain.    Diagnoses and all orders for this visit:    Infection associated with internal right knee prosthesis, initial encounter  -     Case Request; Standing  -     Consult Primary Care Physician for Medical Clearance  -     CBC and Differential  -     Comprehensive metabolic panel  -     Protime-INR  -     APTT  -     MRSA Screen Culture - Swab, Nares  -     Urinalysis With / Culture If Indicated - Urine, Clean Catch  -     ECG 12 Lead  -     XR chest 2 vw; Future  -     Tranexamic Acid 544 mg in sodium chloride 0.9 % 100 mL; Infuse 544 mg into a venous catheter 1 (One) Time.  -     Tranexamic Acid 544 mg in sodium chloride 0.9 % 100 mL; Infuse 544 mg into a venous catheter 1 (One) Time.  -     famotidine (PEPCID) injection 20 mg; Infuse 2 mL into a venous catheter 60 Minutes Prior to Surgery.  -     Sedimentation rate; Future  -     C-reactive protein; Future  -     vancomycin (VANCOCIN) 1,000 mg in sodium chloride 0.9 % 250 mL IVPB; Infuse 1,000 mg into a venous catheter 1 (One) Time.  -     Case Request    Arthralgia of knee, right  -     Case Request; Standing  -     Consult Primary Care Physician for Medical Clearance  -     CBC and Differential  -     Comprehensive metabolic panel  -     Protime-INR  -     APTT  -     MRSA Screen  Culture - Swab, Nares  -     Urinalysis With / Culture If Indicated - Urine, Clean Catch  -     ECG 12 Lead  -     XR chest 2 vw; Future  -     Tranexamic Acid 544 mg in sodium chloride 0.9 % 100 mL; Infuse 544 mg into a venous catheter 1 (One) Time.  -     Tranexamic Acid 544 mg in sodium chloride 0.9 % 100 mL; Infuse 544 mg into a venous catheter 1 (One) Time.  -     famotidine (PEPCID) injection 20 mg; Infuse 2 mL into a venous catheter 60 Minutes Prior to Surgery.  -     Sedimentation rate; Future  -     C-reactive protein; Future  -     vancomycin (VANCOCIN) 1,000 mg in sodium chloride 0.9 % 250 mL IVPB; Infuse 1,000 mg into a venous catheter 1 (One) Time.  -     Case Request    Post-traumatic osteoarthritis of left knee  -     Case Request; Standing  -     Consult Primary Care Physician for Medical Clearance  -     CBC and Differential  -     Comprehensive metabolic panel  -     Protime-INR  -     APTT  -     MRSA Screen Culture - Swab, Nares  -     Urinalysis With / Culture If Indicated - Urine, Clean Catch  -     ECG 12 Lead  -     XR chest 2 vw; Future  -     Tranexamic Acid 544 mg in sodium chloride 0.9 % 100 mL; Infuse 544 mg into a venous catheter 1 (One) Time.  -     Tranexamic Acid 544 mg in sodium chloride 0.9 % 100 mL; Infuse 544 mg into a venous catheter 1 (One) Time.  -     famotidine (PEPCID) injection 20 mg; Infuse 2 mL into a venous catheter 60 Minutes Prior to Surgery.  -     Sedimentation rate; Future  -     C-reactive protein; Future  -     vancomycin (VANCOCIN) 1,000 mg in sodium chloride 0.9 % 250 mL IVPB; Infuse 1,000 mg into a venous catheter 1 (One) Time.  -     Case Request    Lumbar degenerative disc disease  -     Case Request; Standing  -     Consult Primary Care Physician for Medical Clearance  -     CBC and Differential  -     Comprehensive metabolic panel  -     Protime-INR  -     APTT  -     MRSA Screen Culture - Swab, Nares  -     Urinalysis With / Culture If Indicated - Urine,  Clean Catch  -     ECG 12 Lead  -     XR chest 2 vw; Future  -     Tranexamic Acid 544 mg in sodium chloride 0.9 % 100 mL; Infuse 544 mg into a venous catheter 1 (One) Time.  -     Tranexamic Acid 544 mg in sodium chloride 0.9 % 100 mL; Infuse 544 mg into a venous catheter 1 (One) Time.  -     famotidine (PEPCID) injection 20 mg; Infuse 2 mL into a venous catheter 60 Minutes Prior to Surgery.  -     Sedimentation rate; Future  -     C-reactive protein; Future  -     vancomycin (VANCOCIN) 1,000 mg in sodium chloride 0.9 % 250 mL IVPB; Infuse 1,000 mg into a venous catheter 1 (One) Time.  -     Case Request    Neuropathy  -     Case Request; Standing  -     Consult Primary Care Physician for Medical Clearance  -     CBC and Differential  -     Comprehensive metabolic panel  -     Protime-INR  -     APTT  -     MRSA Screen Culture - Swab, Nares  -     Urinalysis With / Culture If Indicated - Urine, Clean Catch  -     ECG 12 Lead  -     XR chest 2 vw; Future  -     Tranexamic Acid 544 mg in sodium chloride 0.9 % 100 mL; Infuse 544 mg into a venous catheter 1 (One) Time.  -     Tranexamic Acid 544 mg in sodium chloride 0.9 % 100 mL; Infuse 544 mg into a venous catheter 1 (One) Time.  -     famotidine (PEPCID) injection 20 mg; Infuse 2 mL into a venous catheter 60 Minutes Prior to Surgery.  -     Sedimentation rate; Future  -     C-reactive protein; Future  -     vancomycin (VANCOCIN) 1,000 mg in sodium chloride 0.9 % 250 mL IVPB; Infuse 1,000 mg into a venous catheter 1 (One) Time.  -     Case Request    Pseudogout  -     Case Request; Standing  -     Consult Primary Care Physician for Medical Clearance  -     CBC and Differential  -     Comprehensive metabolic panel  -     Protime-INR  -     APTT  -     MRSA Screen Culture - Swab, Nares  -     Urinalysis With / Culture If Indicated - Urine, Clean Catch  -     ECG 12 Lead  -     XR chest 2 vw; Future  -     Tranexamic Acid 544 mg in sodium chloride 0.9 % 100 mL; Infuse  544 mg into a venous catheter 1 (One) Time.  -     Tranexamic Acid 544 mg in sodium chloride 0.9 % 100 mL; Infuse 544 mg into a venous catheter 1 (One) Time.  -     famotidine (PEPCID) injection 20 mg; Infuse 2 mL into a venous catheter 60 Minutes Prior to Surgery.  -     Sedimentation rate; Future  -     C-reactive protein; Future  -     vancomycin (VANCOCIN) 1,000 mg in sodium chloride 0.9 % 250 mL IVPB; Infuse 1,000 mg into a venous catheter 1 (One) Time.  -     Case Request    Status post total right knee replacement using cement  -     Case Request; Standing  -     Consult Primary Care Physician for Medical Clearance  -     CBC and Differential  -     Comprehensive metabolic panel  -     Protime-INR  -     APTT  -     MRSA Screen Culture - Swab, Nares  -     Urinalysis With / Culture If Indicated - Urine, Clean Catch  -     ECG 12 Lead  -     XR chest 2 vw; Future  -     Tranexamic Acid 544 mg in sodium chloride 0.9 % 100 mL; Infuse 544 mg into a venous catheter 1 (One) Time.  -     Tranexamic Acid 544 mg in sodium chloride 0.9 % 100 mL; Infuse 544 mg into a venous catheter 1 (One) Time.  -     famotidine (PEPCID) injection 20 mg; Infuse 2 mL into a venous catheter 60 Minutes Prior to Surgery.  -     Sedimentation rate; Future  -     C-reactive protein; Future  -     vancomycin (VANCOCIN) 1,000 mg in sodium chloride 0.9 % 250 mL IVPB; Infuse 1,000 mg into a venous catheter 1 (One) Time.  -     Case Request    Other orders  -     oxyCODONE (ROXICODONE) 10 MG tablet; Take 1 tablet by mouth Every 8 (Eight) Hours As Needed.  -     Inpatient Admission; Standing  -     Provide instructions to patient regarding NPO status  -     Obtain informed consent  -     Clorhexidine skin prep  -     Follow anesthesia standing orders.; Standing  -     Verify NPO Status; Standing  -     PAU hose- To be placed on patient in pre-op; Standing  -     SCD (sequential compression device)- to be placed on patient in Pre-op; Standing  -      POC Glucose Fingerstick; Standing  -     Notify physician (specify); Standing         *SPECIAL INSTRUCTIONS:  Post-operative antibiotic protocol guidance from Evaristo Christine MD of Infectious Diseases.  Multi-modal analgesia.  Tranexamic acid IV protocol (see screening parameters this report.  Multi-modal DVT prophylaxis.  Rehabilitation PT/OT protocol with same day walker ambulation with therapist.      Risks, benefits, and alternative treatments discussed with the patient: [x] Yes [] No    Risk benefits and merits of the proposed surgery were discussed and the patient's questions were answered risks discussed including and not limited to:  Anesthesia reactions  Blood loss and possible transfusion  Infection  Deep venous thrombosis and pulmonary embolus  Nerve, vascular or tendon injury  Fracture  Deformity  Stiffness  Weakness  Prosthesis and implant issues such as loosening, material wear or dislocation  Skin necrosis  Revision surgery or further treatment  Recurrence of problem and condition     Informed consent: [] Signed  [x] To be obtained at hospital  [] Both    Recommendations/Plan:    Regular exercise as tolerated  Orthopedic activities reviewed and patient expressed appreciation  Discussion of orthopedic goals  Risk, benefits, and merits of treatment alternatives reviewed with the patient and questions answered  Physical therapy referral given  Take prescribed medications as instructed only as tolerated  After care and dental prophylaxis for joint replacement prosthesis  Watch for signs and symptoms of infection  Guided on proper techniques for mobility, strength, agility and/or conditioning exercises    Exercise, medications, injections, other patient advice, and return appointment as noted.  Brace: No brace was given at today's visit  Referral: Infectious diseases  Test/Studies: No additional studies ordered.  Surgery: Surgery proposed at this visit as noted.  Work/Activity Status: May perform usual  activities as tolerated and No strenuous activity.  Patient is encouraged to call or return for any issues or concerns.    PLANNED SURGICAL PROCEDURE:  Second stage of right total knee replacement revision.    Return in about 4 weeks (around 1/5/2018) for Post-op, Recheck.

## 2017-12-19 NOTE — ANESTHESIA PREPROCEDURE EVALUATION
Anesthesia Evaluation     Patient summary reviewed and Nursing notes reviewed   no history of anesthetic complications:  NPO Solid Status: > 8 hours  NPO Liquid Status: > 8 hours     Airway   Mallampati: I  TM distance: >3 FB  Neck ROM: full  no difficulty expected  Dental    (+) poor dentition        Pulmonary - normal exam    breath sounds clear to auscultation  (+) asthma,   Cardiovascular - normal exam    Rhythm: regular  Rate: normal    (+) hypertension,       Neuro/Psych  (+) psychiatric history Anxiety,    GI/Hepatic/Renal/Endo    (+)  GERD,     Musculoskeletal     Abdominal    Substance History - negative use     OB/GYN negative ob/gyn ROS         Other   (+) arthritis   history of cancer                                Anesthesia Plan    ASA 3     spinal   (Postoperative adductor canal block and IPACK/tibial nerve block    PO tylenol plus percocet)  intravenous induction   Anesthetic plan and risks discussed with patient.

## 2017-12-20 LAB
ABO GROUP BLD: NORMAL
ABO GROUP BLD: NORMAL
ANION GAP SERPL CALCULATED.3IONS-SCNC: 9.8 MMOL/L
BASOPHILS # BLD AUTO: 0.01 10*3/MM3 (ref 0–0.2)
BASOPHILS NFR BLD AUTO: 0.1 % (ref 0–2.5)
BLD GP AB SCN SERPL QL: NEGATIVE
BUN BLD-MCNC: 13 MG/DL (ref 7–20)
BUN/CREAT SERPL: 16.3 (ref 7.1–23.5)
CALCIUM SPEC-SCNC: 7.9 MG/DL (ref 8.4–10.2)
CHLORIDE SERPL-SCNC: 107 MMOL/L (ref 98–107)
CO2 SERPL-SCNC: 26 MMOL/L (ref 26–30)
CREAT BLD-MCNC: 0.8 MG/DL (ref 0.6–1.3)
DEPRECATED RDW RBC AUTO: 45.4 FL (ref 37–54)
EOSINOPHIL # BLD AUTO: 0.17 10*3/MM3 (ref 0–0.7)
EOSINOPHIL NFR BLD AUTO: 2.4 % (ref 0–7)
ERYTHROCYTE [DISTWIDTH] IN BLOOD BY AUTOMATED COUNT: 13.7 % (ref 11.5–14.5)
GFR SERPL CREATININE-BSD FRML MDRD: 75 ML/MIN/1.73
GLUCOSE BLD-MCNC: 81 MG/DL (ref 74–98)
HCT VFR BLD AUTO: 22.6 % (ref 37–47)
HCT VFR BLD AUTO: 23.3 % (ref 37–47)
HGB BLD-MCNC: 7.2 G/DL (ref 12–16)
HGB BLD-MCNC: 7.3 G/DL (ref 12–16)
IMM GRANULOCYTES # BLD: 0.02 10*3/MM3 (ref 0–0.06)
IMM GRANULOCYTES NFR BLD: 0.3 % (ref 0–0.6)
LYMPHOCYTES # BLD AUTO: 1.02 10*3/MM3 (ref 0.6–3.4)
LYMPHOCYTES NFR BLD AUTO: 14.6 % (ref 10–50)
MCH RBC QN AUTO: 28.5 PG (ref 27–31)
MCHC RBC AUTO-ENTMCNC: 31.3 G/DL (ref 30–37)
MCV RBC AUTO: 91 FL (ref 81–99)
MONOCYTES # BLD AUTO: 0.33 10*3/MM3 (ref 0–0.9)
MONOCYTES NFR BLD AUTO: 4.7 % (ref 0–12)
NEUTROPHILS # BLD AUTO: 5.46 10*3/MM3 (ref 2–6.9)
NEUTROPHILS NFR BLD AUTO: 77.9 % (ref 37–80)
NRBC BLD MANUAL-RTO: 0 /100 WBC (ref 0–0)
PLATELET # BLD AUTO: 134 10*3/MM3 (ref 130–400)
PMV BLD AUTO: 10.5 FL (ref 6–12)
POTASSIUM BLD-SCNC: 3.8 MMOL/L (ref 3.5–5.1)
RBC # BLD AUTO: 2.56 10*6/MM3 (ref 4.2–5.4)
RH BLD: POSITIVE
RH BLD: POSITIVE
SODIUM BLD-SCNC: 139 MMOL/L (ref 137–145)
WBC NRBC COR # BLD: 7.01 10*3/MM3 (ref 4.8–10.8)

## 2017-12-20 PROCEDURE — 97165 OT EVAL LOW COMPLEX 30 MIN: CPT

## 2017-12-20 PROCEDURE — 25010000002 VANCOMYCIN PER 500 MG: Performed by: ORTHOPAEDIC SURGERY

## 2017-12-20 PROCEDURE — 99024 POSTOP FOLLOW-UP VISIT: CPT | Performed by: PHYSICIAN ASSISTANT

## 2017-12-20 PROCEDURE — 86850 RBC ANTIBODY SCREEN: CPT | Performed by: NURSE PRACTITIONER

## 2017-12-20 PROCEDURE — 86900 BLOOD TYPING SEROLOGIC ABO: CPT | Performed by: NURSE PRACTITIONER

## 2017-12-20 PROCEDURE — 86901 BLOOD TYPING SEROLOGIC RH(D): CPT

## 2017-12-20 PROCEDURE — 36430 TRANSFUSION BLD/BLD COMPNT: CPT

## 2017-12-20 PROCEDURE — 99232 SBSQ HOSP IP/OBS MODERATE 35: CPT | Performed by: NURSE PRACTITIONER

## 2017-12-20 PROCEDURE — 85018 HEMOGLOBIN: CPT | Performed by: INTERNAL MEDICINE

## 2017-12-20 PROCEDURE — 86920 COMPATIBILITY TEST SPIN: CPT

## 2017-12-20 PROCEDURE — 86901 BLOOD TYPING SEROLOGIC RH(D): CPT | Performed by: NURSE PRACTITIONER

## 2017-12-20 PROCEDURE — C1751 CATH, INF, PER/CENT/MIDLINE: HCPCS

## 2017-12-20 PROCEDURE — C1894 INTRO/SHEATH, NON-LASER: HCPCS

## 2017-12-20 PROCEDURE — 86900 BLOOD TYPING SEROLOGIC ABO: CPT

## 2017-12-20 PROCEDURE — 80048 BASIC METABOLIC PNL TOTAL CA: CPT | Performed by: ORTHOPAEDIC SURGERY

## 2017-12-20 PROCEDURE — 97161 PT EVAL LOW COMPLEX 20 MIN: CPT

## 2017-12-20 PROCEDURE — P9016 RBC LEUKOCYTES REDUCED: HCPCS

## 2017-12-20 PROCEDURE — 25010000002 FONDAPARINUX PER 0.5 MG: Performed by: ORTHOPAEDIC SURGERY

## 2017-12-20 PROCEDURE — 85014 HEMATOCRIT: CPT | Performed by: INTERNAL MEDICINE

## 2017-12-20 PROCEDURE — 25010000002 HYDROMORPHONE PER 4 MG: Performed by: ORTHOPAEDIC SURGERY

## 2017-12-20 PROCEDURE — 25010000002 HYDROMORPHONE PER 4 MG: Performed by: NURSE PRACTITIONER

## 2017-12-20 PROCEDURE — 05H433Z INSERTION OF INFUSION DEVICE INTO LEFT INNOMINATE VEIN, PERCUTANEOUS APPROACH: ICD-10-PCS | Performed by: ORTHOPAEDIC SURGERY

## 2017-12-20 PROCEDURE — 85025 COMPLETE CBC W/AUTO DIFF WBC: CPT | Performed by: ORTHOPAEDIC SURGERY

## 2017-12-20 RX ORDER — NALOXONE HCL 0.4 MG/ML
0.1 VIAL (ML) INJECTION
Status: DISCONTINUED | OUTPATIENT
Start: 2017-12-20 | End: 2017-12-20

## 2017-12-20 RX ORDER — SODIUM CHLORIDE 9 MG/ML
125 INJECTION, SOLUTION INTRAVENOUS CONTINUOUS
Status: DISCONTINUED | OUTPATIENT
Start: 2017-12-20 | End: 2017-12-21 | Stop reason: HOSPADM

## 2017-12-20 RX ORDER — OXYCODONE HYDROCHLORIDE 10 MG/1
10 TABLET ORAL EVERY 4 HOURS PRN
Qty: 60 TABLET | Refills: 0 | Status: SHIPPED | OUTPATIENT
Start: 2017-12-20 | End: 2017-12-27 | Stop reason: SDUPTHER

## 2017-12-20 RX ORDER — ZOLPIDEM TARTRATE 5 MG/1
5 TABLET ORAL NIGHTLY PRN
Status: DISCONTINUED | OUTPATIENT
Start: 2017-12-20 | End: 2017-12-21 | Stop reason: HOSPADM

## 2017-12-20 RX ORDER — OXYCODONE HYDROCHLORIDE 5 MG/1
10 TABLET ORAL EVERY 6 HOURS PRN
Status: DISCONTINUED | OUTPATIENT
Start: 2017-12-20 | End: 2017-12-21 | Stop reason: HOSPADM

## 2017-12-20 RX ORDER — OXYCODONE HYDROCHLORIDE 5 MG/1
10 TABLET ORAL EVERY 8 HOURS
Status: DISCONTINUED | OUTPATIENT
Start: 2017-12-20 | End: 2017-12-20

## 2017-12-20 RX ORDER — ASPIRIN 81 MG/1
81 TABLET ORAL DAILY
Status: DISCONTINUED | OUTPATIENT
Start: 2017-12-21 | End: 2017-12-20

## 2017-12-20 RX ORDER — SODIUM CHLORIDE 0.9 % (FLUSH) 0.9 %
10 SYRINGE (ML) INJECTION AS NEEDED
Status: DISCONTINUED | OUTPATIENT
Start: 2017-12-20 | End: 2017-12-21 | Stop reason: HOSPADM

## 2017-12-20 RX ORDER — NALOXONE HCL 0.4 MG/ML
0.1 VIAL (ML) INJECTION
Status: DISCONTINUED | OUTPATIENT
Start: 2017-12-20 | End: 2017-12-21 | Stop reason: HOSPADM

## 2017-12-20 RX ADMIN — SODIUM CHLORIDE 1000 ML: 9 INJECTION, SOLUTION INTRAVENOUS at 01:13

## 2017-12-20 RX ADMIN — SODIUM CHLORIDE 125 ML/HR: 9 INJECTION, SOLUTION INTRAVENOUS at 09:38

## 2017-12-20 RX ADMIN — FONDAPARINUX SODIUM 2.5 MG: 2.5 INJECTION, SOLUTION SUBCUTANEOUS at 09:44

## 2017-12-20 RX ADMIN — MELOXICAM 15 MG: 7.5 TABLET ORAL at 09:44

## 2017-12-20 RX ADMIN — HYDROMORPHONE HYDROCHLORIDE 0.5 MG: 1 INJECTION, SOLUTION INTRAMUSCULAR; INTRAVENOUS; SUBCUTANEOUS at 20:01

## 2017-12-20 RX ADMIN — HYDROMORPHONE HYDROCHLORIDE 0.5 MG: 1 INJECTION, SOLUTION INTRAMUSCULAR; INTRAVENOUS; SUBCUTANEOUS at 22:49

## 2017-12-20 RX ADMIN — OXYCODONE HYDROCHLORIDE 10 MG: 5 TABLET ORAL at 09:44

## 2017-12-20 RX ADMIN — GABAPENTIN 400 MG: 400 CAPSULE ORAL at 20:01

## 2017-12-20 RX ADMIN — GABAPENTIN 400 MG: 400 CAPSULE ORAL at 17:26

## 2017-12-20 RX ADMIN — HYDROMORPHONE HYDROCHLORIDE 1 MG: 1 INJECTION, SOLUTION INTRAMUSCULAR; INTRAVENOUS; SUBCUTANEOUS at 04:59

## 2017-12-20 RX ADMIN — OXYCODONE HYDROCHLORIDE 10 MG: 5 TABLET ORAL at 16:44

## 2017-12-20 RX ADMIN — HYDROMORPHONE HYDROCHLORIDE 0.5 MG: 1 INJECTION, SOLUTION INTRAMUSCULAR; INTRAVENOUS; SUBCUTANEOUS at 12:55

## 2017-12-20 RX ADMIN — ZOLPIDEM TARTRATE 5 MG: 5 TABLET ORAL at 21:45

## 2017-12-20 RX ADMIN — GABAPENTIN 400 MG: 400 CAPSULE ORAL at 09:44

## 2017-12-20 RX ADMIN — HYDROMORPHONE HYDROCHLORIDE 1 MG: 1 INJECTION, SOLUTION INTRAMUSCULAR; INTRAVENOUS; SUBCUTANEOUS at 07:07

## 2017-12-20 RX ADMIN — VANCOMYCIN HYDROCHLORIDE 750 MG: 750 INJECTION, SOLUTION INTRAVENOUS at 05:53

## 2017-12-20 RX ADMIN — SODIUM CHLORIDE 1000 ML: 9 INJECTION, SOLUTION INTRAVENOUS at 08:23

## 2017-12-20 NOTE — PROGRESS NOTES
Continued Stay Note  AYAH Almonte     Patient Name: Anca Andrade  MRN: [id number]  Today's Date: 12/20/2017    Admit Date: 12/19/2017          Discharge Plan       12/20/17 [id number]    Case Management/Social Work Plan    Additional Comments Called and faxed to [facility] they will accept pt will need to fax Dc summary and order pt has been accepted by [facility] She has her Vancomycin in the room for IV ABX at home She has had IV BAx at home twice in the past and declining home health nursing to assist with this       12/20/17 1232    Case Management/Social Work Plan    Plan Discharge Planning    Patient/Family In Agreement With Plan yes    Additional Comments SW met with pt at bedside for discharge planning. Pt is independent with ADL's but does have a quad cane and rolling walker at home. She lives in a tri-level home with her mother. Pt still works. She has a PCP that she follows. She is agreeable for living will booklet to be provided. She is agreeable for home health and has used [facility] in the past. Pt states that she prefers that home health provides physical therapy only. Pt is agreeable for a referral to whichever home health agency can accommodate a new pt. Pt denies any other needs at this time. Advised that when official orders are placed by attending provider, then CM will arrange HH for pt. She is agreeable with this.              Discharge Codes     None            Tammy Deras

## 2017-12-20 NOTE — PLAN OF CARE
Problem: Patient Care Overview (Adult)  Goal: Plan of Care Review  Outcome: Ongoing (interventions implemented as appropriate)   12/20/17 1606   Coping/Psychosocial Response Interventions   Plan Of Care Reviewed With patient   Outcome Evaluation   Outcome Summary/Follow up Plan PT agata completed. Patient is s/p R TKA 2nd stage revision. She presents with decreased strength, ROM, balance, endurance and independence with mobility. She is expected to benefit from continued skilled PT intervention to improve her functional mobility status prior to D/C.       Problem: Inpatient Physical Therapy  Goal: Transfer Training Goal 1 LTG- PT  Outcome: Ongoing (interventions implemented as appropriate)   12/20/17 1606   Transfer Training PT LTG   Transfer Training PT LTG, Date Established 12/20/17   Transfer Training PT LTG, Time to Achieve 2 wks   Transfer Training PT LTG, Activity Type sit to stand/stand to sit;bed to chair /chair to bed   Transfer Training PT LTG, [place] Level conditional independence   Transfer Training PT LTG, Assist Device walker, rolling   Transfer Training PT LTG, Outcome goal ongoing     Goal: Gait Training Goal LTG- PT  Outcome: Ongoing (interventions implemented as appropriate)   12/20/17 1606   Gait Training PT LTG   Gait Training Goal PT LTG, Date Established 12/20/17   Gait Training Goal PT LTG, Time to Achieve 2 wks   Gait Training Goal PT LTG, [place] Level conditional independence   Gait Training Goal PT LTG, Assist Device walker, rolling   Gait Training Goal PT LTG, Distance to Achieve 400   Gait Training Goal PT LTG, Additional Goal Good heel strike with R LE.   Gait Training Goal PT LTG, Outcome goal ongoing     Goal: Strength Goal LTG- PT  Outcome: Ongoing (interventions implemented as appropriate)   12/20/17 1606   Strength Goal PT LTG   Strength Goal PT LTG, Date Established 12/20/17   Strength Goal PT LTG, Time to Achieve 2 wks   Strength Goal PT LTG, Measure to Achieve Patient  will demonstrate independence with HEP.   Strength Goal PT LTG, Outcome goal ongoing

## 2017-12-20 NOTE — PLAN OF CARE
Problem: Patient Care Overview (Adult)  Goal: Plan of Care Review  Outcome: Ongoing (interventions implemented as appropriate)   12/20/17 4555   Coping/Psychosocial Response Interventions   Plan Of Care Reviewed With patient   Outcome Evaluation   Outcome Summary/Follow up Plan Spoke to patient as she sat in a reclining chair. Patient was tearful and in extreme pain. She stated that she was not able to receive pain medication due to fluctuating blood pressure, but had wanted to have a less painful experience with this replacement surgery. I acknowledged the pain she was experiencing, offered sympathy for the pain she was dealing with, and offered prayer at her invitation.

## 2017-12-20 NOTE — PROGRESS NOTES
AYAH Almonte    Nerve Cath Post Op Call    Patient Name: Anca Andrade  :  1965  MRN:  [id number]  Date of Discharge:     Nerve Cath Post Op Call:    Analgesia:Fair  Pain Score:4/10  Side Effects:None  Catheter Site:clean  Patient Controlled ON Q pump infusion rate: 6ml/hr

## 2017-12-20 NOTE — PROGRESS NOTES
Patient: Anca Andrade  Procedure(s):  SECOND STAGE OF RIGHT TOTAL KNEE ARTHROPLASTY REVISION (S&N)  Anesthesia type: [unfilled]    Patient location: [facility] Floor  Last vitals: BP (!) 85/50 (BP Location: Right arm, Patient Position: Lying) Comment: nurse notified  Pulse 69  Temp 98.4 °F (36.9 °C) (Oral)   Resp 18  Wt 56.8 kg (125 lb 4.8 oz)  SpO2 91%  BMI 20.22 kg/m2  Level of consciousness: awake, alert and oriented    Post-anesthesia pain: adequate analgesia  Airway patency: patent  Respiratory: unassisted  Cardiovascular: stable and blood pressure at baseline  Hydration: euvolemic    Anesthetic complications: no

## 2017-12-20 NOTE — SIGNIFICANT NOTE
12/20/17 1102   Rehab Treatment   Discipline physical therapist   Rehab Evaluation   Evaluation Not Performed patient unavailable for evaluation  (Attempted PT eval .  Pt unavailable d/t receiving 2 units of blood with hgb of 7.2.  PT will follow up with patient later and proceed with eval as tolerated and appropriate.)
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
## 2017-12-20 NOTE — SIGNIFICANT NOTE
12/20/17 1100   Rehab Treatment   Discipline occupational therapist   Treatment Not Performed patient unavailable for treatment  (Patient receiving blood; will follow up as medically appropriate)

## 2017-12-20 NOTE — PROGRESS NOTES
PROGRESS NOTE        Date of Admission: 12/19/2017  Length of Stay: 1  Primary Care Physician: Zita Velasco    Subjective   Chief Complaint: anemia and hypotension  HPI: Patient is seen today.  She was admitted status post right second stage revision of a total knee arthroplasty for her prosthetic infection with removal of temporary antibiotic knee spacers.  Patient did have a PICC line placed today.  She was noted to have acute blood loss anemia with a hemoglobin of 7.2.  I did have a long discussion with her patient regarding a blood transfusion.  She is a little anxious about receiving blood but is agreeable to do so.  I did discuss with the patient the risk and benefits of a blood transfusion.  Earlier was on the lower side she has been getting IV fluids.  Blood pressure has shown some improvement.  We will try to decrease some of the IV analgesics as this will add to the drop of her blood pressure.  She denies any chest pain, shortness breath, nausea or vomiting.           Review Of Systems:   Review of Systems   General ROS: Patient denies any fevers, chills or loss of consciousness.    Psychological ROS: Denies any hallucinations and delusions.  Ophthalmic ROS: Denies any diplopia or transient loss of vision.  ENT ROS: Denies sore throat, nasal congestion or ear pain.   Allergy and Immunology ROS: Denies rash or itching.  Hematological and Lymphatic ROS: Denies neck swelling or easy bleeding.  Endocrine ROS: Denies any recent unintentional weight gain or loss.  Breast ROS: Denies any pain or swelling.  Respiratory ROS: Denies cough or shortness of breath.   Cardiovascular ROS: Denies chest pain or palpitations. No history of exertional chest pain.   Gastrointestinal ROS: Denies nausea and vomiting. Denies any abdominal pain. No diarrhea.  Genito-Urinary ROS: Denies dysuria or hematuria.  Musculoskeletal ROS: Denies back pain. No muscle pain. No calf pain.   Right knee pain  Neurological ROS: Denies  any focal weakness. No loss of consciousness. Denies any numbness. Denies neck pain.   Dermatological ROS: Denies any redness or pruritis.    Objective      Temp:  [97.4 °F (36.3 °C)-98.9 °F (37.2 °C)] 98.1 °F (36.7 °C)  Heart Rate:  [] 70  Resp:  [8-18] 18  BP: ()/(42-95) 106/65  Physical Exam    General Appearance:  Alert and cooperative, not in any acute distress.   Head:  Atraumatic and normocephalic, without obvious abnormality.   Eyes:          PERRLA, conjunctivae and sclerae normal, no Icterus. No pallor. Extra-occular movements are within normal limits.   Ears:  Ears appear intact with no abnormalities noted.   Throat: No oral lesions, no thrush, oral mucosa moist.   Neck: Supple, trachea midline, no thyromegaly, no carotid bruit.   Back:   No kyphoscoliosis present. No tenderness to palpation,   range of motion normal.   Lungs:   Chest shape is normal. Breath sounds heard bilaterally equally.  No crackles or wheezing. No Pleural rub or bronchial breathing.   Heart:  Normal S1 and S2, no murmur, no gallop, no rub. No JVD   Abdomen:   Normal bowel sounds, no masses, no organomegaly. Soft     non-tender, non-distended, no guarding, no rebound                tenderness   Extremities: Moves all extremities well, no edema, no cyanosis, no clubbing.  Dressing right knee   Pulses: Pulses palpable and equal bilaterally   Skin: No bleeding, bruising or rash   Lymph nodes: No palpable adenopathy   Neurologic:    Psychiatric/Behavior:     Cranial nerves 2 - 12 grossly intact, sensation intact, Motor power is normal and equal bilaterally.  Mood normal, behavior normal       Results Review:    I have reviewed the labs, radiology results and diagnostic studies.      Results from last 7 days  Lab Units 12/20/17  0703 12/20/17  0611   WBC 10*3/mm3  --  7.01   HEMOGLOBIN g/dL 7.2* 7.3*   PLATELETS 10*3/mm3  --  134       Results from last 7 days  Lab Units 12/20/17  0611   SODIUM mmol/L 139   POTASSIUM mmol/L  3.8   CO2 mmol/L 26.0   CREATININE mg/dL 0.80   GLUCOSE mg/dL 81       Culture Data:    Radiology Data:   Cardiology Data:    I have reviewed the medications.    Assessment/Plan     Assessment and Plan:  1.  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  He did get a PICC line placed today.  She will be going home on IV antibiotics.  Case management consulted.    2.  History of asthma with no evidence of exacerbation    3.  Chronic essential hypertension-blood pressure is on the lower side we will hold antihypertensive medications.    4.  Rheumatoid arthritis        DVT prophylaxis:  Arixtra  Discharge Planning:   TODD Maldonado 12/20/17 12:00 PM

## 2017-12-20 NOTE — PLAN OF CARE
Problem: Patient Care Overview (Adult)  Goal: Plan of Care Review  Outcome: Ongoing (interventions implemented as appropriate)   12/20/17 0658   Coping/Psychosocial Response Interventions   Plan Of Care Reviewed With patient   Patient Care Overview   Progress progress towards functional goals is fair       Problem: Perioperative Period (Adult)  Goal: Signs and Symptoms of Listed Potential Problems Will be Absent or Manageable (Perioperative Period)  Outcome: Ongoing (interventions implemented as appropriate)   12/20/17 0658   Perioperative Period   Problems Assessed (Perioperative Period) all   Problems Present (Perioperative Period) pain
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
## 2017-12-20 NOTE — PROGRESS NOTES
"      [facility]  PROGRESS NOTE    Name:  Anca Andrade   Age:  52 y.o.  Sex:  female  :  1965  MRN:  [id number]   Visit Number:  [id number]  Admission Date:  2017 11:57 AM  Date Of Service:  17  Primary Care Physician:  Zita Velasco     LOS: 1 day :  Patient Care Team:  Zita Velasco as PCP - General:    Chief Complaint:      \" My right knee is still bothering me\"      Subjective / Interval History:     Patient is status post day #1 right knee second stage revision of total knee arthroplasty for periprosthetic infection.  She states she still has pain but it is tolerable with pain medicine.  She denies chest pain or shortness of breath.    Review of Systems:     General ROS: No fever spike, no chills or loss of consciousness.  Ophthalmic ROS: no acute visual disturbance.  ENT ROS: No sinus congestion or sore throat.   Respiratory ROS: No shortness of breath.   Cardiovascular ROS: No chest pain or palpitations.  Gastrointestinal ROS: No acute abdominal change. Non-distended.  Genito-Urinary ROS: No reported dysuria or hematuria.  Musculoskeletal ROS: No deep calf pain. No acute focal motor deficit  Neurological ROS: No cyanosis, no new numbness or tingling.  Dermatological ROS: No erythema.  No rash or pruritis.    Vital Signs:    Temp:  [97.4 °F (36.3 °C)-98.9 °F (37.2 °C)] 98.9 °F (37.2 °C)  Heart Rate:  [] 64  Resp:  [8-18] 18  BP: ()/(42-95) 106/58    Intake and output:    I/O last 3 completed shifts:  In: 5550 [I.V.:3975; IV Piggyback:1575]  Out: 1450 [Urine:950; Blood:500]  I/O this shift:  In: -   Out: 200 [Urine:200]    Physical Examination:    General Appearance:    Alert and cooperative, no acute distress.   Head:    Atraumatic and normocephalic, without obvious abnormality.   Eyes:    PERRLA.  Extraocular movements are within normal limits.   ENT:   No acute change.   Neck:   Supple, trachea midline.   Lungs:     Normal respirations, " unlabored.    Heart:    Regular rate.   Abdomen:     Soft non-tender, non-distended.   Extremities:   No new motor deficit, no calf pain, Silvia sign negative.   Skin:     No rash.  No cyanosis.  No erythema.  No active drainage.     Neurologic:   Sensation intact, pulses intact.     Laboratory results:    Lab Results (last 24 hours)     Procedure Component Value Units Date/Time    Basic Metabolic Panel [[id number]]  (Abnormal) Collected:  12/20/17 0611    Specimen:  Blood Updated:  12/20/17 0636     Glucose 81 mg/dL      BUN 13 mg/dL      Creatinine 0.80 mg/dL      Sodium 139 mmol/L      Potassium 3.8 mmol/L      Chloride 107 mmol/L      CO2 26.0 mmol/L      Calcium 7.9 (L) mg/dL      eGFR Non African Amer 75 mL/min/1.73      BUN/Creatinine Ratio 16.3     Anion Gap 9.8 mmol/L     Narrative:       Abnormal estimated GFR should be followed by more specific studies to confirm end stage chronic renal disease. The equation used for calculation may not be accurate for patients less than 19 years old, greater than 70 years old, patients at extremes of weight, malnutrition, or with acute renal dysfunction.    CBC & Differential [[id number]] Collected:  12/20/17 0611    Specimen:  Blood Updated:  12/20/17 0647    Narrative:       The following orders were created for panel order CBC & Differential.  Procedure                               Abnormality         Status                     ---------                               -----------         ------                     CBC Auto Differential[[id number]]        Abnormal            Final result                 Please view results for these tests on the individual orders.    CBC Auto Differential [[id number]]  (Abnormal) Collected:  12/20/17 0611    Specimen:  Blood Updated:  12/20/17 0647     WBC 7.01 10*3/mm3      RBC 2.56 (L) 10*6/mm3      Hemoglobin 7.3 (C) g/dL      Hematocrit 23.3 (C) %      MCV 91.0 fL      MCH 28.5 pg      MCHC 31.3 g/dL      RDW 13.7 %      RDW-SD 45.4 fl       MPV 10.5 fL      Platelets 134 10*3/mm3      Neutrophil % 77.9 %      Lymphocyte % 14.6 %      Monocyte % 4.7 %      Eosinophil % 2.4 %      Basophil % 0.1 %      Immature Grans % 0.3 %      Neutrophils, Absolute 5.46 10*3/mm3      Lymphocytes, Absolute 1.02 10*3/mm3      Monocytes, Absolute 0.33 10*3/mm3      Eosinophils, Absolute 0.17 10*3/mm3      Basophils, Absolute 0.01 10*3/mm3      Immature Grans, Absolute 0.02 10*3/mm3      nRBC 0.0 /100 WBC     Hemoglobin & Hematocrit, Blood [[id number]]  (Abnormal) Collected:  12/20/17 0703    Specimen:  Blood Updated:  12/20/17 0721     Hemoglobin 7.2 (C) g/dL      Hematocrit 22.6 (C) %           I have reviewed the patient's laboratory results.    Radiology results:    Imaging Results (last 24 hours)     Procedure Component Value Units Date/Time    XR Knee 1 or 2 View Right [[id number]] Collected:  12/20/17 0807     Updated:  12/20/17 0810    Narrative:       PROCEDURE: XR KNEE 1 OR 2 VW RIGHT-     HISTORY: post-op revision R TKR; T84.53XA-Infection and inflammatory  reaction due to internal right knee prosthesis, initial encounter;  M25.561-Pain in right knee; M17.32-Unilateral post-traumatic  osteoarthritis, left knee; M51.36-Other intervertebral disc  degeneration, lumbar region; G62.9-Polyneuropathy, unspecified;  M11.20-Other chondrocalcinosis, unspecified site; Z96.651-Presence of  right artificial      COMPARISON: October 4, 2017.     FINDINGS:  A 2 view exam demonstrates revision of the patient's knee  arthroplasty with a cemented longstem device is.  There are no hardware  complications or fractures.  The expected postoperative fluid and gas is  seen in the soft tissues.           Impression:       Revision of the patient's right knee arthroplasty with  placement of longstem device is.                 This report was finalized on 12/20/2017 8:08 AM by Noah Felder M.D..          I have reviewed the patient's radiology reports.    AP and lateral taken in  recovery room shows no acute concern.  Good position and alignment of prosthesis and/or hardware.       Assessment/Plan    Active Problems:    Infection of prosthetic right knee joint      SP Right knee second stage of revision total knee arthroplasty for periprosthetic infection with removal of temporary articulating antibiotic knee spacers.       Continue current management per the detailed orders and instructions, including skin, safety and fall precautions, respiratory therapy with incentive spirometer, advance diet as tolerated, bowel regimen, multi-modal analgesia, multi-modal DVT prophylaxis, Hospitalist consult, PT/OT following post-surgical rehab protocol, and Case Management for discharge plans.  PICC line nurse to place PICC LINE.   Anticipate discharge home tomorrow.  Yosvany Sheridan PA-C  12/20/17  11:41 AM

## 2017-12-20 NOTE — CONSULTS
[facility]      Name:  Anca Andrade   Age:  52 y.o.  Sex:  female  :  1965  MRN:  [id number]   Visit Number:  [id number]  Admission Date:  2017  Date Of Service:  17  Primary Care Physician:  Zita Velasco    Consulting Physician:    Dr. Willis    Reason For Consult:    Medical management of asthma, hypertension, rheumatoid arthritis    History Obtained From:    patient    Chief Complaint:     Right knee pain    History Of Presenting Illness:      Patient is a 51-year-old  female with history of asthma and GERD, hypertension, rheumatoid arthritis who has had multiple knee surgeries including a right total knee arthroplasty on 10/18/2016.  She developed chronic pain the whole year as well as development of fluid collection.  She had arthrocentesis by Dr. Willis with culture and negative.  She was taken to the operating room on 2017 for removal of the arthroplasty.  Antibiotic spacer was placed at that time.  She was placed on IV vancomycin which she took for 6 weeks.  She also is on oral antibiotic.  She had the antibiotic spacer removed today.  She has significant 10 out of 10 pain in the knee.  She has been on chronic oxycodone as an outpatient.  She claims this is the worst pain of her life.  She also is requesting something for sleep.  She denies any chest pressure, shortness of breath, nausea, vomiting, or diarrhea.    Review Of Systems:     General ROS: negative  Psychological ROS: negative  Ophthalmic ROS: negative  ENT ROS: negative  Allergy and Immunology ROS: negative  Hematological and Lymphatic ROS: negative  Endocrine ROS: negative  Breast ROS: negative  Respiratory ROS: negative  Cardiovascular ROS: negative  Gastrointestinal ROS: negative  Genito-Urinary ROS: negative  Musculoskeletal ROS: positive for - pain in knee - right  Neurological ROS: negative  Dermatological ROS: negative       Past Medical  History:    Asthma, hypertension, rheumatoid arthritis, GERD, depression    Past Surgical history:    Cholecystectomy, colonoscopy, EGD, total abdominal hysterectomy, left ACL reconstruction, knee arthroscopically left ×4, right ×2.  ORIF of the right forearm, parathyroidectomy, right total knee arthroplasty 10/18/2016, removal of arthroplasty and antibiotics pacer placement on 2017, left wrist surgery.    Social History:    Smoker, since she was 19 years of age.  Rarely drinks alcohol, does not use drugs.  She has one daughter.    Family History:    Other  of cancer of the esophagus, also had coronary artery disease.  Mother had coronary artery disease.  She is an only child.    Allergies:      Ativan [lorazepam]; Morphine and related; and Penicillins    Home Medications:    Prior to Admission Medications     Prescriptions Last Dose Informant Patient Reported? Taking?    aspirin (ECOTRIN) 325 MG EC tablet Past Week  No Yes    Take 1 tablet by mouth Daily.    atenolol (TENORMIN) 50 MG tablet 2017  Yes No    Take 0.5 tablets by mouth daily, patient states BP fluctuates and she doesn't take this everyday    docusate sodium 100 MG capsule Unknown  No No    Take 1 capsule by mouth 2 (Two) Times a Day As Needed for Constipation.    gabapentin (NEURONTIN) 400 MG capsule 2017  No Yes    Take 1 capsule by mouth 3 (Three) Times a Day.    ibuprofen (ADVIL,MOTRIN) 800 MG tablet Past Week  Yes Yes    Every 6 (Six) Hours As Needed.    lisinopril-hydrochlorothiazide (PRINZIDE,ZESTORETIC) 10-12.5 MG per tablet 2017  Yes Yes    Take 1 tablet by mouth Daily. Patient states she doesn't take this every day.    meloxicam (MOBIC) 15 MG tablet 2017  No Yes    Take 1 tablet by mouth Daily.    oxyCODONE (ROXICODONE) 10 MG tablet 2017  No Yes    Take 1 tablet by mouth Every 8 (Eight) Hours As Needed.    Patient taking differently:  Take 10 mg by mouth Every 6 (Six) Hours.             Hospital Scheduled  Meds:      aspirin 325 mg Oral Daily   atenolol 50 mg Oral Q24H   clindamycin      [START ON 12/20/2017] fondaparinux 2.5 mg Subcutaneous Q24H   gabapentin 400 mg Oral TID   [START ON 12/20/2017] lisinopril-hydrochlorothiazide 0.5 tablet Oral Q24H   meloxicam 15 mg Oral Daily   oxyCODONE 10 mg Oral Q6H   vancomycin      [START ON 12/20/2017] vancomycin 15 mg/kg Intravenous Once   zolpidem 10 mg Oral Once         Bupivacaine HCl-NaCl (PF) 4-14 mL/hr Last Rate: 6 mL/hr (12/19/17 1832)   lactated ringers 100 mL/hr Last Rate: 100 mL/hr (12/19/17 2022)        Vital Signs:    Temp:  [97.4 °F (36.3 °C)-98.3 °F (36.8 °C)] 97.5 °F (36.4 °C)  Heart Rate:  [] 84  Resp:  [8-16] 14  BP: ()/(54-95) 95/73    Last 3 weights    12/19/17 2019   Weight: 59.1 kg (130 lb 3.2 oz)       Body mass index is 21.01 kg/(m^2).    Physical Exam:      General Appearance:    Alert, cooperative, in no acute distress   Head:    Normocephalic, without obvious abnormality, atraumatic   Eyes:            Lids and lashes normal, conjunctivae and sclerae normal, no   icterus, no pallor, corneas clear, PERRLA   Ears:    Ears appear intact with no abnormalities noted   Throat:   No oral lesions, no thrush, oral mucosa moist   Neck:   No adenopathy, supple, trachea midline, no thyromegaly, no     carotid bruit, no JVD   Back:     No kyphosis present, no scoliosis present, no skin lesions,       erythema or scars, no tenderness to percussion or                   palpation,   range of motion normal   Lungs:     Clear to auscultation,respirations regular, even and                   unlabored    Heart:    Regular rhythm and normal rate, normal S1 and S2, no            murmur, no gallop, no rub, no click   Breast Exam:    Deferred   Abdomen:     Normal bowel sounds, no masses, no organomegaly, soft        non-tender, non-distended, no guarding, no rebound                 tenderness   Genitalia:    Deferred   Extremities:   Moves all extremities well,  no edema, no cyanosis, no              redness   Pulses:   Pulses palpable and equal bilaterally   Skin:   No bleeding, bruising or rash   Lymph nodes:   No palpable adenopathy   Neurologic:   Cranial nerves 2 - 12 grossly intact, sensation intact, DTR        present and equal bilaterally             Labs:                Invalid input(s): PROTEstimated Creatinine Clearance: 75.9 mL/min (by C-G formula based on Cr of 0.8).  No results found for: AMMONIA          No results found for: HGBA1C  No results found for: TSH, FREET4  No results found for: PREGTESTUR, PREGSERUM, HCG, HCGQUANT  Pain Management Panel     There is no flowsheet data to display.                          Radiology:    Imaging Results (last 7 days)     Procedure Component Value Units Date/Time    XR Knee 1 or 2 View Right [[id number]] Updated:  12/19/17 2044          Assessment:    1.  Right knee second stage of revision total knee arthroplasty for periprosthetic infection with removal of temporary articulating antibiotic knee spacers.  2.  Asthma stable  3.  Hypertension  4.  Rheumatoid arthritis  5.  GERD     Recommendations/Plan:     Nursing has already contacted Dr. Willis was adjusted her pain medication.  She'll be placed on Ambien for sleep.  Continue with her home medications.  Continue with DVT prophylaxis.  Check lab work in the a.m.  Hospitalist team will follow along with the orthopedic team.  Further recommendations will depend on the clinical course.    Ramírez Rodriguez,   12/19/17  10:07 PM

## 2017-12-20 NOTE — CONSULTS
Discharge Planning Assessment   [facility]     Patient Name: Anca Andrade  MRN: [id number]  Today's Date: 12/20/2017    Admit Date: 12/19/2017          Discharge Needs Assessment       12/20/17 1226    Living Environment    Lives With parent(s)    Living Arrangements house    Home Accessibility bed and bath on same level;stairs within home    Number of Stairs Within Home 10    Stair Railings at Home inside, present at both sides    Type of Financial/Environmental Concern none    Transportation Available car    Living Environment    Provides Primary Care For no one    Quality Of Family Relationships involved;helpful;supportive    Able to Return to Prior Living Arrangements yes    Discharge Needs Assessment    Concerns To Be Addressed basic needs concerns;care coordination/care conferences    Readmission Within The Last 30 Days no previous admission in last 30 days    Equipment Currently Used at Home walker, rolling;cane, quad    Discharge Facility/Level Of Care Needs home with home health    Discharge Disposition still a patient            Discharge Plan       12/20/17 1232    Case Management/Social Work Plan    Plan Discharge Planning    Patient/Family In Agreement With Plan yes    Additional Comments SW met with pt at bedside for discharge planning. Pt is independent with ADL's but does have a quad cane and rolling walker at home. She lives in a tri-level home with her mother. Pt still works. She has a PCP that she follows. She is agreeable for living will booklet to be provided. She is agreeable for home health and has used [facility] in the past. Pt states that she prefers that home health provides physical therapy only. Pt is agreeable for a referral to whichever home health agency can accommodate a new pt. Pt denies any other needs at this time. Advised that when official orders are placed by attending provider, then CM will arrange HH for pt.  She is agreeable with this. Pt's physical address is [address] [place] [address]  [address] in [place] as she only has a PO box listed.        Discharge Placement     No information found                Demographic Summary     None            Functional Status       12/20/17 1219    Functional Status Prior    Ambulation 0-->independent    Transferring 0-->independent    Toileting 0-->independent    Bathing 0-->independent    Dressing 0-->independent    Eating 0-->independent    Communication 0-->understands/communicates without difficulty    Swallowing 0-->swallows foods/liquids without difficulty    IADL    Medications independent    Meal Preparation independent    Housekeeping independent    Laundry independent    Shopping independent    Oral Care independent    Activity Tolerance    Usual Activity Tolerance good    Current Activity Tolerance fair    Employment/Financial    Source Of Income salary/wages    Financial Concerns none            Psychosocial     None            Abuse/Neglect     None            Legal     None            Substance Abuse     None            Patient Forms     None          ANTHONY Collado, JAREDW  12/20/17  12:44 PM

## 2017-12-20 NOTE — PLAN OF CARE
Problem: Patient Care Overview (Adult)  Goal: Plan of Care Review  Outcome: Ongoing (interventions implemented as appropriate)   12/20/17 1623   Coping/Psychosocial Response Interventions   Plan Of Care Reviewed With patient   Patient Care Overview   Progress no change   Outcome Evaluation   Outcome Summary/Follow up Plan OT eval completed. Patient presents deficits in strength, endurance, balance, mobility and ADL poerformance. Patient is expected to benefit from OT services to improve overall functional performance and mobility prior to DC.        Problem: Inpatient Occupational Therapy  Goal: Strength Goal LTG- OT  Outcome: Ongoing (interventions implemented as appropriate)   12/20/17 1623   Strength OT LTG   Strength Goal OT LTG, Date Established 12/20/17   Strength Goal OT LTG, Time to Achieve 2 wks   Strength Goal OT LTG, Measure to Achieve Patient will perform HEP ind prior to DC in order to increase strength and endurance.    Strength Goal OT LTG, Outcome goal ongoing     Goal: Toileting Goal LTG- OT  Outcome: Ongoing (interventions implemented as appropriate)   12/20/17 1623   Toileting OT LTG   Toileting Goal OT LTG, Date Established 12/20/17   Toileting Goal OT LTG, Time to Achieve 2 wks   Toileting Goal OT LTG, [place] Level conditional independence   Toileting Goal OT LTG, Outcome goal ongoing     Goal: LB Dressing Goal LTG- OT  Outcome: Ongoing (interventions implemented as appropriate)   12/20/17 1623   LB Dressing OT LTG   LB Dressing Goal OT LTG, Date Established 12/20/17   LB Dressing Goal OT LTG, Time to Achieve 2 wks   LB Dressing Goal OT LTG, [place] Level set up required   LB Dressing Goal OT LTG, Outcome goal ongoing     Goal: Functional Mobility Goal LTG- OT  Outcome: Ongoing (interventions implemented as appropriate)   12/20/17 1623   Functional Mobility OT LTG   Functional Mobility Goal OT LTG, Date Established 12/20/17   Functional Mobility Goal OT LTG, Time to Achieve 2 wks    Functional Mobility Goal OT LTG, [place] Level modified independent   Functional Mobility Goal OT LTG, Assist Device rolling walker   Functional Mobility Goal OT LTG, Distance to Achieve in hallway   Functional Mobility Goal OT LTG, Additional Goal 400   Functional Mobility Goal OT LTG, Outcome goal ongoing

## 2017-12-21 VITALS
RESPIRATION RATE: 16 BRPM | BODY MASS INDEX: 21.36 KG/M2 | SYSTOLIC BLOOD PRESSURE: 159 MMHG | HEIGHT: 66 IN | OXYGEN SATURATION: 100 % | DIASTOLIC BLOOD PRESSURE: 85 MMHG | HEART RATE: 81 BPM | TEMPERATURE: 98.4 F | WEIGHT: 132.9 LBS

## 2017-12-21 LAB
ABO + RH BLD: NORMAL
ABO + RH BLD: NORMAL
ANION GAP SERPL CALCULATED.3IONS-SCNC: 7.4 MMOL/L
BASOPHILS # BLD AUTO: 0.02 10*3/MM3 (ref 0–0.2)
BASOPHILS NFR BLD AUTO: 0.4 % (ref 0–2.5)
BH BB BLOOD EXPIRATION DATE: NORMAL
BH BB BLOOD EXPIRATION DATE: NORMAL
BH BB BLOOD TYPE BARCODE: 5100
BH BB BLOOD TYPE BARCODE: 5100
BH BB DISPENSE STATUS: NORMAL
BH BB DISPENSE STATUS: NORMAL
BH BB PRODUCT CODE: NORMAL
BH BB PRODUCT CODE: NORMAL
BH BB UNIT NUMBER: NORMAL
BH BB UNIT NUMBER: NORMAL
BUN BLD-MCNC: 8 MG/DL (ref 7–20)
BUN/CREAT SERPL: 10 (ref 7.1–23.5)
CALCIUM SPEC-SCNC: 7.8 MG/DL (ref 8.4–10.2)
CHLORIDE SERPL-SCNC: 110 MMOL/L (ref 98–107)
CO2 SERPL-SCNC: 28 MMOL/L (ref 26–30)
CREAT BLD-MCNC: 0.8 MG/DL (ref 0.6–1.3)
CROSSMATCH INTERPRETATION: NORMAL
CROSSMATCH INTERPRETATION: NORMAL
DEPRECATED RDW RBC AUTO: 46.7 FL (ref 37–54)
EOSINOPHIL # BLD AUTO: 0.38 10*3/MM3 (ref 0–0.7)
EOSINOPHIL NFR BLD AUTO: 8.2 % (ref 0–7)
ERYTHROCYTE [DISTWIDTH] IN BLOOD BY AUTOMATED COUNT: 14.6 % (ref 11.5–14.5)
GFR SERPL CREATININE-BSD FRML MDRD: 75 ML/MIN/1.73
GLUCOSE BLD-MCNC: 80 MG/DL (ref 74–98)
HCT VFR BLD AUTO: 28.3 % (ref 37–47)
HGB BLD-MCNC: 9.4 G/DL (ref 12–16)
IMM GRANULOCYTES # BLD: 0.02 10*3/MM3 (ref 0–0.06)
IMM GRANULOCYTES NFR BLD: 0.4 % (ref 0–0.6)
LYMPHOCYTES # BLD AUTO: 1.14 10*3/MM3 (ref 0.6–3.4)
LYMPHOCYTES NFR BLD AUTO: 24.5 % (ref 10–50)
MCH RBC QN AUTO: 28.6 PG (ref 27–31)
MCHC RBC AUTO-ENTMCNC: 33.2 G/DL (ref 30–37)
MCV RBC AUTO: 86 FL (ref 81–99)
MONOCYTES # BLD AUTO: 0.32 10*3/MM3 (ref 0–0.9)
MONOCYTES NFR BLD AUTO: 6.9 % (ref 0–12)
NEUTROPHILS # BLD AUTO: 2.77 10*3/MM3 (ref 2–6.9)
NEUTROPHILS NFR BLD AUTO: 59.6 % (ref 37–80)
NRBC BLD MANUAL-RTO: 0 /100 WBC (ref 0–0)
PLATELET # BLD AUTO: 99 10*3/MM3 (ref 130–400)
PMV BLD AUTO: 9.8 FL (ref 6–12)
POTASSIUM BLD-SCNC: 3.4 MMOL/L (ref 3.5–5.1)
RBC # BLD AUTO: 3.29 10*6/MM3 (ref 4.2–5.4)
SODIUM BLD-SCNC: 142 MMOL/L (ref 137–145)
UNIT  ABO: NORMAL
UNIT  ABO: NORMAL
UNIT  RH: NORMAL
UNIT  RH: NORMAL
WBC NRBC COR # BLD: 4.65 10*3/MM3 (ref 4.8–10.8)

## 2017-12-21 PROCEDURE — 85025 COMPLETE CBC W/AUTO DIFF WBC: CPT | Performed by: NURSE PRACTITIONER

## 2017-12-21 PROCEDURE — 25010000002 FONDAPARINUX PER 0.5 MG: Performed by: ORTHOPAEDIC SURGERY

## 2017-12-21 PROCEDURE — 97110 THERAPEUTIC EXERCISES: CPT

## 2017-12-21 PROCEDURE — 97530 THERAPEUTIC ACTIVITIES: CPT

## 2017-12-21 PROCEDURE — 99232 SBSQ HOSP IP/OBS MODERATE 35: CPT | Performed by: NURSE PRACTITIONER

## 2017-12-21 PROCEDURE — 25010000002 HYDROMORPHONE PER 4 MG: Performed by: ORTHOPAEDIC SURGERY

## 2017-12-21 PROCEDURE — 99024 POSTOP FOLLOW-UP VISIT: CPT | Performed by: PHYSICIAN ASSISTANT

## 2017-12-21 PROCEDURE — 97116 GAIT TRAINING THERAPY: CPT

## 2017-12-21 PROCEDURE — 94799 UNLISTED PULMONARY SVC/PX: CPT

## 2017-12-21 PROCEDURE — 97535 SELF CARE MNGMENT TRAINING: CPT

## 2017-12-21 PROCEDURE — 80048 BASIC METABOLIC PNL TOTAL CA: CPT | Performed by: NURSE PRACTITIONER

## 2017-12-21 RX ORDER — POTASSIUM CHLORIDE 750 MG/1
40 CAPSULE, EXTENDED RELEASE ORAL ONCE
Status: COMPLETED | OUTPATIENT
Start: 2017-12-21 | End: 2017-12-21

## 2017-12-21 RX ADMIN — HYDROMORPHONE HYDROCHLORIDE 0.5 MG: 1 INJECTION, SOLUTION INTRAMUSCULAR; INTRAVENOUS; SUBCUTANEOUS at 06:27

## 2017-12-21 RX ADMIN — OXYCODONE HYDROCHLORIDE 10 MG: 5 TABLET ORAL at 14:19

## 2017-12-21 RX ADMIN — HYDROMORPHONE HYDROCHLORIDE 0.5 MG: 1 INJECTION, SOLUTION INTRAMUSCULAR; INTRAVENOUS; SUBCUTANEOUS at 09:55

## 2017-12-21 RX ADMIN — DOCUSATE SODIUM 100 MG: 100 CAPSULE, LIQUID FILLED ORAL at 07:38

## 2017-12-21 RX ADMIN — GABAPENTIN 400 MG: 400 CAPSULE ORAL at 07:54

## 2017-12-21 RX ADMIN — FONDAPARINUX SODIUM 2.5 MG: 2.5 INJECTION, SOLUTION SUBCUTANEOUS at 07:54

## 2017-12-21 RX ADMIN — HYDROMORPHONE HYDROCHLORIDE 0.5 MG: 1 INJECTION, SOLUTION INTRAMUSCULAR; INTRAVENOUS; SUBCUTANEOUS at 03:30

## 2017-12-21 RX ADMIN — POTASSIUM CHLORIDE 40 MEQ: 750 CAPSULE, EXTENDED RELEASE ORAL at 09:59

## 2017-12-21 RX ADMIN — HYDROMORPHONE HYDROCHLORIDE 0.5 MG: 1 INJECTION, SOLUTION INTRAMUSCULAR; INTRAVENOUS; SUBCUTANEOUS at 13:17

## 2017-12-21 RX ADMIN — SODIUM CHLORIDE 125 ML/HR: 9 INJECTION, SOLUTION INTRAVENOUS at 03:31

## 2017-12-21 RX ADMIN — OXYCODONE HYDROCHLORIDE 10 MG: 5 TABLET ORAL at 07:38

## 2017-12-21 RX ADMIN — OXYCODONE HYDROCHLORIDE 10 MG: 5 TABLET ORAL at 00:31

## 2017-12-21 RX ADMIN — MELOXICAM 15 MG: 7.5 TABLET ORAL at 07:53

## 2017-12-21 NOTE — THERAPY TREATMENT NOTE
Acute Care - Physical Therapy Treatment Note  [facility]     Patient Name: Anca Andrade  : 1965  MRN: [id number]  Today's Date: 2017  Onset of Illness/Injury or Date of Surgery Date: 17  Date of Referral to PT: 17  Referring Physician: Dr. Willis    Admit Date: 2017    Visit Dx:    ICD-10-CM ICD-9-CM   1. Impaired functional mobility, balance, gait, and endurance Z74.09 V49.89   2. Infection associated with internal right knee prosthesis, initial encounter T84.53XA 996.66     V43.65   3. Arthralgia of knee, right M25.561 719.46   4. Post-traumatic osteoarthritis of left knee M17.32 715.26   5. Lumbar degenerative disc disease M51.36 722.52   6. Neuropathy G62.9 355.9   7. Pseudogout M11.20 275.49     712.20   8. Status post total right knee replacement using cement Z96.651 V43.65   9. Impaired mobility and ADLs Z74.09 799.89     Patient Active Problem List   Diagnosis   • Arthralgia of knee, right   • Primary osteoarthritis of right knee   • Pseudogout   • Myalgia   • Neuropathy   • Pre-op testing   • S/P ACL reconstruction   • Pre-op evaluation   • Arthralgia of left shoulder region   • Lumbar degenerative disc disease   • Impingement syndrome of left shoulder   • Post-traumatic osteoarthritis of left knee   • Rotator cuff tendinitis   • Arthralgia of right knee   • Status post total right knee replacement using cement   • Rupture of right posterior tibialis tendon   • Pain of right lower extremity   • S/P right knee surgery   • Infection of prosthetic right knee joint               Adult Rehabilitation Note       17 1010          Rehab Assessment/Intervention    Discipline physical therapy assistant  -RM      Document Type therapy note (daily note)  -RM      Subjective Information agree to therapy;complains of;weakness;pain  -RM      Patient Effort, Rehab Treatment adequate  -RM      Symptoms Noted During/After Treatment fatigue  -RM      Precautions/Limitations fall  precautions  -RM      Recorded by [RM] Baljinder Fontenot PTA      Pain Assessment    Pain Assessment 0-10  -RM      Pain Score 6  -RM      Post Pain Score 6  -RM      Pain Type Acute pain;Surgical pain  -RM      Pain Location Knee  -RM      Pain Orientation Right  -RM      Pain Descriptors Aching;Burning  -RM      Pain Frequency Constant/continuous  -RM      Pain Onset Ongoing  -RM      Pain Intervention(s) Repositioned;Ambulation/increased activity  -RM      Recorded by [RM] Baljinder Fontenot PTA      Mobility Assessment/Training    Right Lower Extremity Weight-Bearing weight-bearing as tolerated  -RM      Recorded by [RM] Baljinder Fontenot PTA      Bed Mobility, Assessment/Treatment    Bed Mobility, Assistive Device bed rails;head of bed elevated  -RM      Bed Mob, Supine to Sit, [place] conditional independence  -RM      Bed Mobility, Safety Issues decreased use of legs for bridging/pushing  -RM      Bed Mobility, Impairments ROM decreased;strength decreased;impaired vision  -RM      Recorded by [RM] Baljinder Fontenot PTA      Transfer Assessment/Treatment    Transfers, Sit-Stand [place] contact guard assist  -RM      Transfers, Stand-Sit [place] contact guard assist  -RM      Transfers, Sit-Stand-Sit, Assist Device rolling walker  -RM      Recorded by [RM] Baljinder Fontenot PTA      Gait Assessment/Treatment    Gait, [place] Level verbal cues required;contact guard assist  -RM      Gait, Assistive Device rolling walker  -RM      Gait, Distance (Feet) 122  -RM      Gait, Gait Pattern Analysis swing-through gait  -RM      Gait, Gait Deviations decreased heel strike;antalgic;step length decreased;toe-to-floor clearance decreased;weight-shifting ability decreased  -RM      Gait, Safety Issues step length decreased;weight-shifting ability decreased  -RM      Gait, Impairments ROM decreased;strength decreased;pain  -RM      Recorded by [RM] Baljinder Fontenot PTA      Stairs Assessment/Treatment     Number of Stairs 11  -RM      Stairs, Handrail Location right side (ascending)  -RM      Stairs, [place] Level contact guard assist;verbal cues required  -RM      Stairs, Assistive Device walker  -RM      Stairs, Technique Used step to step (ascending);step to step (descending)  -RM      Stairs, Maintain Weight Bearing Status able to maintain weight bearing status  -RM      Stairs, Impairments strength decreased;ROM decreased;pain  -RM      Recorded by [RM] Baljinder Fontenot PTA      Therapy Exercises    Right Lower Extremity AAROM:;10 reps;supine;ankle pumps/circles;glut sets;hip abduction/adduction;heel slides;quad sets;SAQ;SLR  -RM      Recorded by [RM] Baljinder Fontenot PTA      Positioning and Restraints    Pre-Treatment Position in bed  -RM      Post Treatment Position chair  -RM      In Chair reclined;call light within reach;encouraged to call for assist;notified nsg  -RM      Recorded by [RM] Baljinder Fontenot PTA        User Key  (r) = Recorded By, (t) = Taken By, (c) = Cosigned By    Initials Name Effective Dates     Baljinder Fontenot PTA 10/26/16 -                 IP PT Goals       12/21/17 1447 12/20/17 1606       Transfer Training PT LTG    Transfer Training PT LTG, Date Established  12/20/17  -LM     Transfer Training PT LTG, Time to Achieve  2 wks  -LM     Transfer Training PT LTG, Activity Type  sit to stand/stand to sit;bed to chair /chair to bed  -LM     Transfer Training PT LTG, [place] Level  conditional independence  -LM     Transfer Training PT LTG, Assist Device  walker, rolling  -LM     Transfer Training PT LTG, Outcome goal partially met  -RM goal ongoing  -LM     Gait Training PT LTG    Gait Training Goal PT LTG, Date Established  12/20/17  -LM     Gait Training Goal PT LTG, Time to Achieve  2 wks  -LM     Gait Training Goal PT LTG, [place] Level  conditional independence  -LM     Gait Training Goal PT LTG, Assist Device  walker, rolling  -LM     Gait Training Goal PT LTG,  Distance to Achieve  400  -LM     Gait Training Goal PT LTG, Additional Goal  Good heel strike with R LE.  -LM     Gait Training Goal PT LTG, Outcome goal ongoing  -RM goal ongoing  -LM     Strength Goal PT LTG    Strength Goal PT LTG, Date Established  12/20/17  -LM     Strength Goal PT LTG, Time to Achieve  2 wks  -LM     Strength Goal PT LTG, Measure to Achieve  Patient will demonstrate independence with HEP.  -LM     Strength Goal PT LTG, Outcome goal met  -RM goal ongoing  -LM       User Key  (r) = Recorded By, (t) = Taken By, (c) = Cosigned By    Initials Name Provider Type    LM Toma Callahan, PT Physical Therapist    RM Baljinder Fontenot, PTA Physical Therapy Assistant          Physical Therapy Education     Title: PT OT SLP Therapies (Active)     Topic: Physical Therapy (Done)     Point: Mobility training (Done)    Learning Progress Summary    Learner Readiness Method Response Comment Documented by Status   Patient Acceptance E,D VU,DU HEP and stair climbing RM 12/21/17 1446 Done    Acceptance E VU Purpose of PT/POC; ther ex for HEP. LM 12/20/17 1605 Done               Point: Home exercise program (Done)    Learning Progress Summary    Learner Readiness Method Response Comment Documented by Status   Patient Acceptance E,D VU,DU HEP and stair climbing RM 12/21/17 1446 Done    Acceptance E VU Purpose of PT/POC; ther ex for HEP. LM 12/20/17 1605 Done               Point: Precautions (Done)    Learning Progress Summary    Learner Readiness Method Response Comment Documented by Status   Patient Acceptance E VU Purpose of PT/POC; ther ex for HEP. LM 12/20/17 1605 Done                      User Key     Initials Effective Dates Name Provider Type Discipline     10/26/16 -  Toma Callahan, PT Physical Therapist PT     10/26/16 -  Blajinder Fontenot, YOHANNES Physical Therapy Assistant PT                    PT Recommendation and Plan  Anticipated Discharge Disposition: home with home health  Planned Therapy Interventions:  balance training, bed mobility training, gait training, home exercise program, patient/family education, strengthening, transfer training  PT Frequency: 2 times/day  Plan of Care Review  Plan Of Care Reviewed With: patient  Progress: progress towards functional goals is fair  Outcome Summary/Follow up Plan: Pt performed bed mobility, gait training,HEP and stair climbing. Pt having difficulty with flexion with mobility and ther ex.  See flowsheet for details.            Outcome Measures       12/21/17 1010 12/20/17 1423 12/20/17 1421    How much help from another person do you currently need...    Turning from your back to your side while in flat bed without using bedrails? 4  -RM  4  -LM    Moving from lying on back to sitting on the side of a flat bed without bedrails? 4  -RM  3  -LM    Moving to and from a bed to a chair (including a wheelchair)? 4  -RM  3  -LM    Standing up from a chair using your arms (e.g., wheelchair, bedside chair)? 4  -RM  3  -LM    Climbing 3-5 steps with a railing? 3  -RM  3  -LM    To walk in hospital room? 3  -RM  3  -LM    AM-PAC 6 Clicks Score 22  -RM  19  -LM    How much help from another is currently needed...    Putting on and taking off regular lower body clothing?  3  -SD     Bathing (including washing, rinsing, and drying)  3  -SD     Toileting (which includes using toilet bed pan or urinal)  3  -SD     Putting on and taking off regular upper body clothing  4  -SD     Taking care of personal grooming (such as brushing teeth)  4  -SD     Eating meals  4  -SD     Score  21  -SD     Functional Assessment    Outcome Measure Options AM-PAC 6 Clicks Basic Mobility (PT)  -RM AM-PAC 6 Clicks Daily Activity (OT)  -SD AM-PAC 6 Clicks Basic Mobility (PT)  -LM      User Key  (r) = Recorded By, (t) = Taken By, (c) = Cosigned By    Initials Name Provider Type    NAIN Callahan, PT Physical Therapist    JOEL Fontenot, PTA Physical Therapy Assistant    LAYNE García, OT Occupational  Therapist           Time Calculation:         PT Charges       12/21/17 1451          Time Calculation    Start Time 1010  -RM      PT Received On 12/21/17  -RM      PT Goal Re-Cert Due Date 12/30/17  -RM      Time Calculation- PT    Total Timed Code Minutes- PT 39 minute(s)  -RM        User Key  (r) = Recorded By, (t) = Taken By, (c) = Cosigned By    Initials Name Provider Type     Baljinder Fontenot PTA Physical Therapy Assistant          Therapy Charges for Today     Code Description Service Date Service Provider Modifiers Qty    64376059792 HC GAIT TRAINING EA 15 MIN 12/21/2017 Baljinder Fontenot, YOHANNES GP 1    72268430487 HC PT THERAPEUTIC ACT EA 15 MIN 12/21/2017 Baljinder Fontenot PTA GP 1    26481070477 HC PT THER PROC EA 15 MIN 12/21/2017 Baljinder Fnotenot PTA GP 1          PT G-Codes  Outcome Measure Options: AM-PAC 6 Clicks Basic Mobility (PT)    Baljinder Fontenot PTA  12/21/2017

## 2017-12-21 NOTE — PLAN OF CARE
Problem: Patient Care Overview (Adult)  Goal: Plan of Care Review  Outcome: Ongoing (interventions implemented as appropriate)   12/21/17 1447   Coping/Psychosocial Response Interventions   Plan Of Care Reviewed With patient   Patient Care Overview   Progress progress towards functional goals is fair   Outcome Evaluation   Outcome Summary/Follow up Plan Pt performed bed mobility, gait training,HEP and stair climbing. Pt having difficulty with flexion with mobility and ther ex. See flowsheet for details.        Problem: Inpatient Physical Therapy  Goal: Transfer Training Goal 1 LTG- PT  Outcome: Ongoing (interventions implemented as appropriate)   12/20/17 1606 12/21/17 1447   Transfer Training PT LTG   Transfer Training PT LTG, Date Established 12/20/17 --    Transfer Training PT LTG, Time to Achieve 2 wks --    Transfer Training PT LTG, Activity Type sit to stand/stand to sit;bed to chair /chair to bed --    Transfer Training PT LTG, [place] Level conditional independence --    Transfer Training PT LTG, Assist Device walker, rolling --    Transfer Training PT LTG, Outcome --  goal partially met     Goal: Gait Training Goal LTG- PT  Outcome: Ongoing (interventions implemented as appropriate)   12/20/17 1606 12/21/17 1447   Gait Training PT LTG   Gait Training Goal PT LTG, Date Established 12/20/17 --    Gait Training Goal PT LTG, Time to Achieve 2 wks --    Gait Training Goal PT LTG, [place] Level conditional independence --    Gait Training Goal PT LTG, Assist Device walker, rolling --    Gait Training Goal PT LTG, Distance to Achieve 400 --    Gait Training Goal PT LTG, Additional Goal Good heel strike with R LE. --    Gait Training Goal PT LTG, Outcome --  goal ongoing     Goal: Strength Goal LTG- PT  Outcome: Outcome(s) achieved Date Met: 12/21/17 12/20/17 1606 12/21/17 1447   Strength Goal PT LTG   Strength Goal PT LTG, Date Established 12/20/17 --    Strength Goal PT LTG, Time to Achieve 2 wks --     Strength Goal PT LTG, Measure to Achieve Patient will demonstrate independence with HEP. --    Strength Goal PT LTG, Outcome --  goal met

## 2017-12-21 NOTE — THERAPY TREATMENT NOTE
Acute Care - Occupational Therapy Treatment Note  [facility]     Patient Name: Anca Andrade  : 1965  MRN: [id number]  Today's Date: 2017  Onset of Illness/Injury or Date of Surgery Date: 17  Date of Referral to OT: 17  Referring Physician: Dr. Willis      Admit Date: 2017    Visit Dx:     ICD-10-CM ICD-9-CM   1. Impaired functional mobility, balance, gait, and endurance Z74.09 V49.89   2. Infection associated with internal right knee prosthesis, initial encounter T84.53XA 996.66     V43.65   3. Arthralgia of knee, right M25.561 719.46   4. Post-traumatic osteoarthritis of left knee M17.32 715.26   5. Lumbar degenerative disc disease M51.36 722.52   6. Neuropathy G62.9 355.9   7. Pseudogout M11.20 275.49     712.20   8. Status post total right knee replacement using cement Z96.651 V43.65   9. Impaired mobility and ADLs Z74.09 799.89     Patient Active Problem List   Diagnosis   • Arthralgia of knee, right   • Primary osteoarthritis of right knee   • Pseudogout   • Myalgia   • Neuropathy   • Pre-op testing   • S/P ACL reconstruction   • Pre-op evaluation   • Arthralgia of left shoulder region   • Lumbar degenerative disc disease   • Impingement syndrome of left shoulder   • Post-traumatic osteoarthritis of left knee   • Rotator cuff tendinitis   • Arthralgia of right knee   • Status post total right knee replacement using cement   • Rupture of right posterior tibialis tendon   • Pain of right lower extremity   • S/P right knee surgery   • Infection of prosthetic right knee joint             Adult Rehabilitation Note       17 1451 17 1010       Rehab Assessment/Intervention    Discipline occupational therapist  -SD physical therapy assistant  -RM     Document Type therapy note (daily note)  -SD therapy note (daily note)  -RM     Subjective Information agree to therapy;no complaints  -SD agree to therapy;complains of;weakness;pain  -RM     Patient Effort, Rehab  Treatment good  -SD adequate  -RM     Symptoms Noted During/After Treatment none  -SD fatigue  -RM     Precautions/Limitations fall precautions  -SD fall precautions  -RM     Recorded by [SD] Jennifer García OT [RM] Baljinder Fontenot PTA     Pain Assessment    Pain Assessment No/denies pain  -SD 0-10  -RM     Pain Score  6  -RM     Post Pain Score  6  -RM     Pain Type  Acute pain;Surgical pain  -RM     Pain Location  Knee  -RM     Pain Orientation  Right  -RM     Pain Descriptors  Aching;Burning  -RM     Pain Frequency  Constant/continuous  -RM     Pain Onset  Ongoing  -RM     Pain Intervention(s)  Repositioned;Ambulation/increased activity  -RM     Recorded by [SD] Jennifer García OT [RM] Baljinder Fontenot PTA     Mobility Assessment/Training    Right Lower Extremity Weight-Bearing  weight-bearing as tolerated  -RM     Recorded by  [RM] Baljinder Fontenot PTA     Bed Mobility, Assessment/Treatment    Bed Mobility, Assistive Device bed rails;head of bed elevated  -SD bed rails;head of bed elevated  -RM     Bed Mob, Supine to Sit, [place] conditional independence  -SD conditional independence  -RM     Bed Mobility, Safety Issues  decreased use of legs for bridging/pushing  -RM     Bed Mobility, Impairments  ROM decreased;strength decreased;impaired vision  -RM     Recorded by [SD] Jennifer García OT [RM] Baljinder Fontenot PTA     Transfer Assessment/Treatment    Transfers, Sit-Stand [place] supervision required  -SD contact guard assist  -RM     Transfers, Stand-Sit [place] supervision required  -SD contact guard assist  -RM     Transfers, Sit-Stand-Sit, Assist Device rolling walker  -SD rolling walker  -RM     Recorded by [SD] Jennifer García OT [RM] Baljinder Fontenot PTA     Gait Assessment/Treatment    Gait, [place] Level  verbal cues required;contact guard assist  -RM     Gait, Assistive Device  rolling walker  -RM     Gait, Distance (Feet)  122  -RM     Gait, Gait Pattern Analysis   swing-through gait  -RM     Gait, Gait Deviations  decreased heel strike;antalgic;step length decreased;toe-to-floor clearance decreased;weight-shifting ability decreased  -RM     Gait, Safety Issues  step length decreased;weight-shifting ability decreased  -RM     Gait, Impairments  ROM decreased;strength decreased;pain  -RM     Recorded by  [RM] Baljinder Fontento PTA     Stairs Assessment/Treatment    Number of Stairs  11  -RM     Stairs, Handrail Location  right side (ascending)  -RM     Stairs, [place] Level  contact guard assist;verbal cues required  -RM     Stairs, Assistive Device  walker  -RM     Stairs, Technique Used  step to step (ascending);step to step (descending)  -RM     Stairs, Maintain Weight Bearing Status  able to maintain weight bearing status  -RM     Stairs, Impairments  strength decreased;ROM decreased;pain  -RM     Recorded by  [RM] Baljinder Fontenot PTA     Upper Body Dressing Assessment/Training    UB Dressing Assess/Train, [place] independent  -SD      Recorded by [SD] Jennifer García OT      Lower Body Dressing Assessment/Training    LB Dressing Assess/Train, [place] set up required  -SD      Recorded by [SD] Jennifer García OT      Therapy Exercises    Right Lower Extremity  AAROM:;10 reps;supine;ankle pumps/circles;glut sets;hip abduction/adduction;heel slides;quad sets;SAQ;SLR  -RM     Recorded by  [RM] Baljinder Fontenot PTA     Positioning and Restraints    Pre-Treatment Position in bed  -SD in bed  -RM     Post Treatment Position bed  -SD chair  -RM     In Bed sitting EOB;call light within reach;encouraged to call for assist  -SD      In Chair  reclined;call light within reach;encouraged to call for assist;notified nsg  -RM     Recorded by [SD] Jennifer García OT [RM] Baljinder Fontenot PTA       User Key  (r) = Recorded By, (t) = Taken By, (c) = Cosigned By    Initials Name Effective Dates     Baljinder Fontenot PTA 10/26/16 -     LAYNE García OT 06/30/17 -                  OT Goals       12/21/17 1542 12/20/17 1623       Strength OT LTG    Strength Goal OT LTG, Date Established  12/20/17  -SD     Strength Goal OT LTG, Time to Achieve  2 wks  -SD     Strength Goal OT LTG, Measure to Achieve  Patient will perform HEP ind prior to DC in order to increase strength and endurance.   -SD     Strength Goal OT LTG, Date Goal Reviewed 12/21/17  -SD      Strength Goal OT LTG, Outcome goal met  -SD goal ongoing  -SD     Toileting OT LTG    Toileting Goal OT LTG, Date Established  12/20/17  -SD     Toileting Goal OT LTG, Time to Achieve  2 wks  -SD     Toileting Goal OT LTG, [place] Level  conditional independence  -SD     Toileting Goal OT LTG, Date Goal Reviewed 12/21/17  -SD      Toileting Goal OT LTG, Outcome goal not met  -SD goal ongoing  -SD     LB Dressing OT LTG    LB Dressing Goal OT LTG, Date Established  12/20/17  -SD     LB Dressing Goal OT LTG, Time to Achieve  2 wks  -SD     LB Dressing Goal OT LTG, [place] Level  set up required  -SD     LB Dressing Goal OT LTG, Date Goal Reviewed 12/21/17  -SD      LB Dressing Goal OT LTG, Outcome goal met  -SD goal ongoing  -SD     Functional Mobility OT LTG    Functional Mobility Goal OT LTG, Date Established  12/20/17  -SD     Functional Mobility Goal OT LTG, Time to Achieve  2 wks  -SD     Functional Mobility Goal OT LTG, [place] Level  modified independent  -SD     Functional Mobility Goal OT LTG, Assist Device  rolling walker  -SD     Functional Mobility Goal OT LTG, Distance to Achieve  in hallway  -SD     Functional Mobility Goal OT LTG, Additional Goal  400  -SD     Functional Mobility Goal OT LTG, Date Goal Reviewed 12/21/17  -SD      Functional Mobility Goal OT LTG, Outcome goal not met  -SD goal ongoing  -SD       User Key  (r) = Recorded By, (t) = Taken By, (c) = Cosigned By    Initials Name Provider Type    LAYNE García OT Occupational Therapist          Occupational Therapy Education     Title:  PT OT SLP Therapies (Done)     Topic: Occupational Therapy (Resolved)     Point: ADL training (Resolved)    Description: Instruct learner(s) on proper safety adaptation and remediation techniques during self care or transfers.   Instruct in proper use of assistive devices.    Learning Progress Summary    Learner Readiness Method Response Comment Documented by Status   Patient Acceptance E VU Benefits of OT and OT POC SD 12/20/17 1634 Done                      User Key     Initials Effective Dates Name Provider Type Discipline    SD 06/30/17 -  Jennifer García OT Occupational Therapist OT                  OT Recommendation and Plan  Anticipated Discharge Disposition: home with home health  Therapy Frequency: 3-5 times/wk  Plan of Care Review  Plan Of Care Reviewed With: patient  Progress: improving  Outcome Summary/Follow up Plan: OT tx completed. Patient performed bed mob with cond ind, functional transfers with SBA and U/LB dressing tasks with Ind-S/U sitting EOB using RW for stability. Patient is DC home this date.         Outcome Measures       12/21/17 1451 12/21/17 1010 12/20/17 1423    How much help from another person do you currently need...    Turning from your back to your side while in flat bed without using bedrails?  4  -RM     Moving from lying on back to sitting on the side of a flat bed without bedrails?  4  -RM     Moving to and from a bed to a chair (including a wheelchair)?  4  -RM     Standing up from a chair using your arms (e.g., wheelchair, bedside chair)?  4  -RM     Climbing 3-5 steps with a railing?  3  -RM     To walk in hospital room?  3  -RM     AM-PAC 6 Clicks Score  22  -RM     How much help from another is currently needed...    Putting on and taking off regular lower body clothing? 4  -SD  3  -SD    Bathing (including washing, rinsing, and drying) 3  -SD  3  -SD    Toileting (which includes using toilet bed pan or urinal) 4  -SD  3  -SD    Putting on and taking off regular upper  body clothing 4  -SD  4  -SD    Taking care of personal grooming (such as brushing teeth) 4  -SD  4  -SD    Eating meals 4  -SD  4  -SD    Score 23  -SD  21  -SD    Functional Assessment    Outcome Measure Options AM-PAC 6 Clicks Daily Activity (OT)  -SD AM-PAC 6 Clicks Basic Mobility (PT)  -RM AM-PAC 6 Clicks Daily Activity (OT)  -SD      12/20/17 1421          How much help from another person do you currently need...    Turning from your back to your side while in flat bed without using bedrails? 4  -LM      Moving from lying on back to sitting on the side of a flat bed without bedrails? 3  -LM      Moving to and from a bed to a chair (including a wheelchair)? 3  -LM      Standing up from a chair using your arms (e.g., wheelchair, bedside chair)? 3  -LM      Climbing 3-5 steps with a railing? 3  -LM      To walk in hospital room? 3  -LM      AM-PAC 6 Clicks Score 19  -LM      Functional Assessment    Outcome Measure Options AM-PAC 6 Clicks Basic Mobility (PT)  -LM        User Key  (r) = Recorded By, (t) = Taken By, (c) = Cosigned By    Initials Name Provider Type    LM Toma Callahan, PT Physical Therapist    RM Baljinder Fontenot, PTA Physical Therapy Assistant    LAYNE García OT Occupational Therapist           Time Calculation:         Time Calculation- OT       12/21/17 1553          Time Calculation- OT    OT Start Time 1451  -SD      Total Timed Code Minutes- OT 14 minute(s)  -SD      OT Received On 12/21/17  -SD        User Key  (r) = Recorded By, (t) = Taken By, (c) = Cosigned By    Initials Name Provider Type    SD Jennifer García OT Occupational Therapist           Therapy Charges for Today     Code Description Service Date Service Provider Modifiers Qty    [id number]  OT EVAL LOW COMPLEXITY 4 12/20/2017 Jennifer García OT GO 1    [id number]  OT SELF CARE/MGMT/TRAIN EA 15 MIN 12/21/2017 Jennifer García OT GO 1               Jennifer García OT  12/21/2017

## 2017-12-21 NOTE — THERAPY DISCHARGE NOTE
Acute Care - Occupational Therapy Discharge Summary  [facility]     Patient Name: Anca Andrade  : 1965  MRN: [id number]    Today's Date: 2017  Onset of Illness/Injury or Date of Surgery Date: 17    Date of Referral to OT: 17  Referring Physician: Dr. Willis      Admit Date: 2017        OT Recommendation and Plan    Visit Dx:    ICD-10-CM ICD-9-CM   1. Impaired functional mobility, balance, gait, and endurance Z74.09 V49.89   2. Infection associated with internal right knee prosthesis, initial encounter T84.53XA 996.66     V43.65   3. Arthralgia of knee, right M25.561 719.46   4. Post-traumatic osteoarthritis of left knee M17.32 715.26   5. Lumbar degenerative disc disease M51.36 722.52   6. Neuropathy G62.9 355.9   7. Pseudogout M11.20 275.49     712.20   8. Status post total right knee replacement using cement Z96.651 V43.65   9. Impaired mobility and ADLs Z74.09 799.89               Time Calculation- OT       17 1553          Time Calculation- OT    OT Start Time 1451  -SD      Total Timed Code Minutes- OT 14 minute(s)  -SD      OT Received On 17  -SD        User Key  (r) = Recorded By, (t) = Taken By, (c) = Cosigned By    Initials Name Provider Type    SD Jennifer Leigh, OT Occupational Therapist                  OT Goals       17 1542 17 1623       Strength OT LTG    Strength Goal OT LTG, Date Established  17  -SD     Strength Goal OT LTG, Time to Achieve  2 wks  -SD     Strength Goal OT LTG, Measure to Achieve  Patient will perform HEP ind prior to DC in order to increase strength and endurance.   -SD     Strength Goal OT LTG, Date Goal Reviewed 17  -SD      Strength Goal OT LTG, Outcome goal met  -SD goal ongoing  -SD     Toileting OT LTG    Toileting Goal OT LTG, Date Established  17  -SD     Toileting Goal OT LTG, Time to Achieve  2 wks  -SD     Toileting Goal OT LTG, [place] Level  conditional independence  -SD      Toileting Goal OT LTG, Date Goal Reviewed 12/21/17  -SD      Toileting Goal OT LTG, Outcome goal not met  -SD goal ongoing  -SD     LB Dressing OT LTG    LB Dressing Goal OT LTG, Date Established  12/20/17  -SD     LB Dressing Goal OT LTG, Time to Achieve  2 wks  -SD     LB Dressing Goal OT LTG, [place] Level  set up required  -SD     LB Dressing Goal OT LTG, Date Goal Reviewed 12/21/17  -SD      LB Dressing Goal OT LTG, Outcome goal met  -SD goal ongoing  -SD     Functional Mobility OT LTG    Functional Mobility Goal OT LTG, Date Established  12/20/17  -SD     Functional Mobility Goal OT LTG, Time to Achieve  2 wks  -SD     Functional Mobility Goal OT LTG, [place] Level  modified independent  -SD     Functional Mobility Goal OT LTG, Assist Device  rolling walker  -SD     Functional Mobility Goal OT LTG, Distance to Achieve  in hallway  -SD     Functional Mobility Goal OT LTG, Additional Goal  400  -SD     Functional Mobility Goal OT LTG, Date Goal Reviewed 12/21/17  -SD      Functional Mobility Goal OT LTG, Outcome goal not met  -SD goal ongoing  -SD       User Key  (r) = Recorded By, (t) = Taken By, (c) = Cosigned By    Initials Name Provider Type    SD Jennifer García, OT Occupational Therapist                Outcome Measures       12/21/17 1451 12/21/17 1010 12/20/17 1423    How much help from another person do you currently need...    Turning from your back to your side while in flat bed without using bedrails?  4  -RM     Moving from lying on back to sitting on the side of a flat bed without bedrails?  4  -RM     Moving to and from a bed to a chair (including a wheelchair)?  4  -RM     Standing up from a chair using your arms (e.g., wheelchair, bedside chair)?  4  -RM     Climbing 3-5 steps with a railing?  3  -RM     To walk in hospital room?  3  -RM     AM-PAC 6 Clicks Score  22  -RM     How much help from another is currently needed...    Putting on and taking off regular lower body clothing? 4   -SD  3  -SD    Bathing (including washing, rinsing, and drying) 3  -SD  3  -SD    Toileting (which includes using toilet bed pan or urinal) 4  -SD  3  -SD    Putting on and taking off regular upper body clothing 4  -SD  4  -SD    Taking care of personal grooming (such as brushing teeth) 4  -SD  4  -SD    Eating meals 4  -SD  4  -SD    Score 23  -SD  21  -SD    Functional Assessment    Outcome Measure Options AM-PAC 6 Clicks Daily Activity (OT)  -SD AM-PAC 6 Clicks Basic Mobility (PT)  -RM AM-PAC 6 Clicks Daily Activity (OT)  -SD      12/20/17 1421          How much help from another person do you currently need...    Turning from your back to your side while in flat bed without using bedrails? 4  -LM      Moving from lying on back to sitting on the side of a flat bed without bedrails? 3  -LM      Moving to and from a bed to a chair (including a wheelchair)? 3  -LM      Standing up from a chair using your arms (e.g., wheelchair, bedside chair)? 3  -LM      Climbing 3-5 steps with a railing? 3  -LM      To walk in hospital room? 3  -LM      AM-PAC 6 Clicks Score 19  -LM      Functional Assessment    Outcome Measure Options AM-PAC 6 Clicks Basic Mobility (PT)  -LM        User Key  (r) = Recorded By, (t) = Taken By, (c) = Cosigned By    Initials Name Provider Type    LM Toma Callahan, PT Physical Therapist    RM Baljinder Fontenot, PTA Physical Therapy Assistant    SD Jennifer García OT Occupational Therapist          Therapy Charges for Today     Code Description Service Date Service Provider Modifiers Qty    [id number]  OT EVAL LOW COMPLEXITY 4 12/20/2017 Jennifer García OT GO 1    [id number]  OT SELF CARE/MGMT/TRAIN EA 15 MIN 12/21/2017 Jennifer García OT GO 1          OT Discharge Summary  Anticipated Discharge Disposition: home with home health  Reason for Discharge: Discharge from facility  Outcomes Achieved: Refer to plan of care for updates on goals achieved  Discharge Destination: Home with home  health      Jennifer García, OT  12/21/2017

## 2017-12-21 NOTE — DISCHARGE SUMMARY
Name:  Anca Andrade   Age:  52 y.o.  Sex:  female  :  1965  MRN:  [id number]   Visit Number:  [id number]  Primary Care Physician:  Zita Velasco  Date of Discharge:  2017  Admission Date:  2017 11:57 AM      Discharge Diagnosis:       Patient Active Problem List   Diagnosis   • Arthralgia of knee, right   • Primary osteoarthritis of right knee   • Pseudogout   • Myalgia   • Neuropathy   • Pre-op testing   • S/P ACL reconstruction   • Pre-op evaluation   • Arthralgia of left shoulder region   • Lumbar degenerative disc disease   • Impingement syndrome of left shoulder   • Post-traumatic osteoarthritis of left knee   • Rotator cuff tendinitis   • Arthralgia of right knee   • Status post total right knee replacement using cement   • Rupture of right posterior tibialis tendon   • Pain of right lower extremity   • S/P right knee surgery   • Infection of prosthetic right knee joint       Presenting Problem/History of Present Illness:    Pseudogout [M11.20]  Neuropathy [G62.9]  Arthralgia of knee, right [M25.561]  Lumbar degenerative disc disease [M51.36]  Post-traumatic osteoarthritis of left knee [M17.32]  Status post total right knee replacement using cement [Z96.651]  Infection associated with internal right knee prosthesis, initial encounter [T84.53XA]  Infection associated with internal right knee prosthesis, initial encounter [T84.53XA]         Consults:     Consults     Date and Time Order Name Status Description    2017 Inpatient Consult to Hospitalist Completed           Procedures Performed:    Procedure(s):  SECOND STAGE OF RIGHT TOTAL KNEE ARTHROPLASTY REVISION (S&N)         History of presenting illness:    Patient was admitted status post right second stage revision of a total knee arthroplasty for her prosthetic infection with removal of temporary antibiotic knee spacers. Patient tolerated the procedure well.  She did have a drop in her hemoglobin she was  transfused packed red blood cells.  She reports no dizziness, no chest pain or shortness of breath.  She states her pain as a 7 out of 10 to her right knee.  Patient is currently being followed by infectious disease.  She had a PICC line placed in the hospital yesterday.  She is comfortable with home discharge and home health.  She received the Invanz abx       Vital Signs:    Temp:  [98.1 °F (36.7 °C)-99.4 °F (37.4 °C)] 98.4 °F (36.9 °C)  Heart Rate:  [64-82] 81  Resp:  [16-20] 16  BP: (106-165)/(58-99) 123/72    Physical Exam:    General Appearance: alert, appears stated age and cooperative  Head: normocephalic, without obvious abnormality  Eyes: conjunctivae and sclerae normal  Nose: no drainage  Throat: oral mucosa moist  Lungs: respirations regular, respirations even and respirations unlabored  Heart: regular rhythm & normal rate  Abdomen: soft non-tender  Extremities: no redness, Silvia's sign negative, no ischemic changes and no ulcers  Pulses: Pulses palpable and equal bilaterally  Skin: turgor normal  Neurologic: Mental Status orientated to person, place, time and situation  Psych: normal    + swelling to right knee medial aspect of knee.  + ttp to right knee    Pertinent Lab Results:     Lab Results (all)     Procedure Component Value Units Date/Time    Basic Metabolic Panel [[id number]]  (Abnormal) Collected:  12/20/17 0611    Specimen:  Blood Updated:  12/20/17 0636     Glucose 81 mg/dL      BUN 13 mg/dL      Creatinine 0.80 mg/dL      Sodium 139 mmol/L      Potassium 3.8 mmol/L      Chloride 107 mmol/L      CO2 26.0 mmol/L      Calcium 7.9 (L) mg/dL      eGFR Non African Amer 75 mL/min/1.73      BUN/Creatinine Ratio 16.3     Anion Gap 9.8 mmol/L     Narrative:       Abnormal estimated GFR should be followed by more specific studies to confirm end stage chronic renal disease. The equation used for calculation may not be accurate for patients less than 19 years old, greater than 70 years old, patients at  extremes of weight, malnutrition, or with acute renal dysfunction.    CBC & Differential [[id number]] Collected:  12/20/17 0611    Specimen:  Blood Updated:  12/20/17 0647    Narrative:       The following orders were created for panel order CBC & Differential.  Procedure                               Abnormality         Status                     ---------                               -----------         ------                     CBC Auto Differential[[id number]]        Abnormal            Final result                 Please view results for these tests on the individual orders.    CBC Auto Differential [[id number]]  (Abnormal) Collected:  12/20/17 0611    Specimen:  Blood Updated:  12/20/17 0647     WBC 7.01 10*3/mm3      RBC 2.56 (L) 10*6/mm3      Hemoglobin 7.3 (C) g/dL      Hematocrit 23.3 (C) %      MCV 91.0 fL      MCH 28.5 pg      MCHC 31.3 g/dL      RDW 13.7 %      RDW-SD 45.4 fl      MPV 10.5 fL      Platelets 134 10*3/mm3      Neutrophil % 77.9 %      Lymphocyte % 14.6 %      Monocyte % 4.7 %      Eosinophil % 2.4 %      Basophil % 0.1 %      Immature Grans % 0.3 %      Neutrophils, Absolute 5.46 10*3/mm3      Lymphocytes, Absolute 1.02 10*3/mm3      Monocytes, Absolute 0.33 10*3/mm3      Eosinophils, Absolute 0.17 10*3/mm3      Basophils, Absolute 0.01 10*3/mm3      Immature Grans, Absolute 0.02 10*3/mm3      nRBC 0.0 /100 WBC     Hemoglobin & Hematocrit, Blood [[id number]]  (Abnormal) Collected:  12/20/17 0703    Specimen:  Blood Updated:  12/20/17 0721     Hemoglobin 7.2 (C) g/dL      Hematocrit 22.6 (C) %     CBC & Differential [[id number]] Collected:  12/21/17 0611    Specimen:  Blood Updated:  12/21/17 0623    Narrative:       The following orders were created for panel order CBC & Differential.  Procedure                               Abnormality         Status                     ---------                               -----------         ------                     CBC Auto Differential[[id number]]         Abnormal            Final result                 Please view results for these tests on the individual orders.    CBC Auto Differential [[id number]]  (Abnormal) Collected:  12/21/17 0611    Specimen:  Blood Updated:  12/21/17 0623     WBC 4.65 (L) 10*3/mm3      RBC 3.29 (L) 10*6/mm3      Hemoglobin 9.4 (L) g/dL      Hematocrit 28.3 (L) %      MCV 86.0 fL      MCH 28.6 pg      MCHC 33.2 g/dL      RDW 14.6 (H) %      RDW-SD 46.7 fl      MPV 9.8 fL      Platelets 99 (L) 10*3/mm3      Neutrophil % 59.6 %      Lymphocyte % 24.5 %      Monocyte % 6.9 %      Eosinophil % 8.2 (H) %      Basophil % 0.4 %      Immature Grans % 0.4 %      Neutrophils, Absolute 2.77 10*3/mm3      Lymphocytes, Absolute 1.14 10*3/mm3      Monocytes, Absolute 0.32 10*3/mm3      Eosinophils, Absolute 0.38 10*3/mm3      Basophils, Absolute 0.02 10*3/mm3      Immature Grans, Absolute 0.02 10*3/mm3      nRBC 0.0 /100 WBC     Basic Metabolic Panel [[id number]]  (Abnormal) Collected:  12/21/17 0611    Specimen:  Blood Updated:  12/21/17 0640     Glucose 80 mg/dL      BUN 8 mg/dL      Creatinine 0.80 mg/dL      Sodium 142 mmol/L      Potassium 3.4 (L) mmol/L      Chloride 110 (H) mmol/L      CO2 28.0 mmol/L      Calcium 7.8 (L) mg/dL      eGFR Non African Amer 75 mL/min/1.73      BUN/Creatinine Ratio 10.0     Anion Gap 7.4 mmol/L     Narrative:       Abnormal estimated GFR should be followed by more specific studies to confirm end stage chronic renal disease. The equation used for calculation may not be accurate for patients less than 19 years old, greater than 70 years old, patients at extremes of weight, malnutrition, or with acute renal dysfunction.          Pertinent Radiology Results:    Imaging Results (all)     Procedure Component Value Units Date/Time    XR Knee 1 or 2 View Right [[id number]] Collected:  12/20/17 0807     Updated:  12/20/17 0810    Narrative:       PROCEDURE: XR KNEE 1 OR 2 VW RIGHT-     HISTORY: post-op revision R TKR;  T84.53XA-Infection and inflammatory  reaction due to internal right knee prosthesis, initial encounter;  M25.561-Pain in right knee; M17.32-Unilateral post-traumatic  osteoarthritis, left knee; M51.36-Other intervertebral disc  degeneration, lumbar region; G62.9-Polyneuropathy, unspecified;  M11.20-Other chondrocalcinosis, unspecified site; Z96.651-Presence of  right artificial      COMPARISON: October 4, 2017.     FINDINGS:  A 2 view exam demonstrates revision of the patient's knee  arthroplasty with a cemented longstem device is.  There are no hardware  complications or fractures.  The expected postoperative fluid and gas is  seen in the soft tissues.           Impression:       Revision of the patient's right knee arthroplasty with  placement of longstem device is.                 This report was finalized on 12/20/2017 8:08 AM by Noah Felder M.D..          Condition on Discharge:      Stable        Discharge Disposition:    Home-Health Care [facility]    Discharge Medication:     Anca Andrade   Home Medication Instructions LUCAS:[id number]    Printed on:12/21/17 5191   Medication Information                      aspirin (ECOTRIN) 325 MG EC tablet  Take 1 tablet by mouth Daily.             atenolol (TENORMIN) 50 MG tablet  Take 0.5 tablets by mouth daily, patient states BP fluctuates and she doesn't take this everyday             docusate sodium 100 MG capsule  Take 1 capsule by mouth 2 (Two) Times a Day As Needed for Constipation.             doxylamine (UNISOM) 25 MG tablet  Take 25 mg by mouth At Night As Needed for Sleep.             gabapentin (NEURONTIN) 400 MG capsule  Take 1 capsule by mouth 3 (Three) Times a Day.             lisinopril-hydrochlorothiazide (PRINZIDE,ZESTORETIC) 10-12.5 MG per tablet  Take 1 tablet by mouth Daily. Patient states she doesn't take this every day.             meloxicam (MOBIC) 15 MG tablet  Take 1 tablet by mouth Daily.             oxyCODONE (ROXICODONE) 10 MG  tablet  Take 1 tablet by mouth Every 4 (Four) Hours As Needed (for pain).                 Discharge Diet:     Diet Instructions     Advance Diet As Tolerated                     Activity at Discharge:     Activity Instructions     Discharge Activity Restrictions       No driving until cleared by ortho  WBAT with a walker.   Continue wearing the PAU hose   A apply ice to the right knee was a protective barrier 20 minutes on 1 hour off at least 5 times per day.                 Follow-up Appointments:    Future Appointments  Date Time Provider Department Center   1/3/2018 1:45 PM Pete Willis MD [facility] None     Additional Instructions for the Follow-ups that You Need to Schedule     Ambulatory Referral to Home Health    As directed    Face to Face Visit Date:  12/21/2017    Follow-up Provider for Plan of Care?:  I will be treating the patient on an ongoing basis.  Please send me the Plan of Care for signature.    Follow-up Provider:  PETE WILLIS [[id number]]    Reason/Clinical Findings:  S/P Right KNEE surger    Describe mobility limitations that make leaving home difficult:  pain, weakness and decreased ROM    Nursing/Therapeutic Services Requested:  Physical Therapy    PT orders:  Total joint pathway Therapeutic exercise Gait Training Strengthening    Weight Bearing Status:  As Tolerated                     Test Results Pending at Discharge:      S/P right second stage revision of a total knee arthroplasty for her prosthetic infection with removal of temporary antibiotic knee spacers:    Patient will be discharged and followed by infectious disease for PICC line antibiotics.  She already has all necessary DME at home.  For DVT prophylaxis patient is encouraged to continue using the PAU hose and continue the aspirin 325 mg daily for 30 days.     Yosvany Sheridan PA-C  12/21/17  9:20 AM    Time: Discharge 20 min

## 2017-12-21 NOTE — PLAN OF CARE
Problem: Patient Care Overview (Adult)  Goal: Plan of Care Review  Outcome: Ongoing (interventions implemented as appropriate)   12/21/17 0114   Coping/Psychosocial Response Interventions   Plan Of Care Reviewed With patient   Patient Care Overview   Progress progress towards functional goals is fair   Outcome Evaluation   Outcome Summary/Follow up Plan Pt receiving iv and po pain meds as needed to maintain tolerable comfort level.     Goal: Adult Individualization and Mutuality  Outcome: Ongoing (interventions implemented as appropriate)    Goal: Discharge Needs Assessment  Outcome: Ongoing (interventions implemented as appropriate)      Problem: Fall Risk (Adult)  Goal: Identify Related Risk Factors and Signs and Symptoms  Outcome: Outcome(s) achieved Date Met: 12/21/17    Goal: Absence of Falls  Outcome: Ongoing (interventions implemented as appropriate)

## 2017-12-21 NOTE — PROGRESS NOTES
PROGRESS NOTE        Date of Admission: 12/19/2017  Length of Stay: 2  Primary Care Physician: Zita Velasco    Subjective   Chief Complaint: anemia and hypotension  HPI: Patient is seen today.  She was admitted status post right second stage revision of a total knee arthroplasty for her prosthetic infection with removal of temporary antibiotic knee spacers.  She did have acute blood loss anemia for which she got 2 units of packed red blood cells yesterday.  Her infectious disease specialist in [place] has arrange for her to go home on IV Invanz.  She does have a PICC line in place.  She is still complaining of pain in her right knee.  Dr. Young has adjusted her pain medications.  She denies any chest pain, shortness of breath nausea or vomiting.      Review Of Systems:   Review of Systems   General ROS: Patient denies any fevers, chills or loss of consciousness.    Psychological ROS: Denies any hallucinations and delusions.  Ophthalmic ROS: Denies any diplopia or transient loss of vision.  ENT ROS: Denies sore throat, nasal congestion or ear pain.   Allergy and Immunology ROS: Denies rash or itching.  Hematological and Lymphatic ROS: Denies neck swelling or easy bleeding.  Endocrine ROS: Denies any recent unintentional weight gain or loss.  Breast ROS: Denies any pain or swelling.  Respiratory ROS: Denies cough or shortness of breath.   Cardiovascular ROS: Denies chest pain or palpitations. No history of exertional chest pain.   Gastrointestinal ROS: Denies nausea and vomiting. Denies any abdominal pain. No diarrhea.  Genito-Urinary ROS: Denies dysuria or hematuria.  Musculoskeletal ROS: Denies back pain. No muscle pain. No calf pain.   Right knee pain  Neurological ROS: Denies any focal weakness. No loss of consciousness. Denies any numbness. Denies neck pain.   Dermatological ROS: Denies any redness or pruritis.    Objective      Temp:  [98.2 °F (36.8 °C)-99.4 °F (37.4 °C)] 98.4 °F (36.9 °C)  Heart  Rate:  [65-82] 81  Resp:  [16-20] 16  BP: (109-165)/(61-99) 159/85  Physical Exam    General Appearance:  Alert and cooperative, not in any acute distress.   Head:  Atraumatic and normocephalic, without obvious abnormality.   Eyes:          PERRLA, conjunctivae and sclerae normal, no Icterus. No pallor. Extra-occular movements are within normal limits.   Ears:  Ears appear intact with no abnormalities noted.   Throat: No oral lesions, no thrush, oral mucosa moist.   Neck: Supple, trachea midline, no thyromegaly, no carotid bruit.   Back:   No kyphoscoliosis present. No tenderness to palpation,   range of motion normal.   Lungs:   Chest shape is normal. Breath sounds heard bilaterally equally.  No crackles or wheezing. No Pleural rub or bronchial breathing.   Heart:  Normal S1 and S2, no murmur, no gallop, no rub. No JVD   Abdomen:   Normal bowel sounds, no masses, no organomegaly. Soft     non-tender, non-distended, no guarding, no rebound                tenderness   Extremities: Moves all extremities well, no edema, no cyanosis, no clubbing.  Dressing right knee with small amount of bloody drainage.   Pulses: Pulses palpable and equal bilaterally   Skin: No bleeding, bruising or rash   Lymph nodes: No palpable adenopathy   Neurologic:    Psychiatric/Behavior:     Cranial nerves 2 - 12 grossly intact, sensation intact, Motor power is normal and equal bilaterally.  Mood normal, behavior normal       Results Review:    I have reviewed the labs, radiology results and diagnostic studies.      Results from last 7 days  Lab Units 12/21/17  0611   WBC 10*3/mm3 4.65*   HEMOGLOBIN g/dL 9.4*   PLATELETS 10*3/mm3 99*       Results from last 7 days  Lab Units 12/21/17  0611   SODIUM mmol/L 142   POTASSIUM mmol/L 3.4*   CO2 mmol/L 28.0   CREATININE mg/dL 0.80   GLUCOSE mg/dL 80       Culture Data:    Radiology Data:   Cardiology Data:    I have reviewed the medications.    Assessment/Plan     Assessment and Plan:  1.  Right knee  second stage revision of a total knee arthroplasty for periprosthetic infection with removal of temporary antibiotic knee spacers.  He did get a PICC line placed yesterday.  She will be going home on IV antibiotics.  Case management consulted.  He will be going home on IV antibiotics as per her infectious disease specialist in [place].  She will follow-up with them every week for PICC line care.    2.  History of asthma with no evidence of exacerbation    3.  Chronic essential hypertension-blood pressure is on the lower side we will hold antihypertensive medications.    4.  Rheumatoid arthritis    5.  Hypokalemia-  Supplemented    6.  Acute blood loss anemia-  S/p 2 units PRBCs.        DVT prophylaxis:  Arixtra  Discharge Planning:   Jackie Guzman, TODD 12/21/17 1:31 PM

## 2017-12-21 NOTE — PROGRESS NOTES
Continued Stay Note  [facility]     Patient Name: Anca Andrade  MRN: [id number]  Today's Date: 12/21/2017    Admit Date: 12/19/2017          Discharge Plan       12/21/17 0908    Case Management/Social Work Plan    Plan Pt going home today, Has IV Invanz for home IV antibiotics set up by [facility].  Does not want help with this has done it 2 times before and goes to the office 1 x weekly for Picc line care.  Called to Keysha at [facility] and referral faxed, had accepted yesterday.  DCS not available.  Pt states her Mother is coming to pick her up.                Discharge Codes     None        Expected Discharge Date and Time     Expected Discharge Date Expected Discharge Time    Dec 21, 2017             Ileana Brown

## 2017-12-21 NOTE — PROGRESS NOTES
"  AYAH Almonte    Nerve Cath Post Op Call    Patient Name: Anca Andrade  :  1965  MRN:  [id number]  Date of Discharge:     Nerve Cath Post Op Call:    Analgesia:Fair  Pain Score:7/10        C/o anterior/posterior pain and describes it as \"rotates between two areas\".   I did give a bolus 2% lid + .5 marcaine. She did have some relief within 30 minutes however the RN did give her PO pain at same time.                        "

## 2017-12-21 NOTE — PLAN OF CARE
Problem: Patient Care Overview (Adult)  Goal: Plan of Care Review  Outcome: Ongoing (interventions implemented as appropriate)   12/21/17 1542   Coping/Psychosocial Response Interventions   Plan Of Care Reviewed With patient   Patient Care Overview   Progress improving   Outcome Evaluation   Outcome Summary/Follow up Plan OT tx completed. Patient performed bed mob with cond ind, functional transfers with SBA and U/LB dressing tasks with Ind-S/U sitting EOB using RW for stability. Patient is DC home this date.        Problem: Inpatient Occupational Therapy  Goal: Strength Goal LTG- OT  Outcome: Outcome(s) achieved Date Met: 12/21/17 12/20/17 1623 12/21/17 1542   Strength OT LTG   Strength Goal OT LTG, Date Established 12/20/17 --    Strength Goal OT LTG, Time to Achieve 2 wks --    Strength Goal OT LTG, Measure to Achieve Patient will perform HEP ind prior to DC in order to increase strength and endurance.  --    Strength Goal OT LTG, Date Goal Reviewed --  12/21/17   Strength Goal OT LTG, Outcome --  goal met     Goal: Toileting Goal LTG- OT  Outcome: Unable to achieve outcome(s) by discharge Date Met: 12/21/17 12/20/17 1623 12/21/17 1542   Toileting OT LTG   Toileting Goal OT LTG, Date Established 12/20/17 --    Toileting Goal OT LTG, Time to Achieve 2 wks --    Toileting Goal OT LTG, [place] Level conditional independence --    Toileting Goal OT LTG, Date Goal Reviewed --  12/21/17   Toileting Goal OT LTG, Outcome --  goal not met     Goal: LB Dressing Goal LTG- OT  Outcome: Outcome(s) achieved Date Met: 12/21/17 12/20/17 1623 12/21/17 1542   LB Dressing OT LTG   LB Dressing Goal OT LTG, Date Established 12/20/17 --    LB Dressing Goal OT LTG, Time to Achieve 2 wks --    LB Dressing Goal OT LTG, [place] Level set up required --    LB Dressing Goal OT LTG, Date Goal Reviewed --  12/21/17   LB Dressing Goal OT LTG, Outcome --  goal met     Goal: Functional Mobility Goal LTG- OT  Outcome: Unable to  achieve outcome(s) by discharge Date Met: 12/21/17 12/20/17 1623 12/21/17 1542   Functional Mobility OT LTG   Functional Mobility Goal OT LTG, Date Established 12/20/17 --    Functional Mobility Goal OT LTG, Time to Achieve 2 wks --    Functional Mobility Goal OT LTG, [place] Level modified independent --    Functional Mobility Goal OT LTG, Assist Device rolling walker --    Functional Mobility Goal OT LTG, Distance to Achieve in hallway --    Functional Mobility Goal OT LTG, Additional Goal 400 --    Functional Mobility Goal OT LTG, Date Goal Reviewed --  12/21/17   Functional Mobility Goal OT LTG, Outcome --  goal not met

## 2017-12-22 NOTE — PROGRESS NOTES
Case Management Discharge Note         Discharge Placement     Facility/Agency Request Status Selected? Address Phone Number Fax Number    [facility] Accepted    [address], [facility] [address] [phone]        Other:  (pvt car)    Discharge Codes: 06  Discharged/transferred to home under care of organized home health service organization in anticipation of skilled care

## 2017-12-22 NOTE — PROGRESS NOTES
AYAH Almonte    Nerve Cath Post Op Call    Patient Name: Anca Andrade  :  1965  MRN:  [id number]  Date of Discharge: 2017    Nerve Cath Post Op Call:    Analgesia:Good  Pain Score:6/10  Side Effects:None  Catheter Site:clean  Patient Controlled ON Q pump infusion rate: 10ml/hr  Catheter Plan:Patient/Family member was instructed to remove the catheter during telephone contact    Patient reached by phone. States she is taking PO pain meds. Still has some local anes in pump. Pt told to remove catheter this evening when pump is empty.

## 2017-12-27 RX ORDER — OXYCODONE HYDROCHLORIDE 10 MG/1
10 TABLET ORAL EVERY 6 HOURS PRN
Qty: 40 TABLET | Refills: 0 | Status: SHIPPED | OUTPATIENT
Start: 2017-12-27 | End: 2018-01-03 | Stop reason: SDUPTHER

## 2017-12-28 ENCOUNTER — APPOINTMENT (OUTPATIENT)
Dept: LAB | Facility: HOSPITAL | Age: 52
End: 2017-12-28

## 2017-12-28 ENCOUNTER — TRANSCRIBE ORDERS (OUTPATIENT)
Dept: LAB | Facility: HOSPITAL | Age: 52
End: 2017-12-28

## 2017-12-28 DIAGNOSIS — T84.53XA INFECTION OF TOTAL RIGHT KNEE REPLACEMENT, INITIAL ENCOUNTER (HCC): Primary | ICD-10-CM

## 2017-12-28 LAB
ALBUMIN SERPL-MCNC: 3.7 G/DL (ref 3.2–4.8)
ALBUMIN/GLOB SERPL: 1.4 G/DL (ref 1.5–2.5)
ALP SERPL-CCNC: 101 U/L (ref 25–100)
ALT SERPL W P-5'-P-CCNC: 20 U/L (ref 7–40)
ANION GAP SERPL CALCULATED.3IONS-SCNC: 5 MMOL/L (ref 3–11)
AST SERPL-CCNC: 27 U/L (ref 0–33)
BASOPHILS # BLD AUTO: 0.03 10*3/MM3 (ref 0–0.2)
BASOPHILS NFR BLD AUTO: 0.7 % (ref 0–1)
BILIRUB SERPL-MCNC: 0.5 MG/DL (ref 0.3–1.2)
BUN BLD-MCNC: 17 MG/DL (ref 9–23)
BUN/CREAT SERPL: 21.3 (ref 7–25)
CALCIUM SPEC-SCNC: 8.3 MG/DL (ref 8.7–10.4)
CHLORIDE SERPL-SCNC: 99 MMOL/L (ref 99–109)
CO2 SERPL-SCNC: 31 MMOL/L (ref 20–31)
CREAT BLD-MCNC: 0.8 MG/DL (ref 0.6–1.3)
CRP SERPL-MCNC: 2.21 MG/DL (ref 0–1)
DEPRECATED RDW RBC AUTO: 47 FL (ref 37–54)
EOSINOPHIL # BLD AUTO: 0.63 10*3/MM3 (ref 0–0.3)
EOSINOPHIL NFR BLD AUTO: 15.6 % (ref 0–3)
ERYTHROCYTE [DISTWIDTH] IN BLOOD BY AUTOMATED COUNT: 14.5 % (ref 11.3–14.5)
ERYTHROCYTE [SEDIMENTATION RATE] IN BLOOD: 25 MM/HR (ref 0–30)
GFR SERPL CREATININE-BSD FRML MDRD: 75 ML/MIN/1.73
GLOBULIN UR ELPH-MCNC: 2.7 GM/DL
GLUCOSE BLD-MCNC: 98 MG/DL (ref 70–100)
HCT VFR BLD AUTO: 32.4 % (ref 34.5–44)
HGB BLD-MCNC: 10.3 G/DL (ref 11.5–15.5)
IMM GRANULOCYTES # BLD: 0 10*3/MM3 (ref 0–0.03)
IMM GRANULOCYTES NFR BLD: 0 % (ref 0–0.6)
LYMPHOCYTES # BLD AUTO: 1.15 10*3/MM3 (ref 0.6–4.8)
LYMPHOCYTES NFR BLD AUTO: 28.4 % (ref 24–44)
MCH RBC QN AUTO: 28.5 PG (ref 27–31)
MCHC RBC AUTO-ENTMCNC: 31.8 G/DL (ref 32–36)
MCV RBC AUTO: 89.8 FL (ref 80–99)
MONOCYTES # BLD AUTO: 0.25 10*3/MM3 (ref 0–1)
MONOCYTES NFR BLD AUTO: 6.2 % (ref 0–12)
NEUTROPHILS # BLD AUTO: 1.99 10*3/MM3 (ref 1.5–8.3)
NEUTROPHILS NFR BLD AUTO: 49.1 % (ref 41–71)
PLATELET # BLD AUTO: 228 10*3/MM3 (ref 150–450)
PMV BLD AUTO: 9.3 FL (ref 6–12)
POTASSIUM BLD-SCNC: 3.9 MMOL/L (ref 3.5–5.5)
PROT SERPL-MCNC: 6.4 G/DL (ref 5.7–8.2)
RBC # BLD AUTO: 3.61 10*6/MM3 (ref 3.89–5.14)
SODIUM BLD-SCNC: 135 MMOL/L (ref 132–146)
WBC NRBC COR # BLD: 4.05 10*3/MM3 (ref 3.5–10.8)

## 2017-12-28 PROCEDURE — 85025 COMPLETE CBC W/AUTO DIFF WBC: CPT | Performed by: INTERNAL MEDICINE

## 2017-12-28 PROCEDURE — 36415 COLL VENOUS BLD VENIPUNCTURE: CPT | Performed by: INTERNAL MEDICINE

## 2017-12-28 PROCEDURE — 86140 C-REACTIVE PROTEIN: CPT | Performed by: INTERNAL MEDICINE

## 2017-12-28 PROCEDURE — 80053 COMPREHEN METABOLIC PANEL: CPT | Performed by: INTERNAL MEDICINE

## 2018-01-03 ENCOUNTER — OFFICE VISIT (OUTPATIENT)
Dept: ORTHOPEDIC SURGERY | Facility: CLINIC | Age: 53
End: 2018-01-03

## 2018-01-03 VITALS — BODY MASS INDEX: 19.61 KG/M2 | HEIGHT: 66 IN | WEIGHT: 122 LBS | RESPIRATION RATE: 16 BRPM

## 2018-01-03 DIAGNOSIS — T14.8XXA HEMATOMA: ICD-10-CM

## 2018-01-03 DIAGNOSIS — Z96.651 STATUS POST REVISION OF TOTAL REPLACEMENT OF RIGHT KNEE: Primary | ICD-10-CM

## 2018-01-03 PROCEDURE — 99024 POSTOP FOLLOW-UP VISIT: CPT | Performed by: ORTHOPAEDIC SURGERY

## 2018-01-03 RX ORDER — OXYCODONE HYDROCHLORIDE 10 MG/1
10 TABLET ORAL EVERY 6 HOURS PRN
Qty: 30 TABLET | Refills: 0 | Status: SHIPPED | OUTPATIENT
Start: 2018-01-03 | End: 2018-01-12

## 2018-01-03 RX ORDER — OSELTAMIVIR PHOSPHATE 75 MG/1
CAPSULE ORAL
Refills: 0 | Status: ON HOLD | COMMUNITY
Start: 2017-12-29 | End: 2021-04-27

## 2018-01-03 RX ORDER — GABAPENTIN 400 MG/1
400 CAPSULE ORAL 3 TIMES DAILY
Qty: 90 CAPSULE | Refills: 0
Start: 2018-01-03 | End: 2018-01-03

## 2018-01-03 RX ORDER — GABAPENTIN 400 MG/1
400 CAPSULE ORAL 3 TIMES DAILY
Qty: 90 CAPSULE | Refills: 0 | Status: SHIPPED | OUTPATIENT
Start: 2018-01-03 | End: 2018-02-09 | Stop reason: SDUPTHER
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
## 2018-01-04 ENCOUNTER — TRANSCRIBE ORDERS (OUTPATIENT)
Dept: LAB | Facility: HOSPITAL | Age: 53
End: 2018-01-04

## 2018-01-04 ENCOUNTER — LAB (OUTPATIENT)
Dept: LAB | Facility: HOSPITAL | Age: 53
End: 2018-01-04

## 2018-01-04 DIAGNOSIS — T84.53XA INFECTION OF TOTAL RIGHT KNEE REPLACEMENT, INITIAL ENCOUNTER (HCC): Primary | ICD-10-CM

## 2018-01-04 DIAGNOSIS — T84.53XA INFECTION OF TOTAL RIGHT KNEE REPLACEMENT, INITIAL ENCOUNTER (HCC): ICD-10-CM

## 2018-01-04 LAB
ALBUMIN SERPL-MCNC: 4 G/DL (ref 3.2–4.8)
ALBUMIN/GLOB SERPL: 1.3 G/DL (ref 1.5–2.5)
ALP SERPL-CCNC: 123 U/L (ref 25–100)
ALT SERPL W P-5'-P-CCNC: 16 U/L (ref 7–40)
ANION GAP SERPL CALCULATED.3IONS-SCNC: 6 MMOL/L (ref 3–11)
AST SERPL-CCNC: 22 U/L (ref 0–33)
BASOPHILS # BLD AUTO: 0.04 10*3/MM3 (ref 0–0.2)
BASOPHILS NFR BLD AUTO: 0.7 % (ref 0–1)
BILIRUB SERPL-MCNC: 0.4 MG/DL (ref 0.3–1.2)
BUN BLD-MCNC: 16 MG/DL (ref 9–23)
BUN/CREAT SERPL: 20 (ref 7–25)
CALCIUM SPEC-SCNC: 8.9 MG/DL (ref 8.7–10.4)
CHLORIDE SERPL-SCNC: 102 MMOL/L (ref 99–109)
CO2 SERPL-SCNC: 30 MMOL/L (ref 20–31)
CREAT BLD-MCNC: 0.8 MG/DL (ref 0.6–1.3)
CRP SERPL-MCNC: 0.42 MG/DL (ref 0–1)
DEPRECATED RDW RBC AUTO: 51.4 FL (ref 37–54)
EOSINOPHIL # BLD AUTO: 0.68 10*3/MM3 (ref 0–0.3)
EOSINOPHIL NFR BLD AUTO: 11.9 % (ref 0–3)
ERYTHROCYTE [DISTWIDTH] IN BLOOD BY AUTOMATED COUNT: 15.9 % (ref 11.3–14.5)
ERYTHROCYTE [SEDIMENTATION RATE] IN BLOOD: 24 MM/HR (ref 0–30)
GFR SERPL CREATININE-BSD FRML MDRD: 75 ML/MIN/1.73
GLOBULIN UR ELPH-MCNC: 3 GM/DL
GLUCOSE BLD-MCNC: 73 MG/DL (ref 70–100)
HCT VFR BLD AUTO: 36.3 % (ref 34.5–44)
HGB BLD-MCNC: 11.4 G/DL (ref 11.5–15.5)
IMM GRANULOCYTES # BLD: 0.02 10*3/MM3 (ref 0–0.03)
IMM GRANULOCYTES NFR BLD: 0.4 % (ref 0–0.6)
LYMPHOCYTES # BLD AUTO: 1.9 10*3/MM3 (ref 0.6–4.8)
LYMPHOCYTES NFR BLD AUTO: 33.3 % (ref 24–44)
MCH RBC QN AUTO: 28.5 PG (ref 27–31)
MCHC RBC AUTO-ENTMCNC: 31.4 G/DL (ref 32–36)
MCV RBC AUTO: 90.8 FL (ref 80–99)
MONOCYTES # BLD AUTO: 0.26 10*3/MM3 (ref 0–1)
MONOCYTES NFR BLD AUTO: 4.6 % (ref 0–12)
NEUTROPHILS # BLD AUTO: 2.81 10*3/MM3 (ref 1.5–8.3)
NEUTROPHILS NFR BLD AUTO: 49.1 % (ref 41–71)
PLATELET # BLD AUTO: 309 10*3/MM3 (ref 150–450)
PMV BLD AUTO: 9.8 FL (ref 6–12)
POTASSIUM BLD-SCNC: 4.5 MMOL/L (ref 3.5–5.5)
PROT SERPL-MCNC: 7 G/DL (ref 5.7–8.2)
RBC # BLD AUTO: 4 10*6/MM3 (ref 3.89–5.14)
SODIUM BLD-SCNC: 138 MMOL/L (ref 132–146)
WBC NRBC COR # BLD: 5.71 10*3/MM3 (ref 3.5–10.8)

## 2018-01-04 PROCEDURE — 85652 RBC SED RATE AUTOMATED: CPT

## 2018-01-04 PROCEDURE — 86140 C-REACTIVE PROTEIN: CPT

## 2018-01-04 PROCEDURE — 36415 COLL VENOUS BLD VENIPUNCTURE: CPT

## 2018-01-04 PROCEDURE — 85025 COMPLETE CBC W/AUTO DIFF WBC: CPT

## 2018-01-04 PROCEDURE — 80053 COMPREHEN METABOLIC PANEL: CPT

## 2018-01-12 ENCOUNTER — OFFICE VISIT (OUTPATIENT)
Dept: ORTHOPEDIC SURGERY | Facility: CLINIC | Age: 53
End: 2018-01-12

## 2018-01-12 VITALS — BODY MASS INDEX: 19.61 KG/M2 | HEIGHT: 66 IN | WEIGHT: 122 LBS | RESPIRATION RATE: 18 BRPM

## 2018-01-12 DIAGNOSIS — G62.9 NEUROPATHY: ICD-10-CM

## 2018-01-12 DIAGNOSIS — Z98.890 S/P ACL RECONSTRUCTION: ICD-10-CM

## 2018-01-12 DIAGNOSIS — M17.32 POST-TRAUMATIC OSTEOARTHRITIS OF LEFT KNEE: ICD-10-CM

## 2018-01-12 DIAGNOSIS — M51.36 LUMBAR DEGENERATIVE DISC DISEASE: ICD-10-CM

## 2018-01-12 DIAGNOSIS — Z96.651 STATUS POST REVISION OF TOTAL REPLACEMENT OF RIGHT KNEE: Primary | ICD-10-CM

## 2018-01-12 PROCEDURE — 99024 POSTOP FOLLOW-UP VISIT: CPT | Performed by: ORTHOPAEDIC SURGERY

## 2018-01-12 RX ORDER — OXYCODONE HYDROCHLORIDE 10 MG/1
10 TABLET ORAL EVERY 6 HOURS PRN
Qty: 56 TABLET | Refills: 0
Start: 2018-01-12 | End: 2018-01-23 | Stop reason: SDUPTHER

## 2018-01-12 RX ORDER — DOXYCYCLINE 100 MG/1
CAPSULE ORAL
Refills: 1 | Status: ON HOLD | COMMUNITY
Start: 2018-01-04 | End: 2021-04-27
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
## 2018-01-23 RX ORDER — OXYCODONE HYDROCHLORIDE 10 MG/1
10 TABLET ORAL EVERY 8 HOURS PRN
Qty: 42 TABLET | Refills: 0 | Status: SHIPPED | OUTPATIENT
Start: 2018-01-23 | End: 2018-01-31 | Stop reason: SDUPTHER

## 2018-01-29 ENCOUNTER — HOSPITAL ENCOUNTER (OUTPATIENT)
Dept: OTHER | Age: 53
Discharge: HOME | End: 2018-01-29
Attending: ORTHOPAEDIC SURGERY | Admitting: ORTHOPAEDIC SURGERY

## 2018-01-29 DIAGNOSIS — Z96.651 PRESENCE OF RIGHT ARTIFICIAL KNEE JOINT: ICD-10-CM

## 2018-01-29 DIAGNOSIS — M51.36 ANNULAR TEAR OF LUMBAR DISC: ICD-10-CM

## 2018-01-30 ENCOUNTER — TELEPHONE (OUTPATIENT)
Dept: ORTHOPEDIC SURGERY | Facility: CLINIC | Age: 53
End: 2018-01-30

## 2018-01-31 ENCOUNTER — OFFICE VISIT (OUTPATIENT)
Dept: ORTHOPEDIC SURGERY | Facility: CLINIC | Age: 53
End: 2018-01-31

## 2018-01-31 VITALS — WEIGHT: 122 LBS | HEIGHT: 65 IN | RESPIRATION RATE: 18 BRPM | BODY MASS INDEX: 20.33 KG/M2

## 2018-01-31 DIAGNOSIS — Z96.651 STATUS POST REVISION OF TOTAL REPLACEMENT OF RIGHT KNEE: ICD-10-CM

## 2018-01-31 DIAGNOSIS — S86.911A KNEE STRAIN, RIGHT, INITIAL ENCOUNTER: ICD-10-CM

## 2018-01-31 DIAGNOSIS — G62.9 NEUROPATHY: ICD-10-CM

## 2018-01-31 DIAGNOSIS — M17.32 POST-TRAUMATIC OSTEOARTHRITIS OF LEFT KNEE: ICD-10-CM

## 2018-01-31 DIAGNOSIS — S83.411A SPRAIN OF MEDIAL COLLATERAL LIGAMENT OF RIGHT KNEE, INITIAL ENCOUNTER: Primary | ICD-10-CM

## 2018-01-31 DIAGNOSIS — M51.36 LUMBAR DEGENERATIVE DISC DISEASE: ICD-10-CM

## 2018-01-31 PROCEDURE — 99024 POSTOP FOLLOW-UP VISIT: CPT | Performed by: ORTHOPAEDIC SURGERY

## 2018-01-31 RX ORDER — OXYCODONE HYDROCHLORIDE 10 MG/1
10 TABLET ORAL EVERY 8 HOURS PRN
Qty: 42 TABLET | Refills: 0 | Status: SHIPPED | OUTPATIENT
Start: 2018-01-31 | End: 2018-02-14 | Stop reason: SDUPTHER

## 2018-01-31 RX ORDER — METHOCARBAMOL 750 MG/1
750 TABLET, FILM COATED ORAL NIGHTLY PRN
Qty: 30 TABLET | Refills: 0 | Status: SHIPPED | OUTPATIENT
Start: 2018-01-31 | End: 2019-06-28 | Stop reason: SDUPTHER
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
## 2018-02-09 ENCOUNTER — OFFICE VISIT (OUTPATIENT)
Dept: ORTHOPEDIC SURGERY | Facility: CLINIC | Age: 53
End: 2018-02-09

## 2018-02-09 VITALS — HEIGHT: 65 IN | BODY MASS INDEX: 20.33 KG/M2 | RESPIRATION RATE: 18 BRPM | WEIGHT: 122 LBS

## 2018-02-09 DIAGNOSIS — Z96.651 STATUS POST REVISION OF TOTAL REPLACEMENT OF RIGHT KNEE: Primary | ICD-10-CM

## 2018-02-09 DIAGNOSIS — G62.9 NEUROPATHY: ICD-10-CM

## 2018-02-09 DIAGNOSIS — M51.36 LUMBAR DEGENERATIVE DISC DISEASE: ICD-10-CM

## 2018-02-09 DIAGNOSIS — M17.32 POST-TRAUMATIC OSTEOARTHRITIS OF LEFT KNEE: ICD-10-CM

## 2018-02-09 PROCEDURE — 99024 POSTOP FOLLOW-UP VISIT: CPT | Performed by: ORTHOPAEDIC SURGERY

## 2018-02-09 RX ORDER — MELOXICAM 15 MG/1
15 TABLET ORAL DAILY
Qty: 30 TABLET | Refills: 3 | Status: SHIPPED | OUTPATIENT
Start: 2018-02-09 | End: 2018-05-11 | Stop reason: SDUPTHER

## 2018-02-09 RX ORDER — GABAPENTIN 300 MG/1
300 CAPSULE ORAL 3 TIMES DAILY
Qty: 90 CAPSULE | Refills: 0
Start: 2018-02-09 | End: 2018-03-26 | Stop reason: SDUPTHER
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
## 2018-02-15 RX ORDER — OXYCODONE HYDROCHLORIDE 10 MG/1
10 TABLET ORAL EVERY 8 HOURS PRN
Qty: 42 TABLET | Refills: 0 | Status: SHIPPED | OUTPATIENT
Start: 2018-02-15 | End: 2018-02-28 | Stop reason: SDUPTHER

## 2018-02-28 ENCOUNTER — OFFICE VISIT (OUTPATIENT)
Dept: ORTHOPEDIC SURGERY | Facility: CLINIC | Age: 53
End: 2018-02-28

## 2018-02-28 VITALS — HEIGHT: 65 IN | BODY MASS INDEX: 20.33 KG/M2 | RESPIRATION RATE: 18 BRPM | WEIGHT: 122 LBS

## 2018-02-28 DIAGNOSIS — G62.9 NEUROPATHY: ICD-10-CM

## 2018-02-28 DIAGNOSIS — M75.82 ROTATOR CUFF TENDINITIS, LEFT: ICD-10-CM

## 2018-02-28 DIAGNOSIS — Z96.651 STATUS POST REVISION OF TOTAL REPLACEMENT OF RIGHT KNEE: ICD-10-CM

## 2018-02-28 DIAGNOSIS — M51.36 LUMBAR DEGENERATIVE DISC DISEASE: ICD-10-CM

## 2018-02-28 DIAGNOSIS — M75.42 IMPINGEMENT SYNDROME OF LEFT SHOULDER: Primary | ICD-10-CM

## 2018-02-28 DIAGNOSIS — M25.512 ARTHRALGIA OF LEFT SHOULDER REGION: ICD-10-CM

## 2018-02-28 DIAGNOSIS — M17.32 POST-TRAUMATIC OSTEOARTHRITIS OF LEFT KNEE: ICD-10-CM

## 2018-02-28 PROCEDURE — 20610 DRAIN/INJ JOINT/BURSA W/O US: CPT | Performed by: ORTHOPAEDIC SURGERY

## 2018-02-28 PROCEDURE — 99024 POSTOP FOLLOW-UP VISIT: CPT | Performed by: ORTHOPAEDIC SURGERY

## 2018-02-28 RX ADMIN — METHYLPREDNISOLONE ACETATE 40 MG: 40 INJECTION, SUSPENSION INTRA-ARTICULAR; INTRALESIONAL; INTRAMUSCULAR; SOFT TISSUE at 11:02

## 2018-02-28 RX ADMIN — LIDOCAINE HYDROCHLORIDE 2 ML: 10 INJECTION, SOLUTION INFILTRATION; PERINEURAL at 11:02

## 2018-03-01 RX ORDER — OXYCODONE HYDROCHLORIDE 10 MG/1
10 TABLET ORAL EVERY 12 HOURS PRN
Qty: 42 TABLET | Refills: 0 | Status: SHIPPED | OUTPATIENT
Start: 2018-03-01 | End: 2018-03-12 | Stop reason: SDUPTHER

## 2018-03-01 RX ORDER — OXYCODONE HYDROCHLORIDE 5 MG/1
5 TABLET ORAL DAILY PRN
Qty: 14 TABLET
Start: 2018-03-01 | End: 2018-03-12

## 2018-03-02 RX ORDER — METHYLPREDNISOLONE ACETATE 40 MG/ML
40 INJECTION, SUSPENSION INTRA-ARTICULAR; INTRALESIONAL; INTRAMUSCULAR; SOFT TISSUE
Status: COMPLETED | OUTPATIENT
Start: 2018-02-28 | End: 2018-02-28

## 2018-03-02 RX ORDER — LIDOCAINE HYDROCHLORIDE 10 MG/ML
2 INJECTION, SOLUTION INFILTRATION; PERINEURAL
Status: COMPLETED | OUTPATIENT
Start: 2018-02-28 | End: 2018-02-28

## 2018-03-12 RX ORDER — OXYCODONE HYDROCHLORIDE 10 MG/1
10 TABLET ORAL EVERY 12 HOURS PRN
Qty: 42 TABLET | Refills: 0 | Status: SHIPPED | OUTPATIENT
Start: 2018-03-12 | End: 2018-03-12

## 2018-03-12 RX ORDER — OXYCODONE HYDROCHLORIDE 10 MG/1
10 TABLET ORAL EVERY 12 HOURS PRN
Qty: 28 TABLET | Refills: 0 | Status: SHIPPED | OUTPATIENT
Start: 2018-03-12 | End: 2018-03-26 | Stop reason: SDUPTHER

## 2018-03-12 RX ORDER — OXYCODONE HYDROCHLORIDE 5 MG/1
5 TABLET ORAL DAILY PRN
Qty: 14 TABLET | Status: CANCELLED
Start: 2018-03-12

## 2018-03-26 RX ORDER — OXYCODONE HYDROCHLORIDE 5 MG/1
5 TABLET ORAL EVERY 8 HOURS PRN
Qty: 42 TABLET | Refills: 0 | Status: SHIPPED | OUTPATIENT
Start: 2018-03-26 | End: 2018-05-01

## 2018-03-26 RX ORDER — GABAPENTIN 400 MG/1
400 CAPSULE ORAL EVERY 12 HOURS PRN
Qty: 60 CAPSULE | Refills: 0 | Status: SHIPPED | OUTPATIENT
Start: 2018-03-26 | End: 2018-04-25 | Stop reason: SDUPTHER

## 2018-03-26 RX ORDER — OXYCODONE HYDROCHLORIDE 10 MG/1
5 TABLET ORAL EVERY 8 HOURS PRN
Qty: 42 TABLET | Refills: 0 | Status: SHIPPED | OUTPATIENT
Start: 2018-03-26 | End: 2018-05-01

## 2018-03-26 NOTE — TELEPHONE ENCOUNTER
Patient presented to office today for Rx pickup. Original Rx was written by Dr. Willis for Oxycodone 10mg 1/2 tab Q8PRN. The Rx was given to the patient by our front office staff and the patient requested to speak with a member of our clinic staff to address questions in regards to her Rx. I spoke with the patient Dr. Willis reviewed the Rx. He was agreeable to re-write the Rx and change it to Oxycodone 5mg TID. Dr. Willis recommended the patient take two 5mg tablets in the morning and one 5mg tablet at night. The new dosage would reflect a taper from the last Oxycodone 10mg BID as our pain agreement states. The patient became extremely upset when I handed her the new Rx and demanded to speak with Dr. Willis or April directly. Both April and Dr. Willis were with other patients and were not available at that time. I spoke with Dr. Willis again after she became upset and he agreed that we made every effort to accommodate the patients needs. She was adamant that she receive 10mg tablets instead of having to take two 5mg tablets. We felt that this was not a legitimate reason to re-write the Rx for a 3rd time. The patient left our office very aggressively and began yelling and screaming.

## 2018-04-09 RX ORDER — OXYCODONE HYDROCHLORIDE 10 MG/1
TABLET ORAL
Qty: 30 TABLET | Refills: 0 | Status: SHIPPED | OUTPATIENT
Start: 2018-04-09 | End: 2018-05-01

## 2018-04-25 RX ORDER — GABAPENTIN 400 MG/1
400 CAPSULE ORAL EVERY 12 HOURS PRN
Qty: 60 CAPSULE | Refills: 0 | Status: SHIPPED | OUTPATIENT
Start: 2018-04-25 | End: 2021-05-01 | Stop reason: HOSPADM

## 2018-05-01 RX ORDER — OXYCODONE HYDROCHLORIDE 5 MG/1
5 TABLET ORAL EVERY 12 HOURS PRN
Qty: 30 TABLET | Refills: 0 | Status: SHIPPED | OUTPATIENT
Start: 2018-05-01 | End: 2021-05-01 | Stop reason: HOSPADM

## 2018-05-05 RX ORDER — GABAPENTIN 400 MG/1
CAPSULE ORAL
Qty: 90 CAPSULE | Refills: 1 | Status: SHIPPED | OUTPATIENT
Start: 2018-05-05 | End: 2018-07-09 | Stop reason: SDUPTHER

## 2018-05-11 ENCOUNTER — OFFICE VISIT (OUTPATIENT)
Dept: ORTHOPEDIC SURGERY | Facility: CLINIC | Age: 53
End: 2018-05-11

## 2018-05-11 VITALS — BODY MASS INDEX: 18.33 KG/M2 | RESPIRATION RATE: 12 BRPM | WEIGHT: 110 LBS | HEIGHT: 65 IN

## 2018-05-11 DIAGNOSIS — G62.9 NEUROPATHY: ICD-10-CM

## 2018-05-11 DIAGNOSIS — M17.32 POST-TRAUMATIC OSTEOARTHRITIS OF LEFT KNEE: ICD-10-CM

## 2018-05-11 DIAGNOSIS — M51.36 LUMBAR DEGENERATIVE DISC DISEASE: ICD-10-CM

## 2018-05-11 DIAGNOSIS — Z96.651 STATUS POST REVISION OF TOTAL REPLACEMENT OF RIGHT KNEE: Primary | ICD-10-CM

## 2018-05-11 DIAGNOSIS — M75.42 IMPINGEMENT SYNDROME OF LEFT SHOULDER: ICD-10-CM

## 2018-05-11 DIAGNOSIS — M25.512 ARTHRALGIA OF LEFT SHOULDER REGION: ICD-10-CM

## 2018-05-11 PROCEDURE — 99213 OFFICE O/P EST LOW 20 MIN: CPT | Performed by: ORTHOPAEDIC SURGERY

## 2018-05-11 PROCEDURE — 20610 DRAIN/INJ JOINT/BURSA W/O US: CPT | Performed by: ORTHOPAEDIC SURGERY

## 2018-05-11 RX ADMIN — METHYLPREDNISOLONE ACETATE 40 MG: 40 INJECTION, SUSPENSION INTRA-ARTICULAR; INTRALESIONAL; INTRAMUSCULAR; SOFT TISSUE at 11:56

## 2018-05-11 RX ADMIN — LIDOCAINE HYDROCHLORIDE 2 ML: 10 INJECTION, SOLUTION INFILTRATION; PERINEURAL at 11:56
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
## 2018-05-13 RX ORDER — LIDOCAINE HYDROCHLORIDE 10 MG/ML
2 INJECTION, SOLUTION INFILTRATION; PERINEURAL
Status: COMPLETED | OUTPATIENT
Start: 2018-05-11 | End: 2018-05-11

## 2018-05-13 RX ORDER — MELOXICAM 15 MG/1
15 TABLET ORAL DAILY
Qty: 30 TABLET | Refills: 3 | Status: SHIPPED | OUTPATIENT
Start: 2018-05-13 | End: 2018-10-17 | Stop reason: ALTCHOICE

## 2018-05-13 RX ORDER — METHYLPREDNISOLONE ACETATE 40 MG/ML
40 INJECTION, SUSPENSION INTRA-ARTICULAR; INTRALESIONAL; INTRAMUSCULAR; SOFT TISSUE
Status: COMPLETED | OUTPATIENT
Start: 2018-05-11 | End: 2018-05-11

## 2018-05-29 DIAGNOSIS — M51.36 LUMBAR DEGENERATIVE DISC DISEASE: Primary | ICD-10-CM

## 2018-05-29 RX ORDER — IBUPROFEN 800 MG/1
800 TABLET ORAL EVERY 12 HOURS PRN
Qty: 30 TABLET | Refills: 2 | Status: SHIPPED | OUTPATIENT
Start: 2018-05-29 | End: 2018-10-17 | Stop reason: ALTCHOICE

## 2018-05-29 RX ORDER — IBUPROFEN 800 MG/1
800 TABLET ORAL EVERY 8 HOURS PRN
Qty: 30 TABLET | Refills: 2 | Status: SHIPPED | OUTPATIENT
Start: 2018-05-29 | End: 2018-05-29 | Stop reason: DRUGHIGH

## 2018-07-09 RX ORDER — GABAPENTIN 400 MG/1
CAPSULE ORAL
Qty: 90 CAPSULE | Refills: 1 | Status: SHIPPED | OUTPATIENT
Start: 2018-07-09 | End: 2018-09-10 | Stop reason: SDUPTHER

## 2018-07-16 RX ORDER — GABAPENTIN 400 MG/1
400 CAPSULE ORAL EVERY 12 HOURS PRN
Qty: 60 CAPSULE | Refills: 0 | Status: CANCELLED | OUTPATIENT
Start: 2018-07-16

## 2018-07-16 RX ORDER — GABAPENTIN 400 MG/1
400 CAPSULE ORAL 3 TIMES DAILY
Qty: 90 CAPSULE | Refills: 1 | OUTPATIENT
Start: 2018-07-16

## 2018-08-10 ENCOUNTER — HOSPITAL ENCOUNTER (OUTPATIENT)
Facility: HOSPITAL | Age: 53
Discharge: HOME | End: 2018-08-10
Payer: COMMERCIAL

## 2018-08-10 ENCOUNTER — HOSPITAL ENCOUNTER (OUTPATIENT)
Dept: GENERAL RADIOLOGY | Facility: HOSPITAL | Age: 53
Discharge: HOME | End: 2018-08-10
Payer: COMMERCIAL

## 2018-08-10 ENCOUNTER — OFFICE VISIT (OUTPATIENT)
Dept: ORTHOPEDIC SURGERY | Facility: CLINIC | Age: 53
End: 2018-08-10

## 2018-08-10 VITALS — WEIGHT: 112 LBS | HEIGHT: 65 IN | RESPIRATION RATE: 18 BRPM | BODY MASS INDEX: 18.66 KG/M2

## 2018-08-10 DIAGNOSIS — S86.819A BICEPS FEMORIS TENDON STRAIN AT KNEE: ICD-10-CM

## 2018-08-10 DIAGNOSIS — R52 PAIN: ICD-10-CM

## 2018-08-10 DIAGNOSIS — M25.512 ARTHRALGIA OF LEFT SHOULDER REGION: ICD-10-CM

## 2018-08-10 DIAGNOSIS — M75.42 IMPINGEMENT SYNDROME OF LEFT SHOULDER: ICD-10-CM

## 2018-08-10 DIAGNOSIS — M51.36 LUMBAR DEGENERATIVE DISC DISEASE: ICD-10-CM

## 2018-08-10 DIAGNOSIS — M17.32 POST-TRAUMATIC OSTEOARTHRITIS OF LEFT KNEE: ICD-10-CM

## 2018-08-10 DIAGNOSIS — Z96.651 STATUS POST REVISION OF TOTAL REPLACEMENT OF RIGHT KNEE: Primary | ICD-10-CM

## 2018-08-10 DIAGNOSIS — G62.9 NEUROPATHY: ICD-10-CM

## 2018-08-10 DIAGNOSIS — M75.82 ROTATOR CUFF TENDINITIS, LEFT: ICD-10-CM

## 2018-08-10 PROCEDURE — 99212 OFFICE O/P EST SF 10 MIN: CPT | Performed by: ORTHOPAEDIC SURGERY

## 2018-08-10 PROCEDURE — 73560 X-RAY EXAM OF KNEE 1 OR 2: CPT

## 2018-08-10 PROCEDURE — 20610 DRAIN/INJ JOINT/BURSA W/O US: CPT | Performed by: ORTHOPAEDIC SURGERY

## 2018-08-10 RX ORDER — BUPRENORPHINE HYDROCHLORIDE AND NALOXONE HYDROCHLORIDE DIHYDRATE 8; 2 MG/1; MG/1
TABLET SUBLINGUAL
Refills: 0 | COMMUNITY
Start: 2018-08-07 | End: 2019-06-28 | Stop reason: ALTCHOICE

## 2018-08-10 RX ORDER — METHYLPREDNISOLONE ACETATE 40 MG/ML
40 INJECTION, SUSPENSION INTRA-ARTICULAR; INTRALESIONAL; INTRAMUSCULAR; SOFT TISSUE
Status: COMPLETED | OUTPATIENT
Start: 2018-08-10 | End: 2018-08-10

## 2018-08-10 RX ORDER — LIDOCAINE HYDROCHLORIDE 10 MG/ML
2 INJECTION, SOLUTION INFILTRATION; PERINEURAL
Status: COMPLETED | OUTPATIENT
Start: 2018-08-10 | End: 2018-08-10

## 2018-08-10 RX ADMIN — LIDOCAINE HYDROCHLORIDE 2 ML: 10 INJECTION, SOLUTION INFILTRATION; PERINEURAL at 08:17

## 2018-08-10 RX ADMIN — METHYLPREDNISOLONE ACETATE 40 MG: 40 INJECTION, SUSPENSION INTRA-ARTICULAR; INTRALESIONAL; INTRAMUSCULAR; SOFT TISSUE at 08:17

## 2018-08-10 NOTE — PROGRESS NOTES
Subjective   Anca Andrade is a 52 y.o. female is here today for injection therapy.  Follow-up, Pain, and Injections of the Left Shoulder and Follow-up and Pain of the Right Knee      History of Present Illness     Since last injection, patient notes substantial relief of symptoms.  No adverse effects of prior injection.  Patient requests repeat injection.  She notes about six months of shoulder relief since the prior injection.  Left shoulder MRI done last year shows supraspinatus tendinopathy with partial thickness tear and impingement.  She also is  considering elective shoulder arthroscopy for any worsening or intractable symptoms.        Her right knee recently has had an occasional pain and binding sensation, posterolateral near the lateral hamstring and usually it occurs when rising up after prolonged sitting. We repeated her right knee radiographs today and they are fine with no interval changes of her revision right total knee replacement.    Past Medical History:   Diagnosis Date   • Asthma    • Body piercing     BILATERAL EARS   • Chipped tooth    • Depression    • GERD (gastroesophageal reflux disease)    • H/O corticosteroid therapy     right knee   • History of being tatooed    • Hypertension    • Low blood pressure     DROPS FAST   • Osteoarthritis    • Pseudogout    • Rheumatoid arthritis (CMS/HCC)    • Skin cancer     LEFT SHOULDER        Past Surgical History:   Procedure Laterality Date   • CHOLECYSTECTOMY     • COLONOSCOPY  2015   • ENDOSCOPY     • HYSTERECTOMY     • KNEE ACL RECONSTRUCTION Left    • KNEE ARTHROSCOPY      Multiple procedures (left x 4 and right x 2)    • OTHER SURGICAL HISTORY      ORIF right forearm   • PARATHYROIDECTOMY     • ND REVISE KNEE JOINT REPLACE,1 PART Right 9/26/2017    Procedure: RIGHT TOTAL KNEE ARTHROPLASTY REVISION STAGE ONE WITH ANTIBIOTIC SPACER FOR PERIPROSTHETIC INFECTION;  Surgeon: Pete Willis MD;  Location: [facility];  Service: Orthopedics   •  "ME REVISE KNEE JOINT REPLACE,1 PART Right 12/19/2017    Procedure: SECOND STAGE OF RIGHT TOTAL KNEE ARTHROPLASTY REVISION (S&N);  Surgeon: Pete Willis MD;  Location: [facility];  Service: Orthopedics   • TOOTH EXTRACTION     • TOTAL KNEE ARTHROPLASTY Right 10/18/2016   • WRIST SURGERY      Left wrist procedure       Allergies   Allergen Reactions   • Ativan [Lorazepam] Mental Status Change   • Morphine And Related      Headaches due to morphine only, related meds ok   • Penicillins Other (See Comments)     UNSURE OF REACTION         Review of Systems   Constitutional: Negative for fever.   HENT: Negative for voice change.    Eyes: Negative for visual disturbance.   Respiratory: Negative for shortness of breath.    Cardiovascular: Negative for chest pain.   Gastrointestinal: Negative for abdominal distention and abdominal pain.   Genitourinary: Negative for dysuria.   Musculoskeletal: Positive for arthralgias. Negative for gait problem and joint swelling.   Skin: Negative for rash.   Neurological: Negative for speech difficulty.   Hematological: Does not bruise/bleed easily.   Psychiatric/Behavioral: Negative for confusion.        Objective   Resp 18   Ht 165.1 cm (65\")   Wt 50.8 kg (112 lb)   BMI 18.64 kg/m²    Physical Exam   Constitutional: She appears well-developed. No distress.   Musculoskeletal:        Right knee: She exhibits effusion.   Neurological: She is alert. Gait normal.   Skin: Skin is warm and dry. No rash noted. No erythema.   Psychiatric: She has a normal mood and affect. Her speech is normal.   Vitals reviewed.    Skin exam stable with no erythema, ecchymosis or rash.  No new swelling.  No motor or sensory changes.  Distal pulse intact.  Right Knee Exam     Tenderness   Right knee tenderness location: lateral hamstring area.    Range of Motion   Extension: 0   Right knee flexion: 125 degrees.     Muscle Strength     The patient has normal right knee strength.    Tests   Jose G:  Medial " - negative Lateral - negative  Varus: negative  Valgus: negative  Patellar Apprehension: negative    Other   Erythema: absent  Scars: present (well healed)  Other tests: effusion present      Left Knee Exam     Tenderness   The patient is experiencing no tenderness.         Range of Motion   Extension: 0   Flexion: 130     Muscle Strength     The patient has normal left knee strength.      Back Exam     Muscle Strength   Right Hamstrings:  5/5   Left Hamstrings:  5/5       Right Shoulder Exam     Tenderness   The patient is experiencing tenderness in the acromion.    Range of Motion   Active Abduction: 140   Forward Flexion: 150     Muscle Strength   Abduction: 4/5   Internal Rotation: 5/5   External Rotation: 5/5   Biceps: 5/5     Tests   Apprehension: negative  Drop Arm: negative  Hawkin's test: positive  Impingement: positive    Other   Erythema: absent  Pulse: present          Neurologic Exam     Mental Status   Attention: normal.   Speech: speech is normal   Level of consciousness: alert  Knowledge: good.     Motor Exam   Overall muscle tone: normal    Strength   Right hamstrin/5  Left hamstrin/5  Right glutei: 5/5  Left glutei: 5/5    Gait, Coordination, and Reflexes     Gait  Gait: normal    Left Knee Exam     Muscle Strength   Normal left knee strength    Right Knee Exam     Muscle Strength   Normal right knee strength            Large Joint Arthrocentesis  Date/Time: 8/10/2018 8:17 AM  Consent given by: patient  Site marked: site marked  Timeout: Immediately prior to procedure a time out was called to verify the correct patient, procedure, equipment, support staff and site/side marked as required   Supporting Documentation  Indications: pain   Procedure Details  Location: shoulder - L subacromial bursa  Preparation: Patient was prepped and draped in the usual sterile fashion  Needle size: 22 G  Approach: lateral  Medications administered: 2 mL lidocaine 1 %; 40 mg methylPREDNISolone acetate 40  MG/ML  Patient tolerance: patient tolerated the procedure well with no immediate complications          Assessment/Plan   Independent Review of Radiographic Studies:    AP and lateral right knee, indication to evaluate status of revision prosthesis and joint, and compared with prior imaging, shows no acute fracture or dislocation. Good position and alignment of prosthesis with no radiographic signs of loosening or any concern.  Also, indication to evaluate posterolateral knee pain and with prior comparison views, shows no acute fracture or dislocation evident.  Laboratory and Other Studies:  No new results reviewed today.   Medical Decision Making:    No complications of shoulder injection therapy.  Stable neurovascular exam.  Good progress, significantly improved.  Continue current plan, and management.    Anca was seen today for follow-up, pain, injections, follow-up and pain.    Diagnoses and all orders for this visit:    Status post revision of total replacement of right knee  -     XR Knee 1 or 2 View Right    Rotator cuff tendinitis, left  -     Large Joint Arthrocentesis    Impingement syndrome of left shoulder    Arthralgia of left shoulder region    Lumbar degenerative disc disease    Neuropathy    Post-traumatic osteoarthritis of left knee    Biceps femoris tendon strain at knee  Comments:  right        Discussion of orthopaedic goals and activities and patient and/or guardian expressed appreciation.  Call or notify for any adverse effect from injection therapy  Ice, heat, and/or modalities as beneficial  Watch for signs and symptoms of infection  Guided on proper techniques for mobility, strength, agility and/or conditioning exercises        Recommendations/Plan:  Brace: No brace was given at today's visit  Referral: No referrals made at today's visit  Test/Studies: No additional studies ordered.  Surgery: No surgery proposed at this visit., For intractable painful limiting condition, patient to  consider elective surgical options for her right shoulder condition.  Work/Activity Status: May perform usual activities as tolerated, May return to routine exercise and physical work as tolerated. and Regular duty  Patient and/or guardian is encouraged to call or return for any issues or concerns.    Return in about 3 months (around 11/10/2018) for Recheck.  Patient agreeable to call or return sooner for any concerns.

## 2018-09-04 RX ORDER — IBUPROFEN 800 MG/1
800 TABLET ORAL EVERY 12 HOURS PRN
Qty: 30 TABLET | Refills: 2 | Status: SHIPPED | OUTPATIENT
Start: 2018-09-04 | End: 2018-10-17 | Stop reason: SDUPTHER

## 2018-09-10 RX ORDER — GABAPENTIN 400 MG/1
400 CAPSULE ORAL 3 TIMES DAILY
Qty: 90 CAPSULE | Refills: 1 | Status: SHIPPED | OUTPATIENT
Start: 2018-09-10 | End: 2018-11-02 | Stop reason: SDUPTHER

## 2018-10-17 RX ORDER — IBUPROFEN 800 MG/1
TABLET ORAL
Qty: 30 TABLET | Refills: 2 | Status: SHIPPED | OUTPATIENT
Start: 2018-10-17 | End: 2018-11-05

## 2018-10-23 ENCOUNTER — HOSPITAL ENCOUNTER (OUTPATIENT)
Dept: MAMMOGRAPHY | Facility: HOSPITAL | Age: 53
Discharge: HOME | End: 2018-10-23
Payer: COMMERCIAL

## 2018-10-23 DIAGNOSIS — Z12.39 BREAST CANCER SCREENING: ICD-10-CM

## 2018-10-23 PROCEDURE — 77063 BREAST TOMOSYNTHESIS BI: CPT

## 2018-11-05 RX ORDER — GABAPENTIN 400 MG/1
CAPSULE ORAL
Qty: 90 CAPSULE | Refills: 1 | Status: SHIPPED | OUTPATIENT
Start: 2018-11-05 | End: 2019-05-24 | Stop reason: SDUPTHER

## 2018-11-05 RX ORDER — IBUPROFEN 800 MG/1
800 TABLET ORAL EVERY 12 HOURS PRN
Qty: 60 TABLET | Refills: 1 | Status: SHIPPED | OUTPATIENT
Start: 2018-11-05

## 2018-11-06 ENCOUNTER — TELEPHONE (OUTPATIENT)
Dept: PRIMARY CARE CLINIC | Age: 53
End: 2018-11-06

## 2018-11-27 ENCOUNTER — HOSPITAL ENCOUNTER (OUTPATIENT)
Dept: PHYSICAL THERAPY | Facility: HOSPITAL | Age: 53
Setting detail: THERAPIES SERIES
Discharge: HOME | End: 2018-11-27
Payer: COMMERCIAL

## 2018-11-27 PROCEDURE — 97161 PT EVAL LOW COMPLEX 20 MIN: CPT

## 2018-11-27 PROCEDURE — G8979 MOBILITY GOAL STATUS: HCPCS

## 2018-11-27 PROCEDURE — G8978 MOBILITY CURRENT STATUS: HCPCS

## 2018-11-27 PROCEDURE — 97535 SELF CARE MNGMENT TRAINING: CPT

## 2018-11-27 ASSESSMENT — PAIN DESCRIPTION - FREQUENCY: FREQUENCY: CONTINUOUS

## 2018-11-27 ASSESSMENT — PAIN DESCRIPTION - ONSET: ONSET: ON-GOING

## 2018-11-27 ASSESSMENT — PAIN SCALES - GENERAL: PAINLEVEL_OUTOF10: 2

## 2018-11-27 ASSESSMENT — PAIN DESCRIPTION - LOCATION: LOCATION: KNEE

## 2018-11-27 ASSESSMENT — PAIN DESCRIPTION - PROGRESSION: CLINICAL_PROGRESSION: GRADUALLY WORSENING

## 2018-11-27 ASSESSMENT — PAIN DESCRIPTION - ORIENTATION: ORIENTATION: RIGHT

## 2018-11-27 ASSESSMENT — PAIN DESCRIPTION - PAIN TYPE: TYPE: CHRONIC PAIN

## 2018-11-27 ASSESSMENT — PAIN DESCRIPTION - DESCRIPTORS: DESCRIPTORS: ACHING;TENDER;SORE

## 2018-11-27 NOTE — PROGRESS NOTES
History  Social/Functional History  Lives With: Family  Type of Home: House  Home Layout: Multi-level  ADL Assistance: Independent  Ambulation Assistance: Independent  Transfer Assistance: Independent  Active : Yes  Mode of Transportation: Car  Occupation: Full time employment  Type of occupation: [employer] - frequent sitting, repetitive UE activity  Objective     Observation/Palpation  Posture: Good  Palpation: Tenderness-right knee at distal IT band, patella tendon, VMO; mild crepitus at PTFJ with AROM flexion, extension  Observation: Gait: WFL, wearing knee support; right knee: mild general edema, mild-moderate quad atrophy    AROM RLE (degrees)  R Hip Flexion 0-125: WFL  R Knee Flexion 0-145: 115  R Knee Extension 0: -5    Strength RLE  R Hip Flexion: 4+/5  R Knee Flexion: 4+/5  R Knee Extension: 4/5  Tone RLE  RLE Tone: Normotonic  Tone LLE  LLE Tone: Normotonic  Motor Control  Gross Motor?: WFL  Additional Measures  Flexibility: (+) tightness right rectus femoris, quadriceps  Special Tests: Right knee joint stable ligamentously  Other: (+) patella compression  Sensation  Overall Sensation Status: WFL        Ambulation  Ambulation?: Yes  Ambulation 1  Surface: level tile  Device: No Device  Assistance: Independent     Exercises  Exercise 1: Nustep: L5 x 8'  Exercise 2: Mini-squats - 2 x 15  Exercise 3: standing marching - focus on high knee - 30\" x 3  Exercise 4: LAQ's: 90-30 ROM only - 5# - 2 x 15  Exercise 5: Seated HS curls - blueberry - 2 x 15   Exercise 6: standing hip flexor stretch - gentle - 20\" x 5  Exercise 7: Manual: STM right distal IT band, patella tendon, VMO;  prone knee flexion stretch- focus on quad stretching - 30\" x 5                      Assessment   Conditions Requiring Skilled Therapeutic Intervention  Body structures, Functions, Activity limitations: Decreased functional mobility ; Decreased strength;Decreased high-level IADLs  Assessment: Right knee pain, tendinitis; patello-femoral pain; quadriceps muscle tightness  Treatment Diagnosis: Right knee pain, tendinitis; patello-femoral pain; quadriceps muscle tightness  Prognosis: Good  Decision Making: Low Complexity  Patient Education: activity modification  REQUIRES PT FOLLOW UP: Yes  Treatment Initiated : patient education  Activity Tolerance  Activity Tolerance: Patient Tolerated treatment well         Plan   Plan  Times per week: 2-3 x week  Plan weeks: 6-8 weeks  Current Treatment Recommendations: Strengthening, ROM, Neuromuscular Re-education, Home Exercise Program, Manual Therapy - Joint Manipulation, Manual Therapy - Soft Tissue Mobilization, Modalities    G-Code  PT G-Codes  Functional Assessment Tool Used: LEFS  Score: 19/80  Functional Limitation: Mobility: Walking and moving around  Mobility: Walking and Moving Around Current Status (): At least 40 percent but less than 60 percent impaired, limited or restricted  Mobility: Walking and Moving Around Goal Status (): At least 1 percent but less than 20 percent impaired, limited or restricted                              Goals  Short term goals  Time Frame for Short term goals: 3-4 weeks  Short term goal 1: Report a 25-50% decrease in right knee pain with routine daily acitivities. Short term goal 2: Achieve a LEFS score of 28/80. Short term goal 3: Independent with HEP. Long term goals  Time Frame for Long term goals : 6-8 weeks  Long term goal 1: Report a 75% decrease in right knee pain with routine daily acitivities. Long term goal 2: Achieve 5-/5 right knee strength. Long term goal 3: Achieve a LEFS score of 34/80. Long term goal 4: Be able to ambulate community distances with 1/10 knee pain or less.   Patient Goals   Patient goals : alleviate knee pain       Therapy Time   Individual Concurrent Group Co-treatment   Time In           Time Out           Minutes                   Electronically signed by Luis Stern PT on 11/27/2018 at 29:84 AM      Certification of

## 2018-11-29 ENCOUNTER — HOSPITAL ENCOUNTER (OUTPATIENT)
Dept: PHYSICAL THERAPY | Facility: HOSPITAL | Age: 53
Setting detail: THERAPIES SERIES
Discharge: HOME | End: 2018-11-29
Payer: COMMERCIAL

## 2018-11-29 PROCEDURE — 97140 MANUAL THERAPY 1/> REGIONS: CPT

## 2018-11-29 PROCEDURE — 97110 THERAPEUTIC EXERCISES: CPT

## 2018-11-29 NOTE — FLOWSHEET NOTE
Physical Therapy Daily Treatment Note   Date:  2018    TIme In:      830                Time Out:  Juan    Patient Name:  Alexandru Calzada    :  1965  MRN: [id number]    Restrictions/Precautions:    Pertinent Medical History:  Medical/Treatment Diagnosis Information:  ·   Right knee pain, tendinitis; patello-femoral pain; quadriceps muscle tightness  ·    Insurance/Certification information:    Gonzalez HINOJOSA/OJ  Physician Information:    Shubham Simms PA-C  Plan of care signed (Y/N):    Visit# / total visits:   2  /    G-Code (if applicable):      Date / Visit # G-Code Applied:         Progress Note: []  Yes  [x]  No  Next due by: Visit #10      Pain level:  2 /10    Subjective:    No changes or new c/o; mild right knee pain this morning     Objective:  Observation:   Test measurements:    Palpation:    Exercises:  Exercise Resistance/Repetitions Other comments   Nustep L5 x 8'    Mini-squats 2 x 15    Standing marching 30\" x 3    Seated HS curls Blueberry - 2 x 15    LAQ's 90-30 degrees ROM 5#, 2 x 15    Standing hip flexor stretch 20\" x 5                                    Other Therapeutic Activities:      Manual Treatments:  STM right distal IT band, patella tendon, VMO; prone knee flexion stretch - 30\" x 5    Modalities:        Timed Code Treatment Minutes:  48'        Total Treatment Minutes:  48'    Treatment/Activity Tolerance:  [x] Patient tolerated treatment well [] Patient limited by fatigue  [] Patient limited by pain  [] Patient limited by other medical complications  [] Other:     Pain after treatment:    0  /10    Prognosis: [x] Good [] Fair  [] Poor    Patient Requires Follow-up: [x] Yes  [] No    Plan:   [x] Continue per plan of care [] Alter current plan (see comments)  [] Plan of care initiated [] Hold pending MD visit [] Discharge    Plan for Next Session:        Electronically signed by:  . Electronically signed by Cayden Ramos PT on 2018 at 9:29 AM

## 2018-12-03 ENCOUNTER — APPOINTMENT (OUTPATIENT)
Dept: PHYSICAL THERAPY | Facility: HOSPITAL | Age: 53
End: 2018-12-03
Payer: COMMERCIAL

## 2018-12-05 ENCOUNTER — APPOINTMENT (OUTPATIENT)
Dept: PHYSICAL THERAPY | Facility: HOSPITAL | Age: 53
End: 2018-12-05
Payer: COMMERCIAL

## 2018-12-06 ENCOUNTER — HOSPITAL ENCOUNTER (OUTPATIENT)
Dept: PHYSICAL THERAPY | Facility: HOSPITAL | Age: 53
Setting detail: THERAPIES SERIES
Discharge: HOME | End: 2018-12-06
Payer: COMMERCIAL

## 2018-12-06 PROCEDURE — 97140 MANUAL THERAPY 1/> REGIONS: CPT

## 2018-12-06 PROCEDURE — 97110 THERAPEUTIC EXERCISES: CPT

## 2018-12-06 NOTE — FLOWSHEET NOTE
Physical Therapy Daily Treatment Note   Date:  2018    TIme In:      1500                Time Out:   700 [address]    Patient Name:  Tessa Mendez    :  1965  MRN: [id number]    Restrictions/Precautions:    Pertinent Medical History:  Medical/Treatment Diagnosis Information:  ·   Right knee pain, tendinitis; patello-femoral pain; quadriceps muscle tightness     Insurance/Certification information:    Gonzalez HINOJOSA/OJ  Physician Information:    Kelly De Jesus PA-C  Plan of care signed (Y/N):    Visit# / total visits:     3/    G-Code (if applicable):      Date / Visit # G-Code Applied:         Progress Note: []  Yes  [x]  No  Next due by: Visit #10      Pain level:  2/10    Subjective:    No changes or new c/o; mild right knee pain this morning     Objective:  Observation:   Test measurements:    Palpation:    Exercises:  Exercise Resistance/Repetitions Other comments   Nustep L5 x 8' 6   Mini-squats 2 x 15 6   Standing marching 30\" x 3 6   Seated HS curls Blueberry - 2 x 15 6   LAQ's 90-30 degrees ROM 5#, 2 x 15 6   Standing hip flexor stretch 20\" x 5 6                                   Other Therapeutic Activities:      Manual Treatments:  STM right distal IT band, patella tendon, VMO; prone knee flexion stretch - 30\" x 5    Modalities:        Timed Code Treatment Minutes:  40        Total Treatment Minutes:  45    Treatment/Activity Tolerance:  [x] Patient tolerated treatment well [] Patient limited by fatigue  [] Patient limited by pain  [] Patient limited by other medical complications  [] Other:     Pain after treatment:    0/10    Prognosis: [x] Good [] Fair  [] Poor    Patient Requires Follow-up: [x] Yes  [] No    Plan:   [x] Continue per plan of care [] Alter current plan (see comments)  [] Plan of care initiated [] Hold pending MD visit [] Discharge    Plan for Next Session:        Electronically signed by:  . Electronically signed by Eliazar Valle PTA on 2018 at 3:13 PM

## 2018-12-10 ENCOUNTER — HOSPITAL ENCOUNTER (OUTPATIENT)
Dept: PHYSICAL THERAPY | Facility: HOSPITAL | Age: 53
Setting detail: THERAPIES SERIES
End: 2018-12-10
Payer: COMMERCIAL

## 2018-12-13 RX ORDER — GABAPENTIN 400 MG/1
400 CAPSULE ORAL 3 TIMES DAILY
Qty: 90 CAPSULE | Refills: 0 | Status: SHIPPED | OUTPATIENT
Start: 2018-12-13 | End: 2019-05-24 | Stop reason: SDUPTHER

## 2018-12-18 ENCOUNTER — HOSPITAL ENCOUNTER (OUTPATIENT)
Dept: PHYSICAL THERAPY | Facility: HOSPITAL | Age: 53
Setting detail: THERAPIES SERIES
Discharge: HOME | End: 2018-12-18
Payer: COMMERCIAL

## 2018-12-18 PROCEDURE — 97110 THERAPEUTIC EXERCISES: CPT

## 2018-12-18 PROCEDURE — 97530 THERAPEUTIC ACTIVITIES: CPT

## 2018-12-18 PROCEDURE — 97140 MANUAL THERAPY 1/> REGIONS: CPT

## 2018-12-18 NOTE — FLOWSHEET NOTE
Physical Therapy Daily Treatment Note   Date:  2018    TIme In:      1125                Time Out:   1210    Patient Name:  Karen Vasquez    :  1965  MRN: [id number]    Restrictions/Precautions:    Pertinent Medical History:  Medical/Treatment Diagnosis Information:  ·   Right knee pain, tendinitis; patello-femoral pain; quadriceps muscle tightness     Insurance/Certification information:    Gonzalez HINOJOSA/BS  Physician Information:    Todd Pina PA-C  Plan of care signed (Y/N):    Visit# / total visits:     4/    G-Code (if applicable):      Date / Visit # G-Code Applied:         Progress Note: []  Yes  [x]  No  Next due by: Visit #10      Pain level:  2/10    Subjective:  Pr reports her pain has been [place]. \"     Objective:  Observation:   Test measurements:    Palpation:    Exercises:  Exercise Resistance/Repetitions Other comments   Nustep L5 x 8' 18   Mini-squats 2 x 15 18   Standing marching 30\" x 3 18   Seated HS curls Blueberry - 2 x 15 18   LAQ's 90-30 degrees ROM 5#, 2 x 15 18   Standing hip flexor stretch 20\" x 5 18                                   Other Therapeutic Activities:      Manual Treatments:  STM right distal IT band, patella tendon, VMO; prone knee flexion stretch - 30\" x 5    Modalities:        Timed Code Treatment Minutes:  40        Total Treatment Minutes:  45    Treatment/Activity Tolerance:  [x] Patient tolerated treatment well [] Patient limited by fatigue  [] Patient limited by pain  [] Patient limited by other medical complications  [] Other:     Pain after treatment:    4/10    Prognosis: [x] Good [] Fair  [] Poor    Patient Requires Follow-up: [x] Yes  [] No    Plan:   [x] Continue per plan of care [] Alter current plan (see comments)  [] Plan of care initiated [] Hold pending MD visit [] Discharge    Plan for Next Session:        Electronically signed by:  . Electronically signed by Cindy Aragon PTA on 2018 at 11:26 AM

## 2018-12-19 ENCOUNTER — HOSPITAL ENCOUNTER (OUTPATIENT)
Dept: PHYSICAL THERAPY | Facility: HOSPITAL | Age: 53
Setting detail: THERAPIES SERIES
Discharge: HOME | End: 2018-12-19
Payer: COMMERCIAL

## 2018-12-19 PROCEDURE — 97110 THERAPEUTIC EXERCISES: CPT

## 2018-12-19 PROCEDURE — 97140 MANUAL THERAPY 1/> REGIONS: CPT

## 2018-12-19 PROCEDURE — 97530 THERAPEUTIC ACTIVITIES: CPT

## 2018-12-24 ENCOUNTER — HOSPITAL ENCOUNTER (OUTPATIENT)
Dept: PHYSICAL THERAPY | Facility: HOSPITAL | Age: 53
Setting detail: THERAPIES SERIES
Discharge: HOME | End: 2018-12-24
Payer: COMMERCIAL

## 2018-12-24 PROCEDURE — 97110 THERAPEUTIC EXERCISES: CPT

## 2018-12-24 PROCEDURE — 97140 MANUAL THERAPY 1/> REGIONS: CPT

## 2018-12-24 PROCEDURE — 97530 THERAPEUTIC ACTIVITIES: CPT

## 2018-12-24 NOTE — FLOWSHEET NOTE
Physical Therapy Daily Treatment Note   Date:  2018    TIme In:      1030                Time Out:   1115    Patient Name:  Junior Perales    :  1965  MRN: [id number]    Restrictions/Precautions:    Pertinent Medical History:  Medical/Treatment Diagnosis Information:  ·   Right knee pain, tendinitis; patello-femoral pain; quadriceps muscle tightness     Insurance/Certification information:    Gonzalez HINOJOSA/OJ  Physician Information:    Ramya Iglesias PA-C  Plan of care signed (Y/N):    Visit# / total visits:     6/    G-Code (if applicable):      Date / Visit # G-Code Applied:         Progress Note: []  Yes  [x]  No  Next due by: Visit #10      Pain level:  2/10    Subjective:  Pr reports her pain has been [place]. \"      Objective:  Observation:   Test measurements:    Palpation:    Exercises:  Exercise Resistance/Repetitions Other comments   Nustep L5 x 8' 24   Mini-squats 2 x 15 24   Standing marching 30\" x 3 24   Seated HS curls Blueberry - 2 x 15 24   LAQ's 90-30 degrees ROM 5#, 2 x 15 24   Standing hip flexor stretch 20\" x 5 24                                   Other Therapeutic Activities:      Manual Treatments:  STM right distal IT band, patella tendon, VMO; prone knee flexion stretch - 30\" x 5    Modalities:        Timed Code Treatment Minutes:  40        Total Treatment Minutes:  45    Treatment/Activity Tolerance:  [x] Patient tolerated treatment well [] Patient limited by fatigue  [] Patient limited by pain  [] Patient limited by other medical complications  [x] Other:  Pt c/o increased pain with IT band, slight increased c/o pain after session. Pain after treatment:    3/10    Prognosis: [x] Good [] Fair  [] Poor    Patient Requires Follow-up: [x] Yes  [] No    Plan:   [x] Continue per plan of care [] Alter current plan (see comments)  [] Plan of care initiated [] Hold pending MD visit [] Discharge    Plan for Next Session:        Electronically signed by:  . Electronically signed by

## 2018-12-26 ENCOUNTER — HOSPITAL ENCOUNTER (OUTPATIENT)
Dept: PHYSICAL THERAPY | Facility: HOSPITAL | Age: 53
Setting detail: THERAPIES SERIES
Discharge: HOME | End: 2018-12-26
Payer: COMMERCIAL

## 2018-12-26 PROCEDURE — 97110 THERAPEUTIC EXERCISES: CPT

## 2018-12-26 PROCEDURE — 97140 MANUAL THERAPY 1/> REGIONS: CPT

## 2018-12-27 PROCEDURE — G8979 MOBILITY GOAL STATUS: HCPCS

## 2018-12-27 PROCEDURE — G8978 MOBILITY CURRENT STATUS: HCPCS

## 2018-12-31 ENCOUNTER — APPOINTMENT (OUTPATIENT)
Dept: PHYSICAL THERAPY | Facility: HOSPITAL | Age: 53
End: 2018-12-31
Payer: COMMERCIAL

## 2019-01-03 ENCOUNTER — HOSPITAL ENCOUNTER (OUTPATIENT)
Dept: PHYSICAL THERAPY | Facility: HOSPITAL | Age: 54
Setting detail: THERAPIES SERIES
Discharge: HOME | End: 2019-01-03
Payer: COMMERCIAL

## 2019-01-03 PROCEDURE — 97140 MANUAL THERAPY 1/> REGIONS: CPT

## 2019-01-03 PROCEDURE — 97110 THERAPEUTIC EXERCISES: CPT

## 2019-01-07 ENCOUNTER — HOSPITAL ENCOUNTER (OUTPATIENT)
Dept: PHYSICAL THERAPY | Facility: HOSPITAL | Age: 54
Setting detail: THERAPIES SERIES
Discharge: HOME | End: 2019-01-07
Payer: COMMERCIAL

## 2019-01-07 PROCEDURE — 97161 PT EVAL LOW COMPLEX 20 MIN: CPT

## 2019-01-07 PROCEDURE — 97140 MANUAL THERAPY 1/> REGIONS: CPT

## 2019-01-07 PROCEDURE — 97110 THERAPEUTIC EXERCISES: CPT

## 2019-01-09 ENCOUNTER — HOSPITAL ENCOUNTER (OUTPATIENT)
Dept: PHYSICAL THERAPY | Facility: HOSPITAL | Age: 54
Setting detail: THERAPIES SERIES
Discharge: HOME | End: 2019-01-09
Payer: COMMERCIAL

## 2019-01-09 PROCEDURE — 97140 MANUAL THERAPY 1/> REGIONS: CPT

## 2019-01-09 PROCEDURE — 97110 THERAPEUTIC EXERCISES: CPT

## 2019-01-09 PROCEDURE — 97530 THERAPEUTIC ACTIVITIES: CPT

## 2019-01-14 ENCOUNTER — HOSPITAL ENCOUNTER (OUTPATIENT)
Dept: PHYSICAL THERAPY | Facility: HOSPITAL | Age: 54
Setting detail: THERAPIES SERIES
Discharge: HOME | End: 2019-01-14
Payer: COMMERCIAL

## 2019-01-14 PROCEDURE — 97110 THERAPEUTIC EXERCISES: CPT

## 2019-01-14 PROCEDURE — 97140 MANUAL THERAPY 1/> REGIONS: CPT

## 2019-01-16 ENCOUNTER — HOSPITAL ENCOUNTER (OUTPATIENT)
Dept: PHYSICAL THERAPY | Facility: HOSPITAL | Age: 54
Setting detail: THERAPIES SERIES
Discharge: HOME | End: 2019-01-16
Payer: COMMERCIAL

## 2019-01-16 PROCEDURE — 97140 MANUAL THERAPY 1/> REGIONS: CPT

## 2019-01-16 PROCEDURE — 97110 THERAPEUTIC EXERCISES: CPT

## 2019-01-21 ENCOUNTER — HOSPITAL ENCOUNTER (OUTPATIENT)
Dept: PHYSICAL THERAPY | Facility: HOSPITAL | Age: 54
Setting detail: THERAPIES SERIES
Discharge: HOME | End: 2019-01-21
Payer: COMMERCIAL

## 2019-01-21 PROCEDURE — 97140 MANUAL THERAPY 1/> REGIONS: CPT

## 2019-01-21 PROCEDURE — 97110 THERAPEUTIC EXERCISES: CPT

## 2019-01-21 PROCEDURE — G0283 ELEC STIM OTHER THAN WOUND: HCPCS

## 2019-01-24 ENCOUNTER — HOSPITAL ENCOUNTER (OUTPATIENT)
Dept: PHYSICAL THERAPY | Facility: HOSPITAL | Age: 54
Setting detail: THERAPIES SERIES
End: 2019-01-24
Payer: COMMERCIAL

## 2019-02-22 ENCOUNTER — HOSPITAL ENCOUNTER (OUTPATIENT)
Dept: GENERAL RADIOLOGY | Facility: HOSPITAL | Age: 54
Discharge: HOME | End: 2019-02-22
Payer: COMMERCIAL

## 2019-02-22 ENCOUNTER — HOSPITAL ENCOUNTER (OUTPATIENT)
Facility: HOSPITAL | Age: 54
Discharge: HOME | End: 2019-02-22
Payer: COMMERCIAL

## 2019-02-22 DIAGNOSIS — Z01.818 PRE-OP EVALUATION: ICD-10-CM

## 2019-02-22 LAB
A/G RATIO: 1.8 (ref 0.8–2)
ALBUMIN SERPL-MCNC: 4.3 G/DL (ref 3.4–4.8)
ALP BLD-CCNC: 72 U/L (ref 25–100)
ALT SERPL-CCNC: 13 U/L (ref 4–36)
ANION GAP SERPL CALCULATED.3IONS-SCNC: 9 MMOL/L (ref 3–16)
AST SERPL-CCNC: 19 U/L (ref 8–33)
BILIRUB SERPL-MCNC: <0.2 MG/DL (ref 0.3–1.2)
BUN BLDV-MCNC: 24 MG/DL (ref 6–20)
CALCIUM SERPL-MCNC: 8.8 MG/DL (ref 8.5–10.5)
CHLORIDE BLD-SCNC: 104 MMOL/L (ref 98–107)
CO2: 30 MMOL/L (ref 20–30)
CREAT SERPL-MCNC: 0.8 MG/DL (ref 0.4–1.2)
GFR AFRICAN AMERICAN: >59
GFR NON-AFRICAN AMERICAN: >60
GLOBULIN: 2.4 G/DL
GLUCOSE BLD-MCNC: 54 MG/DL (ref 74–106)
HCT VFR BLD CALC: 39.1 % (ref 37–47)
HEMOGLOBIN: 12.2 G/DL (ref 11.5–16.5)
MCH RBC QN AUTO: 29.1 PG (ref 27–32)
MCHC RBC AUTO-ENTMCNC: 31.2 G/DL (ref 31–35)
MCV RBC AUTO: 93.3 FL (ref 80–100)
PDW BLD-RTO: 12.8 % (ref 11–16)
PLATELET # BLD: 199 K/UL (ref 150–400)
PMV BLD AUTO: 9.6 FL (ref 6–10)
POTASSIUM SERPL-SCNC: 3.6 MMOL/L (ref 3.4–5.1)
RBC # BLD: 4.19 M/UL (ref 3.8–5.8)
SODIUM BLD-SCNC: 143 MMOL/L (ref 136–145)
TOTAL PROTEIN: 6.7 G/DL (ref 6.4–8.3)
WBC # BLD: 5.1 K/UL (ref 4–11)

## 2019-02-22 PROCEDURE — 71046 X-RAY EXAM CHEST 2 VIEWS: CPT

## 2019-02-22 PROCEDURE — 80053 COMPREHEN METABOLIC PANEL: CPT

## 2019-02-22 PROCEDURE — 36415 COLL VENOUS BLD VENIPUNCTURE: CPT

## 2019-02-22 PROCEDURE — 85027 COMPLETE CBC AUTOMATED: CPT

## 2019-03-14 ENCOUNTER — HOSPITAL ENCOUNTER (OUTPATIENT)
Dept: PHYSICAL THERAPY | Facility: HOSPITAL | Age: 54
Setting detail: THERAPIES SERIES
Discharge: HOME | End: 2019-03-14
Payer: COMMERCIAL

## 2019-03-14 PROCEDURE — 97110 THERAPEUTIC EXERCISES: CPT

## 2019-03-14 PROCEDURE — 97161 PT EVAL LOW COMPLEX 20 MIN: CPT

## 2019-03-14 ASSESSMENT — PAIN DESCRIPTION - PAIN TYPE: TYPE: SURGICAL PAIN

## 2019-03-14 ASSESSMENT — PAIN SCALES - GENERAL: PAINLEVEL_OUTOF10: 3

## 2019-03-14 ASSESSMENT — PAIN DESCRIPTION - ORIENTATION: ORIENTATION: LEFT

## 2019-03-14 ASSESSMENT — PAIN DESCRIPTION - LOCATION: LOCATION: SHOULDER

## 2019-03-14 ASSESSMENT — PAIN DESCRIPTION - FREQUENCY: FREQUENCY: CONTINUOUS

## 2019-03-18 ASSESSMENT — PAIN DESCRIPTION - ORIENTATION: ORIENTATION: LEFT

## 2019-03-18 ASSESSMENT — PAIN DESCRIPTION - PAIN TYPE: TYPE: SURGICAL PAIN

## 2019-03-18 ASSESSMENT — PAIN DESCRIPTION - FREQUENCY: FREQUENCY: CONTINUOUS

## 2019-03-18 ASSESSMENT — PAIN DESCRIPTION - LOCATION: LOCATION: SHOULDER

## 2019-03-18 ASSESSMENT — PAIN SCALES - GENERAL: PAINLEVEL_OUTOF10: 3

## 2019-03-19 ENCOUNTER — HOSPITAL ENCOUNTER (OUTPATIENT)
Dept: PHYSICAL THERAPY | Facility: HOSPITAL | Age: 54
Setting detail: THERAPIES SERIES
Discharge: HOME | End: 2019-03-19
Payer: COMMERCIAL

## 2019-03-19 PROCEDURE — 97140 MANUAL THERAPY 1/> REGIONS: CPT

## 2019-03-19 PROCEDURE — 97110 THERAPEUTIC EXERCISES: CPT

## 2019-03-21 ENCOUNTER — HOSPITAL ENCOUNTER (OUTPATIENT)
Dept: PHYSICAL THERAPY | Facility: HOSPITAL | Age: 54
Setting detail: THERAPIES SERIES
Discharge: HOME | End: 2019-03-21
Payer: COMMERCIAL

## 2019-03-21 PROCEDURE — 97110 THERAPEUTIC EXERCISES: CPT

## 2019-03-21 PROCEDURE — 97140 MANUAL THERAPY 1/> REGIONS: CPT

## 2019-03-25 ENCOUNTER — HOSPITAL ENCOUNTER (OUTPATIENT)
Dept: PHYSICAL THERAPY | Facility: HOSPITAL | Age: 54
Setting detail: THERAPIES SERIES
End: 2019-03-25
Payer: COMMERCIAL

## 2019-03-26 ENCOUNTER — HOSPITAL ENCOUNTER (OUTPATIENT)
Dept: PHYSICAL THERAPY | Facility: HOSPITAL | Age: 54
Setting detail: THERAPIES SERIES
Discharge: HOME | End: 2019-03-26
Payer: COMMERCIAL

## 2019-03-26 PROCEDURE — G0283 ELEC STIM OTHER THAN WOUND: HCPCS

## 2019-03-26 PROCEDURE — 97110 THERAPEUTIC EXERCISES: CPT

## 2019-03-26 PROCEDURE — 97140 MANUAL THERAPY 1/> REGIONS: CPT

## 2019-03-28 ENCOUNTER — APPOINTMENT (OUTPATIENT)
Dept: PHYSICAL THERAPY | Facility: HOSPITAL | Age: 54
End: 2019-03-28
Payer: COMMERCIAL

## 2019-03-29 ENCOUNTER — HOSPITAL ENCOUNTER (OUTPATIENT)
Dept: PHYSICAL THERAPY | Facility: HOSPITAL | Age: 54
Setting detail: THERAPIES SERIES
Discharge: HOME | End: 2019-03-29
Payer: COMMERCIAL

## 2019-03-29 PROCEDURE — 97140 MANUAL THERAPY 1/> REGIONS: CPT

## 2019-03-29 PROCEDURE — G0283 ELEC STIM OTHER THAN WOUND: HCPCS

## 2019-03-29 PROCEDURE — 97110 THERAPEUTIC EXERCISES: CPT

## 2019-04-01 ENCOUNTER — HOSPITAL ENCOUNTER (OUTPATIENT)
Dept: PHYSICAL THERAPY | Facility: HOSPITAL | Age: 54
Setting detail: THERAPIES SERIES
Discharge: HOME | End: 2019-04-01
Payer: COMMERCIAL

## 2019-04-01 PROCEDURE — 97140 MANUAL THERAPY 1/> REGIONS: CPT

## 2019-04-01 PROCEDURE — 97110 THERAPEUTIC EXERCISES: CPT

## 2019-04-01 PROCEDURE — G0283 ELEC STIM OTHER THAN WOUND: HCPCS

## 2019-04-01 NOTE — FLOWSHEET NOTE
Physical Therapy Daily Treatment Note   Date:  2019    TIme In:      813                 Time Out:  908    Patient Name:  Zahra Hutchinson    :  1965  MRN: [id number]    Restrictions/Precautions:    Pertinent Medical History:  Medical/Treatment Diagnosis Information:  ·   s/p L shoulder ATS with SAD / DCR / biceps tenodesis     Insurance/Certification information:    Gonzalez HINOJOSA/BS  Physician Information:   Nader Sweeney PA-C  Plan of care signed (Y/N):    Visit# / total visits:     6 /    G-Code (if applicable):      Date / Visit # G-Code Applied:         Progress Note: []  Yes  [x]  No  Next due by: Visit #10      Pain level:   2/10    Subjective:  Pt reports: having increase in UT region pain, but shoulder pain is improved.       Objective:  Observation:   Test measurements:    Palpation:    Exercises:  Exercise Resistance/Repetitions Other comments   Pendulums x30 each 1   Scapular retraction  x30 1   Pulleys 3' flexion 1   UT stretch 5x20\" 1   Wand abd x30 1   Wand ER/IR x30 1   Ball roll on table with scap ret x30 1   Supine self ROM flexion x30 1                         Other Therapeutic Activities:      Manual Treatments:  Lshoulder PROM as tolerated, grade II AP / inf mobs    Modalities:  IFC with MH, L shoulder 15 min      Timed Code Treatment Minutes: 38       Total Treatment Minutes:  55    Treatment/Activity Tolerance:  [x] Patient tolerated treatment well [] Patient limited by fatigue  [] Patient limited by pain  [] Patient limited by other medical complications  [] Other:      Pain after treatment:      2/10    Prognosis: [x] Good [] Fair  [] Poor    Patient Requires Follow-up: [x] Yes  [] No    Plan:   [x] Continue per plan of care [] Alter current plan (see comments)  [] Plan of care initiated [] Hold pending MD visit [] Discharge    Plan for Next Session:        Electronically signed by:  Kristen Porras PT

## 2019-04-03 ENCOUNTER — HOSPITAL ENCOUNTER (OUTPATIENT)
Dept: PHYSICAL THERAPY | Facility: HOSPITAL | Age: 54
Setting detail: THERAPIES SERIES
End: 2019-04-03
Payer: COMMERCIAL

## 2019-04-03 ENCOUNTER — HOSPITAL ENCOUNTER (OUTPATIENT)
Facility: HOSPITAL | Age: 54
Discharge: HOME | End: 2019-04-03
Payer: COMMERCIAL

## 2019-04-03 LAB
BASOPHILS ABSOLUTE: 0 K/UL (ref 0–0.1)
BASOPHILS RELATIVE PERCENT: 0.5 %
C-REACTIVE PROTEIN: 3.7 MG/L (ref 0–5.1)
EOSINOPHILS ABSOLUTE: 0.2 K/UL (ref 0–0.4)
EOSINOPHILS RELATIVE PERCENT: 2.1 %
HCT VFR BLD CALC: 41.7 % (ref 37–47)
HEMOGLOBIN: 13.5 G/DL (ref 11.5–16.5)
IMMATURE GRANULOCYTES #: 0 K/UL
IMMATURE GRANULOCYTES %: 0.1 % (ref 0–5)
LYMPHOCYTES ABSOLUTE: 1.5 K/UL (ref 1.5–4)
LYMPHOCYTES RELATIVE PERCENT: 19.6 %
MCH RBC QN AUTO: 28.8 PG (ref 27–32)
MCHC RBC AUTO-ENTMCNC: 32.4 G/DL (ref 31–35)
MCV RBC AUTO: 89.1 FL (ref 80–100)
MONOCYTES ABSOLUTE: 0.5 K/UL (ref 0.2–0.8)
MONOCYTES RELATIVE PERCENT: 6.8 %
NEUTROPHILS ABSOLUTE: 5.4 K/UL (ref 2–7.5)
NEUTROPHILS RELATIVE PERCENT: 70.9 %
PDW BLD-RTO: 12.8 % (ref 11–16)
PLATELET # BLD: 233 K/UL (ref 150–400)
PMV BLD AUTO: 9.3 FL (ref 6–10)
RBC # BLD: 4.68 M/UL (ref 3.8–5.8)
SEDIMENTATION RATE, ERYTHROCYTE: 23 MM/HR (ref 0–20)
WBC # BLD: 7.6 K/UL (ref 4–11)

## 2019-04-03 PROCEDURE — 36415 COLL VENOUS BLD VENIPUNCTURE: CPT

## 2019-04-03 PROCEDURE — 85025 COMPLETE CBC W/AUTO DIFF WBC: CPT

## 2019-04-03 PROCEDURE — 85652 RBC SED RATE AUTOMATED: CPT

## 2019-04-03 PROCEDURE — 86140 C-REACTIVE PROTEIN: CPT

## 2019-04-05 ENCOUNTER — HOSPITAL ENCOUNTER (OUTPATIENT)
Dept: PHYSICAL THERAPY | Facility: HOSPITAL | Age: 54
Setting detail: THERAPIES SERIES
Discharge: HOME | End: 2019-04-05
Payer: COMMERCIAL

## 2019-04-05 PROCEDURE — G0283 ELEC STIM OTHER THAN WOUND: HCPCS

## 2019-04-05 PROCEDURE — 97140 MANUAL THERAPY 1/> REGIONS: CPT

## 2019-04-05 PROCEDURE — 97110 THERAPEUTIC EXERCISES: CPT

## 2019-04-05 NOTE — FLOWSHEET NOTE
Physical Therapy Daily Treatment Note   Date:  2019    TIme In:                      Time Out:  1011    Patient Name:  Tess Rao    :  1965  MRN: [id number]    Restrictions/Precautions:    Pertinent Medical History:  Medical/Treatment Diagnosis Information:  ·   s/p L shoulder ATS with SAD / DCR / biceps tenodesis     Insurance/Certification information:    Gonzalez HINOJOSA/BS  Physician Information:   Olya Roland PA-C  Plan of care signed (Y/N):    Visit# / total visits:     7 /    G-Code (if applicable):      Date / Visit # G-Code Applied:         Progress Note: []  Yes  [x]  No  Next due by: Visit #10      Pain level:   2/10    Subjective:  Pt reports her sh is doing well and he took her out of the sling. Objective:  Observation:   Test measurements:    Palpation:     Exercises:  Exercise Resistance/Repetitions Other comments   Pendulums x30 each 5   Scapular retraction  x30 5   Pulleys 3' flexion 5   UT stretch 5x20\" 5   Wand abd x30 5   Wand ER/IR x30 5   Ball roll on table with scap ret x30 5   Supine self ROM flexion x30 5                         Other Therapeutic Activities:      Manual Treatments:  Lshoulder PROM as tolerated, grade II AP / inf mobs    Modalities:  IFC with MH, L shoulder 15 min      Timed Code Treatment Minutes: 45      Total Treatment Minutes:  54    Treatment/Activity Tolerance:  [x] Patient tolerated treatment well [] Patient limited by fatigue  [] Patient limited by pain  [] Patient limited by other medical complications  [x] Other:  Pt completed tx with min c/o pain and good PROM in all planes.     Pain after treatment:      2/10    Prognosis: [x] Good [] Fair  [] Poor    Patient Requires Follow-up: [x] Yes  [] No    Plan:   [x] Continue per plan of care [] Alter current plan (see comments)  [] Plan of care initiated [] Hold pending MD visit [] Discharge    Plan for Next Session:        Electronically signed by:  Jean Silvestre PTA

## 2019-04-08 ENCOUNTER — HOSPITAL ENCOUNTER (OUTPATIENT)
Dept: PHYSICAL THERAPY | Facility: HOSPITAL | Age: 54
Setting detail: THERAPIES SERIES
Discharge: HOME | End: 2019-04-08
Payer: COMMERCIAL

## 2019-04-08 PROCEDURE — 97110 THERAPEUTIC EXERCISES: CPT

## 2019-04-08 PROCEDURE — 97140 MANUAL THERAPY 1/> REGIONS: CPT

## 2019-04-08 PROCEDURE — G0283 ELEC STIM OTHER THAN WOUND: HCPCS

## 2019-04-08 NOTE — FLOWSHEET NOTE
Physical Therapy Daily Treatment Note   Date:  2019    TIme In:     09                 Time Out:  [facility] 7715    Patient Name:  Genny Dey    :  1965  MRN: [id number]    Restrictions/Precautions:    Pertinent Medical History:  Medical/Treatment Diagnosis Information:  ·   s/p L shoulder ATS with SAD / DCR / biceps tenodesis     Insurance/Certification information:    Gonzalez HINOJOSA/BS  Physician Information:   Fifi Fleming PA-C  Plan of care signed (Y/N):    Visit# / total visits:     8/    G-Code (if applicable):      Date / Visit # G-Code Applied:         Progress Note: []  Yes  [x]  No  Next due by: Visit #10      Pain level:  2 /10    Subjective:  Pt reports she has pain down her arm when she takes it out to the side but other than that it is doing good. Objective:  Observation:   Test measurements:    Palpation:     Exercises:  Exercise Resistance/Repetitions Other comments   Pendulums x30 each 8   Scapular retraction  x30 8   Pulleys 3' flexion 8   UT stretch 5x20\" 8   Wand abd x30 8   Wand ER/IR x30 8   Ball roll on table with scap ret x30 8   Supine self ROM flexion x30 8                         Other Therapeutic Activities:      Manual Treatments:  Lshoulder PROM as tolerated, grade II AP / inf mobs. STM of mid deltoid added today. Modalities:  IFC with MH, L shoulder 15 min      Timed Code Treatment Minutes: 45      Total Treatment Minutes:  54    Treatment/Activity Tolerance:  [x] Patient tolerated treatment well [] Patient limited by fatigue  [] Patient limited by pain  [] Patient limited by other medical complications  [x] Other:  Pt completed tx with min c/o pain and had mod muscle tension in left middle deltoid.     Pain after treatment:     2 /10    Prognosis: [x] Good [] Fair  [] Poor    Patient Requires Follow-up: [x] Yes  [] No    Plan:   [x] Continue per plan of care [] Alter current plan (see comments)  [] Plan of care initiated [] Hold pending MD visit [] Discharge    Plan for Next Session:        Electronically signed by:  Evelio Silvestre PTA

## 2019-04-11 ENCOUNTER — HOSPITAL ENCOUNTER (OUTPATIENT)
Dept: PHYSICAL THERAPY | Facility: HOSPITAL | Age: 54
Setting detail: THERAPIES SERIES
Discharge: HOME | End: 2019-04-11
Payer: COMMERCIAL

## 2019-04-11 PROCEDURE — 97110 THERAPEUTIC EXERCISES: CPT

## 2019-04-11 PROCEDURE — 97140 MANUAL THERAPY 1/> REGIONS: CPT

## 2019-04-11 PROCEDURE — G0283 ELEC STIM OTHER THAN WOUND: HCPCS

## 2019-04-11 NOTE — FLOWSHEET NOTE
AROM.  She is ready to begin strengthening phase and progress with AROM. Pain after treatment:      2/10    Prognosis: [x] Good [] Fair  [] Poor    Patient Requires Follow-up: [x] Yes  [] No    Plan:   [x] Continue per plan of care [] Alter current plan (see comments)  [] Plan of care initiated [] Hold pending MD visit [] Discharge    Plan for Next Session:      Goals  Short term goals  Time Frame for Short term goals: 3-4 weeks  Short term goal 1: Pt to be I with HEP -MET  Short term goal 2: Pt to demonstrate L shoulder flexion AROM of 0-120 deg or greater  Short term goal 3: Pt to demonstrate L shoulder flexion / abd PROM of 0-160 deg. -MET   Short term goal 4: Pt to perform daily activities with pain of less than 5/10. -MET  Long term goals  Time Frame for Long term goals : 6-8 weeks  Long term goal 1: Quick DASH score to improve to less than 15% indicating improved function  Long term goal 2: Pt to demonstrate L shoulder AROM flexion and abduction of 0-150 deg or greater  Long term goal 3: ER / IR AROM to improve to 0-60 deg or greater.   Long term goal 4: Pt to demonstrate 4+/5 or greater L shoulder strength grossly       Electronically signed by:  Douglas James PT

## 2019-04-12 ENCOUNTER — HOSPITAL ENCOUNTER (OUTPATIENT)
Dept: PHYSICAL THERAPY | Facility: HOSPITAL | Age: 54
Setting detail: THERAPIES SERIES
Discharge: HOME | End: 2019-04-12
Payer: COMMERCIAL

## 2019-04-12 PROCEDURE — G0283 ELEC STIM OTHER THAN WOUND: HCPCS

## 2019-04-12 PROCEDURE — 97140 MANUAL THERAPY 1/> REGIONS: CPT

## 2019-04-12 PROCEDURE — 97110 THERAPEUTIC EXERCISES: CPT

## 2019-04-12 NOTE — FLOWSHEET NOTE
Physical Therapy Daily Note  Date:  2019    TIme In:     928                 Time Out:  1027    Patient Name:  Gerardo Evans    :  1965  MRN: [id number]    Restrictions/Precautions:    Pertinent Medical History:  Medical/Treatment Diagnosis Information:  ·   s/p L shoulder ATS with SAD / DCR / biceps tenodesis     Insurance/Certification information:    Gonzalez HINOJOSA/BS  Physician Information:   Wenceslao Horton PA-C  Plan of care signed (Y/N):    Visit# / total visits:     10/    G-Code (if applicable):      Date / Visit # G-Code Applied:         Progress Note: [x]  Yes  []  No  Next due by: 19      Pain level:  2/10    Subjective:  Pt reports she continues to having tightness in her shoulder. Objective:  Observation:   Test measurements:    Palpation:     Exercises:  Exercise Resistance/Repetitions Other comments   Pendulums x30 each 12   Scapular retraction  x30 12   Pulleys 3' flexion 12   UT stretch 5x20\" 12   Wand abd x30 12   Wand ER/IR x30 12   Ball roll on table with scap ret x30 12   Supine self ROM flexion x30 12   Mid / low rows Orange, 2x10 12   ER / IR w/ t-band Orange, 2x10 each 12   HH depression (gentle) 2x10 12   Ball roll up wall 3x10 12     Other Therapeutic Activities:      Manual Treatments:  Lshoulder PROM as tolerated, grade II AP / inf mobs. STM of mid deltoid added today. X 12'    Modalities:  IFC with MH, L shoulder 15 min      Timed Code Treatment Minutes:  54'      Total Treatment Minutes:  61'    Treatment/Activity Tolerance:  [x] Patient tolerated treatment well [] Patient limited by fatigue  [] Patient limited by pain  [] Patient limited by other medical complications  [x] Other: Pt is progressing well with post op rehab.     Pain after treatment:      2/10    Prognosis: [x] Good [] Fair  [] Poor    Patient Requires Follow-up: [x] Yes  [] No    Plan:   [x] Continue per plan of care [] Alter current plan (see comments)  [] Plan of care initiated [] Hold pending MD visit [] Discharge    Plan for Next Session:      Goals  Short term goals  Time Frame for Short term goals: 3-4 weeks  Short term goal 1: Pt to be I with HEP -MET  Short term goal 2: Pt to demonstrate L shoulder flexion AROM of 0-120 deg or greater  Short term goal 3: Pt to demonstrate L shoulder flexion / abd PROM of 0-160 deg. -MET   Short term goal 4: Pt to perform daily activities with pain of less than 5/10. -MET  Long term goals  Time Frame for Long term goals : 6-8 weeks  Long term goal 1: Quick DASH score to improve to less than 15% indicating improved function  Long term goal 2: Pt to demonstrate L shoulder AROM flexion and abduction of 0-150 deg or greater  Long term goal 3: ER / IR AROM to improve to 0-60 deg or greater.   Long term goal 4: Pt to demonstrate 4+/5 or greater L shoulder strength grossly       Electronically signed by:  Abida Sanford PT

## 2019-04-15 ENCOUNTER — HOSPITAL ENCOUNTER (OUTPATIENT)
Dept: PHYSICAL THERAPY | Facility: HOSPITAL | Age: 54
Setting detail: THERAPIES SERIES
Discharge: HOME | End: 2019-04-15
Payer: COMMERCIAL

## 2019-04-15 PROCEDURE — 97110 THERAPEUTIC EXERCISES: CPT

## 2019-04-15 PROCEDURE — G0283 ELEC STIM OTHER THAN WOUND: HCPCS

## 2019-04-15 PROCEDURE — 97140 MANUAL THERAPY 1/> REGIONS: CPT

## 2019-04-17 ENCOUNTER — TELEPHONE (OUTPATIENT)
Dept: ORTHOPEDIC SURGERY | Facility: CLINIC | Age: 54
End: 2019-04-17

## 2019-04-18 ENCOUNTER — HOSPITAL ENCOUNTER (OUTPATIENT)
Dept: PHYSICAL THERAPY | Facility: HOSPITAL | Age: 54
Setting detail: THERAPIES SERIES
Discharge: HOME | End: 2019-04-18
Payer: COMMERCIAL

## 2019-04-18 PROCEDURE — 97140 MANUAL THERAPY 1/> REGIONS: CPT

## 2019-04-18 PROCEDURE — 97110 THERAPEUTIC EXERCISES: CPT

## 2019-04-18 NOTE — FLOWSHEET NOTE
Physical Therapy Daily Note  Date:  2019    TIme In:    1530                 Time Out:  [address]    Patient Name:  Gerardo Evans    :  1965  MRN: [id number]    Restrictions/Precautions:    Pertinent Medical History:  Medical/Treatment Diagnosis Information:  ·   s/p L shoulder ATS with SAD / DCR / biceps tenodesis     Insurance/Certification information:    Gonzalez HINOJOSA/BS  Physician Information:   Wenceslao Horton PA-C  Plan of care signed (Y/N):    Visit# / total visits:     12/    G-Code (if applicable):      Date / Visit # G-Code Applied:         Progress Note: []  Yes  [x]  No  Next due by: 19      Pain level:  2/10    Subjective:  Pt reports doing well, no new changes. Objective:  Observation:   Test measurements:    Palpation:     Exercises:  Exercise Resistance/Repetitions Other comments   Pendulums x30 each 18   Scapular retraction  x30 18   Pulleys 3' flexion 18   UT stretch 5x20\" 18   Wand abd x30 18   Wand ER/IR x30 18   Ball roll on table with scap ret x30 18   Supine self ROM flexion x30 18   Mid / low rows Orange, 2x10 18   ER / IR w/ t-band Orange, 2x10 each 18   HH depression (gentle) 2x10 18   Ball roll up wall 3x10 18     Other Therapeutic Activities:      Manual Treatments:  Lshoulder PROM as tolerated, grade II AP / inf mobs. STM of mid deltoid added today.  X 12' - performed by Rae Alfred PTA    Modalities:  IFC with MH, L shoulder 15 min      Timed Code Treatment Minutes:  40      Total Treatment Minutes:  40    Treatment/Activity Tolerance:  [x] Patient tolerated treatment well [] Patient limited by fatigue  [] Patient limited by pain  [] Patient limited by other medical complications  [] Other:     Pain after treatment:      2/10    Prognosis: [x] Good [] Fair  [] Poor    Patient Requires Follow-up: [x] Yes  [] No    Plan:   [x] Continue per plan of care [] Alter current plan (see comments)  [] Plan of care initiated [] Hold pending MD visit [] Discharge    Plan for Next Session:      Goals  Short term goals  Time Frame for Short term goals: 3-4 weeks  Short term goal 1: Pt to be I with HEP -MET  Short term goal 2: Pt to demonstrate L shoulder flexion AROM of 0-120 deg or greater  Short term goal 3: Pt to demonstrate L shoulder flexion / abd PROM of 0-160 deg. -MET   Short term goal 4: Pt to perform daily activities with pain of less than 5/10. -MET  Long term goals  Time Frame for Long term goals : 6-8 weeks  Long term goal 1: Quick DASH score to improve to less than 15% indicating improved function  Long term goal 2: Pt to demonstrate L shoulder AROM flexion and abduction of 0-150 deg or greater  Long term goal 3: ER / IR AROM to improve to 0-60 deg or greater.   Long term goal 4: Pt to demonstrate 4+/5 or greater L shoulder strength grossly       Electronically signed by:  Rosario Culver PT

## 2019-04-22 ENCOUNTER — HOSPITAL ENCOUNTER (OUTPATIENT)
Dept: PHYSICAL THERAPY | Facility: HOSPITAL | Age: 54
Setting detail: THERAPIES SERIES
Discharge: HOME | End: 2019-04-22
Payer: COMMERCIAL

## 2019-04-22 PROCEDURE — 97110 THERAPEUTIC EXERCISES: CPT

## 2019-04-22 PROCEDURE — G0283 ELEC STIM OTHER THAN WOUND: HCPCS

## 2019-04-22 PROCEDURE — 97140 MANUAL THERAPY 1/> REGIONS: CPT

## 2019-04-22 NOTE — FLOWSHEET NOTE
Physical Therapy Daily Note  Date:  2019    TIme In:   0957                  Time Out:  1101    Patient Name:  Aiyana Payne    :  1965  MRN: [id number]    Restrictions/Precautions:    Pertinent Medical History:  Medical/Treatment Diagnosis Information:  ·   s/p L shoulder ATS with SAD / DCR / biceps tenodesis     Insurance/Certification information:    Gonzalez HINOJOSA/BS  Physician Information:   Karin Garcia PA-C  Plan of care signed (Y/N):    Visit# / total visits:     13/    G-Code (if applicable):      Date / Visit # G-Code Applied:         Progress Note: []  Yes  [x]  No  Next due by: 19      Pain level:  2/10    Subjective:  Pt reports her sh only hurts when she reaches out in front of her. Objective:  Observation:   Test measurements:     Palpation:     Exercises:  Exercise Resistance/Repetitions Other comments   Pendulums x30 each 22   Scapular retraction  x30 22   Pulleys 3' flexion 22   UT stretch 5x20\" 22   Wand abd x30 22   Wand ER/IR x30 22   Ball roll on table with scap ret x30 22   Supine self ROM flexion x30 22   Mid / low rows Orange, 2x10 22   ER / IR w/ t-band Orange, 2x10 each 22   HH depression (gentle) 2x10 22   Ball roll up wall 3x10 22     Other Therapeutic Activities:      Manual Treatments:  Lshoulder PROM as tolerated, grade II AP / inf mobs. STM of mid deltoid added today. X 12'     Modalities:  IFC with MH, L shoulder 15 min      Timed Code Treatment Minutes:  55      Total Treatment Minutes:  64    Treatment/Activity Tolerance:  [x] Patient tolerated treatment well [] Patient limited by fatigue  [] Patient limited by pain  [] Patient limited by other medical complications  [x] Other:  Pt completed tx with min c/o pain and good PROM. Muscle spasms noted in left mid deltoid today.     Pain after treatment:      1/10    Prognosis: [x] Good [] Fair  [] Poor    Patient Requires Follow-up: [x] Yes  [] No    Plan:   [x] Continue per plan of care [] Alter current plan (see comments)  [] Plan of care initiated [] Hold pending MD visit [] Discharge    Plan for Next Session:      Goals  Short term goals  Time Frame for Short term goals: 3-4 weeks  Short term goal 1: Pt to be I with HEP -MET  Short term goal 2: Pt to demonstrate L shoulder flexion AROM of 0-120 deg or greater  Short term goal 3: Pt to demonstrate L shoulder flexion / abd PROM of 0-160 deg. -MET   Short term goal 4: Pt to perform daily activities with pain of less than 5/10. -MET  Long term goals  Time Frame for Long term goals : 6-8 weeks  Long term goal 1: Quick DASH score to improve to less than 15% indicating improved function  Long term goal 2: Pt to demonstrate L shoulder AROM flexion and abduction of 0-150 deg or greater  Long term goal 3: ER / IR AROM to improve to 0-60 deg or greater.   Long term goal 4: Pt to demonstrate 4+/5 or greater L shoulder strength grossly       Electronically signed by:  Gato Silvestre PTA

## 2019-04-24 ENCOUNTER — HOSPITAL ENCOUNTER (OUTPATIENT)
Dept: PHYSICAL THERAPY | Facility: HOSPITAL | Age: 54
Setting detail: THERAPIES SERIES
Discharge: HOME | End: 2019-04-24
Payer: COMMERCIAL

## 2019-04-24 PROCEDURE — G0283 ELEC STIM OTHER THAN WOUND: HCPCS

## 2019-04-24 PROCEDURE — 97110 THERAPEUTIC EXERCISES: CPT

## 2019-04-24 PROCEDURE — 97140 MANUAL THERAPY 1/> REGIONS: CPT

## 2019-04-26 ENCOUNTER — HOSPITAL ENCOUNTER (OUTPATIENT)
Dept: PHYSICAL THERAPY | Facility: HOSPITAL | Age: 54
Setting detail: THERAPIES SERIES
Discharge: HOME | End: 2019-04-26
Payer: COMMERCIAL

## 2019-04-26 PROCEDURE — 97110 THERAPEUTIC EXERCISES: CPT

## 2019-04-26 PROCEDURE — G0283 ELEC STIM OTHER THAN WOUND: HCPCS

## 2019-04-26 PROCEDURE — 97140 MANUAL THERAPY 1/> REGIONS: CPT

## 2019-04-26 NOTE — FLOWSHEET NOTE
Goals  Short term goals  Time Frame for Short term goals: 3-4 weeks  Short term goal 1: Pt to be I with HEP -MET  Short term goal 2: Pt to demonstrate L shoulder flexion AROM of 0-120 deg or greater  Short term goal 3: Pt to demonstrate L shoulder flexion / abd PROM of 0-160 deg. -MET   Short term goal 4: Pt to perform daily activities with pain of less than 5/10. -MET  Long term goals  Time Frame for Long term goals : 6-8 weeks  Long term goal 1: Quick DASH score to improve to less than 15% indicating improved function  Long term goal 2: Pt to demonstrate L shoulder AROM flexion and abduction of 0-150 deg or greater  Long term goal 3: ER / IR AROM to improve to 0-60 deg or greater.   Long term goal 4: Pt to demonstrate 4+/5 or greater L shoulder strength grossly       Electronically signed by:  Durga Panda PTA

## 2019-05-06 ENCOUNTER — HOSPITAL ENCOUNTER (OUTPATIENT)
Dept: PHYSICAL THERAPY | Facility: HOSPITAL | Age: 54
Setting detail: THERAPIES SERIES
End: 2019-05-06
Payer: COMMERCIAL

## 2019-05-09 ENCOUNTER — APPOINTMENT (OUTPATIENT)
Dept: PHYSICAL THERAPY | Facility: HOSPITAL | Age: 54
End: 2019-05-09
Payer: COMMERCIAL

## 2019-05-09 ENCOUNTER — HOSPITAL ENCOUNTER (OUTPATIENT)
Dept: PHYSICAL THERAPY | Facility: HOSPITAL | Age: 54
Setting detail: THERAPIES SERIES
Discharge: HOME | End: 2019-05-09
Payer: COMMERCIAL

## 2019-05-09 PROCEDURE — 97110 THERAPEUTIC EXERCISES: CPT

## 2019-05-09 PROCEDURE — G0283 ELEC STIM OTHER THAN WOUND: HCPCS

## 2019-05-09 PROCEDURE — 97140 MANUAL THERAPY 1/> REGIONS: CPT

## 2019-05-09 NOTE — FLOWSHEET NOTE
Physical Therapy Daily Note  Date:  2019    TIme In:   909                    Time Out:   [facility]    Patient Name:  Navid Martinez    :  1965  MRN: [id number]    Restrictions/Precautions:    Pertinent Medical History:  Medical/Treatment Diagnosis Information:  ·   s/p L shoulder ATS with SAD / DCR / biceps tenodesis     Insurance/Certification information:    Gonzalez HINOJOSA/BS  Physician Information:   Chester Gustafson PA-C  Plan of care signed (Y/N):    Visit# / total visits:     16/    G-Code (if applicable):      Date / Visit # G-Code Applied:         Progress Note: []  Yes  [x]  No  Next due by: 19      Pain level:  3/10    Subjective:  Pt reports she saw her ortho yesterday; she has not been released to return to work yet but is to continue PT. She reports having some \"popping\" at the front of her shoulder. Objective:  Observation:   Test measurements:     Palpation:     Exercises:  Exercise Resistance/Repetitions Other comments   Pendulums x30 each 9   Scapular retraction  x30 9   Pulleys 3' flexion 9   UT stretch 5x20\" 9   Wand abd x30 9   Wand ER/IR x30 9   Ball roll on table with scap ret x30 9   Supine self ROM flexion x30 9   Mid / low rows Orange, 2x10 9   ER / IR w/ t-band Orange, 2x10 each 9   HH depression (gentle) 2x10 9   Ball roll up wall 3x10 9     Other Therapeutic Activities:      Manual Treatments:  Lshoulder PROM as tolerated, grade II AP / inf mobs, STM of mid deltoid. Rhythmic stabilization 3x20\"  x15'     Modalities:  IFC with MH, L shoulder 15 min      Timed Code Treatment Minutes:  54      Total Treatment Minutes:  70    Treatment/Activity Tolerance:  [x] Patient tolerated treatment well [] Patient limited by fatigue  [] Patient limited by pain  [] Patient limited by other medical complications  [x] Other:  Rhythmic stabilization was added to manual therapy today; pt had pain initially but improved with repetition and STM to anterior shoulder.      Pain after treatment: 2/10    Prognosis: [x] Good [] Fair  [] Poor    Patient Requires Follow-up: [x] Yes  [] No    Plan:   [x] Continue per plan of care [] Alter current plan (see comments)  [] Plan of care initiated [] Hold pending MD visit [] Discharge    Plan for Next Session:      Goals  Short term goals  Time Frame for Short term goals: 3-4 weeks  Short term goal 1: Pt to be I with HEP -MET  Short term goal 2: Pt to demonstrate L shoulder flexion AROM of 0-120 deg or greater  Short term goal 3: Pt to demonstrate L shoulder flexion / abd PROM of 0-160 deg. -MET   Short term goal 4: Pt to perform daily activities with pain of less than 5/10. -MET  Long term goals  Time Frame for Long term goals : 6-8 weeks  Long term goal 1: Quick DASH score to improve to less than 15% indicating improved function  Long term goal 2: Pt to demonstrate L shoulder AROM flexion and abduction of 0-150 deg or greater  Long term goal 3: ER / IR AROM to improve to 0-60 deg or greater.   Long term goal 4: Pt to demonstrate 4+/5 or greater L shoulder strength grossly       Electronically signed by:  Aicha Ornelas, PT

## 2019-05-13 ENCOUNTER — HOSPITAL ENCOUNTER (OUTPATIENT)
Dept: PHYSICAL THERAPY | Facility: HOSPITAL | Age: 54
Setting detail: THERAPIES SERIES
Discharge: HOME | End: 2019-05-13
Payer: COMMERCIAL

## 2019-05-13 PROCEDURE — 97140 MANUAL THERAPY 1/> REGIONS: CPT

## 2019-05-13 PROCEDURE — G0283 ELEC STIM OTHER THAN WOUND: HCPCS

## 2019-05-13 PROCEDURE — 97110 THERAPEUTIC EXERCISES: CPT

## 2019-05-13 NOTE — FLOWSHEET NOTE
Physical Therapy Daily Note  Date:  2019    TIme In: 1500                      Time Out:  1610    Patient Name:  Vania Boyd    :  1965  MRN: [id number]    Restrictions/Precautions:    Pertinent Medical History:  Medical/Treatment Diagnosis Information:  ·   s/p L shoulder ATS with SAD / DCR / biceps tenodesis     Insurance/Certification information:    Gonzalez HINOJOSA/BS  Physician Information:   Dick Lynch PA-C  Plan of care signed (Y/N):    Visit# / total visits:     17/    G-Code (if applicable):      Date / Visit # G-Code Applied:         Progress Note: []  Yes  [x]  No  Next due by: 19      Pain level:  2/10    Subjective:  Pt reports her sh range is doing good but she still has the pain in her arm when she is driving and it doesn't matter how her arm is positioned. Objective:  Observation:   Test measurements:  Quick DASH: 20%, L sh AROM: flex:160 deg, abd: 150 deg, ER and IR: WNL. L sh MMT: flex: 4-/5, abd: 4/5, ER: 4+/5, IR: 5/5. Palpation:     Exercises:  Exercise Resistance/Repetitions Other comments   Pendulums x30 each 13   Scapular retraction  x30 13   Pulleys 3' flexion 13   UT stretch 5x20\" 13   Wand abd x30 13   Wand ER/IR x30 13   Ball roll on table with scap ret x30 13   Supine self ROM flexion x30 13   Mid / low rows Orange, 2x10 13   ER / IR w/ t-band Orange, 2x10 each 13   HH depression (gentle) 2x10 13   Ball roll up wall 3x10 13     Other Therapeutic Activities:  Re-assessment performed by Izabella Murillo, PT, OCS. Manual Treatments:  Lshoulder PROM as tolerated, grade II AP / inf mobs, STM of mid deltoid.  Rhythmic stabilization 3x20\"  x15'     Modalities:  IFC with MH, L shoulder 15 min      Timed Code Treatment Minutes:  60      Total Treatment Minutes:  70    Treatment/Activity Tolerance:  [x] Patient tolerated treatment well [] Patient limited by fatigue  [] Patient limited by pain  [] Patient limited by other medical complications  [x] Other: Pt demonstrated good ROM today and improving strength but continues to have pain with sh abd. Pain after treatment:     1 /10    Prognosis: [x] Good [] Fair  [] Poor    Patient Requires Follow-up: [x] Yes  [] No    Plan:   [x] Continue per plan of care [] Alter current plan (see comments)  [] Plan of care initiated [] Hold pending MD visit [] Discharge    Plan for Next Session:      Goals  Short term goals  Time Frame for Short term goals: 3-4 weeks  Short term goal 1: Pt to be I with HEP -MET  Short term goal 2: Pt to demonstrate L shoulder flexion AROM of 0-120 deg or greater  Short term goal 3: Pt to demonstrate L shoulder flexion / abd PROM of 0-160 deg. -MET   Short term goal 4: Pt to perform daily activities with pain of less than 5/10. -MET  Long term goals  Time Frame for Long term goals : 6-8 weeks  Long term goal 1: Quick DASH score to improve to less than 15% indicating improved function  Long term goal 2: Pt to demonstrate L shoulder AROM flexion and abduction of 0-150 deg or greater  Long term goal 3: ER / IR AROM to improve to 0-60 deg or greater.   Long term goal 4: Pt to demonstrate 4+/5 or greater L shoulder strength grossly       Electronically signed by:  Kimberly Silvestre PTA

## 2019-05-15 ENCOUNTER — HOSPITAL ENCOUNTER (OUTPATIENT)
Dept: PHYSICAL THERAPY | Facility: HOSPITAL | Age: 54
Setting detail: THERAPIES SERIES
Discharge: HOME | End: 2019-05-15
Payer: COMMERCIAL

## 2019-05-15 PROCEDURE — 97110 THERAPEUTIC EXERCISES: CPT

## 2019-05-15 PROCEDURE — G0283 ELEC STIM OTHER THAN WOUND: HCPCS

## 2019-05-15 PROCEDURE — 97140 MANUAL THERAPY 1/> REGIONS: CPT

## 2019-05-15 NOTE — FLOWSHEET NOTE
Physical Therapy Daily Note  Date:  5/15/2019    TIme In:     1510                      Time Out:  [address] [facility]    Patient Name:  Luis Lundberg    :  1965  MRN: [id number]    Restrictions/Precautions:    Pertinent Medical History:  Medical/Treatment Diagnosis Information:  ·   s/p L shoulder ATS with SAD / DCR / biceps tenodesis     Insurance/Certification information:    Gonzalez HINOJOSA/BS  Physician Information:   Kenneth Strauss PA-C  Plan of care signed (Y/N):    Visit# / total visits:     18/    G-Code (if applicable):      Date / Visit # G-Code Applied:         Progress Note: []  Yes  [x]  No  Next due by: 19      Pain level:  3/10    Subjective:  Pt reports her sh range is doing good but she still has the pain in her arm when she reaches. Objective:  Observation:   Test measurements:  Quick DASH: 20%, L sh AROM: flex:160 deg, abd: 150 deg, ER and IR: WNL. L sh MMT: flex: 4-/5, abd: 4/5, ER: 4+/5, IR: 5/5. Palpation:     Exercises:  Exercise Resistance/Repetitions Other comments   Pendulums x30 each 15   Scapular retraction  x30 15   Pulleys 3' flexion 15   UT stretch 5x20\" 15   Wand abd x30 15   Wand ER/IR x30 15   Ball roll on table with scap ret x30 15   Supine self ROM flexion x30 15   Mid / low rows Orange, 2x10 15   ER / IR w/ t-band Orange, 2x10 each 15   HH depression (gentle) 2x10 15   Ball roll up wall 3x10 15     Other Therapeutic Activities:     Manual Treatments:  Lshoulder PROM as tolerated, grade II AP / inf mobs, STM of mid deltoid.  Rhythmic stabilization 3x20\"  x15'     Modalities:  IFC with MH, L shoulder 15 min      Timed Code Treatment Minutes:  60      Total Treatment Minutes:  70    Treatment/Activity Tolerance:  [x] Patient tolerated treatment well [] Patient limited by fatigue  [] Patient limited by pain  [] Patient limited by other medical complications  [] Other:    Pain after treatment:     1/10    Prognosis: [x] Good [] Fair  [] Poor    Patient Requires Follow-up: [x] Yes  [] No    Plan:   [x] Continue per plan of care [] Alter current plan (see comments)  [] Plan of care initiated [] Hold pending MD visit [] Discharge    Plan for Next Session:      Goals  Short term goals  Time Frame for Short term goals: 3-4 weeks  Short term goal 1: Pt to be I with HEP -MET  Short term goal 2: Pt to demonstrate L shoulder flexion AROM of 0-120 deg or greater  Short term goal 3: Pt to demonstrate L shoulder flexion / abd PROM of 0-160 deg. -MET   Short term goal 4: Pt to perform daily activities with pain of less than 5/10. -MET  Long term goals  Time Frame for Long term goals : 6-8 weeks  Long term goal 1: Quick DASH score to improve to less than 15% indicating improved function  Long term goal 2: Pt to demonstrate L shoulder AROM flexion and abduction of 0-150 deg or greater  Long term goal 3: ER / IR AROM to improve to 0-60 deg or greater.   Long term goal 4: Pt to demonstrate 4+/5 or greater L shoulder strength grossly       Electronically signed by:  Yordy Aguilar PTA

## 2019-05-16 NOTE — FLOWSHEET NOTE
Physical Therapy Re-Assessment  Date:  2019    TIme In: 1500                      Time Out:  1610    Patient Name:  Sammy Perez    :  1965  MRN: [id number]    Restrictions/Precautions:    Pertinent Medical History:  Medical/Treatment Diagnosis Information:  ·   s/p L shoulder ATS with SAD / DCR / biceps tenodesis     Insurance/Certification information:    Gonzalez HINOJOSA/BS  Physician Information:   Abigail Galaviz PA-C  Plan of care signed (Y/N):    Visit# / total visits:     17/    G-Code (if applicable):      Date / Visit # G-Code Applied:         Progress Note: [x]  Yes  []  No  Next due by: 19      Pain level:  210    Subjective:  Pt reports her sh range is doing good but she still has the pain in her arm when she is driving and it doesn't matter how her arm is positioned. Objective:  Observation:   Test measurements:  Quick DASH: 20%, L sh AROM: flex:160 deg, abd: 150 deg, ER and IR: WNL. L sh MMT: flex: 4-/5, abd: 4/5, ER: 4+/5, IR: 5/5. Palpation:     Exercises:  Exercise Resistance/Repetitions Other comments   Pendulums x30 each 13   Scapular retraction  x30 13   Pulleys 3' flexion 13   UT stretch 5x20\" 13   Wand abd x30 13   Wand ER/IR x30 13   Ball roll on table with scap ret x30 13   Supine self ROM flexion x30 13   Mid / low rows Orange, 2x10 13   ER / IR w/ t-band Orange, 2x10 each 13   HH depression (gentle) 2x10 13   Ball roll up wall 3x10 13     Other Therapeutic Activities:  Treatment performed by Perry Silvestre PTA; re-assessment performed by Verenice Isaacs, PT    Manual Treatments:  Lshoulder PROM as tolerated, grade II AP / inf mobs, STM of mid deltoid.  Rhythmic stabilization 3x20\"  x15'     Modalities:  IFC with MH, L shoulder 15 min      Timed Code Treatment Minutes:  60      Total Treatment Minutes:  70    Treatment/Activity Tolerance:  [x] Patient tolerated treatment well [] Patient limited by fatigue  [] Patient limited by pain  [] Patient limited by other medical

## 2019-05-17 ENCOUNTER — HOSPITAL ENCOUNTER (OUTPATIENT)
Dept: PHYSICAL THERAPY | Facility: HOSPITAL | Age: 54
Setting detail: THERAPIES SERIES
Discharge: HOME | End: 2019-05-17
Payer: COMMERCIAL

## 2019-05-17 PROCEDURE — 97140 MANUAL THERAPY 1/> REGIONS: CPT

## 2019-05-17 PROCEDURE — 97110 THERAPEUTIC EXERCISES: CPT

## 2019-05-17 PROCEDURE — G0283 ELEC STIM OTHER THAN WOUND: HCPCS

## 2019-05-17 NOTE — FLOWSHEET NOTE
Physical Therapy Daily Note  Date:  2019    TIme In:    4352                       Time Out:  [facility] 7715    Patient Name:  Rosalie Rangel    :  1965  MRN: [id number]    Restrictions/Precautions:    Pertinent Medical History:  Medical/Treatment Diagnosis Information:  ·   s/p L shoulder ATS with SAD / DCR / biceps tenodesis     Insurance/Certification information:    Gonzalez HINJOOSA/BS  Physician Information:   Denton Rondon PA-C  Plan of care signed (Y/N):    Visit# / total visits:     19/    G-Code (if applicable):      Date / Visit # G-Code Applied:         Progress Note: []  Yes  [x]  No  Next due by: 19      Pain level:  2 /10    Subjective:    Pt reports: less soreness since last visit; still has significant pain with reaching and overhead movements. Objective:  Observation:   Test measurements:  Quick DASH: 20%, L sh AROM: flex:160 deg, abd: 150 deg, ER and IR: WNL. L sh MMT: flex: 4-/5, abd: 4/5, ER: 4+/5, IR: 5/5.    Palpation:     Exercises:  Exercise Resistance/Repetitions Other comments   Pendulums x30 each 17   Scapular retraction  x30 17   Pulleys 3' flexion 17   UT stretch 5x20\" 17   Wand abd x30 17   Wand ER/IR x30 17   Ball roll on table with scap ret x30 17   Supine self ROM flexion x30 17   Mid / low rows Orange, 2x10 17   ER / IR w/ t-band Orange, 2x10 each 17   HH depression (gentle) 2x10 17                  Ball roll up wall 3x10 17     Other Therapeutic Activities:     Manual Treatments:  Lshoulder PROM as tolerated, grade II AP / inf mobs, STM of mid deltoid  X 15'     Modalities:  IFC with MH, L shoulder 15 min      Timed Code Treatment Minutes:    65      Total Treatment Minutes:  80'    Treatment/Activity Tolerance:  [x] Patient tolerated treatment well [] Patient limited by fatigue  [] Patient limited by pain  [] Patient limited by other medical complications  [] Other:    Pain after treatment:    1  /10    Prognosis: [x] Good [] Fair  [] Poor    Patient Requires Follow-up: [x] Yes  [] No    Plan:   [x] Continue per plan of care [] Alter current plan (see comments)  [] Plan of care initiated [] Hold pending MD visit [] Discharge    Plan for Next Session:      Goals  Short term goals  Time Frame for Short term goals: 3-4 weeks  Short term goal 1: Pt to be I with HEP -MET  Short term goal 2: Pt to demonstrate L shoulder flexion AROM of 0-120 deg or greater  Short term goal 3: Pt to demonstrate L shoulder flexion / abd PROM of 0-160 deg. -MET   Short term goal 4: Pt to perform daily activities with pain of less than 5/10. -MET  Long term goals  Time Frame for Long term goals : 6-8 weeks  Long term goal 1: Quick DASH score to improve to less than 15% indicating improved function  Long term goal 2: Pt to demonstrate L shoulder AROM flexion and abduction of 0-150 deg or greater  Long term goal 3: ER / IR AROM to improve to 0-60 deg or greater.   Long term goal 4: Pt to demonstrate 4+/5 or greater L shoulder strength grossly       Electronically signed by:  Nicolas Oppenheim, PT

## 2019-05-17 NOTE — FLOWSHEET NOTE
Physical Therapy Daily Note  Date:  2019    TIme In:    3064                       Time Out:      Patient Name:  Huang Acevedo    :  1965  MRN: [id number]    Restrictions/Precautions:    Pertinent Medical History:  Medical/Treatment Diagnosis Information:  ·   s/p L shoulder ATS with SAD / DCR / biceps tenodesis     Insurance/Certification information:    Gonzalez HINOJOSA/BS  Physician Information:   Laura Barton PA-C  Plan of care signed (Y/N):    Visit# / total visits:     19/    G-Code (if applicable):      Date / Visit # G-Code Applied:         Progress Note: []  Yes  [x]  No  Next due by: 19      Pain level:  2 /10    Subjective:    Pt reports:     Objective:  Observation:   Test measurements:  Quick DASH: 20%, L sh AROM: flex:160 deg, abd: 150 deg, ER and IR: WNL. L sh MMT: flex: 4-/5, abd: 4/5, ER: 4+/5, IR: 5/5. Palpation:     Exercises:  Exercise Resistance/Repetitions Other comments   Pendulums x30 each    Scapular retraction  x30    Pulleys 3' flexion    UT stretch 5x20\"    Wand abd x30    Wand ER/IR x30    Ball roll on table with scap ret x30    Supine self ROM flexion x30    Mid / low rows Orange, 2x10    ER / IR w/ t-band Orange, 2x10 each    HH depression (gentle) 2x10                   Ball roll up wall 3x10      Other Therapeutic Activities:     Manual Treatments:  Lshoulder PROM as tolerated, grade II AP / inf mobs, STM of mid deltoid. Rhythmic stabilization 3x20\"  x15'     Modalities:  IFC with MH, L shoulder 15 min      Timed Code Treatment Minutes:           Total Treatment Minutes:      Treatment/Activity Tolerance:  [x] Patient tolerated treatment well [] Patient limited by fatigue  [] Patient limited by pain  [] Patient limited by other medical complications  [] Other:    Pain after treatment:      /10    Prognosis: [x] Good [] Fair  [] Poor    Patient Requires Follow-up: [x] Yes  [] No    Plan:   [x] Continue per plan of care [] Alter current plan (see comments)  [] Plan of care initiated [] Hold pending MD visit [] Discharge    Plan for Next Session:      Goals  Short term goals  Time Frame for Short term goals: 3-4 weeks  Short term goal 1: Pt to be I with HEP -MET  Short term goal 2: Pt to demonstrate L shoulder flexion AROM of 0-120 deg or greater  Short term goal 3: Pt to demonstrate L shoulder flexion / abd PROM of 0-160 deg. -MET   Short term goal 4: Pt to perform daily activities with pain of less than 5/10. -MET  Long term goals  Time Frame for Long term goals : 6-8 weeks  Long term goal 1: Quick DASH score to improve to less than 15% indicating improved function  Long term goal 2: Pt to demonstrate L shoulder AROM flexion and abduction of 0-150 deg or greater  Long term goal 3: ER / IR AROM to improve to 0-60 deg or greater.   Long term goal 4: Pt to demonstrate 4+/5 or greater L shoulder strength grossly       Electronically signed by:  Casandra Arredondo PT

## 2019-05-20 ENCOUNTER — HOSPITAL ENCOUNTER (OUTPATIENT)
Dept: PHYSICAL THERAPY | Facility: HOSPITAL | Age: 54
Setting detail: THERAPIES SERIES
Discharge: HOME | End: 2019-05-20
Payer: COMMERCIAL

## 2019-05-20 PROCEDURE — 97140 MANUAL THERAPY 1/> REGIONS: CPT

## 2019-05-20 PROCEDURE — 97110 THERAPEUTIC EXERCISES: CPT

## 2019-05-20 PROCEDURE — G0283 ELEC STIM OTHER THAN WOUND: HCPCS

## 2019-05-20 NOTE — FLOWSHEET NOTE
Physical Therapy Daily Note  Date:  2019    TIme In:    1125                       Time Out:  1226    Patient Name:  Ish Thompson    :  1965  MRN: [id number]    Restrictions/Precautions:    Pertinent Medical History:  Medical/Treatment Diagnosis Information:  ·   s/p L shoulder ATS with SAD / DCR / biceps tenodesis     Insurance/Certification information:    Gonzalez HINOJOSA/BS  Physician Information:   Olivia Muñoz PA-C  Plan of care signed (Y/N):    Visit# / total visits:     20/    G-Code (if applicable):      Date / Visit # G-Code Applied:         Progress Note: []  Yes  [x]  No  Next due by: 19      Pain level:  2 /10    Subjective:    Pt reports: less soreness since last visit; still has significant pain with reaching and overhead movements. Objective:  Observation:   Test measurements:  Quick DASH: 20%, L sh AROM: flex:160 deg, abd: 150 deg, ER and IR: WNL. L sh MMT: flex: 4-/5, abd: 4/5, ER: 4+/5, IR: 5/5.    Palpation:     Exercises:  Exercise Resistance/Repetitions Other comments   Pendulums x30 each 20   Scapular retraction  x30 20   Pulleys 3' flexion 20   UT stretch 5x20\" 20   Wand abd x30 20   Wand ER/IR x30 20   Ball roll on table with scap ret x30 20   Supine self ROM flexion x30 20   Mid / low rows Orange, 2x10 20   ER / IR w/ t-band Orange, 2x10 each 20   HH depression (gentle) 2x10 20                  Ball roll up wall 3x10 20     Other Therapeutic Activities:     Manual Treatments:  Lshoulder PROM as tolerated, grade II AP / inf mobs, STM of mid deltoid  X 15'     Modalities:  IFC with MH, L shoulder 15 min      Timed Code Treatment Minutes:    54'      Total Treatment Minutes:  64'    Treatment/Activity Tolerance:  [x] Patient tolerated treatment well [] Patient limited by fatigue  [] Patient limited by pain  [] Patient limited by other medical complications  [] Other:    Pain after treatment:    1  /10    Prognosis: [x] Good [] Fair  [] Poor    Patient Requires Follow-up: [x] Yes  [] No    Plan:   [x] Continue per plan of care [] Alter current plan (see comments)  [] Plan of care initiated [] Hold pending MD visit [] Discharge    Plan for Next Session:      Goals  Short term goals  Time Frame for Short term goals: 3-4 weeks  Short term goal 1: Pt to be I with HEP -MET  Short term goal 2: Pt to demonstrate L shoulder flexion AROM of 0-120 deg or greater  Short term goal 3: Pt to demonstrate L shoulder flexion / abd PROM of 0-160 deg. -MET   Short term goal 4: Pt to perform daily activities with pain of less than 5/10. -MET  Long term goals  Time Frame for Long term goals : 6-8 weeks  Long term goal 1: Quick DASH score to improve to less than 15% indicating improved function  Long term goal 2: Pt to demonstrate L shoulder AROM flexion and abduction of 0-150 deg or greater  Long term goal 3: ER / IR AROM to improve to 0-60 deg or greater.   Long term goal 4: Pt to demonstrate 4+/5 or greater L shoulder strength grossly       Electronically signed by:  Krystian Monsivais PT

## 2019-05-22 ENCOUNTER — HOSPITAL ENCOUNTER (OUTPATIENT)
Dept: PHYSICAL THERAPY | Facility: HOSPITAL | Age: 54
Setting detail: THERAPIES SERIES
Discharge: HOME | End: 2019-05-22
Payer: COMMERCIAL

## 2019-05-22 PROCEDURE — 97110 THERAPEUTIC EXERCISES: CPT

## 2019-05-22 PROCEDURE — 97140 MANUAL THERAPY 1/> REGIONS: CPT

## 2019-05-22 PROCEDURE — G0283 ELEC STIM OTHER THAN WOUND: HCPCS

## 2019-05-22 NOTE — FLOWSHEET NOTE
Physical Therapy Daily Note  Date:  2019    TIme In:      1425                     Time Out: [address]    Patient Name:  Umair Louise    :  1965  MRN: [id number]    Restrictions/Precautions:    Pertinent Medical History:  Medical/Treatment Diagnosis Information:  ·   s/p L shoulder ATS with SAD / DCR / biceps tenodesis     Insurance/Certification information:    Gonzalez HINOJOSA/OJ  Physician Information:   Tan Stuart PA-C  Plan of care signed (Y/N):    Visit# / total visits:     21/    G-Code (if applicable):      Date / Visit # G-Code Applied:         Progress Note: []  Yes  [x]  No  Next due by: 19      Pain level:   1/10    Subjective:    Pt reports she starts back to work  and returns to MD on Friday. Objective:  Observation:   Test measurements:  Quick DASH: 20%, L sh AROM: flex:160 deg, abd: 150 deg, ER and IR: WNL. L sh MMT: flex: 4-/5, abd: 4/5, ER: 4+/5, IR: 5/5. Palpation:     Exercises:  Exercise Resistance/Repetitions Other comments   Pendulums x30 each 22   Scapular retraction  x30 22   Pulleys 3' flexion 22   UT stretch 5x20\" 22   Wand abd x30 22   Wand ER/IR x30 22   Ball roll on table with scap ret x30 22   Supine self ROM flexion x30 22   Mid / low rows Orange, 2x10 22   ER / IR w/ t-band Orange, 2x10 each 22   HH depression (gentle) 2x10 22                  Ball roll up wall 3x10 22     Other Therapeutic Activities:     Manual Treatments:  Lshoulder PROM as tolerated, grade II AP / inf mobs, STM of mid deltoid  X 15'     Modalities:  IFC with MH, L shoulder 15 min      Timed Code Treatment Minutes:   55      Total Treatment Minutes:  66    Treatment/Activity Tolerance:  [x] Patient tolerated treatment well [] Patient limited by fatigue  [] Patient limited by pain  [] Patient limited by other medical complications  [x] Other:Pt completed tx with no pain and continues to have muscle spasms in middle and post deltoid of left UE.     Pain after treatment:   0

## 2019-05-24 ENCOUNTER — HOSPITAL ENCOUNTER (OUTPATIENT)
Facility: HOSPITAL | Age: 54
Discharge: HOME | End: 2019-05-24
Payer: COMMERCIAL

## 2019-05-24 ENCOUNTER — OFFICE VISIT (OUTPATIENT)
Dept: ORTHOPEDIC SURGERY | Facility: CLINIC | Age: 54
End: 2019-05-24

## 2019-05-24 ENCOUNTER — HOSPITAL ENCOUNTER (OUTPATIENT)
Dept: GENERAL RADIOLOGY | Facility: HOSPITAL | Age: 54
Discharge: HOME | End: 2019-05-24
Payer: COMMERCIAL

## 2019-05-24 VITALS — BODY MASS INDEX: 19.81 KG/M2 | HEIGHT: 65 IN | RESPIRATION RATE: 18 BRPM | WEIGHT: 118.9 LBS

## 2019-05-24 DIAGNOSIS — M25.562 ARTHRALGIA OF LEFT KNEE: ICD-10-CM

## 2019-05-24 DIAGNOSIS — M25.561 ARTHRALGIA OF RIGHT KNEE: ICD-10-CM

## 2019-05-24 DIAGNOSIS — Z96.651 STATUS POST REVISION OF TOTAL REPLACEMENT OF RIGHT KNEE: Primary | ICD-10-CM

## 2019-05-24 DIAGNOSIS — Z98.890 S/P ACL RECONSTRUCTION: ICD-10-CM

## 2019-05-24 DIAGNOSIS — G62.9 NEUROPATHY: ICD-10-CM

## 2019-05-24 DIAGNOSIS — M17.32 POST-TRAUMATIC OSTEOARTHRITIS OF LEFT KNEE: ICD-10-CM

## 2019-05-24 DIAGNOSIS — M51.36 LUMBAR DEGENERATIVE DISC DISEASE: ICD-10-CM

## 2019-05-24 PROCEDURE — 99213 OFFICE O/P EST LOW 20 MIN: CPT | Performed by: ORTHOPAEDIC SURGERY

## 2019-05-24 PROCEDURE — 73562 X-RAY EXAM OF KNEE 3: CPT

## 2019-05-24 RX ORDER — NABUMETONE 750 MG/1
750 TABLET, FILM COATED ORAL 2 TIMES DAILY
Qty: 60 TABLET | Refills: 1 | Status: SHIPPED | OUTPATIENT
Start: 2019-05-24 | End: 2019-06-28 | Stop reason: ALTCHOICE

## 2019-05-28 ENCOUNTER — HOSPITAL ENCOUNTER (OUTPATIENT)
Dept: PHYSICAL THERAPY | Facility: HOSPITAL | Age: 54
Setting detail: THERAPIES SERIES
Discharge: HOME | End: 2019-05-28
Payer: COMMERCIAL

## 2019-05-28 PROCEDURE — G0283 ELEC STIM OTHER THAN WOUND: HCPCS

## 2019-05-28 PROCEDURE — 97140 MANUAL THERAPY 1/> REGIONS: CPT

## 2019-05-28 PROCEDURE — 97110 THERAPEUTIC EXERCISES: CPT

## 2019-05-28 NOTE — FLOWSHEET NOTE
Physical Therapy Daily Note  Date:  2019    TIme In:      1100                     Time Out:      1200    Patient Name:  Sammy Perez    :  1965  MRN: [id number]    Restrictions/Precautions:    Pertinent Medical History:  Medical/Treatment Diagnosis Information:  ·   s/p L shoulder ATS with SAD / DCR / biceps tenodesis     Insurance/Certification information:    Gonzalez HINOJOSA/BS  Physician Information:   Abigail Galaviz PA-C  Plan of care signed (Y/N):    Visit# / total visits:     22/    G-Code (if applicable):      Date / Visit # G-Code Applied:         Progress Note: []  Yes  [x]  No  Next due by: 19      Pain level:   1/10    Subjective:    Pt with no new complaints. Objective:  Observation:   Test measurements:  Quick DASH: 20%, L sh AROM: flex:160 deg, abd: 150 deg, ER and IR: WNL. L sh MMT: flex: 4-/5, abd: 4/5, ER: 4+/5, IR: 5/5. Palpation:     Exercises:  Exercise Resistance/Repetitions Other comments   Pendulums x30 each 28   Scapular retraction  x30 28   Pulleys 3' flexion 28   UT stretch 5x20\" 28   Wand abd x30 28   Wand ER/IR x30 28   Ball roll on table with scap ret x30 28   Supine self ROM flexion x30 28   Mid / low rows Orange, 2x10 28   ER / IR w/ t-band Orange, 2x10 each 28   HH depression (gentle) 2x10 28                  Ball roll up wall 3x10 28     Other Therapeutic Activities:     Manual Treatments:  Lshoulder PROM as tolerated, grade II AP / inf mobs, STM of mid deltoid  X 15'     Modalities:  IFC with MH, L shoulder 15 min      Timed Code Treatment Minutes:   55      Total Treatment Minutes:  60    Treatment/Activity Tolerance:  [x] Patient tolerated treatment well [] Patient limited by fatigue  [] Patient limited by pain  [] Patient limited by other medical complications  [x] Other:Pt completed tx with mild pain and c/o spasms in deltoid.     Pain after treatment:   1/10    Prognosis: [x] Good [] Fair  [] Poor    Patient Requires Follow-up: [x] Yes  [] No    Plan:   [x] Continue per plan of care [] Alter current plan (see comments)  [] Plan of care initiated [] Hold pending MD visit [] Discharge    Plan for Next Session:      Goals  Short term goals  Time Frame for Short term goals: 3-4 weeks  Short term goal 1: Pt to be I with HEP -MET  Short term goal 2: Pt to demonstrate L shoulder flexion AROM of 0-120 deg or greater  Short term goal 3: Pt to demonstrate L shoulder flexion / abd PROM of 0-160 deg. -MET   Short term goal 4: Pt to perform daily activities with pain of less than 5/10. -MET  Long term goals  Time Frame for Long term goals : 6-8 weeks  Long term goal 1: Quick DASH score to improve to less than 15% indicating improved function  Long term goal 2: Pt to demonstrate L shoulder AROM flexion and abduction of 0-150 deg or greater  Long term goal 3: ER / IR AROM to improve to 0-60 deg or greater.   Long term goal 4: Pt to demonstrate 4+/5 or greater L shoulder strength grossly       Electronically signed by:  Tonya Hennessy PTA

## 2019-05-29 ENCOUNTER — HOSPITAL ENCOUNTER (OUTPATIENT)
Dept: NUCLEAR MEDICINE | Facility: HOSPITAL | Age: 54
Discharge: HOME | End: 2019-05-29

## 2019-05-29 PROCEDURE — A9503 TC99M MEDRONATE: HCPCS | Performed by: ORTHOPAEDIC SURGERY

## 2019-05-29 PROCEDURE — 0 TECHNETIUM MEDRONATE KIT: Performed by: ORTHOPAEDIC SURGERY

## 2019-05-29 PROCEDURE — 78315 BONE IMAGING 3 PHASE: CPT

## 2019-05-29 RX ORDER — TC 99M MEDRONATE 20 MG/10ML
26.3 INJECTION, POWDER, LYOPHILIZED, FOR SOLUTION INTRAVENOUS
Status: COMPLETED | OUTPATIENT
Start: 2019-05-29 | End: 2019-05-29

## 2019-05-29 RX ADMIN — TC 99M MEDRONATE 26.3 MILLICURIE: 20 INJECTION, POWDER, LYOPHILIZED, FOR SOLUTION INTRAVENOUS at 12:10

## 2019-05-30 ENCOUNTER — HOSPITAL ENCOUNTER (OUTPATIENT)
Dept: PHYSICAL THERAPY | Facility: HOSPITAL | Age: 54
Setting detail: THERAPIES SERIES
Discharge: HOME | End: 2019-05-30
Payer: COMMERCIAL

## 2019-05-30 ENCOUNTER — HOSPITAL ENCOUNTER (OUTPATIENT)
Facility: HOSPITAL | Age: 54
Discharge: HOME | End: 2019-05-30
Payer: COMMERCIAL

## 2019-05-30 LAB
A/G RATIO: 1.8 (ref 0.8–2)
ALBUMIN SERPL-MCNC: 4.6 G/DL (ref 3.4–4.8)
ALP BLD-CCNC: 79 U/L (ref 25–100)
ALT SERPL-CCNC: 14 U/L (ref 4–36)
ANION GAP SERPL CALCULATED.3IONS-SCNC: 11 MMOL/L (ref 3–16)
AST SERPL-CCNC: 19 U/L (ref 8–33)
BASOPHILS ABSOLUTE: 0 K/UL (ref 0–0.1)
BASOPHILS RELATIVE PERCENT: 0.8 %
BILIRUB SERPL-MCNC: <0.2 MG/DL (ref 0.3–1.2)
BUN BLDV-MCNC: 16 MG/DL (ref 6–20)
CALCIUM SERPL-MCNC: 9.8 MG/DL (ref 8.5–10.5)
CHLORIDE BLD-SCNC: 104 MMOL/L (ref 98–107)
CHOLESTEROL, TOTAL: 218 MG/DL (ref 0–200)
CO2: 28 MMOL/L (ref 20–30)
CREAT SERPL-MCNC: 0.8 MG/DL (ref 0.4–1.2)
EOSINOPHILS ABSOLUTE: 0.1 K/UL (ref 0–0.4)
EOSINOPHILS RELATIVE PERCENT: 1.7 %
FOLATE: >20 NG/ML
GFR AFRICAN AMERICAN: >59
GFR NON-AFRICAN AMERICAN: >60
GLOBULIN: 2.5 G/DL
GLUCOSE BLD-MCNC: 91 MG/DL (ref 74–106)
HCT VFR BLD CALC: 42.8 % (ref 37–47)
HDLC SERPL-MCNC: 54 MG/DL (ref 40–60)
HEMOGLOBIN: 13.8 G/DL (ref 11.5–16.5)
IMMATURE GRANULOCYTES #: 0 K/UL
IMMATURE GRANULOCYTES %: 0.2 % (ref 0–5)
LDL CHOLESTEROL CALCULATED: 110 MG/DL
LYMPHOCYTES ABSOLUTE: 1.4 K/UL (ref 1.5–4)
LYMPHOCYTES RELATIVE PERCENT: 27.1 %
MCH RBC QN AUTO: 29.8 PG (ref 27–32)
MCHC RBC AUTO-ENTMCNC: 32.2 G/DL (ref 31–35)
MCV RBC AUTO: 92.4 FL (ref 80–100)
MONOCYTES ABSOLUTE: 0.3 K/UL (ref 0.2–0.8)
MONOCYTES RELATIVE PERCENT: 5.8 %
NEUTROPHILS ABSOLUTE: 3.4 K/UL (ref 2–7.5)
NEUTROPHILS RELATIVE PERCENT: 64.4 %
PDW BLD-RTO: 15.3 % (ref 11–16)
PLATELET # BLD: 227 K/UL (ref 150–400)
PMV BLD AUTO: 10.6 FL (ref 6–10)
POTASSIUM SERPL-SCNC: 4.9 MMOL/L (ref 3.4–5.1)
RBC # BLD: 4.63 M/UL (ref 3.8–5.8)
SODIUM BLD-SCNC: 143 MMOL/L (ref 136–145)
TOTAL PROTEIN: 7.1 G/DL (ref 6.4–8.3)
TRIGL SERPL-MCNC: 271 MG/DL (ref 0–249)
VITAMIN B-12: 1091 PG/ML (ref 211–911)
VLDLC SERPL CALC-MCNC: 54 MG/DL
WBC # BLD: 5.2 K/UL (ref 4–11)

## 2019-05-30 PROCEDURE — 82746 ASSAY OF FOLIC ACID SERUM: CPT

## 2019-05-30 PROCEDURE — 82607 VITAMIN B-12: CPT

## 2019-05-30 PROCEDURE — 80053 COMPREHEN METABOLIC PANEL: CPT

## 2019-05-30 PROCEDURE — G0283 ELEC STIM OTHER THAN WOUND: HCPCS

## 2019-05-30 PROCEDURE — 36415 COLL VENOUS BLD VENIPUNCTURE: CPT

## 2019-05-30 PROCEDURE — 85025 COMPLETE CBC W/AUTO DIFF WBC: CPT

## 2019-05-30 PROCEDURE — 97140 MANUAL THERAPY 1/> REGIONS: CPT

## 2019-05-30 PROCEDURE — 80061 LIPID PANEL: CPT

## 2019-05-30 PROCEDURE — 97110 THERAPEUTIC EXERCISES: CPT

## 2019-05-30 NOTE — FLOWSHEET NOTE
functional activities. Pain after treatment:   1/10    Prognosis: [x] Good [] Fair  [] Poor    Patient Requires Follow-up: [x] Yes  [] No    Plan:   [x] Continue per plan of care [] Alter current plan (see comments)  [] Plan of care initiated [] Hold pending MD visit [] Discharge    Plan for Next Session:      Goals  Short term goals  Time Frame for Short term goals: 3-4 weeks  Short term goal 1: Pt to be I with HEP -MET  Short term goal 2: Pt to demonstrate L shoulder flexion AROM of 0-120 deg or greater  Short term goal 3: Pt to demonstrate L shoulder flexion / abd PROM of 0-160 deg. -MET   Short term goal 4: Pt to perform daily activities with pain of less than 5/10. -MET  Long term goals  Time Frame for Long term goals : 6-8 weeks  Long term goal 1: Quick DASH score to improve to less than 15% indicating improved function  Long term goal 2: Pt to demonstrate L shoulder AROM flexion and abduction of 0-150 deg or greater  Long term goal 3: ER / IR AROM to improve to 0-60 deg or greater.   Long term goal 4: Pt to demonstrate 4+/5 or greater L shoulder strength grossly       Electronically signed by:  Yissel Helm, PT

## 2019-06-03 ENCOUNTER — TELEPHONE (OUTPATIENT)
Dept: ORTHOPEDIC SURGERY | Facility: CLINIC | Age: 54
End: 2019-06-03

## 2019-06-03 NOTE — TELEPHONE ENCOUNTER
Patient called the office requesting a call back in regards to her bone scan results if possible. She stated that he told her that he would contact her with the results.

## 2019-06-04 ENCOUNTER — HOSPITAL ENCOUNTER (OUTPATIENT)
Dept: PHYSICAL THERAPY | Facility: HOSPITAL | Age: 54
Setting detail: THERAPIES SERIES
Discharge: HOME | End: 2019-06-04
Payer: COMMERCIAL

## 2019-06-04 PROCEDURE — 97140 MANUAL THERAPY 1/> REGIONS: CPT

## 2019-06-04 PROCEDURE — G0283 ELEC STIM OTHER THAN WOUND: HCPCS

## 2019-06-04 PROCEDURE — 97110 THERAPEUTIC EXERCISES: CPT

## 2019-06-04 NOTE — FLOWSHEET NOTE
Physical Therapy Daily Note  Date:  2019    TIme In:     1600                   Time Out:   1710    Patient Name:  Allyn Morton    :  1965  MRN: [id number]    Restrictions/Precautions:    Pertinent Medical History:  Medical/Treatment Diagnosis Information:  ·   s/p L shoulder ATS with SAD / DCR / biceps tenodesis     Insurance/Certification information:    Gonzalez HINOJOSA/BS  Physician Information:   Deepika Rodríguez PA-C  Plan of care signed (Y/N):    Visit# / total visits:     24/    G-Code (if applicable):      Date / Visit # G-Code Applied:         Progress Note: []  Yes  [x]  No  Next due by: 19      Pain level:   4/10    Subjective:    Pt reports increased pain, stated she is back to work. Objective:  Observation:   Test measurements:  Quick DASH: 20%, L sh AROM: flex:160 deg, abd: 150 deg, ER and IR: WNL. L sh MMT: flex: 4-/5, abd: 4/5, ER: 4+/5, IR: 5/5.    Palpation:     Exercises:  Exercise Resistance/Repetitions Other comments   Pendulums x30 each 4   Scapular retraction  x30 4   Pulleys 3' flexion 4   UT stretch 5x20\" 4   Wand abd x30 4   Wand ER/IR x30 4   Ball roll on table with scap ret x30 4   Supine self ROM flexion x30 4   Mid / low rows Orange, 2x10 4   ER / IR w/ t-band Orange, 2x10 each 4   HH depression (gentle) 2x10 4   Bench press 5# bar, 3x10 4             Ball roll up wall 3x10 4     Other Therapeutic Activities:     Manual Treatments:  Lshoulder PROM as tolerated, grade II AP / inf mobs, STM of mid deltoid, rhythmic stabilization 3x20\"; 15'    Modalities:  IFC with MH, L shoulder 15 min      Timed Code Treatment Minutes:   55      Total Treatment Minutes:  60    Treatment/Activity Tolerance:  [x] Patient tolerated treatment well [] Patient limited by fatigue  [] Patient limited by pain  [] Patient limited by other medical complications  [] Other:       Pain after treatment:   2/10    Prognosis: [x] Good [] Fair  [] Poor    Patient Requires Follow-up: [x] Yes  [] No    Plan:   [x] Continue per plan of care [] Alter current plan (see comments)  [] Plan of care initiated [] Hold pending MD visit [] Discharge    Plan for Next Session:      Goals  Short term goals  Time Frame for Short term goals: 3-4 weeks  Short term goal 1: Pt to be I with HEP -MET  Short term goal 2: Pt to demonstrate L shoulder flexion AROM of 0-120 deg or greater  Short term goal 3: Pt to demonstrate L shoulder flexion / abd PROM of 0-160 deg. -MET   Short term goal 4: Pt to perform daily activities with pain of less than 5/10. -MET  Long term goals  Time Frame for Long term goals : 6-8 weeks  Long term goal 1: Quick DASH score to improve to less than 15% indicating improved function  Long term goal 2: Pt to demonstrate L shoulder AROM flexion and abduction of 0-150 deg or greater  Long term goal 3: ER / IR AROM to improve to 0-60 deg or greater.   Long term goal 4: Pt to demonstrate 4+/5 or greater L shoulder strength grossly       Electronically signed by:  Boone Harley PTA

## 2019-06-04 NOTE — TELEPHONE ENCOUNTER
Melania,    Her bone scan is improved from the prior one.  No concern on the bone scan of any infection. You can let patient know.    Danny

## 2019-06-06 ENCOUNTER — APPOINTMENT (OUTPATIENT)
Dept: PHYSICAL THERAPY | Facility: HOSPITAL | Age: 54
End: 2019-06-06
Payer: COMMERCIAL

## 2019-06-11 ENCOUNTER — HOSPITAL ENCOUNTER (OUTPATIENT)
Dept: PHYSICAL THERAPY | Facility: HOSPITAL | Age: 54
Setting detail: THERAPIES SERIES
Discharge: HOME | End: 2019-06-11
Payer: COMMERCIAL

## 2019-06-11 PROCEDURE — 97110 THERAPEUTIC EXERCISES: CPT

## 2019-06-11 PROCEDURE — 97140 MANUAL THERAPY 1/> REGIONS: CPT

## 2019-06-11 PROCEDURE — G0283 ELEC STIM OTHER THAN WOUND: HCPCS

## 2019-06-11 NOTE — FLOWSHEET NOTE
Good [] Fair  [] Poor    Patient Requires Follow-up: [x] Yes  [] No    Plan:   [x] Continue per plan of care [] Alter current plan (see comments)  [] Plan of care initiated [] Hold pending MD visit [] Discharge    Plan for Next Session:      Goals  Short term goals  Time Frame for Short term goals: 3-4 weeks  Short term goal 1: Pt to be I with HEP -MET  Short term goal 2: Pt to demonstrate L shoulder flexion AROM of 0-120 deg or greater  Short term goal 3: Pt to demonstrate L shoulder flexion / abd PROM of 0-160 deg. -MET   Short term goal 4: Pt to perform daily activities with pain of less than 5/10. -MET  Long term goals  Time Frame for Long term goals : 6-8 weeks  Long term goal 1: Quick DASH score to improve to less than 15% indicating improved function  Long term goal 2: Pt to demonstrate L shoulder AROM flexion and abduction of 0-150 deg or greater  Long term goal 3: ER / IR AROM to improve to 0-60 deg or greater.   Long term goal 4: Pt to demonstrate 4+/5 or greater L shoulder strength grossly       Electronically signed by:  Blank Hugo PT

## 2019-06-13 ENCOUNTER — HOSPITAL ENCOUNTER (OUTPATIENT)
Dept: PHYSICAL THERAPY | Facility: HOSPITAL | Age: 54
Setting detail: THERAPIES SERIES
Discharge: HOME | End: 2019-06-13
Payer: COMMERCIAL

## 2019-06-13 PROCEDURE — 97110 THERAPEUTIC EXERCISES: CPT

## 2019-06-13 PROCEDURE — G0283 ELEC STIM OTHER THAN WOUND: HCPCS

## 2019-06-13 PROCEDURE — 97140 MANUAL THERAPY 1/> REGIONS: CPT

## 2019-06-20 ENCOUNTER — HOSPITAL ENCOUNTER (OUTPATIENT)
Dept: PHYSICAL THERAPY | Facility: HOSPITAL | Age: 54
Setting detail: THERAPIES SERIES
Discharge: HOME | End: 2019-06-20
Payer: COMMERCIAL

## 2019-06-20 PROCEDURE — G0283 ELEC STIM OTHER THAN WOUND: HCPCS

## 2019-06-20 PROCEDURE — 97110 THERAPEUTIC EXERCISES: CPT

## 2019-06-20 PROCEDURE — 97140 MANUAL THERAPY 1/> REGIONS: CPT

## 2019-06-20 NOTE — FLOWSHEET NOTE
Physical Therapy Daily Note  Date:  2019    TIme In:     1600                  Time Out:   [address]    Patient Name:  Apoorva Pringle    :  1965  MRN: [id number]    Restrictions/Precautions:    Pertinent Medical History:  Medical/Treatment Diagnosis Information:  ·   s/p L shoulder ATS with SAD / DCR / biceps tenodesis     Insurance/Certification information:    Gonzalez HINOJOSA/BS  Physician Information:   Willis Vasquez PA-C  Plan of care signed (Y/N):    Visit# / total visits:     26/    G-Code (if applicable):      Date / Visit # G-Code Applied:         Progress Note: []  Yes  [x]  No  Next due by: 19      Pain level:   4/10    Subjective:    Pt reports she is back to work and has been having more pain. Objective:  Observation:   Test measurements:  Quick DASH: 25%, L sh AROM: flex:165 deg, abd: 165 deg, ER and IR: WNL. L sh MMT: flex: 4-/5, abd: 4/5, ER: 5-/5, IR: 5/5. Palpation:     Exercises:  Exercise Resistance/Repetitions Other comments   Pendulums x30 each 20   Scapular retraction  x30 20   Pulleys 3' flexion 20   UT stretch 5x20\" 20   Wand abd x30 20   Wand ER/IR x30 20   Ball roll on table with scap ret x30 20   Supine self ROM flexion x30 20   Mid / low rows Orange, 2x10 20   ER / IR w/ t-band Orange, 2x10 each 20   HH depression (gentle) 2x10 20   Bench press 5# bar, 3x10 20             Ball roll up wall 3x10 20     Other Therapeutic Activities: Reassessment performed by Alison Mcclellan, PT.     Manual Treatments:  Lshoulder PROM as tolerated, grade II AP / inf mobs, STM of mid deltoid, rhythmic stabilization 3x20\"; 16'    Modalities:  IFC with ARASELI L shoulder 15 min      Timed Code Treatment Minutes:   65      Total Treatment Minutes:  84    Treatment/Activity Tolerance:  [x] Patient tolerated treatment well [] Patient limited by fatigue  [] Patient limited by pain  [] Patient limited by other medical complications  [x] Other:  Pt with improved PROM but still having pain at end range. Pain after treatment:   2/10    Prognosis: [x] Good [] Fair  [] Poor    Patient Requires Follow-up: [x] Yes  [] No    Plan:   [x] Continue per plan of care [] Alter current plan (see comments)  [] Plan of care initiated [] Hold pending MD visit [] Discharge    Plan for Next Session:      Goals  Short term goals  Time Frame for Short term goals: 3-4 weeks  Short term goal 1: Pt to be I with HEP -MET  Short term goal 2: Pt to demonstrate L shoulder flexion AROM of 0-120 deg or greater  Short term goal 3: Pt to demonstrate L shoulder flexion / abd PROM of 0-160 deg. -MET   Short term goal 4: Pt to perform daily activities with pain of less than 5/10. -MET  Long term goals  Time Frame for Long term goals : 6-8 weeks  Long term goal 1: Quick DASH score to improve to less than 15% indicating improved function  Long term goal 2: Pt to demonstrate L shoulder AROM flexion and abduction of 0-150 deg or greater  Long term goal 3: ER / IR AROM to improve to 0-60 deg or greater.   Long term goal 4: Pt to demonstrate 4+/5 or greater L shoulder strength grossly       Electronically signed by:  Cristiano Ferrer PTA

## 2019-06-20 NOTE — FLOWSHEET NOTE
Physical Therapy Re-Assessment  Date:  2019    TIme In:     1600                  Time Out:   [address]    Patient Name:  Rosalie Rangel    :  1965  MRN: [id number]    Restrictions/Precautions:    Pertinent Medical History:  Medical/Treatment Diagnosis Information:  ·   s/p L shoulder ATS with SAD / DCR / biceps tenodesis     Insurance/Certification information:    [employer] HINO[employer]/BS  Physician Information:   Denton Rondon PA-C  Plan of care signed (Y/N):    Visit# / total visits:     26/    G-Code (if applicable):      Date / Visit # G-Code Applied:         Progress Note: [x]  Yes  []  No  Next due by: 19      Pain level:   4/10    Subjective:    Pt reports she is back to work and has been having more pain. Objective:  Observation:   Test measurements:  Quick DASH: 25%, L sh AROM: flex:165 deg, abd: 165 deg, ER and IR: WNL. L sh MMT: flex: 4-/5, abd: 4/5, ER: 5-/5, IR: 5/5. Palpation:     Exercises:  Exercise Resistance/Repetitions Other comments   Pendulums x30 each 20   Scapular retraction  x30 20   Pulleys 3' flexion 20   UT stretch 5x20\" 20   Wand abd x30 20   Wand ER/IR x30 20   Ball roll on table with scap ret x30 20   Supine self ROM flexion x30 20   Mid / low rows Orange, 2x10 20   ER / IR w/ t-band Orange, 2x10 each 20   HH depression (gentle) 2x10 20   Bench press 5# bar, 3x10 20             Ball roll up wall 3x10 20     Other Therapeutic Activities: Treatment performed by Vik Joseph PTA; re-assessment performed by Brandyn Martinez, PT.     Manual Treatments:  Lshoulder PROM as tolerated, grade II AP / inf mobs, STM of mid deltoid, rhythmic stabilization 3x20\"; 16'    Modalities:  IFC with MH, L shoulder 15 min      Timed Code Treatment Minutes:   65      Total Treatment Minutes:  84    Treatment/Activity Tolerance:  [x] Patient tolerated treatment well [] Patient limited by fatigue  [] Patient limited by pain  [] Patient limited by other medical complications  [x] Other:  Pt has responded well with improved ROM and strength overall, however continues to be limited with shoulder flexion and abd strength. She will benefit from continued skilled PT to advance functional strengthening. Pt was educated in and issued further HEP with theraband strengthening activities. Pain after treatment:   2/10    Prognosis: [x] Good [] Fair  [] Poor    Patient Requires Follow-up: [x] Yes  [] No    Plan:   [x] Continue per plan of care [] Alter current plan (see comments)  [] Plan of care initiated [] Hold pending MD visit [] Discharge    Plan for Next Session:      Goals  Short term goals  Time Frame for Short term goals: 3-4 weeks  Short term goal 1: Pt to be I with HEP -MET  Short term goal 2: Pt to demonstrate L shoulder flexion AROM of 0-120 deg or greater -MET  Short term goal 3: Pt to demonstrate L shoulder flexion / abd PROM of 0-160 deg. -MET   Short term goal 4: Pt to perform daily activities with pain of less than 5/10. -MET  Long term goals  Time Frame for Long term goals : 6-8 weeks  Long term goal 1: Quick DASH score to improve to less than 15% indicating improved function  Long term goal 2: Pt to demonstrate L shoulder AROM flexion and abduction of 0-150 deg or greater -MET  Long term goal 3: ER / IR AROM to improve to 0-60 deg or greater.  -MET  Long term goal 4: Pt to demonstrate 4+/5 or greater L shoulder strength grossly       Electronically signed by:  Mateo Akhtar PT

## 2019-06-25 ENCOUNTER — HOSPITAL ENCOUNTER (OUTPATIENT)
Dept: PHYSICAL THERAPY | Facility: HOSPITAL | Age: 54
Setting detail: THERAPIES SERIES
Discharge: HOME | End: 2019-06-25
Payer: COMMERCIAL

## 2019-06-25 PROCEDURE — 97110 THERAPEUTIC EXERCISES: CPT

## 2019-06-28 ENCOUNTER — OFFICE VISIT (OUTPATIENT)
Dept: ORTHOPEDIC SURGERY | Facility: CLINIC | Age: 54
End: 2019-06-28

## 2019-06-28 DIAGNOSIS — G62.9 NEUROPATHY: ICD-10-CM

## 2019-06-28 DIAGNOSIS — Z96.651 STATUS POST REVISION OF TOTAL REPLACEMENT OF RIGHT KNEE: ICD-10-CM

## 2019-06-28 DIAGNOSIS — M25.561 ARTHRALGIA OF RIGHT KNEE: Primary | ICD-10-CM

## 2019-06-28 DIAGNOSIS — M51.36 LUMBAR DEGENERATIVE DISC DISEASE: ICD-10-CM

## 2019-06-28 DIAGNOSIS — M17.32 POST-TRAUMATIC OSTEOARTHRITIS OF LEFT KNEE: ICD-10-CM

## 2019-06-28 PROCEDURE — 99213 OFFICE O/P EST LOW 20 MIN: CPT | Performed by: ORTHOPAEDIC SURGERY

## 2019-06-28 RX ORDER — METHOCARBAMOL 750 MG/1
750 TABLET, FILM COATED ORAL NIGHTLY PRN
Qty: 15 TABLET | Refills: 0 | Status: SHIPPED | OUTPATIENT
Start: 2019-06-28 | End: 2019-07-12 | Stop reason: SDUPTHER

## 2019-06-28 RX ORDER — FENTANYL 50 UG/H
1 PATCH TRANSDERMAL
Qty: 10 EACH | Refills: 0 | Status: ON HOLD | OUTPATIENT
Start: 2019-06-28 | End: 2021-04-27

## 2019-07-03 ENCOUNTER — TELEPHONE (OUTPATIENT)
Dept: ORTHOPEDIC SURGERY | Facility: CLINIC | Age: 54
End: 2019-07-03

## 2019-07-03 NOTE — TELEPHONE ENCOUNTER
Patient called checking on status of Fentanyl patch prescription. Advised patient that her insurance denied covering the patches. Advised her that her pharmacy was notified and the prescription was cancelled. Also advised her that our office will no longer prescribe narcotics for her. Per NIKOLE, she has been getting pain medication and Gabapentin from Dr. Greer. I advised her she can continue to get those meds from him, and explained that because we were not aware she was getting meds from him, we cannot write them. Patient stated she understood and will seek refills from Dr. Greer and her primary care physician.

## 2019-07-12 DIAGNOSIS — Z96.651 STATUS POST REVISION OF TOTAL REPLACEMENT OF RIGHT KNEE: ICD-10-CM

## 2019-07-12 DIAGNOSIS — M25.561 ARTHRALGIA OF RIGHT KNEE: ICD-10-CM

## 2019-07-12 RX ORDER — METHOCARBAMOL 750 MG/1
750 TABLET, FILM COATED ORAL NIGHTLY PRN
Qty: 15 TABLET | Refills: 0 | Status: SHIPPED | OUTPATIENT
Start: 2019-07-12 | End: 2019-08-05 | Stop reason: SDUPTHER

## 2019-07-24 ENCOUNTER — HOSPITAL ENCOUNTER (OUTPATIENT)
Dept: MRI IMAGING | Facility: HOSPITAL | Age: 54
Discharge: HOME | End: 2019-07-24
Payer: COMMERCIAL

## 2019-07-24 DIAGNOSIS — R52 PAIN: ICD-10-CM

## 2019-07-31 ENCOUNTER — HOSPITAL ENCOUNTER (OUTPATIENT)
Dept: MRI IMAGING | Facility: HOSPITAL | Age: 54
Discharge: HOME | End: 2019-07-31
Payer: COMMERCIAL

## 2019-07-31 ENCOUNTER — HOSPITAL ENCOUNTER (OUTPATIENT)
Dept: CT IMAGING | Facility: HOSPITAL | Age: 54
Discharge: HOME | End: 2019-07-31
Payer: COMMERCIAL

## 2019-07-31 ENCOUNTER — APPOINTMENT (OUTPATIENT)
Dept: MRI IMAGING | Facility: HOSPITAL | Age: 54
End: 2019-07-31
Payer: COMMERCIAL

## 2019-07-31 DIAGNOSIS — R52 PAIN: ICD-10-CM

## 2019-07-31 DIAGNOSIS — G89.29 CHRONIC PAIN OF RIGHT KNEE: ICD-10-CM

## 2019-07-31 DIAGNOSIS — M25.561 CHRONIC PAIN OF RIGHT KNEE: ICD-10-CM

## 2019-07-31 PROCEDURE — 73721 MRI JNT OF LWR EXTRE W/O DYE: CPT

## 2019-07-31 PROCEDURE — 73700 CT LOWER EXTREMITY W/O DYE: CPT

## 2019-08-05 ENCOUNTER — HOSPITAL ENCOUNTER (OUTPATIENT)
Dept: MRI IMAGING | Facility: HOSPITAL | Age: 54
Discharge: HOME | End: 2019-08-05
Payer: COMMERCIAL

## 2019-08-05 DIAGNOSIS — R52 PAIN: ICD-10-CM

## 2019-08-05 DIAGNOSIS — M25.561 ARTHRALGIA OF RIGHT KNEE: ICD-10-CM

## 2019-08-05 DIAGNOSIS — Z96.651 STATUS POST REVISION OF TOTAL REPLACEMENT OF RIGHT KNEE: ICD-10-CM

## 2019-08-05 PROCEDURE — 72141 MRI NECK SPINE W/O DYE: CPT

## 2019-08-05 RX ORDER — METHOCARBAMOL 750 MG/1
750 TABLET, FILM COATED ORAL NIGHTLY PRN
Qty: 15 TABLET | Refills: 0 | Status: SHIPPED | OUTPATIENT
Start: 2019-08-05 | End: 2019-08-28 | Stop reason: SDUPTHER

## 2019-08-28 DIAGNOSIS — Z96.651 STATUS POST REVISION OF TOTAL REPLACEMENT OF RIGHT KNEE: ICD-10-CM

## 2019-08-28 DIAGNOSIS — M25.561 ARTHRALGIA OF RIGHT KNEE: ICD-10-CM

## 2019-08-28 RX ORDER — METHOCARBAMOL 750 MG/1
750 TABLET, FILM COATED ORAL NIGHTLY PRN
Qty: 15 TABLET | Refills: 0 | Status: SHIPPED | OUTPATIENT
Start: 2019-08-28 | End: 2019-09-17 | Stop reason: SDUPTHER

## 2019-09-17 DIAGNOSIS — Z96.651 STATUS POST REVISION OF TOTAL REPLACEMENT OF RIGHT KNEE: ICD-10-CM

## 2019-09-17 DIAGNOSIS — M25.561 ARTHRALGIA OF RIGHT KNEE: ICD-10-CM

## 2019-09-17 RX ORDER — METHOCARBAMOL 750 MG/1
750 TABLET, FILM COATED ORAL NIGHTLY PRN
Qty: 15 TABLET | Refills: 0 | Status: SHIPPED | OUTPATIENT
Start: 2019-09-17 | End: 2019-10-08 | Stop reason: SDUPTHER

## 2019-10-08 ENCOUNTER — HOSPITAL ENCOUNTER (OUTPATIENT)
Dept: PHYSICAL THERAPY | Facility: HOSPITAL | Age: 54
Setting detail: THERAPIES SERIES
Discharge: HOME | End: 2019-10-08
Payer: COMMERCIAL

## 2019-10-08 DIAGNOSIS — Z96.651 STATUS POST REVISION OF TOTAL REPLACEMENT OF RIGHT KNEE: ICD-10-CM

## 2019-10-08 DIAGNOSIS — M25.561 ARTHRALGIA OF RIGHT KNEE: ICD-10-CM

## 2019-10-08 PROCEDURE — 97140 MANUAL THERAPY 1/> REGIONS: CPT

## 2019-10-08 PROCEDURE — 97110 THERAPEUTIC EXERCISES: CPT

## 2019-10-08 PROCEDURE — 97161 PT EVAL LOW COMPLEX 20 MIN: CPT

## 2019-10-08 RX ORDER — METHOCARBAMOL 750 MG/1
750 TABLET, FILM COATED ORAL NIGHTLY PRN
Qty: 15 TABLET | Refills: 0 | Status: SHIPPED | OUTPATIENT
Start: 2019-10-08 | End: 2019-10-25 | Stop reason: SDUPTHER

## 2019-10-08 ASSESSMENT — PAIN SCALES - GENERAL: PAINLEVEL_OUTOF10: 3

## 2019-10-08 ASSESSMENT — PAIN DESCRIPTION - LOCATION: LOCATION: ARM;NECK

## 2019-10-08 ASSESSMENT — PAIN DESCRIPTION - DIRECTION: RADIATING_TOWARDS: LUE

## 2019-10-08 ASSESSMENT — PAIN DESCRIPTION - ORIENTATION: ORIENTATION: LEFT

## 2019-10-10 ENCOUNTER — APPOINTMENT (OUTPATIENT)
Dept: PHYSICAL THERAPY | Facility: HOSPITAL | Age: 54
End: 2019-10-10
Payer: COMMERCIAL

## 2019-10-10 ASSESSMENT — PAIN DESCRIPTION - ORIENTATION: ORIENTATION: LEFT

## 2019-10-10 ASSESSMENT — PAIN SCALES - GENERAL: PAINLEVEL_OUTOF10: 3

## 2019-10-10 ASSESSMENT — PAIN DESCRIPTION - DIRECTION: RADIATING_TOWARDS: LUE

## 2019-10-10 ASSESSMENT — PAIN DESCRIPTION - LOCATION: LOCATION: ARM;NECK

## 2019-10-15 ENCOUNTER — HOSPITAL ENCOUNTER (OUTPATIENT)
Dept: PHYSICAL THERAPY | Facility: HOSPITAL | Age: 54
Setting detail: THERAPIES SERIES
Discharge: HOME | End: 2019-10-15
Payer: COMMERCIAL

## 2019-10-15 PROCEDURE — 97140 MANUAL THERAPY 1/> REGIONS: CPT

## 2019-10-15 PROCEDURE — 97110 THERAPEUTIC EXERCISES: CPT

## 2019-10-15 PROCEDURE — G0283 ELEC STIM OTHER THAN WOUND: HCPCS

## 2019-10-17 ENCOUNTER — HOSPITAL ENCOUNTER (OUTPATIENT)
Dept: PHYSICAL THERAPY | Facility: HOSPITAL | Age: 54
Setting detail: THERAPIES SERIES
End: 2019-10-17
Payer: COMMERCIAL

## 2019-10-24 ENCOUNTER — HOSPITAL ENCOUNTER (OUTPATIENT)
Dept: PHYSICAL THERAPY | Facility: HOSPITAL | Age: 54
Setting detail: THERAPIES SERIES
Discharge: HOME | End: 2019-10-24
Payer: COMMERCIAL

## 2019-10-24 PROCEDURE — 97110 THERAPEUTIC EXERCISES: CPT

## 2019-10-24 PROCEDURE — G0283 ELEC STIM OTHER THAN WOUND: HCPCS

## 2019-10-24 PROCEDURE — 97140 MANUAL THERAPY 1/> REGIONS: CPT

## 2019-10-25 DIAGNOSIS — Z96.651 STATUS POST REVISION OF TOTAL REPLACEMENT OF RIGHT KNEE: ICD-10-CM

## 2019-10-25 DIAGNOSIS — M25.561 ARTHRALGIA OF RIGHT KNEE: ICD-10-CM

## 2019-10-25 RX ORDER — METHOCARBAMOL 750 MG/1
750 TABLET, FILM COATED ORAL NIGHTLY PRN
Qty: 15 TABLET | Refills: 0 | Status: SHIPPED | OUTPATIENT
Start: 2019-10-25 | End: 2019-11-13 | Stop reason: SDUPTHER

## 2019-10-28 ENCOUNTER — HOSPITAL ENCOUNTER (OUTPATIENT)
Dept: PHYSICAL THERAPY | Facility: HOSPITAL | Age: 54
Setting detail: THERAPIES SERIES
Discharge: HOME | End: 2019-10-28
Payer: COMMERCIAL

## 2019-10-28 PROCEDURE — G0283 ELEC STIM OTHER THAN WOUND: HCPCS

## 2019-10-28 PROCEDURE — 97140 MANUAL THERAPY 1/> REGIONS: CPT

## 2019-10-28 PROCEDURE — 97110 THERAPEUTIC EXERCISES: CPT

## 2019-10-31 ENCOUNTER — HOSPITAL ENCOUNTER (OUTPATIENT)
Dept: PHYSICAL THERAPY | Facility: HOSPITAL | Age: 54
Setting detail: THERAPIES SERIES
End: 2019-10-31
Payer: COMMERCIAL

## 2019-11-04 NOTE — OP NOTE
[facility] [address], 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    POSTOP DIAGNOSIS:    Same.    PROCEDURE:       Right knee second stage of revision total knee arthroplasty for periprosthetic infection with removal of temporary articulating antibiotic knee spacers.    SURGEON:         Pete Willis M.D.    FIRST ASSIST:  Circulator: Chanel Johnston RN; Dixie Hong RN      Scrub Person: Fernando Pennington; Antonio Rosario; Apurva Michaud; Micah Palacios    ANESTHETIST:  LANE: Dominguez Bwoers CRNA    ANESTHESIA:   Spinal    FLUIDS:   2500 ml crystalloid    ESTIM BLOOD LOSS: 500 ml    URINE OUTPUT:  400 ml    SPECIMENS:                         None.    DRAINS:             Rodriguez to gravity.    TOURNIQUET TIME:             Not used.    HARDWARE:                         [employer] [facility] revision cemented modular total knee replacement with a size 3 oxinium femur posterior stabilized component with modular 160 x 12 mm stem and 4 mm offset in the 5 o’clock postion and no augments, a size 2 tibia component with modular 160 x 10 mm stem and 6 mm offset in the 11:30 o’clock position and no augments, 21 mm polyethylene posterior stabilized constrained tibial liner with high flexion post and 32 x 7.8 mm patella polyethylene button.                             COMPLICATIONS:  None.    DISPOSITION:  Stable to recovery.    FINDINGS:   Diffuse mild chronic inflammation and minimal biofilm with no purulence or any evidence of active infection. Temporary spacers secure in good position and were     removed during first portion of procedure and along with thorough debridement and pulsed antibiotic irrigation as well as exchange of drapes, gloves and  instruments     prior to performing the revision modular total knee replacement.    INDICATIONS:   Planned routine second stage of total knee revision replacement for infection.    NARRATIVE:   The patient presents for planned elective revision knee replacement surgery.  Risks and benefits of the surgery were discussed with the patient, and informed consent was obtained.  Risks discussed including but not limited to anesthesia, infection, nerve/vessel/tendon injury, fracture, prosthetic loosening or wear, blood loss and possible need for transfusion, DVT and pulmonary embolus.  Goals include the potential for pain relief, improved knee joint mechanics, potential knee motion and better function for ambulation and activities.      Antibiotic prophylaxis was given.  Surgeon site marking and a time out were performed prior to the procedure.  Anesthesia was effective and well tolerated.  The knee was prepped and draped in the usual sterile fashion.  A padded tourniquet was placed on the thigh on standby though was not used for the procedure.    After sterile prep and drape, an anterior incision was made at the knee following the prior well healed scar.  A medial parapatella arthrotomy was made and dissection continued proximally along the medial one-third of the quadriceps tendon and distally along the medial border of the patella tendon.  A partial inferior patella fat pad release was performed as well as a superior synovectomy. Chronic mild inflammatory tissue was debrided.  The temporary spacer femur and tibia knee components were removed without difficulty and with good maintenance of remaining bone stock for the revision.    Next, steps were taken to prepare the distal femur and proximal tibia bone surfaces for the knee replacement implant. The minimally invasive instrument set, the intramedullary and extramedullary guides, sizing templates, cutting blocks, and hand reamers for the modular stems as well as  radiographic x-ray templates were used.  Care was taken to address soft tissue and ligament balance and to achieve even flexion and extension gaps and to keep the joint line at the desired level.  The patella surfaced was freshened smooth and with 15 mm of bone thickness.  The patella guide and drill was used for the three holes for the patella button pegs.  Good alignment, motion and stability of the knee were achieved with trial components. Modular components with stems and offsets as well as augments only if needed were utilized for the revision. The bone surfaces were thoroughly irrigated with pulsed antibiotic solution and the bone surfaces dried.        Next, using standard cement technique including vacuum mixing, centrifugation and pressurization, and adding two grams of vancomycin powder to the cement mixture, the actual prosthetic components as described above were cemented in place.  Again, a trial liner was placed to check for the best range of motion and stability of the knee.  The trial was removed and the actual polyethylene liner was placed onto the tibial tray and secured with the locking mechanism.  The knee was again taken through a final range of motion and stability check, which was excellent.  There was also excellent patella tracking and patella level with respect to the knee joint level. The knee and leg showed good alignment.  There was good hemostasis throughout the procedure.  Venous bleeding was controlled with electrocautery.  The wound was irrigated copiously again and throughout the procedure and closure with pulsed antibiotic irrigation.  Routine closure consisted of interrupted figure-of-eight #1 Vicryl for the extensor mechanism, 2-0 Vicryl for the deep and superficial subcutaneous layers and staples for the skin.  A sterile dressing with xeroform, guaze and Covaderm was applied.  Anesthesia was effective and well tolerated.  There were no complications of the procedure.  The  Calm patient was transferred in stable condition to the recovery room.

## 2019-11-05 ENCOUNTER — HOSPITAL ENCOUNTER (OUTPATIENT)
Dept: PHYSICAL THERAPY | Facility: HOSPITAL | Age: 54
Setting detail: THERAPIES SERIES
Discharge: HOME | End: 2019-11-05
Payer: COMMERCIAL

## 2019-11-05 PROCEDURE — 97140 MANUAL THERAPY 1/> REGIONS: CPT

## 2019-11-05 PROCEDURE — G0283 ELEC STIM OTHER THAN WOUND: HCPCS

## 2019-11-05 PROCEDURE — 97110 THERAPEUTIC EXERCISES: CPT

## 2019-11-07 ENCOUNTER — HOSPITAL ENCOUNTER (OUTPATIENT)
Dept: PHYSICAL THERAPY | Facility: HOSPITAL | Age: 54
Setting detail: THERAPIES SERIES
End: 2019-11-07
Payer: COMMERCIAL

## 2019-11-07 DIAGNOSIS — Z96.651 STATUS POST REVISION OF TOTAL REPLACEMENT OF RIGHT KNEE: ICD-10-CM

## 2019-11-07 DIAGNOSIS — M25.561 ARTHRALGIA OF RIGHT KNEE: ICD-10-CM

## 2019-11-07 RX ORDER — METHOCARBAMOL 750 MG/1
750 TABLET, FILM COATED ORAL NIGHTLY PRN
Qty: 15 TABLET | Refills: 0 | OUTPATIENT
Start: 2019-11-07

## 2019-11-12 ENCOUNTER — APPOINTMENT (OUTPATIENT)
Dept: PHYSICAL THERAPY | Facility: HOSPITAL | Age: 54
End: 2019-11-12
Payer: COMMERCIAL

## 2019-11-13 RX ORDER — METHOCARBAMOL 750 MG/1
750 TABLET, FILM COATED ORAL NIGHTLY PRN
Qty: 15 TABLET | Refills: 0 | Status: SHIPPED | OUTPATIENT
Start: 2019-11-13 | End: 2019-11-27 | Stop reason: SDUPTHER

## 2019-11-14 ENCOUNTER — HOSPITAL ENCOUNTER (OUTPATIENT)
Dept: PHYSICAL THERAPY | Facility: HOSPITAL | Age: 54
Setting detail: THERAPIES SERIES
Discharge: HOME | End: 2019-11-14
Payer: COMMERCIAL

## 2019-11-14 PROCEDURE — 97110 THERAPEUTIC EXERCISES: CPT

## 2019-11-14 PROCEDURE — G0283 ELEC STIM OTHER THAN WOUND: HCPCS

## 2019-11-14 PROCEDURE — 97140 MANUAL THERAPY 1/> REGIONS: CPT

## 2019-11-19 ENCOUNTER — HOSPITAL ENCOUNTER (OUTPATIENT)
Dept: PHYSICAL THERAPY | Facility: HOSPITAL | Age: 54
Setting detail: THERAPIES SERIES
End: 2019-11-19
Payer: COMMERCIAL

## 2019-11-21 ENCOUNTER — HOSPITAL ENCOUNTER (OUTPATIENT)
Dept: PHYSICAL THERAPY | Facility: HOSPITAL | Age: 54
Setting detail: THERAPIES SERIES
Discharge: HOME | End: 2019-11-21
Payer: COMMERCIAL

## 2019-11-21 PROCEDURE — 97140 MANUAL THERAPY 1/> REGIONS: CPT

## 2019-11-21 PROCEDURE — 97110 THERAPEUTIC EXERCISES: CPT

## 2019-11-21 PROCEDURE — G0283 ELEC STIM OTHER THAN WOUND: HCPCS

## 2019-11-27 DIAGNOSIS — Z96.651 STATUS POST REVISION OF TOTAL REPLACEMENT OF RIGHT KNEE: ICD-10-CM

## 2019-11-27 DIAGNOSIS — M25.561 ARTHRALGIA OF RIGHT KNEE: ICD-10-CM

## 2019-11-27 RX ORDER — METHOCARBAMOL 750 MG/1
750 TABLET, FILM COATED ORAL NIGHTLY PRN
Qty: 15 TABLET | Refills: 0 | Status: SHIPPED | OUTPATIENT
Start: 2019-11-27 | End: 2019-12-12 | Stop reason: SDUPTHER

## 2019-12-04 ENCOUNTER — HOSPITAL ENCOUNTER (OUTPATIENT)
Facility: HOSPITAL | Age: 54
Discharge: HOME | End: 2019-12-04
Payer: COMMERCIAL

## 2019-12-04 ENCOUNTER — HOSPITAL ENCOUNTER (OUTPATIENT)
Dept: GENERAL RADIOLOGY | Facility: HOSPITAL | Age: 54
Discharge: HOME | End: 2019-12-04
Payer: COMMERCIAL

## 2019-12-04 DIAGNOSIS — Z01.811 PRE-OP CHEST EXAM: ICD-10-CM

## 2019-12-04 LAB
ANION GAP SERPL CALCULATED.3IONS-SCNC: 12 MMOL/L (ref 3–16)
APTT: 27.2 SEC (ref 22.9–31.3)
BUN BLDV-MCNC: 22 MG/DL (ref 6–20)
CALCIUM SERPL-MCNC: 9.6 MG/DL (ref 8.5–10.5)
CHLORIDE BLD-SCNC: 100 MMOL/L (ref 98–107)
CO2: 29 MMOL/L (ref 20–30)
CREAT SERPL-MCNC: 0.8 MG/DL (ref 0.4–1.2)
GFR AFRICAN AMERICAN: >59
GFR NON-AFRICAN AMERICAN: >60
GLUCOSE BLD-MCNC: 102 MG/DL (ref 74–106)
HCT VFR BLD CALC: 41.7 % (ref 37–47)
HEMOGLOBIN: 13.8 G/DL (ref 11.5–16.5)
INR BLD: 1 (ref 0.9–1.1)
MCH RBC QN AUTO: 30.4 PG (ref 27–32)
MCHC RBC AUTO-ENTMCNC: 33.1 G/DL (ref 31–35)
MCV RBC AUTO: 91.9 FL (ref 80–100)
PDW BLD-RTO: 13.1 % (ref 11–16)
PLATELET # BLD: 215 K/UL (ref 150–400)
PMV BLD AUTO: 9.6 FL (ref 6–10)
POTASSIUM SERPL-SCNC: 4.2 MMOL/L (ref 3.4–5.1)
PROTHROMBIN TIME: 9.3 SEC (ref 8.9–10.7)
RBC # BLD: 4.54 M/UL (ref 3.8–5.8)
SODIUM BLD-SCNC: 141 MMOL/L (ref 136–145)
WBC # BLD: 7.2 K/UL (ref 4–11)

## 2019-12-04 PROCEDURE — 93005 ELECTROCARDIOGRAM TRACING: CPT

## 2019-12-04 PROCEDURE — 80048 BASIC METABOLIC PNL TOTAL CA: CPT

## 2019-12-04 PROCEDURE — 85610 PROTHROMBIN TIME: CPT

## 2019-12-04 PROCEDURE — 85027 COMPLETE CBC AUTOMATED: CPT

## 2019-12-04 PROCEDURE — 36415 COLL VENOUS BLD VENIPUNCTURE: CPT

## 2019-12-04 PROCEDURE — 71046 X-RAY EXAM CHEST 2 VIEWS: CPT

## 2019-12-04 PROCEDURE — 85730 THROMBOPLASTIN TIME PARTIAL: CPT

## 2019-12-12 DIAGNOSIS — M25.561 ARTHRALGIA OF RIGHT KNEE: ICD-10-CM

## 2019-12-12 DIAGNOSIS — Z96.651 STATUS POST REVISION OF TOTAL REPLACEMENT OF RIGHT KNEE: ICD-10-CM

## 2019-12-12 RX ORDER — METHOCARBAMOL 750 MG/1
750 TABLET, FILM COATED ORAL NIGHTLY PRN
Qty: 15 TABLET | Refills: 0 | Status: SHIPPED | OUTPATIENT
Start: 2019-12-12

## 2020-01-28 ENCOUNTER — HOSPITAL ENCOUNTER (OUTPATIENT)
Dept: PHYSICAL THERAPY | Facility: HOSPITAL | Age: 55
Setting detail: THERAPIES SERIES
Discharge: HOME | End: 2020-01-28
Payer: COMMERCIAL

## 2020-01-28 PROCEDURE — 97140 MANUAL THERAPY 1/> REGIONS: CPT

## 2020-01-28 PROCEDURE — 97161 PT EVAL LOW COMPLEX 20 MIN: CPT

## 2020-01-28 ASSESSMENT — PAIN DESCRIPTION - DESCRIPTORS: DESCRIPTORS: ACHING;TIGHTNESS;SORE

## 2020-01-28 ASSESSMENT — PAIN SCALES - GENERAL: PAINLEVEL_OUTOF10: 1

## 2020-01-28 ASSESSMENT — PAIN DESCRIPTION - FREQUENCY: FREQUENCY: INTERMITTENT

## 2020-01-28 ASSESSMENT — PAIN DESCRIPTION - PROGRESSION: CLINICAL_PROGRESSION: GRADUALLY IMPROVING

## 2020-01-28 ASSESSMENT — PAIN DESCRIPTION - LOCATION: LOCATION: NECK

## 2020-01-28 ASSESSMENT — PAIN DESCRIPTION - ORIENTATION: ORIENTATION: LEFT;RIGHT

## 2020-01-28 NOTE — PROGRESS NOTES
Physical Therapy  Initial Assessment  Date: 2020  Patient Name: Torres Albarado  MRN: [id number]  : 1965     Treatment Diagnosis: Cervicalgia, muscle strain; s/p C5-7 ACDF    Restrictions  Restrictions/Precautions  Restrictions/Precautions: General Precautions, Surgical Protocols  Required Braces or Orthoses?: No    Subjective   General  Chart Reviewed: Yes  Patient assessed for rehabilitation services?: Yes  Response To Previous Treatment: Not applicable  Family / Caregiver Present: No  Referring Practitioner: Raj Marin PA-C  Diagnosis: s/p C5-7 ACDF  Follows Commands: Within Functional Limits  PT Visit Information  PT Insurance Information: BCBS  Subjective  Subjective: Patient reports: history of progressive neck pain with radiculopathy; failed conservative treatments; underwent surgery as noted above: DOS: 19 = restrictions include no overhead or heavy lifting; wore a cervical collar for about 2 weeks; left UE is some better with regard to numbess and arm weakness; c/o neck stiffness and regain UE conditioning, strength, etc to prepare her to work.; states she has no restrictions with ROM.   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    Objective     Observation/Palpation  Posture: Fair  Palpation: tenderness with increased muscle tension over UT's, cervical paraspinals  Observation: well healed surgical incision anterior neck;     AROM RUE (degrees)  RUE AROM : WFL  AROM LUE (degrees)  LUE AROM : WFL  Spine  Cervical: AROM: FF = 25, BB = 30, SB = 25, ROT = 50    Strength RUE  Strength RUE: WFL  Strength LUE  Strength LUE: WFL     Additional Measures  Other: Neck Index = 32  Sensation  Overall Sensation Status: WFL                   Exercises  Exercise 1: scap squeezes - 3 x 15  Exercise 2: gentle cervical isometrics: 5\" x 10 - SB, BB  Exercise 3: gentle UT stretches - 10\"  Exercise 4: mid rows - 3 x 15 - blue TB  Exercise 5: wall posture stretch - 15\" x 5  Exercise 6: MH with IFC e-stim - UT's x 15'  Exercise 7: Manual: STM - UT's, cervical paraspinals x 15'                      Assessment   Conditions Requiring Skilled Therapeutic Intervention  Body structures, Functions, Activity limitations: Decreased strength;Decreased high-level IADLs; Increased pain  Assessment: Cervicalgia, muscle strain; s/p C5-7 ACDF  Treatment Diagnosis: Cervicalgia, muscle strain; s/p C5-7 ACDF  Prognosis: Good  Decision Making: Low Complexity  REQUIRES PT FOLLOW UP: Yes  Treatment Initiated : evaluation, manual tx, POC education  Activity Tolerance  Activity Tolerance: Patient Tolerated treatment well         Plan   Plan  Times per week: 2-3 x week  Plan weeks: 6 weeks  Current Treatment Recommendations: Strengthening, ROM, Neuromuscular Re-education, Manual Therapy - Soft Tissue Mobilization, Home Exercise Program, Manual Therapy - Joint Manipulation, Modalities               Goals  Long term goals  Time Frame for Long term goals : 6 weeks  Long term goal 1: Achieve neck pain at or less than 1-2/10 with routine daily and work activities. Long term goal 2: Increase cervical AROM by 5 degrees in the noted deficits. Long term goal 3: Independent with HEP. Long term goal 4: Achieve a Neck Index score of 16. Therapy Time   Individual Concurrent Group Co-treatment   Time In           Time Out           Minutes                   Electronically signed by Gaurav Smith PT on 1/30/2020 at 91:04 AM      Certification of Medical Necessity:  It will be understood that this treatment plan is certified medically necessary by the documenting

## 2020-01-31 ENCOUNTER — HOSPITAL ENCOUNTER (OUTPATIENT)
Dept: PHYSICAL THERAPY | Facility: HOSPITAL | Age: 55
Setting detail: THERAPIES SERIES
Discharge: HOME | End: 2020-01-31
Payer: COMMERCIAL

## 2020-01-31 PROCEDURE — G0283 ELEC STIM OTHER THAN WOUND: HCPCS

## 2020-01-31 PROCEDURE — 97140 MANUAL THERAPY 1/> REGIONS: CPT

## 2020-01-31 PROCEDURE — 97110 THERAPEUTIC EXERCISES: CPT

## 2020-02-03 ENCOUNTER — HOSPITAL ENCOUNTER (OUTPATIENT)
Dept: PHYSICAL THERAPY | Facility: HOSPITAL | Age: 55
Setting detail: THERAPIES SERIES
Discharge: HOME | End: 2020-02-03
Payer: COMMERCIAL

## 2020-02-03 PROCEDURE — 97110 THERAPEUTIC EXERCISES: CPT

## 2020-02-03 PROCEDURE — 97140 MANUAL THERAPY 1/> REGIONS: CPT

## 2020-02-03 PROCEDURE — G0283 ELEC STIM OTHER THAN WOUND: HCPCS

## 2020-02-03 NOTE — FLOWSHEET NOTE
Physical Therapy Daily Treatment Note   Date:  2/3/2020    TIme In:       830               Time Out:       n    Patient Name:  Mary Motta    :  1965  MRN: [id number]    Restrictions/Precautions:    Pertinent Medical History:  Medical/Treatment Diagnosis Information:  ·   Cervicalgia, muscle strain; s/p C5-7 ACDF     Insurance/Certification information:    BCBS  Physician Information:    Adamaris Samuel PA-C  Plan of care signed (Y/N):    Visit# / total visits:    3 /    G-Code (if applicable):      Date / Visit # G-Code Applied:         Progress Note: []  Yes  [x]  No  Next due by: Visit #10      Pain level:  3 /10    Subjective:  Patient reports: some soreness since last visit     Objective:   Observation:    Test measurements:     Palpation:    Exercises:  Exercise Resistance/Repetitions Other comments   Scapular retraction 3x15 3   Gentle cervical isometrics 5\"x10 SB/BB 3   Gentle UT stretch 15\"x5 3   TB mid rows 3x15 TB 3   Wall posture stretch 15\"x5 3                                        Other Therapeutic Activities:      Manual Treatments:   STM - UT's, cervical paraspinals, 15 min    Modalities:  IFC with MH, UT's x 15      Timed Code Treatment Minutes:  48'      Total Treatment Minutes:  58'    Treatment/Activity Tolerance:  [x] Patient tolerated treatment well [] Patient limited by fatigue  [] Patient limited by pain  [] Patient limited by other medical complications  [] Other:     Pain after treatment:     2 /10    Prognosis: [x] Good [] Fair  [] Poor    Patient Requires Follow-up: [x] Yes  [] No    Plan:   [x] Continue per plan of care [] Alter current plan (see comments)  [] Plan of care initiated [] Hold pending MD visit [] Discharge    Plan for Next Session:        Electronically signed by:  Adam Curtis, PT

## 2020-02-05 ENCOUNTER — HOSPITAL ENCOUNTER (OUTPATIENT)
Dept: PHYSICAL THERAPY | Facility: HOSPITAL | Age: 55
Setting detail: THERAPIES SERIES
Discharge: HOME | End: 2020-02-05
Payer: COMMERCIAL

## 2020-02-05 PROCEDURE — G0283 ELEC STIM OTHER THAN WOUND: HCPCS

## 2020-02-05 PROCEDURE — 97140 MANUAL THERAPY 1/> REGIONS: CPT

## 2020-02-05 PROCEDURE — 97110 THERAPEUTIC EXERCISES: CPT

## 2020-02-07 ENCOUNTER — HOSPITAL ENCOUNTER (OUTPATIENT)
Dept: PHYSICAL THERAPY | Facility: HOSPITAL | Age: 55
Setting detail: THERAPIES SERIES
Discharge: HOME | End: 2020-02-07
Payer: COMMERCIAL

## 2020-02-07 PROCEDURE — 97140 MANUAL THERAPY 1/> REGIONS: CPT

## 2020-02-07 PROCEDURE — G0283 ELEC STIM OTHER THAN WOUND: HCPCS

## 2020-02-07 PROCEDURE — 97110 THERAPEUTIC EXERCISES: CPT

## 2020-02-07 NOTE — FLOWSHEET NOTE
Physical Therapy Daily Treatment Note   Date:  2020    TIme In:       4960               Time Out:       0940    Patient Name:  Anthony Sharma    :  1965  MRN: [id number]    Restrictions/Precautions:    Pertinent Medical History:  Medical/Treatment Diagnosis Information:  ·   Cervicalgia, muscle strain; s/p C5-7 ACDF     Insurance/Certification information:    [facility]  Physician Information:    Timothy Coleman PA-C  Plan of care signed (Y/N):    Visit# / total visits:    5 /    G-Code (if applicable):      Date / Visit # G-Code Applied:         Progress Note: []  Yes  [x]  No  Next due by: Visit #10      Pain level:  2 /10    Subjective:  Patient reports: did good after last visit, mild c/o pain currently.      Objective:   Observation:    Test measurements:     Palpation:    Exercises:  Exercise Resistance/Repetitions Other comments   Scapular retraction 3x15 7   Gentle cervical isometrics 5\"x10 SB/BB 7   Gentle UT stretch 15\"x5 7   TB mid rows 3x15 TB 7   Wall posture stretch 15\"x5 7                                        Other Therapeutic Activities:      Manual Treatments:   STM - UT's, cervical paraspinals, 15 min    Modalities:  IFC with MH, UT's x 15      Timed Code Treatment Minutes:  55      Total Treatment Minutes:  65    Treatment/Activity Tolerance:  [x] Patient tolerated treatment well [] Patient limited by fatigue  [] Patient limited by pain  [] Patient limited by other medical complications  [] Other:     Pain after treatment:     1 /10    Prognosis: [x] Good [] Fair  [] Poor    Patient Requires Follow-up: [x] Yes  [] No    Plan:   [x] Continue per plan of care [] Alter current plan (see comments)  [] Plan of care initiated [] Hold pending MD visit [] Discharge    Plan for Next Session:        Electronically signed by:  Rodney Loja PTA

## 2020-02-10 ENCOUNTER — HOSPITAL ENCOUNTER (OUTPATIENT)
Dept: PHYSICAL THERAPY | Facility: HOSPITAL | Age: 55
Setting detail: THERAPIES SERIES
Discharge: HOME | End: 2020-02-10
Payer: COMMERCIAL

## 2020-02-10 PROCEDURE — 97140 MANUAL THERAPY 1/> REGIONS: CPT

## 2020-02-10 PROCEDURE — G0283 ELEC STIM OTHER THAN WOUND: HCPCS

## 2020-02-10 PROCEDURE — 97110 THERAPEUTIC EXERCISES: CPT

## 2020-02-14 ENCOUNTER — HOSPITAL ENCOUNTER (OUTPATIENT)
Dept: PHYSICAL THERAPY | Facility: HOSPITAL | Age: 55
Setting detail: THERAPIES SERIES
Discharge: HOME | End: 2020-02-14
Payer: COMMERCIAL

## 2020-02-14 PROCEDURE — 97140 MANUAL THERAPY 1/> REGIONS: CPT

## 2020-02-14 PROCEDURE — 97110 THERAPEUTIC EXERCISES: CPT

## 2020-02-14 PROCEDURE — G0283 ELEC STIM OTHER THAN WOUND: HCPCS

## 2020-02-17 ENCOUNTER — HOSPITAL ENCOUNTER (OUTPATIENT)
Dept: PHYSICAL THERAPY | Facility: HOSPITAL | Age: 55
Setting detail: THERAPIES SERIES
Discharge: HOME | End: 2020-02-17
Payer: COMMERCIAL

## 2020-02-17 PROCEDURE — G0283 ELEC STIM OTHER THAN WOUND: HCPCS

## 2020-02-17 PROCEDURE — 97110 THERAPEUTIC EXERCISES: CPT

## 2020-02-17 PROCEDURE — 97140 MANUAL THERAPY 1/> REGIONS: CPT

## 2020-03-06 ENCOUNTER — HOSPITAL ENCOUNTER (OUTPATIENT)
Dept: MAMMOGRAPHY | Facility: HOSPITAL | Age: 55
Discharge: HOME | End: 2020-03-06
Payer: COMMERCIAL

## 2020-03-06 ENCOUNTER — HOSPITAL ENCOUNTER (OUTPATIENT)
Facility: HOSPITAL | Age: 55
Discharge: HOME | End: 2020-03-06
Payer: COMMERCIAL

## 2020-03-06 LAB
BASOPHILS ABSOLUTE: 0 K/UL (ref 0–0.1)
BASOPHILS RELATIVE PERCENT: 0.6 %
EOSINOPHILS ABSOLUTE: 0.2 K/UL (ref 0–0.4)
EOSINOPHILS RELATIVE PERCENT: 4.6 %
HCT VFR BLD CALC: 38.6 % (ref 37–47)
HEMOGLOBIN: 12.7 G/DL (ref 11.5–16.5)
IMMATURE GRANULOCYTES #: 0 K/UL
IMMATURE GRANULOCYTES %: 0 % (ref 0–5)
LYMPHOCYTES ABSOLUTE: 1.9 K/UL (ref 1.5–4)
LYMPHOCYTES RELATIVE PERCENT: 37.9 %
MCH RBC QN AUTO: 29.9 PG (ref 27–32)
MCHC RBC AUTO-ENTMCNC: 32.9 G/DL (ref 31–35)
MCV RBC AUTO: 90.8 FL (ref 80–100)
MONOCYTES ABSOLUTE: 0.3 K/UL (ref 0.2–0.8)
MONOCYTES RELATIVE PERCENT: 6.9 %
NEUTROPHILS ABSOLUTE: 2.5 K/UL (ref 2–7.5)
NEUTROPHILS RELATIVE PERCENT: 50 %
PDW BLD-RTO: 12.9 % (ref 11–16)
PLATELET # BLD: 172 K/UL (ref 150–400)
PMV BLD AUTO: 10 FL (ref 6–10)
RBC # BLD: 4.25 M/UL (ref 3.8–5.8)
SEDIMENTATION RATE, ERYTHROCYTE: 17 MM/HR (ref 0–30)
URIC ACID, SERUM: 5.5 MG/DL (ref 2.5–7.1)
WBC # BLD: 5 K/UL (ref 4–11)

## 2020-03-06 PROCEDURE — 86200 CCP ANTIBODY: CPT

## 2020-03-06 PROCEDURE — 86038 ANTINUCLEAR ANTIBODIES: CPT

## 2020-03-06 PROCEDURE — 85025 COMPLETE CBC W/AUTO DIFF WBC: CPT

## 2020-03-06 PROCEDURE — 86431 RHEUMATOID FACTOR QUANT: CPT

## 2020-03-06 PROCEDURE — 36415 COLL VENOUS BLD VENIPUNCTURE: CPT

## 2020-03-06 PROCEDURE — 86140 C-REACTIVE PROTEIN: CPT

## 2020-03-06 PROCEDURE — 86812 HLA TYPING A B OR C: CPT

## 2020-03-06 PROCEDURE — 85652 RBC SED RATE AUTOMATED: CPT

## 2020-03-06 PROCEDURE — 77063 BREAST TOMOSYNTHESIS BI: CPT

## 2020-03-06 PROCEDURE — 84550 ASSAY OF BLOOD/URIC ACID: CPT

## 2020-03-07 LAB
ANTI-NUCLEAR ANTIBODY (ANA): NEGATIVE
C-REACTIVE PROTEIN: 1.9 MG/L (ref 0–5.1)
RHEUMATOID FACTOR: <10 IU/ML

## 2020-03-08 LAB — HLA B27: NEGATIVE

## 2020-03-09 LAB — CCP IGG ANTIBODIES: 6 UNITS (ref 0–19)

## 2020-06-10 ENCOUNTER — TELEPHONE (OUTPATIENT)
Dept: PRIMARY CARE CLINIC | Age: 55
End: 2020-06-10

## 2020-06-16 ENCOUNTER — HOSPITAL ENCOUNTER (OUTPATIENT)
Facility: HOSPITAL | Age: 55
Discharge: HOME | End: 2020-06-16
Payer: COMMERCIAL

## 2020-06-16 ENCOUNTER — TRANSCRIBE ORDERS (OUTPATIENT)
Dept: ADMINISTRATIVE | Facility: HOSPITAL | Age: 55
End: 2020-06-16

## 2020-06-16 DIAGNOSIS — M25.561 RIGHT KNEE PAIN, UNSPECIFIED CHRONICITY: Primary | ICD-10-CM

## 2020-06-16 LAB
BASOPHILS ABSOLUTE: 0 K/UL (ref 0–0.1)
BASOPHILS RELATIVE PERCENT: 0.5 %
EOSINOPHILS ABSOLUTE: 0.1 K/UL (ref 0–0.4)
EOSINOPHILS RELATIVE PERCENT: 2.4 %
HCT VFR BLD CALC: 39.5 % (ref 37–47)
HEMOGLOBIN: 12.7 G/DL (ref 11.5–16.5)
IMMATURE GRANULOCYTES #: 0 K/UL
IMMATURE GRANULOCYTES %: 0.2 % (ref 0–5)
LYMPHOCYTES ABSOLUTE: 2.3 K/UL (ref 1.5–4)
LYMPHOCYTES RELATIVE PERCENT: 40.9 %
MCH RBC QN AUTO: 29.5 PG (ref 27–32)
MCHC RBC AUTO-ENTMCNC: 32.2 G/DL (ref 31–35)
MCV RBC AUTO: 91.9 FL (ref 80–100)
MONOCYTES ABSOLUTE: 0.4 K/UL (ref 0.2–0.8)
MONOCYTES RELATIVE PERCENT: 6.5 %
NEUTROPHILS ABSOLUTE: 2.7 K/UL (ref 2–7.5)
NEUTROPHILS RELATIVE PERCENT: 49.5 %
PDW BLD-RTO: 13.2 % (ref 11–16)
PLATELET # BLD: 163 K/UL (ref 150–400)
PMV BLD AUTO: 10.3 FL (ref 6–10)
RBC # BLD: 4.3 M/UL (ref 3.8–5.8)
WBC # BLD: 5.5 K/UL (ref 4–11)

## 2020-06-16 PROCEDURE — 36415 COLL VENOUS BLD VENIPUNCTURE: CPT

## 2020-06-16 PROCEDURE — 85025 COMPLETE CBC W/AUTO DIFF WBC: CPT

## 2020-06-16 PROCEDURE — 86140 C-REACTIVE PROTEIN: CPT

## 2020-06-16 PROCEDURE — 85652 RBC SED RATE AUTOMATED: CPT

## 2020-06-17 LAB
C-REACTIVE PROTEIN: 0.8 MG/L (ref 0–5.1)
SEDIMENTATION RATE, ERYTHROCYTE: 12 MM/HR (ref 0–30)

## 2020-07-01 ENCOUNTER — APPOINTMENT (OUTPATIENT)
Dept: NUCLEAR MEDICINE | Facility: HOSPITAL | Age: 55
End: 2020-07-01

## 2020-07-06 ENCOUNTER — HOSPITAL ENCOUNTER (OUTPATIENT)
Dept: NUCLEAR MEDICINE | Facility: HOSPITAL | Age: 55
Discharge: HOME | End: 2020-07-06

## 2020-07-06 DIAGNOSIS — M25.561 RIGHT KNEE PAIN, UNSPECIFIED CHRONICITY: ICD-10-CM

## 2020-07-06 PROCEDURE — A9503 TC99M MEDRONATE: HCPCS | Performed by: ORTHOPAEDIC SURGERY

## 2020-07-06 PROCEDURE — 0 TECHNETIUM MEDRONATE KIT: Performed by: ORTHOPAEDIC SURGERY

## 2020-07-06 PROCEDURE — 78315 BONE IMAGING 3 PHASE: CPT

## 2020-07-06 RX ORDER — TC 99M MEDRONATE 20 MG/10ML
26.5 INJECTION, POWDER, LYOPHILIZED, FOR SOLUTION INTRAVENOUS
Status: COMPLETED | OUTPATIENT
Start: 2020-07-06 | End: 2020-07-06

## 2020-07-06 RX ADMIN — TC 99M MEDRONATE 26.5 MILLICURIE: 20 INJECTION, POWDER, LYOPHILIZED, FOR SOLUTION INTRAVENOUS at 10:00

## 2020-08-09 ENCOUNTER — HOSPITAL ENCOUNTER (OUTPATIENT)
Facility: HOSPITAL | Age: 55
Discharge: HOME | End: 2020-08-09
Payer: COMMERCIAL

## 2020-08-09 LAB
BASOPHILS ABSOLUTE: 0.1 K/UL (ref 0–0.1)
BASOPHILS RELATIVE PERCENT: 0.8 %
BUN BLDV-MCNC: 18 MG/DL (ref 6–20)
EOSINOPHILS ABSOLUTE: 0.2 K/UL (ref 0–0.4)
EOSINOPHILS RELATIVE PERCENT: 2.5 %
HCT VFR BLD CALC: 42.6 % (ref 37–47)
HEMOGLOBIN: 14 G/DL (ref 11.5–16.5)
IMMATURE GRANULOCYTES #: 0 K/UL
IMMATURE GRANULOCYTES %: 0.2 % (ref 0–5)
LYMPHOCYTES ABSOLUTE: 2.2 K/UL (ref 1.5–4)
LYMPHOCYTES RELATIVE PERCENT: 34 %
MCH RBC QN AUTO: 30.2 PG (ref 27–32)
MCHC RBC AUTO-ENTMCNC: 32.9 G/DL (ref 31–35)
MCV RBC AUTO: 91.8 FL (ref 80–100)
MONOCYTES ABSOLUTE: 0.4 K/UL (ref 0.2–0.8)
MONOCYTES RELATIVE PERCENT: 5.6 %
NEUTROPHILS ABSOLUTE: 3.7 K/UL (ref 2–7.5)
NEUTROPHILS RELATIVE PERCENT: 56.9 %
PDW BLD-RTO: 13 % (ref 11–16)
PLATELET # BLD: 206 K/UL (ref 150–400)
PMV BLD AUTO: 9.7 FL (ref 6–10)
RBC # BLD: 4.64 M/UL (ref 3.8–5.8)
URIC ACID, SERUM: 5.5 MG/DL (ref 2.5–7.1)
WBC # BLD: 6.4 K/UL (ref 4–11)

## 2020-08-09 PROCEDURE — 86431 RHEUMATOID FACTOR QUANT: CPT

## 2020-08-09 PROCEDURE — 86140 C-REACTIVE PROTEIN: CPT

## 2020-08-09 PROCEDURE — 84520 ASSAY OF UREA NITROGEN: CPT

## 2020-08-09 PROCEDURE — 36415 COLL VENOUS BLD VENIPUNCTURE: CPT

## 2020-08-09 PROCEDURE — 86038 ANTINUCLEAR ANTIBODIES: CPT

## 2020-08-09 PROCEDURE — 85652 RBC SED RATE AUTOMATED: CPT

## 2020-08-09 PROCEDURE — 85025 COMPLETE CBC W/AUTO DIFF WBC: CPT

## 2020-08-09 PROCEDURE — 86200 CCP ANTIBODY: CPT

## 2020-08-09 PROCEDURE — 84550 ASSAY OF BLOOD/URIC ACID: CPT

## 2020-08-10 LAB
C-REACTIVE PROTEIN: 2.7 MG/L (ref 0–5.1)
RHEUMATOID FACTOR: <10 IU/ML
SEDIMENTATION RATE, ERYTHROCYTE: 19 MM/HR (ref 0–30)

## 2020-08-11 LAB — ANTI-NUCLEAR ANTIBODY (ANA): NEGATIVE

## 2020-08-12 LAB — CCP IGG ANTIBODIES: 3 UNITS (ref 0–19)

## 2020-08-28 ENCOUNTER — HOSPITAL ENCOUNTER (OUTPATIENT)
Facility: HOSPITAL | Age: 55
Discharge: HOME | End: 2020-08-28
Payer: COMMERCIAL

## 2020-08-28 LAB
A/G RATIO: 2.2 (ref 0.8–2)
ALBUMIN SERPL-MCNC: 4.8 G/DL (ref 3.4–4.8)
ALP BLD-CCNC: 86 U/L (ref 25–100)
ALT SERPL-CCNC: 14 U/L (ref 4–36)
ANION GAP SERPL CALCULATED.3IONS-SCNC: 13 MMOL/L (ref 3–16)
AST SERPL-CCNC: 20 U/L (ref 8–33)
BASOPHILS ABSOLUTE: 0 K/UL (ref 0–0.1)
BASOPHILS RELATIVE PERCENT: 0.5 %
BILIRUB SERPL-MCNC: 0.4 MG/DL (ref 0.3–1.2)
BUN BLDV-MCNC: 27 MG/DL (ref 6–20)
CALCIUM SERPL-MCNC: 9.6 MG/DL (ref 8.5–10.5)
CHLORIDE BLD-SCNC: 100 MMOL/L (ref 98–107)
CHOLESTEROL, TOTAL: 204 MG/DL (ref 0–200)
CO2: 26 MMOL/L (ref 20–30)
CREAT SERPL-MCNC: 1.2 MG/DL (ref 0.4–1.2)
EOSINOPHILS ABSOLUTE: 0.2 K/UL (ref 0–0.4)
EOSINOPHILS RELATIVE PERCENT: 2.1 %
FOLATE: >20 NG/ML
GFR AFRICAN AMERICAN: 56
GFR NON-AFRICAN AMERICAN: 47
GLOBULIN: 2.2 G/DL
GLUCOSE BLD-MCNC: 77 MG/DL (ref 74–106)
HCT VFR BLD CALC: 43.1 % (ref 37–47)
HDLC SERPL-MCNC: 72 MG/DL (ref 40–60)
HEMOGLOBIN: 13.8 G/DL (ref 11.5–16.5)
IMMATURE GRANULOCYTES #: 0 K/UL
IMMATURE GRANULOCYTES %: 0.1 % (ref 0–5)
LDL CHOLESTEROL CALCULATED: 104 MG/DL
LYMPHOCYTES ABSOLUTE: 2.4 K/UL (ref 1.5–4)
LYMPHOCYTES RELATIVE PERCENT: 31.4 %
MCH RBC QN AUTO: 30.9 PG (ref 27–32)
MCHC RBC AUTO-ENTMCNC: 32 G/DL (ref 31–35)
MCV RBC AUTO: 96.6 FL (ref 80–100)
MONOCYTES ABSOLUTE: 0.5 K/UL (ref 0.2–0.8)
MONOCYTES RELATIVE PERCENT: 7.1 %
NEUTROPHILS ABSOLUTE: 4.4 K/UL (ref 2–7.5)
NEUTROPHILS RELATIVE PERCENT: 58.8 %
PDW BLD-RTO: 13.3 % (ref 11–16)
PLATELET # BLD: 201 K/UL (ref 150–400)
PMV BLD AUTO: 10.6 FL (ref 6–10)
POTASSIUM SERPL-SCNC: 4.2 MMOL/L (ref 3.4–5.1)
RBC # BLD: 4.46 M/UL (ref 3.8–5.8)
SODIUM BLD-SCNC: 139 MMOL/L (ref 136–145)
TOTAL PROTEIN: 7 G/DL (ref 6.4–8.3)
TRIGL SERPL-MCNC: 141 MG/DL (ref 0–249)
TSH SERPL DL<=0.05 MIU/L-ACNC: 0.91 UIU/ML (ref 0.35–5.5)
VITAMIN B-12: 966 PG/ML (ref 211–911)
VITAMIN D 25-HYDROXY: 34.6 (ref 32–100)
VLDLC SERPL CALC-MCNC: 28 MG/DL
WBC # BLD: 7.5 K/UL (ref 4–11)

## 2020-08-28 PROCEDURE — 85025 COMPLETE CBC W/AUTO DIFF WBC: CPT

## 2020-08-28 PROCEDURE — 84443 ASSAY THYROID STIM HORMONE: CPT

## 2020-08-28 PROCEDURE — 82746 ASSAY OF FOLIC ACID SERUM: CPT

## 2020-08-28 PROCEDURE — 82607 VITAMIN B-12: CPT

## 2020-08-28 PROCEDURE — 80061 LIPID PANEL: CPT

## 2020-08-28 PROCEDURE — 82306 VITAMIN D 25 HYDROXY: CPT

## 2020-08-28 PROCEDURE — 36415 COLL VENOUS BLD VENIPUNCTURE: CPT

## 2020-08-28 PROCEDURE — 80053 COMPREHEN METABOLIC PANEL: CPT

## 2020-12-08 NOTE — FLOWSHEET NOTE
Physical Therapy Daily Treatment Note   Date:  2020    TIme In:       0902               Time Out:       1008    Patient Name:  Jordan Jerez    :  1965  MRN: [id number]    Restrictions/Precautions:    Pertinent Medical History:  Medical/Treatment Diagnosis Information:  ·   Cervicalgia, muscle strain; s/p C5-7 ACDF     Insurance/Certification information:    BCBS  Physician Information:    Helen Roland PA-C  Plan of care signed (Y/N):    Visit# / total visits:    7 /    G-Code (if applicable):      Date / Visit # G-Code Applied:         Progress Note: []  Yes  [x]  No  Next due by: Visit #10      Pain level:  1 /10    Subjective:  Patient reports she stays so tight and she is really tender around her right sh blade. Objective:   Observation:    Test measurements:     Palpation:    Exercises:  Exercise Resistance/Repetitions Other comments   Scapular retraction 3x15 17   Gentle cervical isometrics 5\"x10 SB/BB 17   Gentle UT stretch 15\"x5 17   TB mid rows 3x15 TB 17   Wall posture stretch 15\"x5 17                                        Other Therapeutic Activities:      Manual Treatments:   STM - UT's, cervical paraspinals, 15 min    Modalities:  IFC with MH, UT's x 15      Timed Code Treatment Minutes:  60      Total Treatment Minutes:  66    Treatment/Activity Tolerance:  [x] Patient tolerated treatment well [] Patient limited by fatigue  [] Patient limited by pain  [] Patient limited by other medical complications  [x] Other:  Pt had significant muscle tension in bilateral UT's and around medial border of right sh blade.     Pain after treatment:     0 /10    Prognosis: [x] Good [] Fair  [] Poor    Patient Requires Follow-up: [x] Yes  [] No    Plan:   [x] Continue per plan of care [] Alter current plan (see comments)  [] Plan of care initiated [] Hold pending MD visit [] Discharge    Plan for Next Session:        Electronically signed by:  Katerina Silvestre PTA
signed by:  Lindsay Silvestre, PTA

## 2021-02-03 ENCOUNTER — HOSPITAL ENCOUNTER (OUTPATIENT)
Facility: HOSPITAL | Age: 56
Discharge: HOME | End: 2021-02-03
Payer: COMMERCIAL

## 2021-02-03 LAB
A/G RATIO: 1.9 (ref 0.8–2)
ALBUMIN SERPL-MCNC: 4.5 G/DL (ref 3.4–4.8)
ALP BLD-CCNC: 72 U/L (ref 25–100)
ALT SERPL-CCNC: 14 U/L (ref 4–36)
ANION GAP SERPL CALCULATED.3IONS-SCNC: 12 MMOL/L (ref 3–16)
AST SERPL-CCNC: 18 U/L (ref 8–33)
BASOPHILS ABSOLUTE: 0.1 K/UL (ref 0–0.1)
BASOPHILS RELATIVE PERCENT: 0.7 %
BILIRUB SERPL-MCNC: 0.3 MG/DL (ref 0.3–1.2)
BUN BLDV-MCNC: 20 MG/DL (ref 6–20)
CALCIUM SERPL-MCNC: 9.4 MG/DL (ref 8.5–10.5)
CHLORIDE BLD-SCNC: 104 MMOL/L (ref 98–107)
CHOLESTEROL, TOTAL: 200 MG/DL (ref 0–200)
CO2: 25 MMOL/L (ref 20–30)
CREAT SERPL-MCNC: 0.8 MG/DL (ref 0.4–1.2)
EOSINOPHILS ABSOLUTE: 0.2 K/UL (ref 0–0.4)
EOSINOPHILS RELATIVE PERCENT: 2.9 %
FOLATE: >20 NG/ML
GFR AFRICAN AMERICAN: >59
GFR NON-AFRICAN AMERICAN: >60
GLOBULIN: 2.4 G/DL
GLUCOSE BLD-MCNC: 88 MG/DL (ref 74–106)
HCT VFR BLD CALC: 41.6 % (ref 37–47)
HDLC SERPL-MCNC: 65 MG/DL (ref 40–60)
HEMOGLOBIN: 13.3 G/DL (ref 11.5–16.5)
IMMATURE GRANULOCYTES #: 0 K/UL
IMMATURE GRANULOCYTES %: 0.1 % (ref 0–5)
LDL CHOLESTEROL CALCULATED: 111 MG/DL
LYMPHOCYTES ABSOLUTE: 2 K/UL (ref 1.5–4)
LYMPHOCYTES RELATIVE PERCENT: 26.6 %
MCH RBC QN AUTO: 30.6 PG (ref 27–32)
MCHC RBC AUTO-ENTMCNC: 32 G/DL (ref 31–35)
MCV RBC AUTO: 95.9 FL (ref 80–100)
MONOCYTES ABSOLUTE: 0.4 K/UL (ref 0.2–0.8)
MONOCYTES RELATIVE PERCENT: 5.6 %
NEUTROPHILS ABSOLUTE: 4.8 K/UL (ref 2–7.5)
NEUTROPHILS RELATIVE PERCENT: 64.1 %
PDW BLD-RTO: 13.3 % (ref 11–16)
PLATELET # BLD: 190 K/UL (ref 150–400)
PMV BLD AUTO: 10.9 FL (ref 6–10)
POTASSIUM SERPL-SCNC: 4.3 MMOL/L (ref 3.4–5.1)
RBC # BLD: 4.34 M/UL (ref 3.8–5.8)
SODIUM BLD-SCNC: 141 MMOL/L (ref 136–145)
TOTAL PROTEIN: 6.9 G/DL (ref 6.4–8.3)
TRIGL SERPL-MCNC: 122 MG/DL (ref 0–249)
TSH SERPL DL<=0.05 MIU/L-ACNC: 1.49 UIU/ML (ref 0.27–4.2)
VITAMIN B-12: >2000 PG/ML (ref 211–911)
VITAMIN D 25-HYDROXY: 27.6 (ref 32–100)
VLDLC SERPL CALC-MCNC: 24 MG/DL
WBC # BLD: 7.5 K/UL (ref 4–11)

## 2021-02-03 PROCEDURE — 84443 ASSAY THYROID STIM HORMONE: CPT

## 2021-02-03 PROCEDURE — 36415 COLL VENOUS BLD VENIPUNCTURE: CPT

## 2021-02-03 PROCEDURE — 85025 COMPLETE CBC W/AUTO DIFF WBC: CPT

## 2021-02-03 PROCEDURE — 82607 VITAMIN B-12: CPT

## 2021-02-03 PROCEDURE — 80053 COMPREHEN METABOLIC PANEL: CPT

## 2021-02-03 PROCEDURE — 80061 LIPID PANEL: CPT

## 2021-02-03 PROCEDURE — 82306 VITAMIN D 25 HYDROXY: CPT

## 2021-02-03 PROCEDURE — 82746 ASSAY OF FOLIC ACID SERUM: CPT

## 2021-04-23 ENCOUNTER — HOSPITAL ENCOUNTER (INPATIENT)
Facility: HOSPITAL | Age: 56
LOS: 8 days | Discharge: HOME | End: 2021-05-01
Attending: ORTHOPAEDIC SURGERY | Admitting: ORTHOPAEDIC SURGERY

## 2021-04-23 ENCOUNTER — ANESTHESIA EVENT (OUTPATIENT)
Dept: PERIOP | Facility: HOSPITAL | Age: 56
End: 2021-04-23

## 2021-04-23 ENCOUNTER — ANESTHESIA (OUTPATIENT)
Dept: PERIOP | Facility: HOSPITAL | Age: 56
End: 2021-04-23

## 2021-04-23 ENCOUNTER — APPOINTMENT (OUTPATIENT)
Dept: ULTRASOUND IMAGING | Facility: HOSPITAL | Age: 56
End: 2021-04-23

## 2021-04-23 DIAGNOSIS — M17.32 UNILATERAL POST-TRAUMATIC OSTEOARTHRITIS, LEFT KNEE: ICD-10-CM

## 2021-04-23 DIAGNOSIS — Z96.651 STATUS POST REVISION OF TOTAL REPLACEMENT OF RIGHT KNEE: ICD-10-CM

## 2021-04-23 DIAGNOSIS — M00.062 STAPHYLOCOCCAL ARTHRITIS OF LEFT KNEE (HCC): Primary | ICD-10-CM

## 2021-04-23 DIAGNOSIS — M25.561 ARTHRALGIA OF RIGHT KNEE: ICD-10-CM

## 2021-04-23 PROBLEM — M00.9 SEPTIC ARTHRITIS OF KNEE, LEFT (HCC): Status: ACTIVE | Noted: 2021-04-23

## 2021-04-23 LAB
CREAT SERPL-MCNC: 0.78 MG/DL (ref 0.57–1)
GFR SERPL CREATININE-BSD FRML MDRD: 76 ML/MIN/1.73
SARS-COV-2 RNA PNL SPEC NAA+PROBE: NOT DETECTED

## 2021-04-23 PROCEDURE — 25010000002 HYDROMORPHONE 1 MG/ML SOLUTION: Performed by: NURSE ANESTHETIST, CERTIFIED REGISTERED

## 2021-04-23 PROCEDURE — 87205 SMEAR GRAM STAIN: CPT | Performed by: ORTHOPAEDIC SURGERY

## 2021-04-23 PROCEDURE — 87070 CULTURE OTHR SPECIMN AEROBIC: CPT | Performed by: ORTHOPAEDIC SURGERY

## 2021-04-23 PROCEDURE — 87186 SC STD MICRODIL/AGAR DIL: CPT | Performed by: ORTHOPAEDIC SURGERY

## 2021-04-23 PROCEDURE — 94799 UNLISTED PULMONARY SVC/PX: CPT

## 2021-04-23 PROCEDURE — 25010000002 PROPOFOL 200 MG/20ML EMULSION: Performed by: NURSE ANESTHETIST, CERTIFIED REGISTERED

## 2021-04-23 PROCEDURE — 25010000002 VANCOMYCIN 1 G RECONSTITUTED SOLUTION 1 EACH VIAL: Performed by: ORTHOPAEDIC SURGERY

## 2021-04-23 PROCEDURE — C1889 IMPLANT/INSERT DEVICE, NOC: HCPCS | Performed by: ORTHOPAEDIC SURGERY

## 2021-04-23 PROCEDURE — 87147 CULTURE TYPE IMMUNOLOGIC: CPT | Performed by: ORTHOPAEDIC SURGERY

## 2021-04-23 PROCEDURE — 0S9D00Z DRAINAGE OF LEFT KNEE JOINT WITH DRAINAGE DEVICE, OPEN APPROACH: ICD-10-PCS | Performed by: ORTHOPAEDIC SURGERY

## 2021-04-23 PROCEDURE — 25010000002 ROPIVACAINE PER 1 MG: Performed by: NURSE ANESTHETIST, CERTIFIED REGISTERED

## 2021-04-23 PROCEDURE — 82565 ASSAY OF CREATININE: CPT | Performed by: ORTHOPAEDIC SURGERY

## 2021-04-23 PROCEDURE — 87635 SARS-COV-2 COVID-19 AMP PRB: CPT | Performed by: ORTHOPAEDIC SURGERY

## 2021-04-23 PROCEDURE — 25010000002 FENTANYL CITRATE (PF) 100 MCG/2ML SOLUTION: Performed by: NURSE ANESTHETIST, CERTIFIED REGISTERED

## 2021-04-23 PROCEDURE — 87075 CULTR BACTERIA EXCEPT BLOOD: CPT | Performed by: ORTHOPAEDIC SURGERY

## 2021-04-23 DEVICE — DEV CONTRL TISS STRATAFIX SYMM PDS PLUS VIL CT-1 45CM: Type: IMPLANTABLE DEVICE | Site: KNEE | Status: FUNCTIONAL

## 2021-04-23 RX ORDER — OXYCODONE AND ACETAMINOPHEN 10; 325 MG/1; MG/1
1 TABLET ORAL EVERY 4 HOURS PRN
Status: DISCONTINUED | OUTPATIENT
Start: 2021-04-23 | End: 2021-04-24

## 2021-04-23 RX ORDER — SODIUM CHLORIDE 9 MG/ML
100 INJECTION, SOLUTION INTRAVENOUS CONTINUOUS
Status: DISCONTINUED | OUTPATIENT
Start: 2021-04-23 | End: 2021-04-28

## 2021-04-23 RX ORDER — MULTIPLE VITAMINS W/ MINERALS TAB 9MG-400MCG
1 TAB ORAL DAILY
Status: DISCONTINUED | OUTPATIENT
Start: 2021-04-24 | End: 2021-05-01 | Stop reason: HOSPADM

## 2021-04-23 RX ORDER — KETAMINE HCL IN NACL, ISO-OSM 100MG/10ML
SYRINGE (ML) INJECTION AS NEEDED
Status: DISCONTINUED | OUTPATIENT
Start: 2021-04-23 | End: 2021-04-23 | Stop reason: SURG

## 2021-04-23 RX ORDER — SODIUM CHLORIDE, SODIUM LACTATE, POTASSIUM CHLORIDE, CALCIUM CHLORIDE 600; 310; 30; 20 MG/100ML; MG/100ML; MG/100ML; MG/100ML
75 INJECTION, SOLUTION INTRAVENOUS CONTINUOUS
Status: DISCONTINUED | OUTPATIENT
Start: 2021-04-23 | End: 2021-04-28

## 2021-04-23 RX ORDER — NALOXONE HCL 0.4 MG/ML
0.4 VIAL (ML) INJECTION
Status: DISCONTINUED | OUTPATIENT
Start: 2021-04-23 | End: 2021-05-01 | Stop reason: HOSPADM

## 2021-04-23 RX ORDER — LIDOCAINE HYDROCHLORIDE 20 MG/ML
INJECTION, SOLUTION INTRAVENOUS AS NEEDED
Status: DISCONTINUED | OUTPATIENT
Start: 2021-04-23 | End: 2021-04-23 | Stop reason: SURG

## 2021-04-23 RX ORDER — ASPIRIN 325 MG
325 TABLET, DELAYED RELEASE (ENTERIC COATED) ORAL DAILY
Status: DISCONTINUED | OUTPATIENT
Start: 2021-04-24 | End: 2021-04-24

## 2021-04-23 RX ORDER — OXYCODONE HYDROCHLORIDE 5 MG/1
5 TABLET ORAL EVERY 12 HOURS PRN
Status: DISCONTINUED | OUTPATIENT
Start: 2021-04-23 | End: 2021-04-24 | Stop reason: SDUPTHER

## 2021-04-23 RX ORDER — PROPOFOL 10 MG/ML
INJECTION, EMULSION INTRAVENOUS AS NEEDED
Status: DISCONTINUED | OUTPATIENT
Start: 2021-04-23 | End: 2021-04-23 | Stop reason: SURG

## 2021-04-23 RX ORDER — VANCOMYCIN HYDROCHLORIDE 500 MG/10ML
1000 INJECTION, POWDER, LYOPHILIZED, FOR SOLUTION INTRAVENOUS ONCE
Status: DISCONTINUED | OUTPATIENT
Start: 2021-04-23 | End: 2021-04-23

## 2021-04-23 RX ORDER — METHOCARBAMOL 500 MG/1
750 TABLET, FILM COATED ORAL NIGHTLY PRN
Status: DISCONTINUED | OUTPATIENT
Start: 2021-04-23 | End: 2021-04-26

## 2021-04-23 RX ORDER — ROPIVACAINE HYDROCHLORIDE 5 MG/ML
INJECTION, SOLUTION EPIDURAL; INFILTRATION; PERINEURAL
Status: DISCONTINUED | OUTPATIENT
Start: 2021-04-23 | End: 2021-04-23 | Stop reason: SURG

## 2021-04-23 RX ORDER — IBUPROFEN 800 MG/1
800 TABLET ORAL EVERY 12 HOURS PRN
Status: DISCONTINUED | OUTPATIENT
Start: 2021-04-23 | End: 2021-05-01 | Stop reason: HOSPADM

## 2021-04-23 RX ORDER — MORPHINE SULFATE 2 MG/ML
2 INJECTION, SOLUTION INTRAMUSCULAR; INTRAVENOUS EVERY 4 HOURS PRN
Status: DISCONTINUED | OUTPATIENT
Start: 2021-04-23 | End: 2021-04-25 | Stop reason: SDUPTHER

## 2021-04-23 RX ORDER — ONDANSETRON 2 MG/ML
4 INJECTION INTRAMUSCULAR; INTRAVENOUS EVERY 6 HOURS PRN
Status: DISCONTINUED | OUTPATIENT
Start: 2021-04-23 | End: 2021-05-01 | Stop reason: HOSPADM

## 2021-04-23 RX ORDER — MAGNESIUM HYDROXIDE 1200 MG/15ML
LIQUID ORAL AS NEEDED
Status: DISCONTINUED | OUTPATIENT
Start: 2021-04-23 | End: 2021-04-23 | Stop reason: HOSPADM

## 2021-04-23 RX ORDER — ATENOLOL 50 MG/1
50 TABLET ORAL
Status: DISCONTINUED | OUTPATIENT
Start: 2021-04-24 | End: 2021-04-24

## 2021-04-23 RX ORDER — OXYCODONE HYDROCHLORIDE AND ACETAMINOPHEN 5; 325 MG/1; MG/1
1 TABLET ORAL EVERY 4 HOURS PRN
Status: DISCONTINUED | OUTPATIENT
Start: 2021-04-23 | End: 2021-04-24

## 2021-04-23 RX ORDER — FENTANYL 50 UG/H
1 PATCH TRANSDERMAL
Status: DISCONTINUED | OUTPATIENT
Start: 2021-04-23 | End: 2021-05-01 | Stop reason: HOSPADM

## 2021-04-23 RX ORDER — MULTIPLE VITAMINS W/ MINERALS TAB 9MG-400MCG
1 TAB ORAL DAILY
COMMUNITY

## 2021-04-23 RX ORDER — FENTANYL CITRATE 50 UG/ML
INJECTION, SOLUTION INTRAMUSCULAR; INTRAVENOUS AS NEEDED
Status: DISCONTINUED | OUTPATIENT
Start: 2021-04-23 | End: 2021-04-23 | Stop reason: SURG

## 2021-04-23 RX ORDER — GABAPENTIN 400 MG/1
400 CAPSULE ORAL EVERY 12 HOURS PRN
Status: DISCONTINUED | OUTPATIENT
Start: 2021-04-23 | End: 2021-04-24

## 2021-04-23 RX ADMIN — Medication 25 MG: at 16:22

## 2021-04-23 RX ADMIN — OXYCODONE 5 MG: 5 TABLET ORAL at 20:40

## 2021-04-23 RX ADMIN — PROPOFOL 150 MG: 10 INJECTION, EMULSION INTRAVENOUS at 16:21

## 2021-04-23 RX ADMIN — IBUPROFEN 800 MG: 800 TABLET ORAL at 20:40

## 2021-04-23 RX ADMIN — HYDROMORPHONE HYDROCHLORIDE 0.5 MG: 1 INJECTION, SOLUTION INTRAMUSCULAR; INTRAVENOUS; SUBCUTANEOUS at 17:57

## 2021-04-23 RX ADMIN — LIDOCAINE HYDROCHLORIDE 60 MG: 20 INJECTION, SOLUTION INTRAVENOUS at 16:19

## 2021-04-23 RX ADMIN — HYDROMORPHONE HYDROCHLORIDE 0.5 MG: 1 INJECTION, SOLUTION INTRAMUSCULAR; INTRAVENOUS; SUBCUTANEOUS at 17:45

## 2021-04-23 RX ADMIN — FENTANYL CITRATE 100 MCG: 50 INJECTION INTRAMUSCULAR; INTRAVENOUS at 16:19

## 2021-04-23 RX ADMIN — HYDROMORPHONE HYDROCHLORIDE 0.5 MG: 1 INJECTION, SOLUTION INTRAMUSCULAR; INTRAVENOUS; SUBCUTANEOUS at 17:29

## 2021-04-23 RX ADMIN — VANCOMYCIN HYDROCHLORIDE 1000 MG: 1 INJECTION, POWDER, LYOPHILIZED, FOR SOLUTION INTRAVENOUS at 17:44

## 2021-04-23 RX ADMIN — SODIUM CHLORIDE, POTASSIUM CHLORIDE, SODIUM LACTATE AND CALCIUM CHLORIDE 75 ML/HR: 600; 310; 30; 20 INJECTION, SOLUTION INTRAVENOUS at 15:03

## 2021-04-23 RX ADMIN — Medication 25 MG: at 16:43

## 2021-04-23 RX ADMIN — HYDROMORPHONE HYDROCHLORIDE 0.5 MG: 1 INJECTION, SOLUTION INTRAMUSCULAR; INTRAVENOUS; SUBCUTANEOUS at 18:14

## 2021-04-23 RX ADMIN — METHOCARBAMOL 750 MG: 500 TABLET, FILM COATED ORAL at 20:41

## 2021-04-23 RX ADMIN — ROPIVACAINE HYDROCHLORIDE 15 ML: 5 INJECTION, SOLUTION EPIDURAL; INFILTRATION; PERINEURAL at 17:00

## 2021-04-23 RX ADMIN — FENTANYL 1 PATCH: 50 PATCH TRANSDERMAL at 20:40

## 2021-04-23 RX ADMIN — GABAPENTIN 400 MG: 400 CAPSULE ORAL at 20:40

## 2021-04-23 NOTE — ANESTHESIA PROCEDURE NOTES
Peripheral Block      Patient reassessed immediately prior to procedure    Start time: 4/23/2021 4:55 PM  Stop time: 4/23/2021 5:00 PM  Reason for block: at surgeon's request and post-op pain management  Preanesthetic Checklist  Completed: patient identified, IV checked, site marked, risks and benefits discussed, surgical consent, monitors and equipment checked, pre-op evaluation and timeout performed  Prep:  Pt Position: supine  Sterile barriers:cap, gloves, mask and sterile barriers  Prep: ChloraPrep  Patient monitoring: blood pressure monitoring, continuous pulse oximetry and EKG  Procedure    Guidance:ultrasound guided  ULTRASOUND INTERPRETATION. Using ultrasound guidance a gauge needle was placed in close proximity to the femoral nerve, at which point, under ultrasound guidance anesthetic was injected in the area of the nerve and spread of the anesthesia was seen on ultrasound in close proximity thereto.  There were no abnormalities seen on ultrasound; a digital image was taken; and the patient tolerated the procedure with no complications. Images:still images obtained    Block Type:adductor canal block    Needle Type:echogenic  Resistance on Injection: none    Medications Used: ropivacaine (NAROPIN) injection 0.5 %, 15 mL      Medications  Comment:Adjuncts per total volume of LA:    Decadron 0 mg PSF    If required, intravenous sedation was given -- see meds on anesthesia record.    Post Assessment  Injection Assessment: negative aspiration for heme, no paresthesia on injection and incremental injection  Patient Tolerance:comfortable throughout block  Complications:no  Additional Notes    +++++++++++++++++++++++++++++++++++++++++    Procedure:          SINGLE ADDUCTOR CANAL BLOCK                                 Patient analgesia was achieved with General Anesthesia       The pt was placed in the Supine position.  The Insertion site was  prepped and Draped in sterile fashion.  A BBraun echogenic needle was then   inserted approximately midline, mid-thigh and advanced In-plane with Ultrasound guidance. The Vastus medialis and Sartorius muscle were visualized and the needle tip was placed in the adductor canal,  lateral to the femoral artery.  LA injection spread was visualized, injection was incremental 1-5 ml; injection pressure was normal or little; no intraneural injection; no vascular injection. LA dose was injected thru the needle (see dose above).

## 2021-04-23 NOTE — ANESTHESIA POSTPROCEDURE EVALUATION
Patient: Anca Andrade    Procedure Summary     Date: 04/23/21 Room / Location: [facility] OR  /  [facility] OR    Anesthesia Start: 1620 Anesthesia Stop: 1724    Procedure: KNEE INCISION AND DRAINAGE left (Left Knee) Diagnosis:       Unilateral post-traumatic osteoarthritis, left knee      (Unilateral post-traumatic osteoarthritis, left knee [M17.32])    Surgeons: Troy Greer MD Provider: Louis Dallas CRNA    Anesthesia Type: general, MAC, regional, general with block ASA Status: 2 - Emergent          Anesthesia Type: general, MAC, regional, general with block    Vitals  Vitals Value Taken Time   /89 04/23/21 1722   Temp 97.2 °F (36.2 °C) 04/23/21 1715   Pulse 62 04/23/21 1724   Resp 12 04/23/21 1715   SpO2 100 % 04/23/21 1724   Vitals shown include unvalidated device data.        Post Anesthesia Care and Evaluation    Patient location during evaluation: PACU  Patient participation: complete - patient participated  Level of consciousness: awake and alert and sleepy but conscious  Pain score: 2  Pain management: adequate  Airway patency: patent  Anesthetic complications: No anesthetic complications  PONV Status: none  Cardiovascular status: acceptable  Respiratory status: acceptable and face mask  Hydration status: acceptable

## 2021-04-23 NOTE — ANESTHESIA PROCEDURE NOTES
Airway  Urgency: elective    Date/Time: 4/23/2021 4:20 PM  Airway not difficult    General Information and Staff    Patient location during procedure: OR  CRNA: Louis Dallas CRNA    Indications and Patient Condition  Indications for airway management: CNS depression    Preoxygenated: yes  Mask difficulty assessment: 1 - vent by mask    Final Airway Details  Final airway type: supraglottic airway      Successful airway: classic  Size 3    Number of attempts at approach: 1

## 2021-04-23 NOTE — ANESTHESIA PREPROCEDURE EVALUATION
Anesthesia Evaluation                  Airway   Mallampati: II  TM distance: >3 FB  Neck ROM: full  No difficulty expected  Dental      Pulmonary    (+) a smoker Current, asthma,  Cardiovascular     (+) hypertension,       Neuro/Psych  (+) psychiatric history Anxiety and Depression,     GI/Hepatic/Renal/Endo    (+)  GERD,      Musculoskeletal     Abdominal    Substance History      OB/GYN          Other   arthritis,    history of cancer                    Anesthesia Plan    ASA 2 - emergent     general, MAC, regional and general with block     intravenous induction     Anesthetic plan, all risks, benefits, and alternatives have been provided, discussed and informed consent has been obtained with: patient.    Plan discussed with CRNA.

## 2021-04-23 NOTE — ADDENDUM NOTE
Addendum  created 04/23/21 1732 by Louis Dallas CRNA    Child order released for a procedure order, Clinical Note Signed, Diagnosis association updated, Intraprocedure Blocks edited

## 2021-04-24 PROBLEM — E44.0 MODERATE MALNUTRITION (HCC): Status: ACTIVE | Noted: 2021-04-24

## 2021-04-24 PROCEDURE — 94799 UNLISTED PULMONARY SVC/PX: CPT

## 2021-04-24 PROCEDURE — 87040 BLOOD CULTURE FOR BACTERIA: CPT | Performed by: ORTHOPAEDIC SURGERY

## 2021-04-24 PROCEDURE — 25010000002 MORPHINE SULFATE (PF) 2 MG/ML SOLUTION: Performed by: ORTHOPAEDIC SURGERY

## 2021-04-24 PROCEDURE — 99254 IP/OBS CNSLTJ NEW/EST MOD 60: CPT | Performed by: INTERNAL MEDICINE

## 2021-04-24 PROCEDURE — 97161 PT EVAL LOW COMPLEX 20 MIN: CPT

## 2021-04-24 PROCEDURE — 25010000002 VANCOMYCIN PER 500 MG: Performed by: ORTHOPAEDIC SURGERY

## 2021-04-24 PROCEDURE — 87186 SC STD MICRODIL/AGAR DIL: CPT | Performed by: ORTHOPAEDIC SURGERY

## 2021-04-24 PROCEDURE — 87147 CULTURE TYPE IMMUNOLOGIC: CPT | Performed by: ORTHOPAEDIC SURGERY

## 2021-04-24 PROCEDURE — 25010000002 HYDROMORPHONE 1 MG/ML SOLUTION: Performed by: INTERNAL MEDICINE

## 2021-04-24 PROCEDURE — 87150 DNA/RNA AMPLIFIED PROBE: CPT | Performed by: ORTHOPAEDIC SURGERY

## 2021-04-24 RX ORDER — OXYCODONE HYDROCHLORIDE AND ACETAMINOPHEN 5; 325 MG/1; MG/1
2 TABLET ORAL EVERY 4 HOURS PRN
Status: DISCONTINUED | OUTPATIENT
Start: 2021-04-24 | End: 2021-04-24

## 2021-04-24 RX ORDER — LISINOPRIL 10 MG/1
10 TABLET ORAL
Status: DISCONTINUED | OUTPATIENT
Start: 2021-04-24 | End: 2021-05-01

## 2021-04-24 RX ORDER — AMINO ACIDS/PROTEIN HYDROLYS 15G-100/30
30 LIQUID (ML) ORAL 2 TIMES DAILY
Status: DISCONTINUED | OUTPATIENT
Start: 2021-04-24 | End: 2021-05-01 | Stop reason: HOSPADM

## 2021-04-24 RX ORDER — OXYCODONE AND ACETAMINOPHEN 10; 325 MG/1; MG/1
2 TABLET ORAL EVERY 4 HOURS PRN
Status: DISCONTINUED | OUTPATIENT
Start: 2021-04-24 | End: 2021-04-26

## 2021-04-24 RX ORDER — HYDROCHLOROTHIAZIDE 25 MG/1
25 TABLET ORAL DAILY
Status: DISCONTINUED | OUTPATIENT
Start: 2021-04-24 | End: 2021-05-01

## 2021-04-24 RX ORDER — GABAPENTIN 400 MG/1
400 CAPSULE ORAL EVERY 8 HOURS SCHEDULED
Status: DISCONTINUED | OUTPATIENT
Start: 2021-04-24 | End: 2021-04-29

## 2021-04-24 RX ADMIN — SODIUM CHLORIDE 100 ML/HR: 9 INJECTION, SOLUTION INTRAVENOUS at 06:40

## 2021-04-24 RX ADMIN — HYDROCHLOROTHIAZIDE 12.5 MG: 25 TABLET ORAL at 14:16

## 2021-04-24 RX ADMIN — VANCOMYCIN HYDROCHLORIDE 750 MG: 750 INJECTION, SOLUTION INTRAVENOUS at 18:51

## 2021-04-24 RX ADMIN — MORPHINE SULFATE 2 MG: 2 INJECTION, SOLUTION INTRAMUSCULAR; INTRAVENOUS at 13:36

## 2021-04-24 RX ADMIN — OXYCODONE HYDROCHLORIDE AND ACETAMINOPHEN 1 TABLET: 10; 325 TABLET ORAL at 03:35

## 2021-04-24 RX ADMIN — OXYCODONE HYDROCHLORIDE AND ACETAMINOPHEN 2 TABLET: 10; 325 TABLET ORAL at 09:20

## 2021-04-24 RX ADMIN — MORPHINE SULFATE 2 MG: 2 INJECTION, SOLUTION INTRAMUSCULAR; INTRAVENOUS at 09:19

## 2021-04-24 RX ADMIN — IBUPROFEN 800 MG: 800 TABLET ORAL at 06:35

## 2021-04-24 RX ADMIN — IBUPROFEN 800 MG: 800 TABLET ORAL at 19:55

## 2021-04-24 RX ADMIN — VANCOMYCIN HYDROCHLORIDE 750 MG: 750 INJECTION, SOLUTION INTRAVENOUS at 05:45

## 2021-04-24 RX ADMIN — GABAPENTIN 400 MG: 400 CAPSULE ORAL at 21:35

## 2021-04-24 RX ADMIN — Medication 30 ML: at 10:08

## 2021-04-24 RX ADMIN — OXYCODONE 5 MG: 5 TABLET ORAL at 06:35

## 2021-04-24 RX ADMIN — Medication 1 TABLET: at 09:20

## 2021-04-24 RX ADMIN — HYDROMORPHONE HYDROCHLORIDE 0.5 MG: 1 INJECTION, SOLUTION INTRAMUSCULAR; INTRAVENOUS; SUBCUTANEOUS at 19:55

## 2021-04-24 RX ADMIN — ATENOLOL 50 MG: 50 TABLET ORAL at 09:20

## 2021-04-24 RX ADMIN — METHOCARBAMOL 750 MG: 500 TABLET, FILM COATED ORAL at 19:55

## 2021-04-24 RX ADMIN — LISINOPRIL 5 MG: 10 TABLET ORAL at 14:17

## 2021-04-24 RX ADMIN — SODIUM CHLORIDE 100 ML/HR: 9 INJECTION, SOLUTION INTRAVENOUS at 21:30

## 2021-04-24 RX ADMIN — GABAPENTIN 400 MG: 400 CAPSULE ORAL at 15:21

## 2021-04-24 RX ADMIN — ASPIRIN 325 MG: 325 TABLET, COATED ORAL at 09:20

## 2021-04-24 RX ADMIN — HYDROMORPHONE HYDROCHLORIDE 0.5 MG: 1 INJECTION, SOLUTION INTRAMUSCULAR; INTRAVENOUS; SUBCUTANEOUS at 16:55

## 2021-04-24 RX ADMIN — OXYCODONE HYDROCHLORIDE AND ACETAMINOPHEN 2 TABLET: 10; 325 TABLET ORAL at 15:13

## 2021-04-24 RX ADMIN — GABAPENTIN 400 MG: 400 CAPSULE ORAL at 06:35

## 2021-04-24 RX ADMIN — MORPHINE SULFATE 2 MG: 2 INJECTION, SOLUTION INTRAMUSCULAR; INTRAVENOUS at 03:50

## 2021-04-25 LAB
ANION GAP SERPL CALCULATED.3IONS-SCNC: 6.8 MMOL/L (ref 5–15)
BACTERIA BLD CULT: ABNORMAL
BACTERIA SPEC AEROBE CULT: ABNORMAL
BASOPHILS # BLD AUTO: 0.01 10*3/MM3 (ref 0–0.2)
BASOPHILS NFR BLD AUTO: 0.2 % (ref 0–1.5)
BUN SERPL-MCNC: 9 MG/DL (ref 6–20)
BUN/CREAT SERPL: 14.5 (ref 7–25)
CALCIUM SPEC-SCNC: 8.8 MG/DL (ref 8.6–10.5)
CHLORIDE SERPL-SCNC: 104 MMOL/L (ref 98–107)
CO2 SERPL-SCNC: 31.2 MMOL/L (ref 22–29)
CREAT SERPL-MCNC: 0.62 MG/DL (ref 0.57–1)
CRP SERPL-MCNC: 25.74 MG/DL (ref 0–0.5)
DEPRECATED RDW RBC AUTO: 45 FL (ref 37–54)
EOSINOPHIL # BLD AUTO: 0.08 10*3/MM3 (ref 0–0.4)
EOSINOPHIL NFR BLD AUTO: 1.3 % (ref 0.3–6.2)
ERYTHROCYTE [DISTWIDTH] IN BLOOD BY AUTOMATED COUNT: 13.3 % (ref 12.3–15.4)
ERYTHROCYTE [SEDIMENTATION RATE] IN BLOOD: 42 MM/HR (ref 0–20)
GFR SERPL CREATININE-BSD FRML MDRD: 100 ML/MIN/1.73
GLUCOSE SERPL-MCNC: 95 MG/DL (ref 65–99)
GRAM STN SPEC: ABNORMAL
HCT VFR BLD AUTO: 38.7 % (ref 34–46.6)
HGB BLD-MCNC: 12.8 G/DL (ref 12–15.9)
IMM GRANULOCYTES # BLD AUTO: 0.04 10*3/MM3 (ref 0–0.05)
IMM GRANULOCYTES NFR BLD AUTO: 0.6 % (ref 0–0.5)
LYMPHOCYTES # BLD AUTO: 0.62 10*3/MM3 (ref 0.7–3.1)
LYMPHOCYTES NFR BLD AUTO: 10 % (ref 19.6–45.3)
MCH RBC QN AUTO: 30.2 PG (ref 26.6–33)
MCHC RBC AUTO-ENTMCNC: 33.1 G/DL (ref 31.5–35.7)
MCV RBC AUTO: 91.3 FL (ref 79–97)
MONOCYTES # BLD AUTO: 0.25 10*3/MM3 (ref 0.1–0.9)
MONOCYTES NFR BLD AUTO: 4 % (ref 5–12)
NEUTROPHILS NFR BLD AUTO: 5.18 10*3/MM3 (ref 1.7–7)
NEUTROPHILS NFR BLD AUTO: 83.9 % (ref 42.7–76)
NRBC BLD AUTO-RTO: 0 /100 WBC (ref 0–0.2)
PLATELET # BLD AUTO: 157 10*3/MM3 (ref 140–450)
PMV BLD AUTO: 10 FL (ref 6–12)
POTASSIUM SERPL-SCNC: 3.5 MMOL/L (ref 3.5–5.2)
RBC # BLD AUTO: 4.24 10*6/MM3 (ref 3.77–5.28)
SODIUM SERPL-SCNC: 142 MMOL/L (ref 136–145)
WBC # BLD AUTO: 6.18 10*3/MM3 (ref 3.4–10.8)

## 2021-04-25 PROCEDURE — 80048 BASIC METABOLIC PNL TOTAL CA: CPT | Performed by: INTERNAL MEDICINE

## 2021-04-25 PROCEDURE — 97116 GAIT TRAINING THERAPY: CPT

## 2021-04-25 PROCEDURE — 97110 THERAPEUTIC EXERCISES: CPT

## 2021-04-25 PROCEDURE — 97530 THERAPEUTIC ACTIVITIES: CPT

## 2021-04-25 PROCEDURE — 25010000002 VANCOMYCIN PER 500 MG: Performed by: ORTHOPAEDIC SURGERY

## 2021-04-25 PROCEDURE — 25010000002 HYDROMORPHONE 1 MG/ML SOLUTION: Performed by: INTERNAL MEDICINE

## 2021-04-25 PROCEDURE — 85025 COMPLETE CBC W/AUTO DIFF WBC: CPT | Performed by: INTERNAL MEDICINE

## 2021-04-25 PROCEDURE — 85651 RBC SED RATE NONAUTOMATED: CPT | Performed by: INTERNAL MEDICINE

## 2021-04-25 PROCEDURE — 25010000003 CEFAZOLIN SODIUM-DEXTROSE 2-3 GM-%(50ML) RECONSTITUTED SOLUTION: Performed by: INTERNAL MEDICINE

## 2021-04-25 PROCEDURE — 99233 SBSQ HOSP IP/OBS HIGH 50: CPT | Performed by: INTERNAL MEDICINE

## 2021-04-25 PROCEDURE — 94799 UNLISTED PULMONARY SVC/PX: CPT

## 2021-04-25 PROCEDURE — 25010000002 ONDANSETRON PER 1 MG: Performed by: ORTHOPAEDIC SURGERY

## 2021-04-25 PROCEDURE — 86140 C-REACTIVE PROTEIN: CPT | Performed by: INTERNAL MEDICINE

## 2021-04-25 RX ORDER — POLYETHYLENE GLYCOL 3350 17 G/17G
17 POWDER, FOR SOLUTION ORAL DAILY PRN
Status: DISCONTINUED | OUTPATIENT
Start: 2021-04-25 | End: 2021-04-28

## 2021-04-25 RX ORDER — CEFAZOLIN SODIUM 2 G/50ML
2 SOLUTION INTRAVENOUS EVERY 8 HOURS
Status: DISCONTINUED | OUTPATIENT
Start: 2021-04-25 | End: 2021-05-01 | Stop reason: HOSPADM

## 2021-04-25 RX ORDER — BISACODYL 5 MG/1
15 TABLET, DELAYED RELEASE ORAL ONCE
Status: COMPLETED | OUTPATIENT
Start: 2021-04-25 | End: 2021-04-25

## 2021-04-25 RX ORDER — LABETALOL HYDROCHLORIDE 5 MG/ML
10 INJECTION, SOLUTION INTRAVENOUS EVERY 6 HOURS PRN
Status: DISCONTINUED | OUTPATIENT
Start: 2021-04-25 | End: 2021-05-01 | Stop reason: HOSPADM

## 2021-04-25 RX ADMIN — OXYCODONE HYDROCHLORIDE AND ACETAMINOPHEN 2 TABLET: 10; 325 TABLET ORAL at 17:53

## 2021-04-25 RX ADMIN — SODIUM CHLORIDE 100 ML/HR: 9 INJECTION, SOLUTION INTRAVENOUS at 20:43

## 2021-04-25 RX ADMIN — LISINOPRIL 10 MG: 10 TABLET ORAL at 10:18

## 2021-04-25 RX ADMIN — HYDROMORPHONE HYDROCHLORIDE 0.5 MG: 1 INJECTION, SOLUTION INTRAMUSCULAR; INTRAVENOUS; SUBCUTANEOUS at 10:17

## 2021-04-25 RX ADMIN — VANCOMYCIN HYDROCHLORIDE 750 MG: 750 INJECTION, SOLUTION INTRAVENOUS at 06:00

## 2021-04-25 RX ADMIN — POLYETHYLENE GLYCOL 3350 17 G: 17 POWDER, FOR SOLUTION ORAL at 11:03

## 2021-04-25 RX ADMIN — ONDANSETRON 4 MG: 2 INJECTION INTRAMUSCULAR; INTRAVENOUS at 20:44

## 2021-04-25 RX ADMIN — SODIUM CHLORIDE 100 ML/HR: 9 INJECTION, SOLUTION INTRAVENOUS at 11:03

## 2021-04-25 RX ADMIN — OXYCODONE HYDROCHLORIDE AND ACETAMINOPHEN 1 TABLET: 10; 325 TABLET ORAL at 06:00

## 2021-04-25 RX ADMIN — HYDROMORPHONE HYDROCHLORIDE 0.5 MG: 1 INJECTION, SOLUTION INTRAMUSCULAR; INTRAVENOUS; SUBCUTANEOUS at 20:26

## 2021-04-25 RX ADMIN — HYDROMORPHONE HYDROCHLORIDE 0.5 MG: 1 INJECTION, SOLUTION INTRAMUSCULAR; INTRAVENOUS; SUBCUTANEOUS at 14:50

## 2021-04-25 RX ADMIN — CEFAZOLIN SODIUM 2 G: 2 SOLUTION INTRAVENOUS at 17:11

## 2021-04-25 RX ADMIN — GABAPENTIN 400 MG: 400 CAPSULE ORAL at 14:49

## 2021-04-25 RX ADMIN — HYDROMORPHONE HYDROCHLORIDE 0.5 MG: 1 INJECTION, SOLUTION INTRAMUSCULAR; INTRAVENOUS; SUBCUTANEOUS at 03:00

## 2021-04-25 RX ADMIN — BISACODYL 15 MG: 5 TABLET, COATED ORAL at 11:03

## 2021-04-25 RX ADMIN — GABAPENTIN 400 MG: 400 CAPSULE ORAL at 06:00

## 2021-04-25 RX ADMIN — OXYCODONE HYDROCHLORIDE AND ACETAMINOPHEN 2 TABLET: 10; 325 TABLET ORAL at 12:01

## 2021-04-25 RX ADMIN — HYDROCHLOROTHIAZIDE 25 MG: 25 TABLET ORAL at 10:17

## 2021-04-25 RX ADMIN — Medication 1 TABLET: at 10:18

## 2021-04-25 RX ADMIN — VANCOMYCIN HYDROCHLORIDE 750 MG: 750 INJECTION, SOLUTION INTRAVENOUS at 17:53

## 2021-04-25 RX ADMIN — GABAPENTIN 400 MG: 400 CAPSULE ORAL at 20:27

## 2021-04-26 ENCOUNTER — APPOINTMENT (OUTPATIENT)
Dept: CARDIOLOGY | Facility: HOSPITAL | Age: 56
End: 2021-04-26

## 2021-04-26 LAB
ALBUMIN SERPL-MCNC: 2.9 G/DL (ref 3.5–5.2)
ALBUMIN/GLOB SERPL: 0.9 G/DL
ALP SERPL-CCNC: 293 U/L (ref 39–117)
ALT SERPL W P-5'-P-CCNC: 70 U/L (ref 1–33)
ANION GAP SERPL CALCULATED.3IONS-SCNC: 9.7 MMOL/L (ref 5–15)
AST SERPL-CCNC: 48 U/L (ref 1–32)
BASOPHILS # BLD AUTO: 0.02 10*3/MM3 (ref 0–0.2)
BASOPHILS NFR BLD AUTO: 0.2 % (ref 0–1.5)
BH CV ECHO MEAS - % IVS THICK: 52.9 %
BH CV ECHO MEAS - % LVPW THICK: 41 %
BH CV ECHO MEAS - AO MAX PG (FULL): 3.6 MMHG
BH CV ECHO MEAS - AO MAX PG: 10 MMHG
BH CV ECHO MEAS - AO MEAN PG (FULL): 1 MMHG
BH CV ECHO MEAS - AO MEAN PG: 4 MMHG
BH CV ECHO MEAS - AO ROOT AREA (BSA CORRECTED): 2.2
BH CV ECHO MEAS - AO ROOT AREA: 8 CM^2
BH CV ECHO MEAS - AO ROOT DIAM: 3.2 CM
BH CV ECHO MEAS - AO V2 MAX: 162 CM/SEC
BH CV ECHO MEAS - AO V2 MEAN: 93.8 CM/SEC
BH CV ECHO MEAS - AO V2 VTI: 25.6 CM
BH CV ECHO MEAS - AVA(I,A): 2.4 CM^2
BH CV ECHO MEAS - AVA(I,D): 2.4 CM^2
BH CV ECHO MEAS - AVA(V,A): 1.9 CM^2
BH CV ECHO MEAS - AVA(V,D): 1.9 CM^2
BH CV ECHO MEAS - BSA(HAYCOCK): 1.4 M^2
BH CV ECHO MEAS - BSA: 1.4 M^2
BH CV ECHO MEAS - BZI_BMI: 14.7 KILOGRAMS/M^2
BH CV ECHO MEAS - BZI_METRIC_HEIGHT: 167.6 CM
BH CV ECHO MEAS - BZI_METRIC_WEIGHT: 41.3 KG
BH CV ECHO MEAS - EDV(CUBED): 77.3 ML
BH CV ECHO MEAS - EDV(MOD-SP2): 97.4 ML
BH CV ECHO MEAS - EDV(MOD-SP4): 85.3 ML
BH CV ECHO MEAS - EDV(TEICH): 81.3 ML
BH CV ECHO MEAS - EF(CUBED): 69.1 %
BH CV ECHO MEAS - EF(MOD-BP): 65.2 %
BH CV ECHO MEAS - EF(MOD-SP2): 70.4 %
BH CV ECHO MEAS - EF(MOD-SP4): 57.2 %
BH CV ECHO MEAS - EF(TEICH): 61 %
BH CV ECHO MEAS - ESV(CUBED): 23.9 ML
BH CV ECHO MEAS - ESV(MOD-SP2): 28.8 ML
BH CV ECHO MEAS - ESV(MOD-SP4): 36.5 ML
BH CV ECHO MEAS - ESV(TEICH): 31.7 ML
BH CV ECHO MEAS - FS: 32.4 %
BH CV ECHO MEAS - IVS/LVPW: 0.84
BH CV ECHO MEAS - IVSD: 0.7 CM
BH CV ECHO MEAS - IVSS: 1.1 CM
BH CV ECHO MEAS - LA DIMENSION: 3.3 CM
BH CV ECHO MEAS - LA/AO: 1
BH CV ECHO MEAS - LAD MAJOR: 5 CM
BH CV ECHO MEAS - LAT PEAK E' VEL: 11.9 CM/SEC
BH CV ECHO MEAS - LATERAL E/E' RATIO: 7.6
BH CV ECHO MEAS - LV DIASTOLIC VOL/BSA (35-75): 59.6 ML/M^2
BH CV ECHO MEAS - LV MASS(C)D: 97.8 GRAMS
BH CV ECHO MEAS - LV MASS(C)DI: 68.3 GRAMS/M^2
BH CV ECHO MEAS - LV MASS(C)S: 92.4 GRAMS
BH CV ECHO MEAS - LV MASS(C)SI: 64.6 GRAMS/M^2
BH CV ECHO MEAS - LV MAX PG: 6.4 MMHG
BH CV ECHO MEAS - LV MEAN PG: 3 MMHG
BH CV ECHO MEAS - LV SYSTOLIC VOL/BSA (12-30): 25.5 ML/M^2
BH CV ECHO MEAS - LV V1 MAX: 126 CM/SEC
BH CV ECHO MEAS - LV V1 MEAN: 78.1 CM/SEC
BH CV ECHO MEAS - LV V1 VTI: 25.5 CM
BH CV ECHO MEAS - LVIDD: 4.3 CM
BH CV ECHO MEAS - LVIDS: 2.9 CM
BH CV ECHO MEAS - LVLD AP2: 7.5 CM
BH CV ECHO MEAS - LVLD AP4: 7.7 CM
BH CV ECHO MEAS - LVLS AP2: 6.5 CM
BH CV ECHO MEAS - LVLS AP4: 6.5 CM
BH CV ECHO MEAS - LVOT AREA (M): 2.4 CM^2
BH CV ECHO MEAS - LVOT AREA: 2.4 CM^2
BH CV ECHO MEAS - LVOT DIAM: 1.8 CM
BH CV ECHO MEAS - LVPWD: 0.83 CM
BH CV ECHO MEAS - LVPWS: 1.2 CM
BH CV ECHO MEAS - MED PEAK E' VEL: 9.6 CM/SEC
BH CV ECHO MEAS - MEDIAL E/E' RATIO: 9.4
BH CV ECHO MEAS - MV A MAX VEL: 61.6 CM/SEC
BH CV ECHO MEAS - MV DEC TIME: 0.19 SEC
BH CV ECHO MEAS - MV E MAX VEL: 90 CM/SEC
BH CV ECHO MEAS - MV E/A: 1.5
BH CV ECHO MEAS - MV MAX PG: 3.9 MMHG
BH CV ECHO MEAS - MV MEAN PG: 2 MMHG
BH CV ECHO MEAS - MV V2 MAX: 99.2 CM/SEC
BH CV ECHO MEAS - MV V2 MEAN: 71.5 CM/SEC
BH CV ECHO MEAS - MV V2 VTI: 28.6 CM
BH CV ECHO MEAS - MVA(VTI): 2.1 CM^2
BH CV ECHO MEAS - PA ACC TIME: 0.1 SEC
BH CV ECHO MEAS - PA MAX PG (FULL): 5.4 MMHG
BH CV ECHO MEAS - PA MAX PG: 7.4 MMHG
BH CV ECHO MEAS - PA MEAN PG (FULL): 2 MMHG
BH CV ECHO MEAS - PA MEAN PG: 3 MMHG
BH CV ECHO MEAS - PA PR(ACCEL): 36.3 MMHG
BH CV ECHO MEAS - PA V2 MAX: 136 CM/SEC
BH CV ECHO MEAS - PA V2 MEAN: 80.6 CM/SEC
BH CV ECHO MEAS - PA V2 VTI: 22.6 CM
BH CV ECHO MEAS - PI END-D VEL: 110 CM/SEC
BH CV ECHO MEAS - PULM A REVS DUR: 0.12 SEC
BH CV ECHO MEAS - PULM A REVS VEL: 35.2 CM/SEC
BH CV ECHO MEAS - PULM DIAS VEL: 38 CM/SEC
BH CV ECHO MEAS - PULM S/D: 1.5
BH CV ECHO MEAS - PULM SYS VEL: 58.1 CM/SEC
BH CV ECHO MEAS - RAP SYSTOLE: 3 MMHG
BH CV ECHO MEAS - RV MAX PG: 2 MMHG
BH CV ECHO MEAS - RV MEAN PG: 1 MMHG
BH CV ECHO MEAS - RV V1 MAX: 70.9 CM/SEC
BH CV ECHO MEAS - RV V1 MEAN: 52.7 CM/SEC
BH CV ECHO MEAS - RV V1 VTI: 17 CM
BH CV ECHO MEAS - RVSP: 22 MMHG
BH CV ECHO MEAS - SI(AO): 143 ML/M^2
BH CV ECHO MEAS - SI(CUBED): 37.3 ML/M^2
BH CV ECHO MEAS - SI(LVOT): 42.9 ML/M^2
BH CV ECHO MEAS - SI(MOD-SP2): 47.9 ML/M^2
BH CV ECHO MEAS - SI(MOD-SP4): 34.1 ML/M^2
BH CV ECHO MEAS - SI(TEICH): 34.6 ML/M^2
BH CV ECHO MEAS - SV(AO): 204.6 ML
BH CV ECHO MEAS - SV(CUBED): 53.4 ML
BH CV ECHO MEAS - SV(LVOT): 61.3 ML
BH CV ECHO MEAS - SV(MOD-SP2): 68.6 ML
BH CV ECHO MEAS - SV(MOD-SP4): 48.8 ML
BH CV ECHO MEAS - SV(TEICH): 49.6 ML
BH CV ECHO MEAS - TAPSE (>1.6): 3.1 CM
BH CV ECHO MEAS - TR MAX PG: 19 MMHG
BH CV ECHO MEAS - TR MAX VEL: 219.7 CM/SEC
BH CV ECHO MEASUREMENTS AVERAGE E/E' RATIO: 8.37
BH CV XLRA - RV BASE: 4.2 CM
BH CV XLRA - RV LENGTH: 6.4 CM
BH CV XLRA - RV MID: 3.1 CM
BH CV XLRA - TDI S': 21.9 CM/SEC
BILIRUB SERPL-MCNC: 0.4 MG/DL (ref 0–1.2)
BUN SERPL-MCNC: 8 MG/DL (ref 6–20)
BUN/CREAT SERPL: 11 (ref 7–25)
CALCIUM SPEC-SCNC: 8.6 MG/DL (ref 8.6–10.5)
CHLORIDE SERPL-SCNC: 95 MMOL/L (ref 98–107)
CO2 SERPL-SCNC: 32.3 MMOL/L (ref 22–29)
CREAT SERPL-MCNC: 0.73 MG/DL (ref 0.57–1)
CRP SERPL-MCNC: 34.01 MG/DL (ref 0–0.5)
DEPRECATED RDW RBC AUTO: 43.8 FL (ref 37–54)
EOSINOPHIL # BLD AUTO: 0.11 10*3/MM3 (ref 0–0.4)
EOSINOPHIL NFR BLD AUTO: 1.2 % (ref 0.3–6.2)
ERYTHROCYTE [DISTWIDTH] IN BLOOD BY AUTOMATED COUNT: 13.1 % (ref 12.3–15.4)
ERYTHROCYTE [SEDIMENTATION RATE] IN BLOOD: 48 MM/HR (ref 0–20)
GFR SERPL CREATININE-BSD FRML MDRD: 82 ML/MIN/1.73
GLOBULIN UR ELPH-MCNC: 3.4 GM/DL
GLUCOSE SERPL-MCNC: 96 MG/DL (ref 65–99)
HCT VFR BLD AUTO: 39.9 % (ref 34–46.6)
HGB BLD-MCNC: 13.3 G/DL (ref 12–15.9)
IMM GRANULOCYTES # BLD AUTO: 0.05 10*3/MM3 (ref 0–0.05)
IMM GRANULOCYTES NFR BLD AUTO: 0.6 % (ref 0–0.5)
LEFT ATRIUM VOLUME INDEX: 36.6 ML/M^2
LEFT ATRIUM VOLUME: 52.4 ML
LYMPHOCYTES # BLD AUTO: 0.84 10*3/MM3 (ref 0.7–3.1)
LYMPHOCYTES NFR BLD AUTO: 9.5 % (ref 19.6–45.3)
MAXIMAL PREDICTED HEART RATE: 164 BPM
MCH RBC QN AUTO: 30.4 PG (ref 26.6–33)
MCHC RBC AUTO-ENTMCNC: 33.3 G/DL (ref 31.5–35.7)
MCV RBC AUTO: 91.3 FL (ref 79–97)
MONOCYTES # BLD AUTO: 0.34 10*3/MM3 (ref 0.1–0.9)
MONOCYTES NFR BLD AUTO: 3.9 % (ref 5–12)
NEUTROPHILS NFR BLD AUTO: 7.47 10*3/MM3 (ref 1.7–7)
NEUTROPHILS NFR BLD AUTO: 84.6 % (ref 42.7–76)
NRBC BLD AUTO-RTO: 0 /100 WBC (ref 0–0.2)
PLATELET # BLD AUTO: 152 10*3/MM3 (ref 140–450)
PMV BLD AUTO: 10.4 FL (ref 6–12)
POTASSIUM SERPL-SCNC: 3.5 MMOL/L (ref 3.5–5.2)
PROT SERPL-MCNC: 6.3 G/DL (ref 6–8.5)
RBC # BLD AUTO: 4.37 10*6/MM3 (ref 3.77–5.28)
SODIUM SERPL-SCNC: 137 MMOL/L (ref 136–145)
STRESS TARGET HR: 139 BPM
WBC # BLD AUTO: 8.83 10*3/MM3 (ref 3.4–10.8)

## 2021-04-26 PROCEDURE — 97116 GAIT TRAINING THERAPY: CPT

## 2021-04-26 PROCEDURE — 97110 THERAPEUTIC EXERCISES: CPT

## 2021-04-26 PROCEDURE — 86140 C-REACTIVE PROTEIN: CPT | Performed by: INTERNAL MEDICINE

## 2021-04-26 PROCEDURE — 93306 TTE W/DOPPLER COMPLETE: CPT | Performed by: INTERNAL MEDICINE

## 2021-04-26 PROCEDURE — 93306 TTE W/DOPPLER COMPLETE: CPT

## 2021-04-26 PROCEDURE — 25010000002 VANCOMYCIN PER 500 MG: Performed by: ORTHOPAEDIC SURGERY

## 2021-04-26 PROCEDURE — 25010000002 HYDROMORPHONE 1 MG/ML SOLUTION: Performed by: INTERNAL MEDICINE

## 2021-04-26 PROCEDURE — 87040 BLOOD CULTURE FOR BACTERIA: CPT | Performed by: INTERNAL MEDICINE

## 2021-04-26 PROCEDURE — 97165 OT EVAL LOW COMPLEX 30 MIN: CPT

## 2021-04-26 PROCEDURE — 25010000002 ONDANSETRON PER 1 MG: Performed by: ORTHOPAEDIC SURGERY

## 2021-04-26 PROCEDURE — 99232 SBSQ HOSP IP/OBS MODERATE 35: CPT | Performed by: NURSE PRACTITIONER

## 2021-04-26 PROCEDURE — 80053 COMPREHEN METABOLIC PANEL: CPT | Performed by: INTERNAL MEDICINE

## 2021-04-26 PROCEDURE — 85651 RBC SED RATE NONAUTOMATED: CPT | Performed by: INTERNAL MEDICINE

## 2021-04-26 PROCEDURE — 25010000003 CEFAZOLIN SODIUM-DEXTROSE 2-3 GM-%(50ML) RECONSTITUTED SOLUTION: Performed by: INTERNAL MEDICINE

## 2021-04-26 PROCEDURE — 85025 COMPLETE CBC W/AUTO DIFF WBC: CPT | Performed by: INTERNAL MEDICINE

## 2021-04-26 RX ORDER — METHOCARBAMOL 500 MG/1
750 TABLET, FILM COATED ORAL EVERY 8 HOURS PRN
Status: DISCONTINUED | OUTPATIENT
Start: 2021-04-26 | End: 2021-04-28

## 2021-04-26 RX ORDER — HYDROXYZINE HYDROCHLORIDE 25 MG/1
25 TABLET, FILM COATED ORAL 3 TIMES DAILY PRN
Status: DISCONTINUED | OUTPATIENT
Start: 2021-04-26 | End: 2021-05-01 | Stop reason: HOSPADM

## 2021-04-26 RX ORDER — OXYCODONE HYDROCHLORIDE 5 MG/1
10 TABLET ORAL EVERY 4 HOURS PRN
Status: DISCONTINUED | OUTPATIENT
Start: 2021-04-26 | End: 2021-04-28

## 2021-04-26 RX ADMIN — ONDANSETRON 4 MG: 2 INJECTION INTRAMUSCULAR; INTRAVENOUS at 06:47

## 2021-04-26 RX ADMIN — SODIUM CHLORIDE 100 ML/HR: 9 INJECTION, SOLUTION INTRAVENOUS at 06:42

## 2021-04-26 RX ADMIN — HYDROXYZINE HYDROCHLORIDE 25 MG: 25 TABLET, FILM COATED ORAL at 20:29

## 2021-04-26 RX ADMIN — FENTANYL 1 PATCH: 50 PATCH TRANSDERMAL at 20:01

## 2021-04-26 RX ADMIN — Medication 1 TABLET: at 08:03

## 2021-04-26 RX ADMIN — GABAPENTIN 400 MG: 400 CAPSULE ORAL at 13:23

## 2021-04-26 RX ADMIN — CEFAZOLIN SODIUM 2 G: 2 SOLUTION INTRAVENOUS at 08:03

## 2021-04-26 RX ADMIN — GABAPENTIN 400 MG: 400 CAPSULE ORAL at 20:00

## 2021-04-26 RX ADMIN — OXYCODONE HYDROCHLORIDE AND ACETAMINOPHEN 2 TABLET: 10; 325 TABLET ORAL at 06:42

## 2021-04-26 RX ADMIN — SODIUM CHLORIDE 100 ML/HR: 9 INJECTION, SOLUTION INTRAVENOUS at 20:00

## 2021-04-26 RX ADMIN — OXYCODONE HYDROCHLORIDE AND ACETAMINOPHEN 2 TABLET: 10; 325 TABLET ORAL at 10:55

## 2021-04-26 RX ADMIN — OXYCODONE 10 MG: 5 TABLET ORAL at 15:46

## 2021-04-26 RX ADMIN — CEFAZOLIN SODIUM 2 G: 2 SOLUTION INTRAVENOUS at 15:46

## 2021-04-26 RX ADMIN — CEFAZOLIN SODIUM 2 G: 2 SOLUTION INTRAVENOUS at 00:13

## 2021-04-26 RX ADMIN — ONDANSETRON 4 MG: 2 INJECTION INTRAMUSCULAR; INTRAVENOUS at 13:23

## 2021-04-26 RX ADMIN — VANCOMYCIN HYDROCHLORIDE 750 MG: 750 INJECTION, SOLUTION INTRAVENOUS at 06:42

## 2021-04-26 RX ADMIN — IBUPROFEN 800 MG: 800 TABLET ORAL at 20:29

## 2021-04-26 RX ADMIN — GABAPENTIN 400 MG: 400 CAPSULE ORAL at 06:42

## 2021-04-26 RX ADMIN — HYDROMORPHONE HYDROCHLORIDE 0.5 MG: 1 INJECTION, SOLUTION INTRAMUSCULAR; INTRAVENOUS; SUBCUTANEOUS at 20:00

## 2021-04-26 RX ADMIN — HYDROMORPHONE HYDROCHLORIDE 0.5 MG: 1 INJECTION, SOLUTION INTRAMUSCULAR; INTRAVENOUS; SUBCUTANEOUS at 07:58

## 2021-04-26 RX ADMIN — LISINOPRIL 10 MG: 10 TABLET ORAL at 08:03

## 2021-04-26 RX ADMIN — ONDANSETRON 4 MG: 2 INJECTION INTRAMUSCULAR; INTRAVENOUS at 20:00

## 2021-04-26 RX ADMIN — HYDROMORPHONE HYDROCHLORIDE 0.5 MG: 1 INJECTION, SOLUTION INTRAMUSCULAR; INTRAVENOUS; SUBCUTANEOUS at 03:47

## 2021-04-26 RX ADMIN — HYDROCHLOROTHIAZIDE 25 MG: 25 TABLET ORAL at 08:03

## 2021-04-27 ENCOUNTER — APPOINTMENT (OUTPATIENT)
Dept: ULTRASOUND IMAGING | Facility: HOSPITAL | Age: 56
End: 2021-04-27

## 2021-04-27 LAB
ALBUMIN SERPL-MCNC: 3.2 G/DL (ref 3.5–5.2)
ALBUMIN/GLOB SERPL: 1 G/DL
ALP SERPL-CCNC: 282 U/L (ref 39–117)
ALT SERPL W P-5'-P-CCNC: 34 U/L (ref 1–33)
ANION GAP SERPL CALCULATED.3IONS-SCNC: 9.4 MMOL/L (ref 5–15)
AST SERPL-CCNC: 24 U/L (ref 1–32)
BACTERIA SPEC AEROBE CULT: ABNORMAL
BACTERIA SPEC AEROBE CULT: ABNORMAL
BASOPHILS # BLD AUTO: 0.02 10*3/MM3 (ref 0–0.2)
BASOPHILS NFR BLD AUTO: 0.3 % (ref 0–1.5)
BILIRUB SERPL-MCNC: 0.3 MG/DL (ref 0–1.2)
BUN SERPL-MCNC: 7 MG/DL (ref 6–20)
BUN/CREAT SERPL: 10 (ref 7–25)
CALCIUM SPEC-SCNC: 9.2 MG/DL (ref 8.6–10.5)
CHLORIDE SERPL-SCNC: 95 MMOL/L (ref 98–107)
CO2 SERPL-SCNC: 35.6 MMOL/L (ref 22–29)
CREAT SERPL-MCNC: 0.7 MG/DL (ref 0.57–1)
CRP SERPL-MCNC: 35.16 MG/DL (ref 0–0.5)
DEPRECATED RDW RBC AUTO: 45.7 FL (ref 37–54)
EOSINOPHIL # BLD AUTO: 0.12 10*3/MM3 (ref 0–0.4)
EOSINOPHIL NFR BLD AUTO: 2.1 % (ref 0.3–6.2)
ERYTHROCYTE [DISTWIDTH] IN BLOOD BY AUTOMATED COUNT: 12.9 % (ref 12.3–15.4)
ERYTHROCYTE [SEDIMENTATION RATE] IN BLOOD: 60 MM/HR (ref 0–20)
GFR SERPL CREATININE-BSD FRML MDRD: 87 ML/MIN/1.73
GLOBULIN UR ELPH-MCNC: 3.3 GM/DL
GLUCOSE SERPL-MCNC: 105 MG/DL (ref 65–99)
GRAM STN SPEC: ABNORMAL
HCT VFR BLD AUTO: 24.7 % (ref 34–46.6)
HCT VFR BLD AUTO: 37.5 % (ref 34–46.6)
HGB BLD-MCNC: 12.4 G/DL (ref 12–15.9)
HGB BLD-MCNC: 7.8 G/DL (ref 12–15.9)
IMM GRANULOCYTES # BLD AUTO: 0.07 10*3/MM3 (ref 0–0.05)
IMM GRANULOCYTES NFR BLD AUTO: 1.2 % (ref 0–0.5)
ISOLATED FROM: ABNORMAL
ISOLATED FROM: ABNORMAL
LYMPHOCYTES # BLD AUTO: 0.91 10*3/MM3 (ref 0.7–3.1)
LYMPHOCYTES NFR BLD AUTO: 15.8 % (ref 19.6–45.3)
MAGNESIUM SERPL-MCNC: 1.9 MG/DL (ref 1.6–2.6)
MCH RBC QN AUTO: 30.6 PG (ref 26.6–33)
MCHC RBC AUTO-ENTMCNC: 31.6 G/DL (ref 31.5–35.7)
MCV RBC AUTO: 96.9 FL (ref 79–97)
MONOCYTES # BLD AUTO: 0.39 10*3/MM3 (ref 0.1–0.9)
MONOCYTES NFR BLD AUTO: 6.8 % (ref 5–12)
NEUTROPHILS NFR BLD AUTO: 4.26 10*3/MM3 (ref 1.7–7)
NEUTROPHILS NFR BLD AUTO: 73.8 % (ref 42.7–76)
NRBC BLD AUTO-RTO: 0 /100 WBC (ref 0–0.2)
PHOSPHATE SERPL-MCNC: 3.4 MG/DL (ref 2.5–4.5)
PLATELET # BLD AUTO: 98 10*3/MM3 (ref 140–450)
PMV BLD AUTO: 10 FL (ref 6–12)
POTASSIUM SERPL-SCNC: 3 MMOL/L (ref 3.5–5.2)
POTASSIUM SERPL-SCNC: 4.3 MMOL/L (ref 3.5–5.2)
PROT SERPL-MCNC: 6.5 G/DL (ref 6–8.5)
RBC # BLD AUTO: 2.55 10*6/MM3 (ref 3.77–5.28)
SODIUM SERPL-SCNC: 140 MMOL/L (ref 136–145)
WBC # BLD AUTO: 5.77 10*3/MM3 (ref 3.4–10.8)

## 2021-04-27 PROCEDURE — 97535 SELF CARE MNGMENT TRAINING: CPT

## 2021-04-27 PROCEDURE — 85651 RBC SED RATE NONAUTOMATED: CPT | Performed by: NURSE PRACTITIONER

## 2021-04-27 PROCEDURE — 86140 C-REACTIVE PROTEIN: CPT | Performed by: NURSE PRACTITIONER

## 2021-04-27 PROCEDURE — 97530 THERAPEUTIC ACTIVITIES: CPT

## 2021-04-27 PROCEDURE — 25010000003 CEFAZOLIN SODIUM-DEXTROSE 2-3 GM-%(50ML) RECONSTITUTED SOLUTION: Performed by: INTERNAL MEDICINE

## 2021-04-27 PROCEDURE — 97110 THERAPEUTIC EXERCISES: CPT

## 2021-04-27 PROCEDURE — 80053 COMPREHEN METABOLIC PANEL: CPT | Performed by: NURSE PRACTITIONER

## 2021-04-27 PROCEDURE — 85025 COMPLETE CBC W/AUTO DIFF WBC: CPT | Performed by: NURSE PRACTITIONER

## 2021-04-27 PROCEDURE — 85018 HEMOGLOBIN: CPT | Performed by: INTERNAL MEDICINE

## 2021-04-27 PROCEDURE — 25010000002 HYDROMORPHONE 1 MG/ML SOLUTION: Performed by: INTERNAL MEDICINE

## 2021-04-27 PROCEDURE — 25010000003 MAGNESIUM SULFATE 4 GM/100ML SOLUTION: Performed by: INTERNAL MEDICINE

## 2021-04-27 PROCEDURE — 99233 SBSQ HOSP IP/OBS HIGH 50: CPT | Performed by: INTERNAL MEDICINE

## 2021-04-27 PROCEDURE — 84100 ASSAY OF PHOSPHORUS: CPT | Performed by: INTERNAL MEDICINE

## 2021-04-27 PROCEDURE — 85014 HEMATOCRIT: CPT | Performed by: INTERNAL MEDICINE

## 2021-04-27 PROCEDURE — 84132 ASSAY OF SERUM POTASSIUM: CPT | Performed by: INTERNAL MEDICINE

## 2021-04-27 PROCEDURE — 97116 GAIT TRAINING THERAPY: CPT

## 2021-04-27 PROCEDURE — 87040 BLOOD CULTURE FOR BACTERIA: CPT | Performed by: NURSE PRACTITIONER

## 2021-04-27 PROCEDURE — 93971 EXTREMITY STUDY: CPT

## 2021-04-27 PROCEDURE — 83735 ASSAY OF MAGNESIUM: CPT | Performed by: INTERNAL MEDICINE

## 2021-04-27 RX ORDER — MAGNESIUM SULFATE HEPTAHYDRATE 40 MG/ML
2 INJECTION, SOLUTION INTRAVENOUS AS NEEDED
Status: DISCONTINUED | OUTPATIENT
Start: 2021-04-27 | End: 2021-05-01 | Stop reason: HOSPADM

## 2021-04-27 RX ORDER — ROPINIROLE 0.25 MG/1
0.25 TABLET, FILM COATED ORAL NIGHTLY
COMMUNITY

## 2021-04-27 RX ORDER — POTASSIUM CHLORIDE 750 MG/1
40 CAPSULE, EXTENDED RELEASE ORAL AS NEEDED
Status: DISCONTINUED | OUTPATIENT
Start: 2021-04-27 | End: 2021-05-01 | Stop reason: HOSPADM

## 2021-04-27 RX ORDER — POTASSIUM CHLORIDE 1.5 G/1.77G
40 POWDER, FOR SOLUTION ORAL AS NEEDED
Status: DISCONTINUED | OUTPATIENT
Start: 2021-04-27 | End: 2021-05-01 | Stop reason: HOSPADM

## 2021-04-27 RX ORDER — MAGNESIUM SULFATE HEPTAHYDRATE 40 MG/ML
4 INJECTION, SOLUTION INTRAVENOUS ONCE
Status: COMPLETED | OUTPATIENT
Start: 2021-04-27 | End: 2021-04-27

## 2021-04-27 RX ORDER — LISINOPRIL 10 MG/1
10 TABLET ORAL DAILY
COMMUNITY

## 2021-04-27 RX ORDER — POTASSIUM CHLORIDE 750 MG/1
40 CAPSULE, EXTENDED RELEASE ORAL EVERY 4 HOURS
Status: COMPLETED | OUTPATIENT
Start: 2021-04-27 | End: 2021-04-27

## 2021-04-27 RX ORDER — MAGNESIUM SULFATE HEPTAHYDRATE 40 MG/ML
4 INJECTION, SOLUTION INTRAVENOUS AS NEEDED
Status: DISCONTINUED | OUTPATIENT
Start: 2021-04-27 | End: 2021-05-01 | Stop reason: HOSPADM

## 2021-04-27 RX ORDER — METAXALONE 800 MG/1
400 TABLET ORAL EVERY 6 HOURS SCHEDULED
Status: DISCONTINUED | OUTPATIENT
Start: 2021-04-27 | End: 2021-04-28

## 2021-04-27 RX ADMIN — Medication 30 ML: at 10:53

## 2021-04-27 RX ADMIN — METAXALONE 400 MG: 800 TABLET ORAL at 18:36

## 2021-04-27 RX ADMIN — Medication 30 ML: at 21:09

## 2021-04-27 RX ADMIN — POTASSIUM CHLORIDE 40 MEQ: 10 CAPSULE, COATED, EXTENDED RELEASE ORAL at 16:58

## 2021-04-27 RX ADMIN — METAXALONE 400 MG: 800 TABLET ORAL at 23:11

## 2021-04-27 RX ADMIN — MAGNESIUM SULFATE IN WATER 4 G: 40 INJECTION, SOLUTION INTRAVENOUS at 11:36

## 2021-04-27 RX ADMIN — HYDROMORPHONE HYDROCHLORIDE 0.5 MG: 1 INJECTION, SOLUTION INTRAMUSCULAR; INTRAVENOUS; SUBCUTANEOUS at 07:10

## 2021-04-27 RX ADMIN — Medication 1 TABLET: at 08:41

## 2021-04-27 RX ADMIN — OXYCODONE 10 MG: 5 TABLET ORAL at 05:24

## 2021-04-27 RX ADMIN — HYDROMORPHONE HYDROCHLORIDE 0.5 MG: 1 INJECTION, SOLUTION INTRAMUSCULAR; INTRAVENOUS; SUBCUTANEOUS at 11:36

## 2021-04-27 RX ADMIN — OXYCODONE 10 MG: 5 TABLET ORAL at 21:08

## 2021-04-27 RX ADMIN — HYDROCHLOROTHIAZIDE 25 MG: 25 TABLET ORAL at 08:41

## 2021-04-27 RX ADMIN — OXYCODONE 10 MG: 5 TABLET ORAL at 10:06

## 2021-04-27 RX ADMIN — POTASSIUM CHLORIDE 40 MEQ: 10 CAPSULE, COATED, EXTENDED RELEASE ORAL at 08:41

## 2021-04-27 RX ADMIN — GABAPENTIN 400 MG: 400 CAPSULE ORAL at 13:47

## 2021-04-27 RX ADMIN — HYDROXYZINE HYDROCHLORIDE 25 MG: 25 TABLET, FILM COATED ORAL at 21:08

## 2021-04-27 RX ADMIN — OXYCODONE 10 MG: 5 TABLET ORAL at 01:01

## 2021-04-27 RX ADMIN — LISINOPRIL 10 MG: 10 TABLET ORAL at 08:41

## 2021-04-27 RX ADMIN — SODIUM CHLORIDE 100 ML/HR: 9 INJECTION, SOLUTION INTRAVENOUS at 05:25

## 2021-04-27 RX ADMIN — OXYCODONE 10 MG: 5 TABLET ORAL at 14:21

## 2021-04-27 RX ADMIN — GABAPENTIN 400 MG: 400 CAPSULE ORAL at 21:08

## 2021-04-27 RX ADMIN — CEFAZOLIN SODIUM 2 G: 2 SOLUTION INTRAVENOUS at 16:58

## 2021-04-27 RX ADMIN — POTASSIUM CHLORIDE 40 MEQ: 10 CAPSULE, COATED, EXTENDED RELEASE ORAL at 13:47

## 2021-04-27 RX ADMIN — CEFAZOLIN SODIUM 2 G: 2 SOLUTION INTRAVENOUS at 01:00

## 2021-04-27 RX ADMIN — GABAPENTIN 400 MG: 400 CAPSULE ORAL at 05:24

## 2021-04-27 RX ADMIN — CEFAZOLIN SODIUM 2 G: 2 SOLUTION INTRAVENOUS at 08:42

## 2021-04-27 RX ADMIN — HYDROMORPHONE HYDROCHLORIDE 0.5 MG: 1 INJECTION, SOLUTION INTRAMUSCULAR; INTRAVENOUS; SUBCUTANEOUS at 18:00

## 2021-04-27 RX ADMIN — METAXALONE 400 MG: 800 TABLET ORAL at 13:47

## 2021-04-28 LAB
ALBUMIN SERPL-MCNC: 2.8 G/DL (ref 3.5–5.2)
ANION GAP SERPL CALCULATED.3IONS-SCNC: 8.6 MMOL/L (ref 5–15)
BACTERIA SPEC ANAEROBE CULT: NORMAL
BASOPHILS # BLD AUTO: 0.02 10*3/MM3 (ref 0–0.2)
BASOPHILS NFR BLD AUTO: 0.2 % (ref 0–1.5)
BUN SERPL-MCNC: 7 MG/DL (ref 6–20)
BUN/CREAT SERPL: 10.3 (ref 7–25)
CALCIUM SPEC-SCNC: 8.5 MG/DL (ref 8.6–10.5)
CHLORIDE SERPL-SCNC: 93 MMOL/L (ref 98–107)
CO2 SERPL-SCNC: 33.4 MMOL/L (ref 22–29)
CREAT SERPL-MCNC: 0.68 MG/DL (ref 0.57–1)
CRP SERPL-MCNC: 29.05 MG/DL (ref 0–0.5)
DEPRECATED RDW RBC AUTO: 43 FL (ref 37–54)
EOSINOPHIL # BLD AUTO: 0.12 10*3/MM3 (ref 0–0.4)
EOSINOPHIL NFR BLD AUTO: 1.4 % (ref 0.3–6.2)
ERYTHROCYTE [DISTWIDTH] IN BLOOD BY AUTOMATED COUNT: 12.9 % (ref 12.3–15.4)
GFR SERPL CREATININE-BSD FRML MDRD: 90 ML/MIN/1.73
GLUCOSE SERPL-MCNC: 154 MG/DL (ref 65–99)
HCT VFR BLD AUTO: 33.7 % (ref 34–46.6)
HGB BLD-MCNC: 11 G/DL (ref 12–15.9)
IMM GRANULOCYTES # BLD AUTO: 0.04 10*3/MM3 (ref 0–0.05)
IMM GRANULOCYTES NFR BLD AUTO: 0.5 % (ref 0–0.5)
LYMPHOCYTES # BLD AUTO: 1.06 10*3/MM3 (ref 0.7–3.1)
LYMPHOCYTES NFR BLD AUTO: 12.5 % (ref 19.6–45.3)
MAGNESIUM SERPL-MCNC: 2 MG/DL (ref 1.6–2.6)
MCH RBC QN AUTO: 29.8 PG (ref 26.6–33)
MCHC RBC AUTO-ENTMCNC: 32.6 G/DL (ref 31.5–35.7)
MCV RBC AUTO: 91.3 FL (ref 79–97)
MONOCYTES # BLD AUTO: 0.7 10*3/MM3 (ref 0.1–0.9)
MONOCYTES NFR BLD AUTO: 8.3 % (ref 5–12)
NEUTROPHILS NFR BLD AUTO: 6.51 10*3/MM3 (ref 1.7–7)
NEUTROPHILS NFR BLD AUTO: 77.1 % (ref 42.7–76)
NRBC BLD AUTO-RTO: 0 /100 WBC (ref 0–0.2)
PHOSPHATE SERPL-MCNC: 2.8 MG/DL (ref 2.5–4.5)
PLATELET # BLD AUTO: 186 10*3/MM3 (ref 140–450)
PMV BLD AUTO: 10 FL (ref 6–12)
POTASSIUM SERPL-SCNC: 4 MMOL/L (ref 3.5–5.2)
RBC # BLD AUTO: 3.69 10*6/MM3 (ref 3.77–5.28)
SODIUM SERPL-SCNC: 135 MMOL/L (ref 136–145)
WBC # BLD AUTO: 8.45 10*3/MM3 (ref 3.4–10.8)

## 2021-04-28 PROCEDURE — 97116 GAIT TRAINING THERAPY: CPT

## 2021-04-28 PROCEDURE — 86140 C-REACTIVE PROTEIN: CPT | Performed by: INTERNAL MEDICINE

## 2021-04-28 PROCEDURE — 99233 SBSQ HOSP IP/OBS HIGH 50: CPT | Performed by: INTERNAL MEDICINE

## 2021-04-28 PROCEDURE — 83735 ASSAY OF MAGNESIUM: CPT | Performed by: INTERNAL MEDICINE

## 2021-04-28 PROCEDURE — 25010000003 CEFAZOLIN SODIUM-DEXTROSE 2-3 GM-%(50ML) RECONSTITUTED SOLUTION: Performed by: INTERNAL MEDICINE

## 2021-04-28 PROCEDURE — 85025 COMPLETE CBC W/AUTO DIFF WBC: CPT | Performed by: INTERNAL MEDICINE

## 2021-04-28 PROCEDURE — 25010000002 HYDROMORPHONE 1 MG/ML SOLUTION: Performed by: INTERNAL MEDICINE

## 2021-04-28 PROCEDURE — 97530 THERAPEUTIC ACTIVITIES: CPT

## 2021-04-28 PROCEDURE — 80069 RENAL FUNCTION PANEL: CPT | Performed by: INTERNAL MEDICINE

## 2021-04-28 RX ORDER — AMOXICILLIN 250 MG
2 CAPSULE ORAL 2 TIMES DAILY
Status: DISCONTINUED | OUTPATIENT
Start: 2021-04-28 | End: 2021-05-01 | Stop reason: HOSPADM

## 2021-04-28 RX ORDER — POLYETHYLENE GLYCOL 3350 17 G/17G
17 POWDER, FOR SOLUTION ORAL DAILY
Status: DISCONTINUED | OUTPATIENT
Start: 2021-04-28 | End: 2021-05-01 | Stop reason: HOSPADM

## 2021-04-28 RX ORDER — METAXALONE 800 MG/1
800 TABLET ORAL EVERY 6 HOURS SCHEDULED
Status: DISCONTINUED | OUTPATIENT
Start: 2021-04-28 | End: 2021-04-29

## 2021-04-28 RX ORDER — OXYCODONE HYDROCHLORIDE 5 MG/1
15 TABLET ORAL EVERY 4 HOURS PRN
Status: DISCONTINUED | OUTPATIENT
Start: 2021-04-28 | End: 2021-05-01 | Stop reason: HOSPADM

## 2021-04-28 RX ORDER — ROPINIROLE 0.25 MG/1
0.25 TABLET, FILM COATED ORAL NIGHTLY
Status: DISCONTINUED | OUTPATIENT
Start: 2021-04-28 | End: 2021-05-01 | Stop reason: HOSPADM

## 2021-04-28 RX ADMIN — METHOCARBAMOL 750 MG: 500 TABLET, FILM COATED ORAL at 00:40

## 2021-04-28 RX ADMIN — HYDROCHLOROTHIAZIDE 25 MG: 25 TABLET ORAL at 08:46

## 2021-04-28 RX ADMIN — METHOCARBAMOL 750 MG: 500 TABLET, FILM COATED ORAL at 08:45

## 2021-04-28 RX ADMIN — METAXALONE 400 MG: 800 TABLET ORAL at 05:05

## 2021-04-28 RX ADMIN — ROPINIROLE HYDROCHLORIDE 0.25 MG: 0.25 TABLET, FILM COATED ORAL at 20:00

## 2021-04-28 RX ADMIN — OXYCODONE 15 MG: 5 TABLET ORAL at 17:54

## 2021-04-28 RX ADMIN — Medication 30 ML: at 08:47

## 2021-04-28 RX ADMIN — HYDROXYZINE HYDROCHLORIDE 25 MG: 25 TABLET, FILM COATED ORAL at 12:28

## 2021-04-28 RX ADMIN — CEFAZOLIN SODIUM 2 G: 2 SOLUTION INTRAVENOUS at 17:32

## 2021-04-28 RX ADMIN — GABAPENTIN 400 MG: 400 CAPSULE ORAL at 05:05

## 2021-04-28 RX ADMIN — DOCUSATE SODIUM 50MG AND SENNOSIDES 8.6MG 2 TABLET: 8.6; 5 TABLET, FILM COATED ORAL at 20:00

## 2021-04-28 RX ADMIN — LISINOPRIL 10 MG: 10 TABLET ORAL at 08:45

## 2021-04-28 RX ADMIN — GABAPENTIN 400 MG: 400 CAPSULE ORAL at 22:16

## 2021-04-28 RX ADMIN — HYDROMORPHONE HYDROCHLORIDE 0.5 MG: 1 INJECTION, SOLUTION INTRAMUSCULAR; INTRAVENOUS; SUBCUTANEOUS at 00:40

## 2021-04-28 RX ADMIN — POLYETHYLENE GLYCOL 3350 17 G: 17 POWDER, FOR SOLUTION ORAL at 12:27

## 2021-04-28 RX ADMIN — CEFAZOLIN SODIUM 2 G: 2 SOLUTION INTRAVENOUS at 00:41

## 2021-04-28 RX ADMIN — Medication 1 TABLET: at 08:46

## 2021-04-28 RX ADMIN — HYDROMORPHONE HYDROCHLORIDE 0.5 MG: 1 INJECTION, SOLUTION INTRAMUSCULAR; INTRAVENOUS; SUBCUTANEOUS at 06:43

## 2021-04-28 RX ADMIN — HYDROXYZINE HYDROCHLORIDE 25 MG: 25 TABLET, FILM COATED ORAL at 20:00

## 2021-04-28 RX ADMIN — METAXALONE 800 MG: 800 TABLET ORAL at 17:54

## 2021-04-28 RX ADMIN — OXYCODONE 15 MG: 5 TABLET ORAL at 22:16

## 2021-04-28 RX ADMIN — OXYCODONE 15 MG: 5 TABLET ORAL at 12:28

## 2021-04-28 RX ADMIN — OXYCODONE 10 MG: 5 TABLET ORAL at 04:14

## 2021-04-28 RX ADMIN — GABAPENTIN 400 MG: 400 CAPSULE ORAL at 14:52

## 2021-04-28 RX ADMIN — METAXALONE 800 MG: 800 TABLET ORAL at 12:28

## 2021-04-28 RX ADMIN — OXYCODONE 10 MG: 5 TABLET ORAL at 08:45

## 2021-04-28 RX ADMIN — Medication 30 ML: at 20:00

## 2021-04-28 RX ADMIN — CEFAZOLIN SODIUM 2 G: 2 SOLUTION INTRAVENOUS at 08:45

## 2021-04-28 RX ADMIN — DOCUSATE SODIUM 50MG AND SENNOSIDES 8.6MG 2 TABLET: 8.6; 5 TABLET, FILM COATED ORAL at 12:28

## 2021-04-29 ENCOUNTER — APPOINTMENT (OUTPATIENT)
Dept: CT IMAGING | Facility: HOSPITAL | Age: 56
End: 2021-04-29

## 2021-04-29 ENCOUNTER — APPOINTMENT (OUTPATIENT)
Dept: GENERAL RADIOLOGY | Facility: HOSPITAL | Age: 56
End: 2021-04-29

## 2021-04-29 LAB
ALBUMIN SERPL-MCNC: 2.9 G/DL (ref 3.5–5.2)
ANION GAP SERPL CALCULATED.3IONS-SCNC: 9.5 MMOL/L (ref 5–15)
BUN SERPL-MCNC: 9 MG/DL (ref 6–20)
BUN/CREAT SERPL: 12 (ref 7–25)
CALCIUM SPEC-SCNC: 9 MG/DL (ref 8.6–10.5)
CHLORIDE SERPL-SCNC: 94 MMOL/L (ref 98–107)
CO2 SERPL-SCNC: 33.5 MMOL/L (ref 22–29)
CREAT SERPL-MCNC: 0.75 MG/DL (ref 0.57–1)
CRP SERPL-MCNC: 26.58 MG/DL (ref 0–0.5)
DEPRECATED RDW RBC AUTO: 43.6 FL (ref 37–54)
ERYTHROCYTE [DISTWIDTH] IN BLOOD BY AUTOMATED COUNT: 12.8 % (ref 12.3–15.4)
GFR SERPL CREATININE-BSD FRML MDRD: 80 ML/MIN/1.73
GLUCOSE SERPL-MCNC: 107 MG/DL (ref 65–99)
HCT VFR BLD AUTO: 38.5 % (ref 34–46.6)
HGB BLD-MCNC: 12.5 G/DL (ref 12–15.9)
MAGNESIUM SERPL-MCNC: 2 MG/DL (ref 1.6–2.6)
MCH RBC QN AUTO: 30 PG (ref 26.6–33)
MCHC RBC AUTO-ENTMCNC: 32.5 G/DL (ref 31.5–35.7)
MCV RBC AUTO: 92.5 FL (ref 79–97)
PHOSPHATE SERPL-MCNC: 4 MG/DL (ref 2.5–4.5)
PLATELET # BLD AUTO: 228 10*3/MM3 (ref 140–450)
PMV BLD AUTO: 9.7 FL (ref 6–12)
POTASSIUM SERPL-SCNC: 4.6 MMOL/L (ref 3.5–5.2)
RBC # BLD AUTO: 4.16 10*6/MM3 (ref 3.77–5.28)
SODIUM SERPL-SCNC: 137 MMOL/L (ref 136–145)
WBC # BLD AUTO: 9.08 10*3/MM3 (ref 3.4–10.8)

## 2021-04-29 PROCEDURE — 71045 X-RAY EXAM CHEST 1 VIEW: CPT

## 2021-04-29 PROCEDURE — 80069 RENAL FUNCTION PANEL: CPT | Performed by: INTERNAL MEDICINE

## 2021-04-29 PROCEDURE — 86140 C-REACTIVE PROTEIN: CPT | Performed by: INTERNAL MEDICINE

## 2021-04-29 PROCEDURE — 25010000002 HYDROMORPHONE 1 MG/ML SOLUTION: Performed by: FAMILY MEDICINE

## 2021-04-29 PROCEDURE — 25010000003 CEFAZOLIN SODIUM-DEXTROSE 2-3 GM-%(50ML) RECONSTITUTED SOLUTION: Performed by: INTERNAL MEDICINE

## 2021-04-29 PROCEDURE — 85027 COMPLETE CBC AUTOMATED: CPT | Performed by: INTERNAL MEDICINE

## 2021-04-29 PROCEDURE — 72131 CT LUMBAR SPINE W/O DYE: CPT

## 2021-04-29 PROCEDURE — 25010000002 HYDROMORPHONE 1 MG/ML SOLUTION: Performed by: INTERNAL MEDICINE

## 2021-04-29 PROCEDURE — 02HV33Z INSERTION OF INFUSION DEVICE INTO SUPERIOR VENA CAVA, PERCUTANEOUS APPROACH: ICD-10-PCS | Performed by: INTERNAL MEDICINE

## 2021-04-29 PROCEDURE — 99233 SBSQ HOSP IP/OBS HIGH 50: CPT | Performed by: INTERNAL MEDICINE

## 2021-04-29 PROCEDURE — 83735 ASSAY OF MAGNESIUM: CPT | Performed by: INTERNAL MEDICINE

## 2021-04-29 PROCEDURE — C1751 CATH, INF, PER/CENT/MIDLINE: HCPCS

## 2021-04-29 PROCEDURE — C1894 INTRO/SHEATH, NON-LASER: HCPCS

## 2021-04-29 PROCEDURE — 73700 CT LOWER EXTREMITY W/O DYE: CPT

## 2021-04-29 RX ORDER — GABAPENTIN 400 MG/1
800 CAPSULE ORAL EVERY 8 HOURS SCHEDULED
Status: DISCONTINUED | OUTPATIENT
Start: 2021-04-29 | End: 2021-05-01 | Stop reason: HOSPADM

## 2021-04-29 RX ORDER — SODIUM CHLORIDE 0.9 % (FLUSH) 0.9 %
10 SYRINGE (ML) INJECTION AS NEEDED
Status: DISCONTINUED | OUTPATIENT
Start: 2021-04-29 | End: 2021-05-01 | Stop reason: HOSPADM

## 2021-04-29 RX ORDER — TIZANIDINE 4 MG/1
4 TABLET ORAL EVERY 8 HOURS SCHEDULED
Status: DISCONTINUED | OUTPATIENT
Start: 2021-04-29 | End: 2021-05-01 | Stop reason: HOSPADM

## 2021-04-29 RX ORDER — SODIUM CHLORIDE 0.9 % (FLUSH) 0.9 %
20 SYRINGE (ML) INJECTION AS NEEDED
Status: DISCONTINUED | OUTPATIENT
Start: 2021-04-29 | End: 2021-05-01 | Stop reason: HOSPADM

## 2021-04-29 RX ORDER — SODIUM CHLORIDE 0.9 % (FLUSH) 0.9 %
10 SYRINGE (ML) INJECTION EVERY 12 HOURS SCHEDULED
Status: DISCONTINUED | OUTPATIENT
Start: 2021-04-29 | End: 2021-05-01 | Stop reason: HOSPADM

## 2021-04-29 RX ORDER — SODIUM CHLORIDE 9 MG/ML
100 INJECTION, SOLUTION INTRAVENOUS CONTINUOUS
Status: ACTIVE | OUTPATIENT
Start: 2021-04-29 | End: 2021-04-30

## 2021-04-29 RX ADMIN — Medication 1 TABLET: at 08:31

## 2021-04-29 RX ADMIN — OXYCODONE 15 MG: 5 TABLET ORAL at 02:04

## 2021-04-29 RX ADMIN — CEFAZOLIN SODIUM 2 G: 2 SOLUTION INTRAVENOUS at 00:17

## 2021-04-29 RX ADMIN — SODIUM CHLORIDE, PRESERVATIVE FREE 10 ML: 5 INJECTION INTRAVENOUS at 20:33

## 2021-04-29 RX ADMIN — OXYCODONE 15 MG: 5 TABLET ORAL at 22:15

## 2021-04-29 RX ADMIN — OXYCODONE 15 MG: 5 TABLET ORAL at 18:21

## 2021-04-29 RX ADMIN — IBUPROFEN 800 MG: 800 TABLET ORAL at 19:39

## 2021-04-29 RX ADMIN — ROPINIROLE HYDROCHLORIDE 0.25 MG: 0.25 TABLET, FILM COATED ORAL at 20:33

## 2021-04-29 RX ADMIN — POLYETHYLENE GLYCOL 3350 17 G: 17 POWDER, FOR SOLUTION ORAL at 08:31

## 2021-04-29 RX ADMIN — DOCUSATE SODIUM 50MG AND SENNOSIDES 8.6MG 2 TABLET: 8.6; 5 TABLET, FILM COATED ORAL at 08:31

## 2021-04-29 RX ADMIN — METAXALONE 800 MG: 800 TABLET ORAL at 00:17

## 2021-04-29 RX ADMIN — METAXALONE 800 MG: 800 TABLET ORAL at 05:51

## 2021-04-29 RX ADMIN — TIZANIDINE 4 MG: 4 TABLET ORAL at 22:15

## 2021-04-29 RX ADMIN — HYDROMORPHONE HYDROCHLORIDE 0.5 MG: 1 INJECTION, SOLUTION INTRAMUSCULAR; INTRAVENOUS; SUBCUTANEOUS at 16:53

## 2021-04-29 RX ADMIN — CEFAZOLIN SODIUM 2 G: 2 SOLUTION INTRAVENOUS at 23:37

## 2021-04-29 RX ADMIN — HYDROXYZINE HYDROCHLORIDE 25 MG: 25 TABLET, FILM COATED ORAL at 12:13

## 2021-04-29 RX ADMIN — GABAPENTIN 400 MG: 400 CAPSULE ORAL at 05:51

## 2021-04-29 RX ADMIN — CEFAZOLIN SODIUM 2 G: 2 SOLUTION INTRAVENOUS at 16:53

## 2021-04-29 RX ADMIN — GABAPENTIN 800 MG: 400 CAPSULE ORAL at 22:15

## 2021-04-29 RX ADMIN — OXYCODONE 15 MG: 5 TABLET ORAL at 05:51

## 2021-04-29 RX ADMIN — HYDROMORPHONE HYDROCHLORIDE 0.5 MG: 1 INJECTION, SOLUTION INTRAMUSCULAR; INTRAVENOUS; SUBCUTANEOUS at 23:36

## 2021-04-29 RX ADMIN — OXYCODONE 15 MG: 5 TABLET ORAL at 12:13

## 2021-04-29 RX ADMIN — TIZANIDINE 4 MG: 4 TABLET ORAL at 14:19

## 2021-04-29 RX ADMIN — CEFAZOLIN SODIUM 2 G: 2 SOLUTION INTRAVENOUS at 08:30

## 2021-04-29 RX ADMIN — GABAPENTIN 400 MG: 400 CAPSULE ORAL at 14:19

## 2021-04-29 RX ADMIN — FENTANYL 1 PATCH: 50 PATCH TRANSDERMAL at 20:32

## 2021-04-29 RX ADMIN — DOCUSATE SODIUM 50MG AND SENNOSIDES 8.6MG 2 TABLET: 8.6; 5 TABLET, FILM COATED ORAL at 20:33

## 2021-04-29 RX ADMIN — SODIUM CHLORIDE 100 ML/HR: 9 INJECTION, SOLUTION INTRAVENOUS at 15:12

## 2021-04-29 RX ADMIN — HYDROMORPHONE HYDROCHLORIDE 0.5 MG: 1 INJECTION, SOLUTION INTRAMUSCULAR; INTRAVENOUS; SUBCUTANEOUS at 20:32

## 2021-04-29 RX ADMIN — SODIUM CHLORIDE, PRESERVATIVE FREE 10 ML: 5 INJECTION INTRAVENOUS at 11:00

## 2021-04-29 RX ADMIN — Medication 10 ML: at 10:59

## 2021-04-29 RX ADMIN — Medication 30 ML: at 20:33

## 2021-04-29 RX ADMIN — HYDROMORPHONE HYDROCHLORIDE 0.5 MG: 1 INJECTION, SOLUTION INTRAMUSCULAR; INTRAVENOUS; SUBCUTANEOUS at 10:49

## 2021-04-30 LAB
ALBUMIN SERPL-MCNC: 2.6 G/DL (ref 3.5–5.2)
ALBUMIN/GLOB SERPL: 0.7 G/DL
ALP SERPL-CCNC: 305 U/L (ref 39–117)
ALT SERPL W P-5'-P-CCNC: 13 U/L (ref 1–33)
ANION GAP SERPL CALCULATED.3IONS-SCNC: 9.8 MMOL/L (ref 5–15)
AST SERPL-CCNC: 25 U/L (ref 1–32)
BASOPHILS # BLD AUTO: 0.04 10*3/MM3 (ref 0–0.2)
BASOPHILS NFR BLD AUTO: 0.5 % (ref 0–1.5)
BILIRUB SERPL-MCNC: 0.4 MG/DL (ref 0–1.2)
BUN SERPL-MCNC: 12 MG/DL (ref 6–20)
BUN/CREAT SERPL: 18.5 (ref 7–25)
CALCIUM SPEC-SCNC: 8.5 MG/DL (ref 8.6–10.5)
CHLORIDE SERPL-SCNC: 96 MMOL/L (ref 98–107)
CO2 SERPL-SCNC: 31.2 MMOL/L (ref 22–29)
CREAT SERPL-MCNC: 0.65 MG/DL (ref 0.57–1)
CRP SERPL-MCNC: 21.05 MG/DL (ref 0–0.5)
DEPRECATED RDW RBC AUTO: 43.9 FL (ref 37–54)
EOSINOPHIL # BLD AUTO: 0.23 10*3/MM3 (ref 0–0.4)
EOSINOPHIL NFR BLD AUTO: 2.6 % (ref 0.3–6.2)
ERYTHROCYTE [DISTWIDTH] IN BLOOD BY AUTOMATED COUNT: 12.9 % (ref 12.3–15.4)
ERYTHROCYTE [SEDIMENTATION RATE] IN BLOOD: 97 MM/HR (ref 0–20)
GFR SERPL CREATININE-BSD FRML MDRD: 94 ML/MIN/1.73
GLOBULIN UR ELPH-MCNC: 4 GM/DL
GLUCOSE SERPL-MCNC: 91 MG/DL (ref 65–99)
HCT VFR BLD AUTO: 36 % (ref 34–46.6)
HGB BLD-MCNC: 11.6 G/DL (ref 12–15.9)
IMM GRANULOCYTES # BLD AUTO: 0.04 10*3/MM3 (ref 0–0.05)
IMM GRANULOCYTES NFR BLD AUTO: 0.5 % (ref 0–0.5)
LYMPHOCYTES # BLD AUTO: 1.36 10*3/MM3 (ref 0.7–3.1)
LYMPHOCYTES NFR BLD AUTO: 15.6 % (ref 19.6–45.3)
MAGNESIUM SERPL-MCNC: 2 MG/DL (ref 1.6–2.6)
MCH RBC QN AUTO: 30 PG (ref 26.6–33)
MCHC RBC AUTO-ENTMCNC: 32.2 G/DL (ref 31.5–35.7)
MCV RBC AUTO: 93 FL (ref 79–97)
MONOCYTES # BLD AUTO: 0.75 10*3/MM3 (ref 0.1–0.9)
MONOCYTES NFR BLD AUTO: 8.6 % (ref 5–12)
NEUTROPHILS NFR BLD AUTO: 6.3 10*3/MM3 (ref 1.7–7)
NEUTROPHILS NFR BLD AUTO: 72.2 % (ref 42.7–76)
NRBC BLD AUTO-RTO: 0 /100 WBC (ref 0–0.2)
PHOSPHATE SERPL-MCNC: 3.5 MG/DL (ref 2.5–4.5)
PLATELET # BLD AUTO: 225 10*3/MM3 (ref 140–450)
PMV BLD AUTO: 9.4 FL (ref 6–12)
POTASSIUM SERPL-SCNC: 3.9 MMOL/L (ref 3.5–5.2)
PROT SERPL-MCNC: 6.6 G/DL (ref 6–8.5)
RBC # BLD AUTO: 3.87 10*6/MM3 (ref 3.77–5.28)
SODIUM SERPL-SCNC: 137 MMOL/L (ref 136–145)
WBC # BLD AUTO: 8.72 10*3/MM3 (ref 3.4–10.8)

## 2021-04-30 PROCEDURE — 85025 COMPLETE CBC W/AUTO DIFF WBC: CPT | Performed by: INTERNAL MEDICINE

## 2021-04-30 PROCEDURE — 25010000002 HYDROMORPHONE 1 MG/ML SOLUTION: Performed by: FAMILY MEDICINE

## 2021-04-30 PROCEDURE — 83735 ASSAY OF MAGNESIUM: CPT | Performed by: INTERNAL MEDICINE

## 2021-04-30 PROCEDURE — 85651 RBC SED RATE NONAUTOMATED: CPT | Performed by: INTERNAL MEDICINE

## 2021-04-30 PROCEDURE — 80053 COMPREHEN METABOLIC PANEL: CPT | Performed by: INTERNAL MEDICINE

## 2021-04-30 PROCEDURE — 87040 BLOOD CULTURE FOR BACTERIA: CPT | Performed by: ORTHOPAEDIC SURGERY

## 2021-04-30 PROCEDURE — 84100 ASSAY OF PHOSPHORUS: CPT | Performed by: INTERNAL MEDICINE

## 2021-04-30 PROCEDURE — 99232 SBSQ HOSP IP/OBS MODERATE 35: CPT | Performed by: INTERNAL MEDICINE

## 2021-04-30 PROCEDURE — 25010000002 ONDANSETRON PER 1 MG: Performed by: ORTHOPAEDIC SURGERY

## 2021-04-30 PROCEDURE — 25010000003 CEFAZOLIN SODIUM-DEXTROSE 2-3 GM-%(50ML) RECONSTITUTED SOLUTION: Performed by: INTERNAL MEDICINE

## 2021-04-30 PROCEDURE — 86140 C-REACTIVE PROTEIN: CPT | Performed by: INTERNAL MEDICINE

## 2021-04-30 PROCEDURE — 25010000002 HYDROMORPHONE 1 MG/ML SOLUTION: Performed by: PHYSICIAN ASSISTANT

## 2021-04-30 PROCEDURE — 97110 THERAPEUTIC EXERCISES: CPT

## 2021-04-30 RX ORDER — ACETAMINOPHEN 325 MG/1
650 TABLET ORAL EVERY 6 HOURS PRN
Status: DISCONTINUED | OUTPATIENT
Start: 2021-04-30 | End: 2021-05-01 | Stop reason: HOSPADM

## 2021-04-30 RX ADMIN — HYDROMORPHONE HYDROCHLORIDE 0.5 MG: 1 INJECTION, SOLUTION INTRAMUSCULAR; INTRAVENOUS; SUBCUTANEOUS at 09:31

## 2021-04-30 RX ADMIN — Medication 1 TABLET: at 09:06

## 2021-04-30 RX ADMIN — ONDANSETRON 4 MG: 2 INJECTION INTRAMUSCULAR; INTRAVENOUS at 12:18

## 2021-04-30 RX ADMIN — Medication 10 ML: at 08:20

## 2021-04-30 RX ADMIN — OXYCODONE 15 MG: 5 TABLET ORAL at 18:17

## 2021-04-30 RX ADMIN — HYDROMORPHONE HYDROCHLORIDE 0.5 MG: 1 INJECTION, SOLUTION INTRAMUSCULAR; INTRAVENOUS; SUBCUTANEOUS at 21:11

## 2021-04-30 RX ADMIN — HYDROMORPHONE HYDROCHLORIDE 0.5 MG: 1 INJECTION, SOLUTION INTRAMUSCULAR; INTRAVENOUS; SUBCUTANEOUS at 05:53

## 2021-04-30 RX ADMIN — Medication 30 ML: at 08:20

## 2021-04-30 RX ADMIN — HYDROMORPHONE HYDROCHLORIDE 0.5 MG: 1 INJECTION, SOLUTION INTRAMUSCULAR; INTRAVENOUS; SUBCUTANEOUS at 14:10

## 2021-04-30 RX ADMIN — POLYETHYLENE GLYCOL 3350 17 G: 17 POWDER, FOR SOLUTION ORAL at 09:06

## 2021-04-30 RX ADMIN — GABAPENTIN 800 MG: 400 CAPSULE ORAL at 21:11

## 2021-04-30 RX ADMIN — CEFAZOLIN SODIUM 2 G: 2 SOLUTION INTRAVENOUS at 08:21

## 2021-04-30 RX ADMIN — ROPINIROLE HYDROCHLORIDE 0.25 MG: 0.25 TABLET, FILM COATED ORAL at 21:11

## 2021-04-30 RX ADMIN — OXYCODONE 15 MG: 5 TABLET ORAL at 22:58

## 2021-04-30 RX ADMIN — SODIUM CHLORIDE, PRESERVATIVE FREE 10 ML: 5 INJECTION INTRAVENOUS at 21:14

## 2021-04-30 RX ADMIN — OXYCODONE 15 MG: 5 TABLET ORAL at 08:05

## 2021-04-30 RX ADMIN — HYDROXYZINE HYDROCHLORIDE 25 MG: 25 TABLET, FILM COATED ORAL at 08:05

## 2021-04-30 RX ADMIN — TIZANIDINE 4 MG: 4 TABLET ORAL at 14:11

## 2021-04-30 RX ADMIN — HYDROCHLOROTHIAZIDE 25 MG: 25 TABLET ORAL at 09:05

## 2021-04-30 RX ADMIN — TIZANIDINE 4 MG: 4 TABLET ORAL at 22:59

## 2021-04-30 RX ADMIN — LISINOPRIL 10 MG: 10 TABLET ORAL at 09:06

## 2021-04-30 RX ADMIN — TIZANIDINE 4 MG: 4 TABLET ORAL at 05:53

## 2021-04-30 RX ADMIN — GABAPENTIN 800 MG: 400 CAPSULE ORAL at 05:53

## 2021-04-30 RX ADMIN — SODIUM CHLORIDE, PRESERVATIVE FREE 10 ML: 5 INJECTION INTRAVENOUS at 08:21

## 2021-04-30 RX ADMIN — ACETAMINOPHEN 650 MG: 325 TABLET ORAL at 21:11

## 2021-04-30 RX ADMIN — IBUPROFEN 800 MG: 800 TABLET ORAL at 08:05

## 2021-04-30 RX ADMIN — SODIUM CHLORIDE, PRESERVATIVE FREE 10 ML: 5 INJECTION INTRAVENOUS at 21:12

## 2021-04-30 RX ADMIN — DOCUSATE SODIUM 50MG AND SENNOSIDES 8.6MG 2 TABLET: 8.6; 5 TABLET, FILM COATED ORAL at 21:11

## 2021-04-30 RX ADMIN — OXYCODONE 15 MG: 5 TABLET ORAL at 04:00

## 2021-04-30 RX ADMIN — HYDROMORPHONE HYDROCHLORIDE 0.5 MG: 1 INJECTION, SOLUTION INTRAMUSCULAR; INTRAVENOUS; SUBCUTANEOUS at 03:03

## 2021-04-30 RX ADMIN — HYDROXYZINE HYDROCHLORIDE 25 MG: 25 TABLET, FILM COATED ORAL at 21:11

## 2021-04-30 RX ADMIN — DOCUSATE SODIUM 50MG AND SENNOSIDES 8.6MG 2 TABLET: 8.6; 5 TABLET, FILM COATED ORAL at 09:05

## 2021-04-30 RX ADMIN — OXYCODONE 15 MG: 5 TABLET ORAL at 12:18

## 2021-04-30 RX ADMIN — GABAPENTIN 800 MG: 400 CAPSULE ORAL at 14:11

## 2021-04-30 RX ADMIN — CEFAZOLIN SODIUM 2 G: 2 SOLUTION INTRAVENOUS at 16:51

## 2021-05-01 ENCOUNTER — APPOINTMENT (OUTPATIENT)
Dept: GENERAL RADIOLOGY | Facility: HOSPITAL | Age: 56
End: 2021-05-01

## 2021-05-01 VITALS
HEIGHT: 66 IN | HEART RATE: 96 BPM | RESPIRATION RATE: 18 BRPM | TEMPERATURE: 98.3 F | SYSTOLIC BLOOD PRESSURE: 116 MMHG | BODY MASS INDEX: 15.38 KG/M2 | WEIGHT: 95.68 LBS | OXYGEN SATURATION: 97 % | DIASTOLIC BLOOD PRESSURE: 73 MMHG

## 2021-05-01 PROBLEM — M00.9 SEPTIC ARTHRITIS OF KNEE, LEFT (HCC): Status: RESOLVED | Noted: 2021-04-23 | Resolved: 2021-05-01

## 2021-05-01 LAB
ALBUMIN SERPL-MCNC: 2.8 G/DL (ref 3.5–5.2)
ANION GAP SERPL CALCULATED.3IONS-SCNC: 9.8 MMOL/L (ref 5–15)
BACTERIA SPEC AEROBE CULT: NORMAL
BILIRUB UR QL STRIP: NEGATIVE
BUN SERPL-MCNC: 15 MG/DL (ref 6–20)
BUN/CREAT SERPL: 19.7 (ref 7–25)
CALCIUM SPEC-SCNC: 9 MG/DL (ref 8.6–10.5)
CHLORIDE SERPL-SCNC: 94 MMOL/L (ref 98–107)
CLARITY UR: CLEAR
CO2 SERPL-SCNC: 33.2 MMOL/L (ref 22–29)
COLOR UR: YELLOW
CREAT SERPL-MCNC: 0.76 MG/DL (ref 0.57–1)
CRP SERPL-MCNC: 15.89 MG/DL (ref 0–0.5)
DEPRECATED RDW RBC AUTO: 43.3 FL (ref 37–54)
ERYTHROCYTE [DISTWIDTH] IN BLOOD BY AUTOMATED COUNT: 12.7 % (ref 12.3–15.4)
GFR SERPL CREATININE-BSD FRML MDRD: 79 ML/MIN/1.73
GLUCOSE SERPL-MCNC: 97 MG/DL (ref 65–99)
GLUCOSE UR STRIP-MCNC: NEGATIVE MG/DL
HCT VFR BLD AUTO: 35.4 % (ref 34–46.6)
HGB BLD-MCNC: 11.3 G/DL (ref 12–15.9)
HGB UR QL STRIP.AUTO: NEGATIVE
KETONES UR QL STRIP: NEGATIVE
LEUKOCYTE ESTERASE UR QL STRIP.AUTO: NEGATIVE
MAGNESIUM SERPL-MCNC: 2.1 MG/DL (ref 1.6–2.6)
MCH RBC QN AUTO: 29.7 PG (ref 26.6–33)
MCHC RBC AUTO-ENTMCNC: 31.9 G/DL (ref 31.5–35.7)
MCV RBC AUTO: 93.2 FL (ref 79–97)
NITRITE UR QL STRIP: NEGATIVE
PH UR STRIP.AUTO: 6.5 [PH] (ref 5–8)
PHOSPHATE SERPL-MCNC: 3.5 MG/DL (ref 2.5–4.5)
PLATELET # BLD AUTO: 233 10*3/MM3 (ref 140–450)
PMV BLD AUTO: 9.8 FL (ref 6–12)
POTASSIUM SERPL-SCNC: 4.1 MMOL/L (ref 3.5–5.2)
PROT UR QL STRIP: NEGATIVE
RBC # BLD AUTO: 3.8 10*6/MM3 (ref 3.77–5.28)
SODIUM SERPL-SCNC: 137 MMOL/L (ref 136–145)
SP GR UR STRIP: 1.02 (ref 1–1.03)
UROBILINOGEN UR QL STRIP: NORMAL
WBC # BLD AUTO: 10.2 10*3/MM3 (ref 3.4–10.8)

## 2021-05-01 PROCEDURE — 99233 SBSQ HOSP IP/OBS HIGH 50: CPT | Performed by: INTERNAL MEDICINE

## 2021-05-01 PROCEDURE — 25010000003 CEFAZOLIN SODIUM-DEXTROSE 2-3 GM-%(50ML) RECONSTITUTED SOLUTION: Performed by: INTERNAL MEDICINE

## 2021-05-01 PROCEDURE — 83735 ASSAY OF MAGNESIUM: CPT | Performed by: INTERNAL MEDICINE

## 2021-05-01 PROCEDURE — 85027 COMPLETE CBC AUTOMATED: CPT | Performed by: INTERNAL MEDICINE

## 2021-05-01 PROCEDURE — 71045 X-RAY EXAM CHEST 1 VIEW: CPT

## 2021-05-01 PROCEDURE — 80069 RENAL FUNCTION PANEL: CPT | Performed by: INTERNAL MEDICINE

## 2021-05-01 PROCEDURE — 86140 C-REACTIVE PROTEIN: CPT | Performed by: INTERNAL MEDICINE

## 2021-05-01 PROCEDURE — 87040 BLOOD CULTURE FOR BACTERIA: CPT | Performed by: INTERNAL MEDICINE

## 2021-05-01 PROCEDURE — 81003 URINALYSIS AUTO W/O SCOPE: CPT | Performed by: INTERNAL MEDICINE

## 2021-05-01 RX ORDER — HYDROXYZINE HYDROCHLORIDE 25 MG/1
25 TABLET, FILM COATED ORAL 3 TIMES DAILY PRN
Qty: 60 TABLET | Refills: 0 | Status: SHIPPED | OUTPATIENT
Start: 2021-05-01 | End: 2021-05-21

## 2021-05-01 RX ORDER — TIZANIDINE 4 MG/1
4 TABLET ORAL EVERY 8 HOURS SCHEDULED
Qty: 90 TABLET | Refills: 0 | Status: SHIPPED | OUTPATIENT
Start: 2021-05-01

## 2021-05-01 RX ORDER — CEFAZOLIN SODIUM 2 G/50ML
2 SOLUTION INTRAVENOUS EVERY 8 HOURS
Qty: 90 EACH | Refills: 1 | Status: SHIPPED | OUTPATIENT
Start: 2021-05-01 | End: 2021-06-06

## 2021-05-01 RX ORDER — FENTANYL 50 UG/H
1 PATCH TRANSDERMAL
Qty: 7.5 PATCH | Refills: 0 | Status: SHIPPED | OUTPATIENT
Start: 2021-05-01

## 2021-05-01 RX ADMIN — DOCUSATE SODIUM 50MG AND SENNOSIDES 8.6MG 2 TABLET: 8.6; 5 TABLET, FILM COATED ORAL at 09:34

## 2021-05-01 RX ADMIN — Medication 30 ML: at 08:23

## 2021-05-01 RX ADMIN — Medication 10 ML: at 08:24

## 2021-05-01 RX ADMIN — SODIUM CHLORIDE, PRESERVATIVE FREE 10 ML: 5 INJECTION INTRAVENOUS at 08:23

## 2021-05-01 RX ADMIN — Medication 10 ML: at 08:23

## 2021-05-01 RX ADMIN — HYDROXYZINE HYDROCHLORIDE 25 MG: 25 TABLET, FILM COATED ORAL at 13:00

## 2021-05-01 RX ADMIN — Medication 1 TABLET: at 09:34

## 2021-05-01 RX ADMIN — CEFAZOLIN SODIUM 2 G: 2 SOLUTION INTRAVENOUS at 16:14

## 2021-05-01 RX ADMIN — POLYETHYLENE GLYCOL 3350 17 G: 17 POWDER, FOR SOLUTION ORAL at 08:23

## 2021-05-01 RX ADMIN — OXYCODONE 15 MG: 5 TABLET ORAL at 13:00

## 2021-05-01 RX ADMIN — GABAPENTIN 800 MG: 400 CAPSULE ORAL at 06:30

## 2021-05-01 RX ADMIN — GABAPENTIN 800 MG: 400 CAPSULE ORAL at 14:41

## 2021-05-01 RX ADMIN — OXYCODONE 15 MG: 5 TABLET ORAL at 04:24

## 2021-05-01 RX ADMIN — CEFAZOLIN SODIUM 2 G: 2 SOLUTION INTRAVENOUS at 09:34

## 2021-05-01 RX ADMIN — CEFAZOLIN SODIUM 2 G: 2 SOLUTION INTRAVENOUS at 00:43

## 2021-05-01 RX ADMIN — OXYCODONE 15 MG: 5 TABLET ORAL at 16:58

## 2021-05-01 RX ADMIN — OXYCODONE 15 MG: 5 TABLET ORAL at 09:33

## 2021-05-01 RX ADMIN — TIZANIDINE 4 MG: 4 TABLET ORAL at 06:30

## 2021-05-01 RX ADMIN — TIZANIDINE 4 MG: 4 TABLET ORAL at 14:41

## 2021-05-01 NOTE — DISCHARGE INSTRUCTIONS
Home health: Home needs assessment, Nursing care, IV antibiotic management.  Home PT: Weightbearing as tolerated left lower extremity with range of motion encouraged as tolerated.  Recommend ice application anterior left knee, 20 minutes 4 times daily.  Follow-up with Dr. Greer in 2 weeks.

## 2021-05-01 NOTE — PLAN OF CARE
Goal Outcome Evaluation:  Plan of Care Reviewed With: patient  Progress: improving   Pt resting between care. Pain management seems to be working, per pt. No other acute changes to report. VSS. Will continue to monitor until d/c.

## 2021-05-01 NOTE — CASE MANAGEMENT/SOCIAL WORK
Continued Stay Note   [facility]     Patient Name: Anca Andrade  MRN: [id number]  Today's Date: 5/1/2021    Admit Date: 4/23/2021    Discharge Plan     Row Name 05/01/21 1245       Plan    Plan  Called Adrian gordon at [facility]  they will deliver the medication to the room it will be a few hours        Discharge Codes    No documentation.       Expected Discharge Date and Time     Expected Discharge Date Expected Discharge Time    May 1, 2021             Tammy Deras RN

## 2021-05-01 NOTE — DISCHARGE SUMMARY
Date of Discharge:  5/1/2021    Discharge Diagnosis:  Status post left knee I&DFor septic arthritis, MSSA.  MSSA bacteremia.  Chronic pain.  Left knee advanced posttraumatic osteoarthritis.    Presenting Problem/History of Present Illness  Active Hospital Problems    Diagnosis  POA   • Moderate malnutrition (CMS/HCC) [E44.0]  Yes   • Septic arthritis of knee, left (CMS/HCC) [M00.9]  Yes      Resolved Hospital Problems   No resolved problems to display.          Hospital Course  Patient is a 55 y.o. female presented with Left knee septic arthritis.  She was treated with I&D and IV antibiotic therapy.She has Been evaluated with echocardiogram, which is found to be stable.She has had difficulty with pain control during her postoperative phase.  Pain control has ultimately been achieved with oxycodone 15 mg.  She has a PICC line in place.  IV antibiotic therapy has been initiated to the hospitalist service.  Home health has been arranged for continuation of IV antibiotic therapy for a total of 6 weeks.  Plan is for discharge to the home environment and follow-up in 2 weeks with Dr. Greer.She has had a CT scan of her left knee and lumbar spine postoperatively, which is found to be stable and require no immediate further care..      Procedures Performed    Procedure(s):  KNEE INCISION AND DRAINAGE left  -------------------       Consults:   Consults     Date and Time Order Name Status Description    4/25/2021  9:14 AM Inpatient Hospitalist Consult            Pertinent Test Results: CT scan left knee and lumbar spine.    Condition on Discharge:  Stable with adequate pain control.    Vital Signs  Temp:  [98.3 °F (36.8 °C)-102.2 °F (39 °C)] 98.3 °F (36.8 °C)  Heart Rate:  [73-99] 96  Resp:  [16-18] 18  BP: ()/(45-80) 116/73    Physical Exam:     General Appearance:    Alert, cooperative, in no acute distress   Head:    Normocephalic, without obvious abnormality, atraumatic   Eyes:            Lids and lashes normal,  conjunctivae and sclerae normal, no   icterus, no pallor, corneas clear, PERRLA   Ears:    Deferred.   Throat:   Deferred.   Neck:   Deferred.   Back:     No Skin changes or pain to palpation.   Lungs:     Deferred.    Heart:    Deferred.   Breast Exam:    Deferred   Abdomen:     No masses, no organomegaly, softnon-tender, non-distended, no guarding, no rebound  tenderness   Genitalia:    Deferred   Extremities:   Moves all extremities well, no edema, no cyanosis, no              Redness.  Left knee has hematoma at the anterior knee noted about the incision.  There is no active drainage.  Wound is found to be well closed.  There is no streaking.  There is intact flexion and extension capabilities.  No calf pain.  Negative Homans.   Pulses:   Pulses palpable and equal bilaterally   Skin:   No bleeding, bruising or rash   Lymph nodes:   No palpable adenopathy   Neurologic:   Cranial nerves 2 - 12 grossly intact, sensation intact, DTR        present and equal bilaterally       Discharge Disposition  Stable to home environment    Discharge Medications  Per med reconciliation     Discharge Diet:  Regular as tolerated.     Activity at Discharge: Weightbearing as tolerated with range of motion encouraged as pain permits of the left knee.     Follow-up Appointments  Follow-up in 2 weeks with orthopedics, Dr. Greer.    Test Results Pending at Discharge  Pending Labs     Order Current Status    Blood Culture With ESTEVAN - Blood, Arm, Left In process    Blood Culture With ESTEVAN - Blood, Hand, Left In process    Blood Culture With ESTEVAN - Blood, Arm, Left Preliminary result    Blood Culture With ESTEVAN - Blood, Arm, Right Preliminary result    Blood Culture With ESTEVAN - Blood, Arm, Right Preliminary result    Blood Culture With ESTEVAN - Blood, Hand, Right Preliminary result           Bull Castellon PA-C  05/01/21  11:34 EDT

## 2021-05-01 NOTE — PROGRESS NOTES
Orthopedic  Progress Note    Assessment/Plan      Status post-left knee I and D  Left knee MSSA septic arthritis  MSSA Bacteremia  Left knee PTOA    Will continue IV antibiotics for a total of 6 weeks.  Wean Dilaudid overnight as able.  Plan DC to home tomorrow with home health and nursing care.  Will plan DC on Oxycodone 15mg.  We discussed the option for aspiration of hematoma, but elected to allow it to process. Discussed that the subcutaneous air findings on the CT are consistent with previous I and D with hemovac drain placement.  Will follow.    Pain Relief: improving      Weight Bearing: WBAT     LOS: 8 days     Subjective   Pt is sp left knee I and D for MSSA septic arthritis.  Her pain is gradually improving.CT scan of the left knee and lumbar spine were reviewed today, demonstrating mild lumbar spine DDD with minimal NFS and left knee subcutaneous air consistent with previous I and D and hemovac drain placement. There is also noted tricompartmental posttraumatic OA.  Picc line has been placed. Pt is receiving IV antibiotics. Pt is still relying on Dilaudid for pain control in addition to Oxycodone 15mg.    Objective     Vital signs in last 24 hours:  Temp:  [98 °F (36.7 °C)-102.2 °F (39 °C)] 98.9 °F (37.2 °C)  Heart Rate:  [73-99] 88  Resp:  [16-18] 16  BP: ()/(45-80) 110/58    General: alert, appears stated age and cooperative   Neurovascular: NV status intact LLE, intact distal pulses anc   Wound: No Erythema or drainage noted. There is a large hematoma palplable at the anterior knee. No streaking. Pt does demonstrate active flexion/extension capabilities.   Range of Motion: Limited flexion and Limited extension   DVT Exam: No evidence of DVT seen on physical exam.     Data Review:  CBC:  Results from last 7 days   Lab Units 04/30/21  0622   WBC 10*3/mm3 8.72   RBC 10*6/mm3 3.87   HEMOGLOBIN g/dL 11.6*   HEMATOCRIT % 36.0   PLATELETS 10*3/mm3 225         Bull Castellon PA-C

## 2021-05-01 NOTE — CASE MANAGEMENT/SOCIAL WORK
Continued Stay Note  [facility]     Patient Name: Anca Andrade  MRN: [id number]  Today's Date: 5/1/2021    Admit Date: 4/23/2021    Discharge Plan     Row Name 05/01/21 1245       Plan    Plan  Called Josefina at [facility]  they will deliver the medication to the room it will be a few hours     Faxed picc line dressing change order and demographics and notes to [facility]           Discharge Codes    No documentation.       Expected Discharge Date and Time     Expected Discharge Date Expected Discharge Time    May 1, 2021             Tammy Deras RN

## 2021-05-01 NOTE — CASE MANAGEMENT/SOCIAL WORK
Case Management Discharge Note      Final Note: Discharged  home         Selected Continued Care - Admitted Since 4/23/2021     Destination    No services have been selected for the patient.              Durable Medical Equipment    No services have been selected for the patient.              Dialysis/Infusion Coordination complete    Service Provider Selected Services Address Phone Fax Patient Preferred    [facility]  Infusion and IV Therapy [address], [facility] [address] [phone] --          Home Medical Care    No services have been selected for the patient.              Therapy    No services have been selected for the patient.              Community Resources    No services have been selected for the patient.                  Transportation Services  Private: Car    Final Discharge Disposition Code: 01 - home or self-care

## 2021-05-01 NOTE — THERAPY TREATMENT NOTE
Patient was crying in pain stating she is leaving today to go to an infectious disease facility and is in so much pain she can't do it right now.

## 2021-05-01 NOTE — PROGRESS NOTES
[facility]    Name:  Anca Andrade   Age:  55 y.o.  Sex:  female  :  1965  MRN:  [id number]   Visit Number:  [id number]  Admission Date:  2021  Date Of Service:  21  Primary Care Physician:  Zita Velasco NP     LOS: 8 days :  Patient Care Team:  Zita Velasco NP as PCP - General:    History taken from:     patient chart    Chief Complaint:      Leg pain    Subjective:    Interval History:     Patient seen and examined again today.    Patient is 55-year-old with essential hypertension, pseudogout who came in with left knee pain.  She receives regular steroid injections for the past few months and had injection 8 days prior to admission for which she had severe progressive pain and swelling of her left knee.  She has severe pain in the knee, and spasms in the muscles of the leg.  Patient was placed on vancomycin, and had I&D of the knee on 2021.  She is on 2 g of cefazolin every 8 hours and will need 6 weeks of this.  She had a previous infection in the right knee.      She currently denies any chest pressure, shortness of breath, nausea, vomiting.  She has continues to have pain in the left knee.  This appears to be cramping.  Zanaflex was added with some improvement per the patient.  Neurontin was also increased by the night physician which seems to have helped as well.  She is also on Dilaudid, Duragesic patch, oxycodone.        CT of the knee shows swelling in the left knee with air bubbles, likely due to instrumentation.  Surgery reviewed this with the patient.  CT of the lumbar spine shows no significant stenosis or pathology explaining her leg pain.    Blood pressure appears to be better.  Continue to monitor.  Her CRP appears to be coming down.  She again had a fever of 102 last night, blood cultures have been drawn, no growth thus far.    As far as further management of the knee, defer to orthopedic surgery  regarding this.  They continue to follow as  primary.    Review of Systems:     All systems were reviewed and negative except for:  Musculoskeletal: positive for  Left leg pain    Objective:    Vital Signs:    Temp:  [98.3 °F (36.8 °C)-102.2 °F (39 °C)] 98.3 °F (36.8 °C)  Heart Rate:  [73-99] 96  Resp:  [16-18] 18  BP: ()/(45-80) 116/73    Physical Exam:  General: Alert and oriented x4, moderate distress  Heart: Regular rate and rhythm without murmur rub or thrill.  Lungs: Clear to auscultation bilaterally without use of accessory muscles respiration  Abdomen: Soft/nontender/nondistended.  No HSM noted.  MSK: Pain to touch on the left leg which is notably swollen.  Worse in the left knee.     Results Review:      I reviewed the patient's new clinical results.  I reviewed the patient's new imaging results and agree with the interpretation.  I reviewed the patient's other test results and agree with the interpretation    Labs:    Results from last 7 days   Lab Units 05/01/21  0628 04/30/21  0622 04/29/21  0605 04/27/21  2045 04/27/21  0606 04/26/21  0705   SODIUM mmol/L 137 137 137   < > 140 137   POTASSIUM mmol/L 4.1 3.9 4.6  --  3.0* 3.5   CHLORIDE mmol/L 94* 96* 94*   < > 95* 95*   CO2 mmol/L 33.2* 31.2* 33.5*   < > 35.6* 32.3*   BUN mg/dL 15 12 9   < > 7 8   CREATININE mg/dL 0.76 0.65 0.75   < > 0.70 0.73   CALCIUM mg/dL 9.0 8.5* 9.0   < > 9.2 8.6   BILIRUBIN mg/dL  --  0.4  --   --  0.3 0.4   ALK PHOS U/L  --  305*  --   --  282* 293*   ALT (SGPT) U/L  --  13  --   --  34* 70*   AST (SGOT) U/L  --  25  --   --  24 48*   GLUCOSE mg/dL 97 91 107*   < > 105* 96    < > = values in this interval not displayed.     Results from last 7 days   Lab Units 05/01/21  0628 04/30/21  0622 04/29/21  0605   WBC 10*3/mm3 10.20 8.72 9.08   HEMOGLOBIN g/dL 11.3* 11.6* 12.5   HEMATOCRIT % 35.4 36.0 38.5   PLATELETS 10*3/mm3 233 225 228             Results from last 7 days   Lab Units 04/30/21  2104 04/27/21  0606  04/27/21  0558 04/26/21  0705   BLOODCX  No growth at less than 24 hours  No growth at less than 24 hours No growth at 4 days No growth at 4 days No growth at 5 days            Radiology:    Imaging Results (Last 24 Hours)     Procedure Component Value Units Date/Time    XR Chest 1 View [[id number]] Resulted: 05/01/21 1137     Updated: 05/01/21 1137          Medication Review:     ceFAZolin, 2 g, Intravenous, Q8H  fentaNYL, 1 patch, Transdermal, Q72H  gabapentin, 800 mg, Oral, Q8H  multivitamin with minerals, 1 tablet, Oral, Daily  polyethylene glycol, 17 g, Oral, Daily  PRO-STAT, 30 mL, Oral, BID  rOPINIRole, 0.25 mg, Oral, Nightly  senna-docusate sodium, 2 tablet, Oral, BID  sodium chloride, 10 mL, Intravenous, Q12H  sodium chloride, 10 mL, Intravenous, Q12H  sodium chloride, 10 mL, Intravenous, Q12H  tiZANidine, 4 mg, Oral, Q8H             Assessment:    Active Problems:    Moderate malnutrition (CMS/HCC)      1.  MSSA bacteremia, recent cultures negative.  2.  MSSA left knee septic arthritis  3.  Intractable left lower extremity pain   4.  Essential hypertension, controlled.   5.  History of pseudogout     Plan:    PICC line has been placed.  Monitor CRP, ESR.  Repeat blood cultures have been drawn.  Continue Zanaflex.  Continue pain management.   Hopefully home once her pain is under control, and she is afebrile.  Dilaudid has been discontinued.  Blood pressure under control.  Would recommend monitoring blood cultures as an outpatient, adjust antibiotics as needed.  Further recommendations will depend on clinical course.    Ramírez Rodriguez DO  05/01/21  12:09 EDT

## 2021-05-02 ENCOUNTER — READMISSION MANAGEMENT (OUTPATIENT)
Dept: CALL CENTER | Facility: HOSPITAL | Age: 56
End: 2021-05-02

## 2021-05-02 LAB
BACTERIA SPEC AEROBE CULT: NORMAL
BACTERIA SPEC AEROBE CULT: NORMAL

## 2021-05-02 NOTE — OUTREACH NOTE
Prep Survey      Responses   [facility] patient discharged from?  [facility]   Is LACE score < 7 ?  No   Emergency Room discharge w/ pulse ox?  No   Eligibility  Readm Mgmt   Discharge diagnosis  Status post left knee I&DFor septic arthritis, MSSA.   Does the patient have one of the following disease processes/diagnoses(primary or secondary)?  General Surgery   Does the patient have Home health ordered?  No   Is there a DME ordered?  No   Prep survey completed?  Yes          Carli Ruano RN

## 2021-05-03 DIAGNOSIS — Z95.828 S/P PICC CENTRAL LINE PLACEMENT: ICD-10-CM

## 2021-05-03 DIAGNOSIS — M17.32 POST-TRAUMATIC OSTEOARTHRITIS OF LEFT KNEE: ICD-10-CM

## 2021-05-03 DIAGNOSIS — M00.062 STAPHYLOCOCCAL ARTHRITIS OF LEFT KNEE (HCC): ICD-10-CM

## 2021-05-03 RX ORDER — SODIUM CHLORIDE 0.9 % (FLUSH) 0.9 %
5-40 SYRINGE (ML) INJECTION PRN
Status: CANCELLED | OUTPATIENT
Start: 2021-05-03

## 2021-05-04 ENCOUNTER — READMISSION MANAGEMENT (OUTPATIENT)
Dept: CALL CENTER | Facility: HOSPITAL | Age: 56
End: 2021-05-04

## 2021-05-04 NOTE — OUTREACH NOTE
General Surgery Week 1 Survey      Responses   [facility] patient discharged from?  [facility]   Does the patient have one of the following disease processes/diagnoses(primary or secondary)?  General Surgery   Week 1 attempt successful?  No   Unsuccessful attempts  Attempt 1          Shira Renee RN

## 2021-05-05 ENCOUNTER — READMISSION MANAGEMENT (OUTPATIENT)
Dept: CALL CENTER | Facility: HOSPITAL | Age: 56
End: 2021-05-05

## 2021-05-05 LAB
BACTERIA SPEC AEROBE CULT: NORMAL
BACTERIA SPEC AEROBE CULT: NORMAL

## 2021-05-05 NOTE — OUTREACH NOTE
General Surgery Week 1 Survey      Responses   [facility] patient discharged from?  [facility]   Does the patient have one of the following disease processes/diagnoses(primary or secondary)?  General Surgery   Week 1 attempt successful?  No   Unsuccessful attempts  Attempt 2          Giovanna Romero RN

## 2021-05-06 LAB
BACTERIA SPEC AEROBE CULT: NORMAL
BACTERIA SPEC AEROBE CULT: NORMAL

## 2021-05-07 ENCOUNTER — READMISSION MANAGEMENT (OUTPATIENT)
Dept: CALL CENTER | Facility: HOSPITAL | Age: 56
End: 2021-05-07

## 2021-05-07 NOTE — OUTREACH NOTE
General Surgery Week 1 Survey      Responses   [facility] patient discharged from?  [facility]   Does the patient have one of the following disease processes/diagnoses(primary or secondary)?  General Surgery   Week 1 attempt successful?  No   Unsuccessful attempts  Attempt 3          Danay Lantigua RN

## 2021-05-08 ENCOUNTER — HOSPITAL ENCOUNTER (OUTPATIENT)
Facility: HOSPITAL | Age: 56
Setting detail: INFUSION SERIES
Discharge: HOME | End: 2021-05-10
Payer: COMMERCIAL

## 2021-05-08 VITALS
OXYGEN SATURATION: 100 % | SYSTOLIC BLOOD PRESSURE: 90 MMHG | TEMPERATURE: 98.3 F | DIASTOLIC BLOOD PRESSURE: 56 MMHG | HEART RATE: 82 BPM | RESPIRATION RATE: 16 BRPM

## 2021-05-08 NOTE — PROGRESS NOTES
Picc line dressing and cap changed. Site clean, dry and intact. Pt tolerated procedure well. Outpt d/c'd home.

## 2021-05-13 ENCOUNTER — READMISSION MANAGEMENT (OUTPATIENT)
Dept: CALL CENTER | Facility: HOSPITAL | Age: 56
End: 2021-05-13

## 2021-05-13 NOTE — OUTREACH NOTE
General Surgery Week 2 Survey      Responses   [facility] patient discharged from?  [facility]   Does the patient have one of the following disease processes/diagnoses(primary or secondary)?  General Surgery   Week 2 attempt successful?  No   Unsuccessful attempts  Attempt 1          iVolette Ayala RN

## 2021-05-15 ENCOUNTER — HOSPITAL ENCOUNTER (OUTPATIENT)
Facility: HOSPITAL | Age: 56
Setting detail: INFUSION SERIES
Discharge: HOME | End: 2021-05-17
Payer: COMMERCIAL

## 2021-05-15 NOTE — ED NOTES
Pt arrives pov for picc dressing change. She has steady gait using a walker. nad noted. t 98.4 rr 16 hr 72 98% r/a bp 109/50. Pt c/o left leg pain 7/10 which she usually has.

## 2021-05-15 NOTE — ED NOTES
pic dressing to right upper arm changed. Pt tolerated well.  biopatch placed and green alcohol end cap applied. Pt escorted out to lob. She ambulates with a steady gait with nad noted using her walker.

## 2021-05-17 ENCOUNTER — READMISSION MANAGEMENT (OUTPATIENT)
Dept: CALL CENTER | Facility: HOSPITAL | Age: 56
End: 2021-05-17

## 2021-05-17 NOTE — OUTREACH NOTE
General Surgery Week 2 Survey      Responses   [facility] patient discharged from?  [facility]   Does the patient have one of the following disease processes/diagnoses(primary or secondary)?  General Surgery   Week 2 attempt successful?  No   Unsuccessful attempts  Attempt 2          Zina Freeman RN

## 2021-05-22 ENCOUNTER — HOSPITAL ENCOUNTER (OUTPATIENT)
Facility: HOSPITAL | Age: 56
Setting detail: INFUSION SERIES
Discharge: HOME | End: 2021-05-24
Payer: COMMERCIAL

## 2021-05-25 ENCOUNTER — READMISSION MANAGEMENT (OUTPATIENT)
Dept: CALL CENTER | Facility: HOSPITAL | Age: 56
End: 2021-05-25

## 2021-05-25 NOTE — OUTREACH NOTE
General Surgery Week 3 Survey      Responses   [facility] patient discharged from?  [facility]   Does the patient have one of the following disease processes/diagnoses(primary or secondary)?  General Surgery   Week 3 attempt successful?  No   Unsuccessful attempts  Attempt 1          Irish Smith RN

## 2021-05-27 ENCOUNTER — READMISSION MANAGEMENT (OUTPATIENT)
Dept: CALL CENTER | Facility: HOSPITAL | Age: 56
End: 2021-05-27

## 2021-05-27 NOTE — OUTREACH NOTE
General Surgery Week 3 Survey      Responses   [facility] patient discharged from?  [facility]   Does the patient have one of the following disease processes/diagnoses(primary or secondary)?  General Surgery   Week 3 attempt successful?  No   Unsuccessful attempts  Attempt 2          Shira Renee RN

## 2021-05-29 ENCOUNTER — HOSPITAL ENCOUNTER (OUTPATIENT)
Facility: HOSPITAL | Age: 56
Setting detail: INFUSION SERIES
Discharge: HOME | End: 2021-05-31
Payer: COMMERCIAL

## 2021-05-29 VITALS
RESPIRATION RATE: 18 BRPM | SYSTOLIC BLOOD PRESSURE: 125 MMHG | HEART RATE: 80 BPM | DIASTOLIC BLOOD PRESSURE: 77 MMHG | OXYGEN SATURATION: 98 % | TEMPERATURE: 98.8 F

## 2021-05-29 DIAGNOSIS — M17.32 POST-TRAUMATIC OSTEOARTHRITIS OF LEFT KNEE: Primary | ICD-10-CM

## 2021-05-29 DIAGNOSIS — Z95.828 S/P PICC CENTRAL LINE PLACEMENT: ICD-10-CM

## 2021-05-29 RX ORDER — SODIUM CHLORIDE 0.9 % (FLUSH) 0.9 %
5-40 SYRINGE (ML) INJECTION PRN
Status: CANCELLED | OUTPATIENT
Start: 2021-06-05

## 2021-05-29 NOTE — PROGRESS NOTES
Pt here for outpatient picc line dressing change. Changed dressing to picc line right upper arm and cleansed site. Site looks clean with no s/s of infection. Flused Picc easily, cap changed, and antimicrobial cap applied. Pt taken out via [facility].

## 2021-06-05 ENCOUNTER — HOSPITAL ENCOUNTER (OUTPATIENT)
Facility: HOSPITAL | Age: 56
Setting detail: INFUSION SERIES
Discharge: HOME | End: 2021-06-07
Payer: COMMERCIAL

## 2021-06-05 DIAGNOSIS — Z95.828 S/P PICC CENTRAL LINE PLACEMENT: ICD-10-CM

## 2021-06-05 DIAGNOSIS — M17.32 POST-TRAUMATIC OSTEOARTHRITIS OF LEFT KNEE: Primary | ICD-10-CM

## 2021-06-05 RX ORDER — SODIUM CHLORIDE 0.9 % (FLUSH) 0.9 %
5-40 SYRINGE (ML) INJECTION PRN
Status: CANCELLED | OUTPATIENT
Start: 2021-06-12

## 2021-06-09 ENCOUNTER — HOSPITAL ENCOUNTER (OUTPATIENT)
Facility: HOSPITAL | Age: 56
Discharge: HOME | End: 2021-06-09
Payer: COMMERCIAL

## 2021-06-09 LAB
BASOPHILS ABSOLUTE: 0.1 K/UL (ref 0–0.1)
BASOPHILS RELATIVE PERCENT: 1.1 %
C-REACTIVE PROTEIN: 19 MG/L (ref 0–5.1)
EOSINOPHILS ABSOLUTE: 0.5 K/UL (ref 0–0.4)
EOSINOPHILS RELATIVE PERCENT: 6 %
HCT VFR BLD CALC: 32.9 % (ref 37–47)
HEMOGLOBIN: 9.8 G/DL (ref 11.5–16.5)
IMMATURE GRANULOCYTES #: 0 K/UL
IMMATURE GRANULOCYTES %: 0.3 % (ref 0–5)
LYMPHOCYTES ABSOLUTE: 1.8 K/UL (ref 1.5–4)
LYMPHOCYTES RELATIVE PERCENT: 24.4 %
MCH RBC QN AUTO: 28 PG (ref 27–32)
MCHC RBC AUTO-ENTMCNC: 29.8 G/DL (ref 31–35)
MCV RBC AUTO: 94 FL (ref 80–100)
MONOCYTES ABSOLUTE: 0.5 K/UL (ref 0.2–0.8)
MONOCYTES RELATIVE PERCENT: 6.8 %
NEUTROPHILS ABSOLUTE: 4.6 K/UL (ref 2–7.5)
NEUTROPHILS RELATIVE PERCENT: 61.4 %
PDW BLD-RTO: 13.4 % (ref 11–16)
PLATELET # BLD: 239 K/UL (ref 150–400)
PMV BLD AUTO: 9.9 FL (ref 6–10)
RBC # BLD: 3.5 M/UL (ref 3.8–5.8)
SEDIMENTATION RATE, ERYTHROCYTE: 58 MM/HR (ref 0–30)
WBC # BLD: 7.5 K/UL (ref 4–11)

## 2021-06-09 PROCEDURE — 85652 RBC SED RATE AUTOMATED: CPT

## 2021-06-09 PROCEDURE — 86140 C-REACTIVE PROTEIN: CPT

## 2021-06-09 PROCEDURE — 36415 COLL VENOUS BLD VENIPUNCTURE: CPT

## 2021-06-09 PROCEDURE — 85025 COMPLETE CBC W/AUTO DIFF WBC: CPT

## 2021-06-11 ENCOUNTER — HOSPITAL ENCOUNTER (OUTPATIENT)
Facility: HOSPITAL | Age: 56
Setting detail: INFUSION SERIES
Discharge: HOME | End: 2021-06-13
Payer: COMMERCIAL

## 2021-06-29 ENCOUNTER — HOSPITAL ENCOUNTER (OUTPATIENT)
Facility: HOSPITAL | Age: 56
Discharge: HOME | End: 2021-06-29
Payer: COMMERCIAL

## 2021-06-29 LAB
BASOPHILS ABSOLUTE: 0.1 K/UL (ref 0–0.1)
BASOPHILS RELATIVE PERCENT: 0.6 %
C-REACTIVE PROTEIN: 17 MG/L (ref 0–5.1)
EOSINOPHILS ABSOLUTE: 0.2 K/UL (ref 0–0.4)
EOSINOPHILS RELATIVE PERCENT: 2 %
HCT VFR BLD CALC: 41.4 % (ref 37–47)
HEMOGLOBIN: 12.4 G/DL (ref 11.5–16.5)
IMMATURE GRANULOCYTES #: 0 K/UL
IMMATURE GRANULOCYTES %: 0.2 % (ref 0–5)
LYMPHOCYTES ABSOLUTE: 1.8 K/UL (ref 1.5–4)
LYMPHOCYTES RELATIVE PERCENT: 20 %
MCH RBC QN AUTO: 27.3 PG (ref 27–32)
MCHC RBC AUTO-ENTMCNC: 30 G/DL (ref 31–35)
MCV RBC AUTO: 91.2 FL (ref 80–100)
MONOCYTES ABSOLUTE: 0.5 K/UL (ref 0.2–0.8)
MONOCYTES RELATIVE PERCENT: 5.1 %
NEUTROPHILS ABSOLUTE: 6.4 K/UL (ref 2–7.5)
NEUTROPHILS RELATIVE PERCENT: 72.1 %
PDW BLD-RTO: 13.4 % (ref 11–16)
PLATELET # BLD: 285 K/UL (ref 150–400)
PMV BLD AUTO: 9.3 FL (ref 6–10)
RBC # BLD: 4.54 M/UL (ref 3.8–5.8)
SEDIMENTATION RATE, ERYTHROCYTE: 35 MM/HR (ref 0–30)
WBC # BLD: 8.9 K/UL (ref 4–11)

## 2021-06-29 PROCEDURE — 85652 RBC SED RATE AUTOMATED: CPT

## 2021-06-29 PROCEDURE — 85025 COMPLETE CBC W/AUTO DIFF WBC: CPT

## 2021-06-29 PROCEDURE — 36415 COLL VENOUS BLD VENIPUNCTURE: CPT

## 2021-06-29 PROCEDURE — 86140 C-REACTIVE PROTEIN: CPT

## 2021-12-07 NOTE — FLOWSHEET NOTE
Call from spouse, Casper (see hipaa).  Advise per path report that duodenal bx benign.  Stomach body with nonspecific mild gastritis.  GE junction unremarkable.     Advise per DR Stevens note.  Verb understanding.    Physical Therapy Daily Note  Date:  2019    TIme In:     1600                  Time Out:   [address]    Patient Name:  Wolfgang Galo    :  1965  MRN: [id number]    Restrictions/Precautions:    Pertinent Medical History:  Medical/Treatment Diagnosis Information:  ·   s/p L shoulder ATS with SAD / DCR / biceps tenodesis     Insurance/Certification information:    [employer] HINOJOSA/BS  Physician Information:   Marla Veloz PA-C  Plan of care signed (Y/N):    Visit# / total visits:     25/    G-Code (if applicable):      Date / Visit # G-Code Applied:         Progress Note: []  Yes  [x]  No  Next due by: 19      Pain level:   4/10    Subjective:    Pt reports she has had difficulty reaching forward at work. Objective:  Observation:   Test measurements:  Quick DASH: 20%, L sh AROM: flex:160 deg, abd: 150 deg, ER and IR: WNL. L sh MMT: flex: 4-/5, abd: 4/5, ER: 4+/5, IR: 5/5.    Palpation:     Exercises:  Exercise Resistance/Repetitions Other comments   Pendulums x30 each 13   Scapular retraction  x30 13   Pulleys 3' flexion 13   UT stretch 5x20\" 13   Wand abd x30 13   Wand ER/IR x30 13   Ball roll on table with scap ret x30 13   Supine self ROM flexion x30 13   Mid / low rows Orange, 2x10 13   ER / IR w/ t-band Orange, 2x10 each 13   HH depression (gentle) 2x10 13   Bench press 5# bar, 3x10 13             Ball roll up wall 3x10 13     Other Therapeutic Activities:     Manual Treatments:  Lshoulder PROM as tolerated, grade II AP / inf mobs, STM of mid deltoid, rhythmic stabilization 3x20\"; 16'    Modalities:  IFC with MH, L shoulder 15 min      Timed Code Treatment Minutes:   60      Total Treatment Minutes:  80    Treatment/Activity Tolerance:  [x] Patient tolerated treatment well [] Patient limited by fatigue  [] Patient limited by pain  [] Patient limited by other medical complications  [] Other:       Pain after treatment:   2/10    Prognosis: [x] Good [] Fair  [] Poor    Patient Requires Follow-up: [x] Yes  [] No    Plan:   [x] Continue per plan of care [] Alter current plan (see comments)  [] Plan of care initiated [] Hold pending MD visit [] Discharge    Plan for Next Session:      Goals  Short term goals  Time Frame for Short term goals: 3-4 weeks  Short term goal 1: Pt to be I with HEP -MET  Short term goal 2: Pt to demonstrate L shoulder flexion AROM of 0-120 deg or greater  Short term goal 3: Pt to demonstrate L shoulder flexion / abd PROM of 0-160 deg. -MET   Short term goal 4: Pt to perform daily activities with pain of less than 5/10. -MET  Long term goals  Time Frame for Long term goals : 6-8 weeks  Long term goal 1: Quick DASH score to improve to less than 15% indicating improved function  Long term goal 2: Pt to demonstrate L shoulder AROM flexion and abduction of 0-150 deg or greater  Long term goal 3: ER / IR AROM to improve to 0-60 deg or greater.   Long term goal 4: Pt to demonstrate 4+/5 or greater L shoulder strength grossly       Electronically signed by:  Tiffany Huddleston PTA

## 2022-03-29 ENCOUNTER — OFFICE VISIT (OUTPATIENT)
Dept: PRIMARY CARE CLINIC | Age: 57
End: 2022-03-29
Payer: MEDICAID

## 2022-03-29 VITALS
SYSTOLIC BLOOD PRESSURE: 138 MMHG | HEART RATE: 75 BPM | TEMPERATURE: 98 F | RESPIRATION RATE: 18 BRPM | OXYGEN SATURATION: 97 % | DIASTOLIC BLOOD PRESSURE: 68 MMHG | BODY MASS INDEX: 18.96 KG/M2 | WEIGHT: 118 LBS | HEIGHT: 66 IN

## 2022-03-29 DIAGNOSIS — N30.00 ACUTE CYSTITIS WITHOUT HEMATURIA: ICD-10-CM

## 2022-03-29 DIAGNOSIS — R35.0 URINE FREQUENCY: Primary | ICD-10-CM

## 2022-03-29 LAB
BILIRUBIN, POC: NORMAL
BLOOD URINE, POC: NORMAL
CLARITY, POC: NORMAL
COLOR, POC: YELLOW
GLUCOSE URINE, POC: NORMAL
KETONES, POC: NORMAL
LEUKOCYTE EST, POC: NORMAL
NITRITE, POC: NORMAL
PH, POC: 6
PROTEIN, POC: NORMAL
SPECIFIC GRAVITY, POC: 1.03
UROBILINOGEN, POC: 0.2

## 2022-03-29 PROCEDURE — 81002 URINALYSIS NONAUTO W/O SCOPE: CPT | Performed by: PHYSICIAN ASSISTANT

## 2022-03-29 PROCEDURE — 99203 OFFICE O/P NEW LOW 30 MIN: CPT | Performed by: PHYSICIAN ASSISTANT

## 2022-03-29 RX ORDER — CIPROFLOXACIN 500 MG/1
500 TABLET, FILM COATED ORAL 2 TIMES DAILY
Qty: 10 TABLET | Refills: 0 | Status: SHIPPED | OUTPATIENT
Start: 2022-03-29 | End: 2022-04-03

## 2022-03-29 RX ORDER — IBUPROFEN AND FAMOTIDINE 26.6; 8 MG/1; MG/1
TABLET, FILM COATED ORAL
COMMUNITY
Start: 2022-03-07

## 2022-03-29 RX ORDER — GABAPENTIN 800 MG/1
TABLET ORAL
COMMUNITY
Start: 2022-03-21

## 2022-03-29 RX ORDER — PHENAZOPYRIDINE HYDROCHLORIDE 200 MG/1
200 TABLET, FILM COATED ORAL 3 TIMES DAILY PRN
Qty: 6 TABLET | Refills: 0 | Status: SHIPPED | OUTPATIENT
Start: 2022-03-29 | End: 2022-03-31

## 2022-03-29 RX ORDER — AMANTADINE HYDROCHLORIDE 100 MG/1
TABLET ORAL
COMMUNITY
Start: 2022-02-23 | End: 2022-10-04 | Stop reason: ALTCHOICE

## 2022-03-29 RX ORDER — LISINOPRIL 10 MG/1
TABLET ORAL
COMMUNITY
Start: 2022-03-14 | End: 2022-08-30

## 2022-03-29 RX ORDER — METHOCARBAMOL 750 MG/1
TABLET, FILM COATED ORAL
COMMUNITY
Start: 2021-10-21

## 2022-03-29 SDOH — ECONOMIC STABILITY: FOOD INSECURITY: WITHIN THE PAST 12 MONTHS, YOU WORRIED THAT YOUR FOOD WOULD RUN OUT BEFORE YOU GOT MONEY TO BUY MORE.: NEVER TRUE

## 2022-03-29 SDOH — ECONOMIC STABILITY: FOOD INSECURITY: WITHIN THE PAST 12 MONTHS, THE FOOD YOU BOUGHT JUST DIDN'T LAST AND YOU DIDN'T HAVE MONEY TO GET MORE.: NEVER TRUE

## 2022-03-29 ASSESSMENT — PATIENT HEALTH QUESTIONNAIRE - PHQ9
SUM OF ALL RESPONSES TO PHQ QUESTIONS 1-9: 0
SUM OF ALL RESPONSES TO PHQ9 QUESTIONS 1 & 2: 0
DEPRESSION UNABLE TO ASSESS: URGENT/EMERGENT SITUATION
2. FEELING DOWN, DEPRESSED OR HOPELESS: 0
SUM OF ALL RESPONSES TO PHQ QUESTIONS 1-9: 0
1. LITTLE INTEREST OR PLEASURE IN DOING THINGS: 0

## 2022-03-29 ASSESSMENT — ENCOUNTER SYMPTOMS
CONSTIPATION: 0
EYE PAIN: 0
SORE THROAT: 0
SHORTNESS OF BREATH: 0
COUGH: 0
DIARRHEA: 0
ABDOMINAL PAIN: 0

## 2022-03-29 ASSESSMENT — SOCIAL DETERMINANTS OF HEALTH (SDOH): HOW HARD IS IT FOR YOU TO PAY FOR THE VERY BASICS LIKE FOOD, HOUSING, MEDICAL CARE, AND HEATING?: NOT VERY HARD

## 2022-03-29 NOTE — PROGRESS NOTES
Oropharynx is clear. Eyes:      Extraocular Movements: Extraocular movements intact. Conjunctiva/sclera: Conjunctivae normal.      Pupils: Pupils are equal, round, and reactive to light. Cardiovascular:      Rate and Rhythm: Normal rate and regular rhythm. Pulses: Normal pulses. Heart sounds: Normal heart sounds. Pulmonary:      Effort: Pulmonary effort is normal.      Breath sounds: Normal breath sounds. Abdominal:      General: Bowel sounds are normal.      Palpations: Abdomen is soft. Tenderness: There is no right CVA tenderness or left CVA tenderness. Musculoskeletal:         General: Normal range of motion. Cervical back: Normal range of motion. Skin:     General: Skin is warm. Findings: No rash. Neurological:      General: No focal deficit present. Mental Status: She is alert and oriented to person, place, and time. Psychiatric:         Mood and Affect: Mood normal.         Thought Content: Thought content normal.                 ASSESSMENT/PLAN:  1. Urine frequency  Results for POC orders placed in visit on 03/29/22   POCT Urinalysis no Micro   Result Value Ref Range    Color, UA yellow     Clarity, UA cloudy     Glucose, UA POC neg     Bilirubin, UA neg     Ketones, UA neg     Spec Grav, UA 1.030     Blood, UA POC 3+     pH, UA 6.0     Protein, UA POC 3+     Urobilinogen, UA 0.2     Leukocytes, UA 2+     Nitrite, UA pos          - POCT Urinalysis no Micro    2. Acute cystitis without hematuria  I encouraged water intake and decreasing caffeine intake. - ciprofloxacin (CIPRO) 500 MG tablet; Take 1 tablet by mouth 2 times daily for 5 days  Dispense: 10 tablet; Refill: 0  - Culture, Urine  - phenazopyridine (PYRIDIUM) 200 MG tablet; Take 1 tablet by mouth 3 times daily as needed for Pain  Dispense: 6 tablet; Refill: 0        Return in about 4 weeks (around 4/26/2022) for wants to establish with [address] [place] [address].          An electronic signature was used to authenticate this note.     --TRUONG MartinC

## 2022-08-30 ENCOUNTER — OFFICE VISIT (OUTPATIENT)
Dept: PRIMARY CARE CLINIC | Age: 57
End: 2022-08-30
Payer: MEDICAID

## 2022-08-30 ENCOUNTER — HOSPITAL ENCOUNTER (OUTPATIENT)
Facility: HOSPITAL | Age: 57
Discharge: HOME | End: 2022-08-30
Payer: MEDICAID

## 2022-08-30 VITALS
WEIGHT: 107 LBS | SYSTOLIC BLOOD PRESSURE: 115 MMHG | BODY MASS INDEX: 17.19 KG/M2 | OXYGEN SATURATION: 98 % | HEART RATE: 71 BPM | TEMPERATURE: 98.9 F | DIASTOLIC BLOOD PRESSURE: 69 MMHG | HEIGHT: 66 IN

## 2022-08-30 DIAGNOSIS — M25.571 ACUTE RIGHT ANKLE PAIN: Primary | ICD-10-CM

## 2022-08-30 DIAGNOSIS — M25.571 ACUTE RIGHT ANKLE PAIN: ICD-10-CM

## 2022-08-30 PROCEDURE — 84550 ASSAY OF BLOOD/URIC ACID: CPT

## 2022-08-30 PROCEDURE — 86431 RHEUMATOID FACTOR QUANT: CPT

## 2022-08-30 PROCEDURE — 80053 COMPREHEN METABOLIC PANEL: CPT

## 2022-08-30 PROCEDURE — 99213 OFFICE O/P EST LOW 20 MIN: CPT | Performed by: PHYSICIAN ASSISTANT

## 2022-08-30 PROCEDURE — 85025 COMPLETE CBC W/AUTO DIFF WBC: CPT

## 2022-08-30 PROCEDURE — 85652 RBC SED RATE AUTOMATED: CPT

## 2022-08-30 RX ORDER — HYDROXYZINE HYDROCHLORIDE 25 MG/1
TABLET, FILM COATED ORAL
COMMUNITY
Start: 2022-05-23 | End: 2022-09-15 | Stop reason: SDUPTHER

## 2022-08-30 RX ORDER — METHYLPREDNISOLONE 4 MG/1
TABLET ORAL
Qty: 1 KIT | Refills: 0 | Status: SHIPPED | OUTPATIENT
Start: 2022-08-30 | End: 2022-09-05

## 2022-08-30 ASSESSMENT — ENCOUNTER SYMPTOMS
COUGH: 0
DIARRHEA: 0
ABDOMINAL PAIN: 0
EYE PAIN: 0
CONSTIPATION: 0
SHORTNESS OF BREATH: 0
SORE THROAT: 0

## 2022-08-30 NOTE — PROGRESS NOTES
Marcin Holley (:  1965) is a 61 y.o. female,Established patient, here for evaluation of the following chief complaint(s):  Leg Pain (Right/), Foot Pain (right), and Foot Swelling (right)           Subjective   SUBJECTIVE/OBJECTIVE:  HPI  Ms. Kings Pichardo is a 61year old female with PMH HTN and chronic left leg pain from frost bite (followed by pain management) who presents with acute right leg pain. It started 2 days ago and starts at her foot and radiated up to her hip. She denies any injury, trauma or falls. She does have a history of pseudogout in b/l knees. She has had 11 or 12 knee surgeries; mostly from injuries from softball. Review of Systems   Constitutional:  Negative for chills, fatigue and fever. HENT:  Negative for congestion, ear pain, nosebleeds and sore throat. Eyes:  Negative for pain and visual disturbance. Respiratory:  Negative for cough and shortness of breath. Cardiovascular:  Negative for chest pain and palpitations. Gastrointestinal:  Negative for abdominal pain, constipation and diarrhea. Genitourinary:  Negative for difficulty urinating and flank pain. Musculoskeletal:  Positive for arthralgias and joint swelling (right foot). Negative for gait problem and myalgias. Skin:  Negative for rash. Neurological:  Negative for syncope, light-headedness and headaches. Psychiatric/Behavioral:  Negative for behavioral problems, confusion and dysphoric mood. Objective     Vitals:    22 1535   BP: 115/69   Site: Left Upper Arm   Position: Sitting   Pulse: 71   Temp: 98.9 °F (37.2 °C)   TempSrc: Temporal   SpO2: 98%   Weight: 107 lb (48.5 kg)   Height: 5' 6\" (1.676 m)     Physical Exam  Vitals reviewed. Constitutional:       Appearance: Normal appearance. HENT:      Head: Normocephalic and atraumatic.       Right Ear: Tympanic membrane, ear canal and external ear normal.      Left Ear: Tympanic membrane, ear canal and external ear normal.      Nose: Nose normal.      Mouth/Throat:      Mouth: Mucous membranes are moist.      Pharynx: Oropharynx is clear. Eyes:      Extraocular Movements: Extraocular movements intact. Conjunctiva/sclera: Conjunctivae normal.      Pupils: Pupils are equal, round, and reactive to light. Neck:      Vascular: No carotid bruit. Cardiovascular:      Rate and Rhythm: Normal rate and regular rhythm. Pulses: Normal pulses. Heart sounds: Normal heart sounds. Pulmonary:      Effort: Pulmonary effort is normal.      Breath sounds: Normal breath sounds. Abdominal:      General: Bowel sounds are normal.      Palpations: Abdomen is soft. Musculoskeletal:         General: Swelling and tenderness (pain right inner ankle with palpation and plantar flexion) present. Normal range of motion. Cervical back: Normal range of motion. Skin:     General: Skin is warm. Findings: No rash. Neurological:      General: No focal deficit present. Mental Status: She is alert and oriented to person, place, and time. Psychiatric:         Mood and Affect: Mood normal.         Thought Content: Thought content normal.               ASSESSMENT/PLAN:  1. Acute right ankle pain  RO Pseudogout  Lab workup and will treat with NSAID Diclofenac and Medrol dose pack  She is followed by Dr. Matt Lopez office for her knees on gabapentin and pain management on oxycodone      Return if symptoms worsen or fail to improve. An electronic signature was used to authenticate this note.     --Susi Brito PA-C

## 2022-08-30 NOTE — PROGRESS NOTES
Chief Complaint   Patient presents with    Leg Pain     Right      Foot Pain     right    Foot Swelling     right     Hx of knee replacement x2 on this leg and Staph infection    Have you seen another provider for this condition recently- No    Have you had any recent diagnostic testing for this condition- No    Do you currently take any medications for this condition- No

## 2022-08-31 LAB
A/G RATIO: 2 (ref 0.8–2)
ALBUMIN SERPL-MCNC: 4.7 G/DL (ref 3.4–4.8)
ALP BLD-CCNC: 106 U/L (ref 25–100)
ALT SERPL-CCNC: 9 U/L (ref 4–36)
ANION GAP SERPL CALCULATED.3IONS-SCNC: 12 MMOL/L (ref 3–16)
AST SERPL-CCNC: 17 U/L (ref 8–33)
BASOPHILS ABSOLUTE: 0.1 K/UL (ref 0–0.1)
BASOPHILS RELATIVE PERCENT: 1.1 %
BILIRUB SERPL-MCNC: 0.3 MG/DL (ref 0.3–1.2)
BUN BLDV-MCNC: 15 MG/DL (ref 6–20)
CALCIUM SERPL-MCNC: 9 MG/DL (ref 8.5–10.5)
CHLORIDE BLD-SCNC: 100 MMOL/L (ref 98–107)
CO2: 28 MMOL/L (ref 20–30)
CREAT SERPL-MCNC: 1.1 MG/DL (ref 0.4–1.2)
EOSINOPHILS ABSOLUTE: 0.1 K/UL (ref 0–0.4)
EOSINOPHILS RELATIVE PERCENT: 1 %
GFR AFRICAN AMERICAN: >59
GFR NON-AFRICAN AMERICAN: 51
GLOBULIN: 2.4 G/DL
GLUCOSE BLD-MCNC: 88 MG/DL (ref 74–106)
HCT VFR BLD CALC: 43 % (ref 37–47)
HEMOGLOBIN: 13.9 G/DL (ref 11.5–16.5)
IMMATURE GRANULOCYTES #: 0 K/UL
IMMATURE GRANULOCYTES %: 0.2 % (ref 0–5)
LYMPHOCYTES ABSOLUTE: 2.7 K/UL (ref 1.5–4)
LYMPHOCYTES RELATIVE PERCENT: 26.9 %
MCH RBC QN AUTO: 29.9 PG (ref 27–32)
MCHC RBC AUTO-ENTMCNC: 32.3 G/DL (ref 31–35)
MCV RBC AUTO: 92.5 FL (ref 80–100)
MONOCYTES ABSOLUTE: 0.5 K/UL (ref 0.2–0.8)
MONOCYTES RELATIVE PERCENT: 5.4 %
NEUTROPHILS ABSOLUTE: 6.5 K/UL (ref 2–7.5)
NEUTROPHILS RELATIVE PERCENT: 65.4 %
PDW BLD-RTO: 13.1 % (ref 11–16)
PLATELET # BLD: 197 K/UL (ref 150–400)
PMV BLD AUTO: 11 FL (ref 6–10)
POTASSIUM SERPL-SCNC: 3.8 MMOL/L (ref 3.4–5.1)
RBC # BLD: 4.65 M/UL (ref 3.8–5.8)
RHEUMATOID FACTOR: <10 IU/ML
SEDIMENTATION RATE, ERYTHROCYTE: 6 MM/HR (ref 0–30)
SODIUM BLD-SCNC: 140 MMOL/L (ref 136–145)
TOTAL PROTEIN: 7.1 G/DL (ref 6.4–8.3)
URIC ACID, SERUM: 6 MG/DL (ref 2.5–7.1)
WBC # BLD: 9.9 K/UL (ref 4–11)

## 2022-09-01 ENCOUNTER — TELEPHONE (OUTPATIENT)
Dept: PRIMARY CARE CLINIC | Age: 57
End: 2022-09-01

## 2022-09-01 NOTE — TELEPHONE ENCOUNTER
----- Message from Pete Alejo PA-C sent at 9/1/2022  8:39 AM EDT -----  Please notify the patient that their results are abnormal.   Labs stable other than kidney function is decreased-recommend she only do Diclofenac PRN

## 2022-09-15 RX ORDER — HYDROXYZINE HYDROCHLORIDE 25 MG/1
TABLET, FILM COATED ORAL
Qty: 90 TABLET | Refills: 0 | Status: SHIPPED | OUTPATIENT
Start: 2022-09-15

## 2022-10-04 ENCOUNTER — OFFICE VISIT (OUTPATIENT)
Dept: PRIMARY CARE CLINIC | Age: 57
End: 2022-10-04
Payer: MEDICAID

## 2022-10-04 VITALS
TEMPERATURE: 97.6 F | RESPIRATION RATE: 16 BRPM | WEIGHT: 104.8 LBS | OXYGEN SATURATION: 99 % | SYSTOLIC BLOOD PRESSURE: 160 MMHG | HEART RATE: 89 BPM | DIASTOLIC BLOOD PRESSURE: 96 MMHG | BODY MASS INDEX: 16.84 KG/M2 | HEIGHT: 66 IN

## 2022-10-04 DIAGNOSIS — M25.562 CHRONIC PAIN OF LEFT KNEE: ICD-10-CM

## 2022-10-04 DIAGNOSIS — G89.4 CHRONIC PAIN SYNDROME: Primary | ICD-10-CM

## 2022-10-04 DIAGNOSIS — G89.29 CHRONIC PAIN OF LEFT KNEE: ICD-10-CM

## 2022-10-04 DIAGNOSIS — F41.0 PANIC: ICD-10-CM

## 2022-10-04 PROCEDURE — 99214 OFFICE O/P EST MOD 30 MIN: CPT | Performed by: NURSE PRACTITIONER

## 2022-10-04 RX ORDER — DULOXETIN HYDROCHLORIDE 30 MG/1
30 CAPSULE, DELAYED RELEASE ORAL DAILY
Qty: 30 CAPSULE | Refills: 3 | Status: SHIPPED | OUTPATIENT
Start: 2022-10-04

## 2022-10-04 RX ORDER — TRAMADOL HYDROCHLORIDE 50 MG/1
50 TABLET ORAL EVERY 4 HOURS PRN
Qty: 42 TABLET | Refills: 0 | Status: SHIPPED | OUTPATIENT
Start: 2022-10-04 | End: 2022-10-11

## 2022-10-04 RX ORDER — ALPRAZOLAM 0.5 MG/1
0.5 TABLET ORAL 2 TIMES DAILY PRN
Qty: 20 TABLET | Refills: 0 | Status: SHIPPED | OUTPATIENT
Start: 2022-10-04 | End: 2022-10-25

## 2022-10-04 ASSESSMENT — ENCOUNTER SYMPTOMS
RESPIRATORY NEGATIVE: 1
EYES NEGATIVE: 1
GASTROINTESTINAL NEGATIVE: 1
ALLERGIC/IMMUNOLOGIC NEGATIVE: 1

## 2022-10-04 NOTE — PROGRESS NOTES
Chief Complaint   Patient presents with    [facility]    Hypertension    Knee Pain       Have you seen any other physician or provider since your last visit yes - Laisha     Have you had any other diagnostic tests since your last visit? yes - labs    Have you changed or stopped any medications since your last visit? no        SUBJECTIVE:    Patient ID:Liliana Burgos is a 61 y.o. female. Chief Complaint   Patient presents with    [facility]    Hypertension    Knee Pain     HPI:  Patient is here to [facility]. Patient has had hypertension for several years. She has been compliant with taking medications, without side effects from it. She has been following a low-sodium, is active and never exercises. Weight is decreasing steadily, compared to last visit. Her blood pressure is elevated 160/106 at this time. She did     Patient has a history of pseudogout. She is scheduled for a left knee replacement at [facility] 12/08/2022. She has been having panic attacks. She got let go from Pain clinic due to having Suboxone in her system. She has been not doing well since. She has been withdrawing from Oxycodone. She complains of left eye twitch. She was going to [facility]. She was taking 4 15 mg Oxycodone a day. She was given 3 15 mg Oxycodone tid for one month then discharge. She had Incision and drainage from her left knee form staph infection. He has had chronic knee pain form knee replacement. She is going to have knee replacement with [facility] December 1. At [facility]. She does go to [facility] on 17 of October. Hydrocodone 10 tid from [facility]. Patient's medications, allergies, past medical, surgical, social and family histories were reviewed and updated as appropriate. Review of Systems   Constitutional: Negative. HENT: Negative. Eyes: Negative. Respiratory: Negative. Cardiovascular: Negative. Gastrointestinal: Negative. Endocrine: Negative. Genitourinary: Negative. Musculoskeletal:  Positive for arthralgias. Skin: Negative. Allergic/Immunologic: Negative. Neurological: Negative. Hematological: Negative. Psychiatric/Behavioral: Negative. OBJECTIVE:  BP (!) 160/96 (Site: Left Upper Arm, Position: Sitting, Cuff Size: Small Adult)   Pulse 89   Temp 97.6 °F (36.4 °C) (Temporal)   Resp 16   Ht 5' 6\" (1.676 m)   Wt 104 lb 12.8 oz (47.5 kg)   LMP 04/25/2011   SpO2 99% Comment: room air  BMI 16.92 kg/m²    Physical Exam  Vitals and nursing note reviewed. Constitutional:       Appearance: She is well-developed. HENT:      Head: Normocephalic and atraumatic. Eyes:      Conjunctiva/sclera: Conjunctivae normal.      Pupils: Pupils are equal, round, and reactive to light. Neck:      Thyroid: No thyromegaly. Vascular: No JVD. Cardiovascular:      Rate and Rhythm: Normal rate and regular rhythm. Heart sounds: No murmur heard. No friction rub. No gallop. Pulmonary:      Effort: Pulmonary effort is normal. No respiratory distress. Breath sounds: Normal breath sounds. No wheezing or rales. Abdominal:      General: Bowel sounds are normal. There is no distension. Palpations: Abdomen is soft. Tenderness: There is no abdominal tenderness. There is no guarding. Musculoskeletal:      Cervical back: Normal range of motion and neck supple. Left knee: Decreased range of motion. Tenderness present. Skin:     General: Skin is warm and dry. Findings: No rash. Neurological:      Mental Status: She is alert and oriented to person, place, and time. Psychiatric:         Judgment: Judgment normal.       No results found for requested labs within last 30 days.        Hemoglobin A1C (%)   Date Value   03/12/2014 5.2     Microscopic Examination (no units)   Date Value   12/31/2016 YES     LDL Calculated (mg/dL)   Date Value   02/03/2021 111         Lab Results   Component Value Date/Time    WBC 9.9 08/30/2022 04:05 PM    NEUTROABS 6.5 08/30/2022 04:05 PM    HGB 13.9 08/30/2022 04:05 PM    HCT 43.0 08/30/2022 04:05 PM    MCV 92.5 08/30/2022 04:05 PM     08/30/2022 04:05 PM       Lab Results   Component Value Date    TSH 1.49 02/03/2021         ASSESSMENT/PLAN:     1. Chronic pain syndrome    - DULoxetine (CYMBALTA) 30 MG extended release capsule; Take 1 capsule by mouth daily  Dispense: 30 capsule; Refill: 3    2. Panic    - ALPRAZolam (XANAX) 0.5 MG tablet; Take 1 tablet by mouth 2 times daily as needed for Anxiety for up to 10 days. Dispense: 20 tablet; Refill: 0    3. Chronic pain of left knee  - traMADol (ULTRAM) 50 MG tablet; Take 1 tablet by mouth every 4 hours as needed for Pain for up to 7 days. Intended supply: 7 days. Take lowest dose possible to manage pain  Dispense: 42 tablet;  Refill: 0     Written by Adeline COLON, acting as a scribe for Esarod Esbon on 10/4/2022 at 9:05 PM.

## 2022-10-04 NOTE — PATIENT INSTRUCTIONS
Keep a list of your medicines with you. List all of the prescription medicines, nonprescription medicines, supplements, natural remedies, and vitamins that you take. Tell your healthcare providers who treat you about all of the products you are taking. Your provider can provide you with a form to keep track of them. Just ask. Follow the directions that come with your medicine, including information about food or alcohol. Make sure you know how and when to take your medicine. Do not take more or less than you are supposed to take. Keep all medicines out of the reach of children. Store medicines according to the directions on the label. Monitor yourself. Learn to know how your body reacts to your new medicine and keep track of how it makes you feel before attempting (If your provider has allowed you to do so) to drive or go to work. Seek emergency medical attention if you think you have used too much of this medicine. An overdose of any prescription medicine can be fatal. Overdose symptoms may include extreme drowsiness, muscle weakness, confusion, cold and clammy skin, pinpoint pupils, shallow breathing, slow heart rate, fainting, or coma. Don't share prescription medicines with others, even when they seem to have the same symptoms. What may be good for you may be harmful to others. If you are no longer taking a prescribed medication and you have pills left please take your pills out of their original containers. Mix crushed pills with an undesirable substance, such as cat litter or used coffee grounds. Put the mixture into a disposable container with a lid, such as an empty margarine tub, or into a sealable bag. Cover up or remove any of your personal information on the empty containers by covering it with black permanent marker or duct tape. Place the sealed container with the mixture, and the empty drug containers, in the trash.    If you use a medication that is in the form of a patch, dispose of used patches by folding them in half so that the sticky sides meet, and then flushing them down a toilet. They should not be placed in the household trash where children or pets can find them. If you have any questions, ask your provider or pharmacist for more information. Be sure to keep all appointments for provider visits or tests. We are committed to providing you with the best care possible. In order to help us achieve these goals please remember to bring all medications, herbal products, and over the counter supplements with you to each visit. If your provider has ordered testing for you, please be sure to follow up with our office if you have not received results within 7 days after the testing took place. *If you receive a survey after visiting one of our offices, please take time to share your experience concerning your physician office visit. These surveys are confidential and no health information about you is shared. We are eager to improve for you and we are counting on your feedback to help make that happen. ips to Help You Stop Smoking       Cigarette smoking is a preventable cause of death in the [place]. If you have thought about quitting but haven't been able to, here are some reasons why you should and some ways to do it. Here's Why   Quitting smoking now can decrease your risk of getting smoking-related illnesses like:   Heart disease   Stroke   Several types of cancer, including:   Lung   Mouth   Esophagus   Larynx   Bladder   Pancreas   Kidney   Chronic lung diseases:   Bronchitis   Emphysema   Asthma   Cataracts   Macular degeneration   Thyroid conditions   Hearing loss   Erectile dysfunction   Dementia   Osteoporosis   Here's How   Once you've decided to quit smoking, set your target quit date a few weeks away.  In the time leading up to your quit day, try some of these ideas offered by the [address] [place] [address] of the [employer] to help you successfully quit smoking. For the best results, work with your doctor. Together, you can test your lung function and compare the results to those of a nonsmoking person. The results can be given to you as your lung age. Finding out your lung age right after having the test done may help you to stop smoking. Your doctor can also discuss with you all of your options and refer you to smoking-cessation support groups. You may wish to use nicotine replacement (gum, patches, inhaler) or one of the prescription medications that have been shown to increase quit rates and prolong abstinence from smoking. But whatever you and your doctor decide on these matters, it will still be you who decides when an how to quit. Here are some techniques:   Switch Brands   Switch to a brand you find distasteful. Change to a brand that is low in tar and nicotine a couple of weeks before your target quit date. This will help change your smoking behavior. However, do not smoke more cigarettes, inhale them more often or more deeply, or place your fingertips over the holes in the filters. All of these actions will increase your nicotine intake, and the idea is to get your body used to functioning without nicotine. Cut Down the Number of Cigarettes You Smoke   Smoke only half of each cigarette. Each day, postpone the lighting of your first cigarette by one hour. Decide you'll only smoke during odd or even hours of the day. Decide beforehand how many cigarettes you'll smoke during the day. For each additional cigarette, give a dollar to your favorite cris. Change your eating habits to help you cut down. For example, drink milk, which many people consider incompatible with smoking. End meals or snacks with something that won't lead to a cigarette. Reach for a glass of juice instead of a cigarette for a \"pick-me-up. \"   Remember: Cutting down can help you quit, but it's not a substitute for quitting.  If you're down to about seven cigarettes a day, it's time to set your target quit date, and get ready to stick to it. Don't Smoke \"Automatically\"   Smoke only those cigarettes you really want. Catch yourself before you light up a cigarette out of pure habit. Don't empty your ashtrays. This will remind you of how many cigarettes you've smoked each day, and the sight and the smell of stale cigarettes butts will be very unpleasant. Make yourself aware of each cigarette by using the opposite hand or putting cigarettes in an unfamiliar location or a different pocket to break the automatic reach. If you light up many times during the day without even thinking about it, try to look in a mirror each time you put a match to your cigarette. You may decide you don't need it. Make Smoking Inconvenient   Stop buying cigarettes by the carton. Wait until one pack is empty before you buy another. Stop carrying cigarettes with you at home or at work. Make them difficult to get to. Make Smoking Unpleasant   Smoke only under circumstances that aren't especially pleasurable for you. If you like to smoke with others, smoke alone. Turn your chair to an empty corner and focus only on the cigarette you are smoking and all its many negative effects. Collect all your cigarette butts in one large glass container as a visual reminder of the filth made by smoking. Just Before Quitting   Practice going without cigarettes. Don't think of never smoking again. Think of quitting in terms of one day at a time . Tell yourself you won't smoke today, and then don't. Clean your clothes to rid them of the cigarette smell, which can linger a long time. On the Day You Quit   Throw away all your cigarettes and matches. Hide your lighters and ashtrays. Visit the dentist and have your teeth cleaned to get rid of tobacco stains. Notice how nice they look and resolve to keep them that way.    Make a list of things you'd like to buy for yourself or someone your [employer] Access Code exactly as it appears below. You will not need to use this code after youve completed the sign-up process. If you do not sign up before the expiration date, you must request a new code. [employer] Access Code: [email or web address]  Expires: 10/14/2022  4:04 PM    Enter your Social Security Number ([id number]) and Date of Birth (mm/dd/yyyy) as indicated and click Submit. You will be taken to the next sign-up page. Create a [employer] ID. This will be your [employer] login ID and cannot be changed, so think of one that is secure and easy to remember. Create a [employer] password. You can change your password at any time. Enter your Password Reset Question and Answer. This can be used at a later time if you forget your password. Enter your e-mail address. You will receive e-mail notification when new information is available in [address] Click Sign Up. You can now view your medical record. Additional Information  If you have questions, please contact your physician practice where you receive care. Remember, [employer] is NOT to be used for urgent needs. For medical emergencies, dial 911.

## 2022-10-10 DIAGNOSIS — G62.9 NEUROPATHY: Primary | ICD-10-CM

## 2022-10-10 RX ORDER — GABAPENTIN 800 MG/1
TABLET ORAL
Qty: 90 TABLET | Refills: 0 | OUTPATIENT
Start: 2022-10-10

## 2022-10-10 NOTE — TELEPHONE ENCOUNTER
Pt called requesting refill on her Gabapentin (last filled on 9/9/22 by Dr Benjamin Tijerina 800mg  #90  Nataly Simon 10/10/2022  Appt 11/1/22

## 2022-10-14 RX ORDER — GABAPENTIN 800 MG/1
TABLET ORAL
Qty: 90 TABLET | Refills: 0 | OUTPATIENT
Start: 2022-10-14 | End: 2022-11-09

## 2022-10-20 ENCOUNTER — OFFICE VISIT (OUTPATIENT)
Dept: PRIMARY CARE CLINIC | Age: 57
End: 2022-10-20
Payer: MEDICAID

## 2022-10-20 VITALS — SYSTOLIC BLOOD PRESSURE: 170 MMHG | DIASTOLIC BLOOD PRESSURE: 100 MMHG

## 2022-10-20 DIAGNOSIS — I10 PRIMARY HYPERTENSION: Primary | ICD-10-CM

## 2022-10-20 PROCEDURE — 99213 OFFICE O/P EST LOW 20 MIN: CPT | Performed by: NURSE PRACTITIONER

## 2022-10-20 RX ORDER — AMLODIPINE BESYLATE 5 MG/1
5 TABLET ORAL EVERY EVENING
Qty: 30 TABLET | Refills: 1 | Status: SHIPPED | OUTPATIENT
Start: 2022-10-20

## 2022-10-20 NOTE — PROGRESS NOTES
SUBJECTIVE:    Patient ID: Jeffrey Ontiveros is a 61 y. o.female. Chief Complaint   Patient presents with    Hypertension    Blood Pressure Check         HPI:      Pt presented to clinic for BP check because was having readings  at home and headache. Hx HTN. On lisinopril 20mg and reports she only takes 10mg and always has. Headache 1 month. Had PCP f/u 2 weeks ago and SBP 160s and was advised to take lisinopril as directed. No CP, SOB, swelling, vision changes or syncope. Brought in her BP cuff and measuring about 10-15 off. Patient's medications, allergies, past medical, surgical, social and family histories were reviewed and updated as appropriate in electronic medical record. Outpatient Medications Marked as Taking for the 10/20/22 encounter (Office Visit) with CHARLES Khoury - CNP   Medication Sig Dispense Refill    DULoxetine (CYMBALTA) 30 MG extended release capsule Take 1 capsule by mouth daily 30 capsule 3    hydrOXYzine HCl (ATARAX) 25 MG tablet TAKE 1 TABLET BY MOUTH 3 TIMES A DAY AS NEEDED FOR 30 DAYS. 90 tablet 0    diclofenac sodium (VOLTAREN) 1 % GEL Place onto the skin 2 times daily      gabapentin (NEURONTIN) 800 MG tablet TAKE ONE TABLET BY MOUTH THREE TIMES A DAY AS NEEDED FOR 30 DAYS      ibuprofen-famotidine 800-26.6 MG TABS       methocarbamol (ROBAXIN) 750 MG tablet TAKE ONE TABLET 4 TIMES A DAY      lisinopril (PRINIVIL;ZESTRIL) 20 MG tablet Take 1 tablet by mouth daily (Patient taking differently: Take 10 mg by mouth daily) 30 tablet 0        Review of Systems   Neurological:  Positive for headaches. All other systems reviewed and are negative.     Past Medical History:   Diagnosis Date    Anxiety     Chronic knee pain     Depression     Hypertension     Hypocalcemia     Hypomagnesemia     Neuropathy     left leg and foot    Substance abuse ([facility] [address]     Thyroid disease      Past Surgical History:   Procedure Laterality Date    ARM SURGERY      CHOLECYSTECTOMY HYSTERECTOMY (CERVIX STATUS UNKNOWN)      KNEE SURGERY      4 left . 2 right    PARATHYROID GLAND SURGERY      PARATHYROID GLAND SURGERY      TOTAL KNEE ARTHROPLASTY       Family History   Problem Relation Age of Onset    High Blood Pressure Mother     Heart Disease Mother     Cancer Father       Social History     Tobacco Use   Smoking Status Every Day    Packs/day: 0.50    Years: 30.00    Pack years: 15.00    Types: Cigarettes   Smokeless Tobacco Never       OBJECTIVE:   Wt Readings from Last 3 Encounters:   10/04/22 104 lb 12.8 oz (47.5 kg)   08/30/22 107 lb (48.5 kg)   03/29/22 118 lb (53.5 kg)     BP Readings from Last 3 Encounters:   10/20/22 (!) 170/100   10/04/22 (!) 160/96   08/30/22 115/69       BP (!) 170/100 (Site: Left Upper Arm, Position: Sitting, Cuff Size: Small Adult)   LMP 04/25/2011      Physical Exam  Vitals and nursing note reviewed. Constitutional:       General: She is not in acute distress. Appearance: Normal appearance. She is not diaphoretic. HENT:      Head: Normocephalic. Eyes:      Conjunctiva/sclera: Conjunctivae normal.   Cardiovascular:      Rate and Rhythm: Normal rate. Pulmonary:      Effort: Pulmonary effort is normal. No respiratory distress. Musculoskeletal:         General: Normal range of motion. Cervical back: Normal range of motion. Neurological:      General: No focal deficit present. Mental Status: She is alert and oriented to person, place, and time.       Coordination: Coordination normal.      Gait: Gait normal.   Psychiatric:         Mood and Affect: Mood normal.         Behavior: Behavior normal.       Lab Results   Component Value Date/Time     08/30/2022 04:05 PM    K 3.8 08/30/2022 04:05 PM     08/30/2022 04:05 PM    CO2 28 08/30/2022 04:05 PM    GLUCOSE 88 08/30/2022 04:05 PM    BUN 15 08/30/2022 04:05 PM    CREATININE 1.1 08/30/2022 04:05 PM    CALCIUM 9.0 08/30/2022 04:05 PM    PROT 7.1 08/30/2022 04:05 PM    LABALBU 4.7 08/30/2022 04:05 PM    BILITOT 0.3 08/30/2022 04:05 PM    ALT 9 08/30/2022 04:05 PM    AST 17 08/30/2022 04:05 PM       Hemoglobin A1C (%)   Date Value   03/12/2014 5.2     Microscopic Examination (no units)   Date Value   12/31/2016 YES     LDL Calculated (mg/dL)   Date Value   02/03/2021 111         Lab Results   Component Value Date/Time    WBC 9.9 08/30/2022 04:05 PM    NEUTROABS 6.5 08/30/2022 04:05 PM    HGB 13.9 08/30/2022 04:05 PM    HCT 43.0 08/30/2022 04:05 PM    MCV 92.5 08/30/2022 04:05 PM     08/30/2022 04:05 PM       Lab Results   Component Value Date    TSH 1.49 02/03/2021         ASSESSMENT/PLAN:     1. Primary hypertension  Cont lisinopril. Add norvasc. Keep BP log and f/u 1 week. Low sodium, cardiac diet. RTC sooner if needed. - amLODIPine (NORVASC) 5 MG tablet; Take 1 tablet by mouth every evening  Dispense: 30 tablet;  Refill: 1      Orders Placed This Encounter   Medications    amLODIPine (NORVASC) 5 MG tablet     Sig: Take 1 tablet by mouth every evening     Dispense:  30 tablet     Refill:  1

## 2022-10-20 NOTE — PROGRESS NOTES
Pt came in for bp check has been getting elevated readings per pt 200/110 to note one and noel Peñaloza was informed and had me put her on her schedule so she can address. Pt was informed will get phone call to be seen. Scheduled for:  Colonoscopy - 16521  Provider Name:  Dr. Juan Carlos Martinez  Date:  12/18/20 - per pt's request  Location:  [facility]  Sedation:  MAC  Time:  8:45 am (pt is aware to arrive at 7:45 am)  Prep:  Trilyte, Prep instructions were given to pt over the phone, pt

## 2022-10-24 ENCOUNTER — NURSE ONLY (OUTPATIENT)
Dept: PRIMARY CARE CLINIC | Age: 57
End: 2022-10-24

## 2022-10-24 VITALS — SYSTOLIC BLOOD PRESSURE: 159 MMHG | DIASTOLIC BLOOD PRESSURE: 102 MMHG

## 2022-10-24 RX ORDER — LISINOPRIL 20 MG/1
20 TABLET ORAL DAILY
Qty: 30 TABLET | Refills: 1 | Status: SHIPPED | OUTPATIENT
Start: 2022-10-24

## 2022-10-24 NOTE — PROGRESS NOTES
Added norvasc 5 last week and SBP 150s today. Taking 10mg lisinopril and prescribed 20. Advised to take the 20mg and will send in rx. Pt agreeable. Keep log. F/u 1 week or sooner if symptomatic. Pt agreeable.

## 2022-10-24 NOTE — PROGRESS NOTES
Chief Complaint   Patient presents with    Blood Pressure Check       Pt arrived at the clinic for a BP check. Automatic blood pressure was obtained. Spoke to [employer] regarding results and pt was advised. Pt has f/u with PCP in 1 week to discuss further.

## 2022-10-25 DIAGNOSIS — F41.0 PANIC: ICD-10-CM

## 2022-10-25 RX ORDER — ALPRAZOLAM 0.5 MG/1
TABLET ORAL
Qty: 20 TABLET | Refills: 0 | Status: SHIPPED | OUTPATIENT
Start: 2022-10-25 | End: 2022-11-24

## 2022-11-01 ENCOUNTER — OFFICE VISIT (OUTPATIENT)
Dept: PRIMARY CARE CLINIC | Age: 57
End: 2022-11-01
Payer: MEDICAID

## 2022-11-01 VITALS
OXYGEN SATURATION: 99 % | DIASTOLIC BLOOD PRESSURE: 82 MMHG | TEMPERATURE: 97.2 F | HEART RATE: 79 BPM | WEIGHT: 104.8 LBS | BODY MASS INDEX: 16.84 KG/M2 | SYSTOLIC BLOOD PRESSURE: 140 MMHG | HEIGHT: 66 IN | RESPIRATION RATE: 14 BRPM

## 2022-11-01 DIAGNOSIS — Z12.31 ENCOUNTER FOR SCREENING MAMMOGRAM FOR BREAST CANCER: ICD-10-CM

## 2022-11-01 DIAGNOSIS — G89.4 CHRONIC PAIN SYNDROME: ICD-10-CM

## 2022-11-01 DIAGNOSIS — I10 PRIMARY HYPERTENSION: Primary | ICD-10-CM

## 2022-11-01 DIAGNOSIS — G89.29 CHRONIC PAIN OF LEFT KNEE: ICD-10-CM

## 2022-11-01 DIAGNOSIS — M25.562 CHRONIC PAIN OF LEFT KNEE: ICD-10-CM

## 2022-11-01 PROCEDURE — 3078F DIAST BP <80 MM HG: CPT | Performed by: NURSE PRACTITIONER

## 2022-11-01 PROCEDURE — 3074F SYST BP LT 130 MM HG: CPT | Performed by: NURSE PRACTITIONER

## 2022-11-01 PROCEDURE — 99213 OFFICE O/P EST LOW 20 MIN: CPT | Performed by: NURSE PRACTITIONER

## 2022-11-01 RX ORDER — HYDROCODONE BITARTRATE AND ACETAMINOPHEN 10; 325 MG/1; MG/1
TABLET ORAL
COMMUNITY
Start: 2022-09-23

## 2022-11-01 RX ORDER — TRAMADOL HYDROCHLORIDE 50 MG/1
50 TABLET ORAL EVERY 6 HOURS PRN
COMMUNITY

## 2022-11-01 RX ORDER — LIDOCAINE 5 %
ADHESIVE PATCH, MEDICATED TOPICAL
COMMUNITY
Start: 2022-09-20

## 2022-11-01 RX ORDER — LISINOPRIL 30 MG/1
30 TABLET ORAL DAILY
Qty: 30 TABLET | Refills: 5 | Status: SHIPPED | OUTPATIENT
Start: 2022-11-01

## 2022-11-01 RX ORDER — HYDROCODONE BITARTRATE AND ACETAMINOPHEN 10; 325 MG/1; MG/1
1 TABLET ORAL 3 TIMES DAILY PRN
Qty: 42 TABLET | Refills: 0 | Status: SHIPPED | OUTPATIENT
Start: 2022-11-01 | End: 2022-11-15

## 2022-11-01 ASSESSMENT — ENCOUNTER SYMPTOMS
EYES NEGATIVE: 1
GASTROINTESTINAL NEGATIVE: 1
RESPIRATORY NEGATIVE: 1
ALLERGIC/IMMUNOLOGIC NEGATIVE: 1

## 2022-11-01 NOTE — PROGRESS NOTES
Chief Complaint   Patient presents with    Hypertension    Anxiety       Have you seen any other physician or provider since your last visit yes - Shanthi    Have you had any other diagnostic tests since your last visit? no    Have you changed or stopped any medications since your last visit? no     I have recommended that this patient have a sigmoidoscopy but she due to refusal reason: not comfortable with test   I have discussed the risks and benefits of this examination with her. The patient verbalizes understanding. Provider will be informed of refusal.      Diabetic retinal exam completed this year? No                       * If yes please have patient sign a records release to obtain record to update Health Maintenance                       * If no, please order referral for patient to be scheduled     SUBJECTIVE:    Patient Luis Otto is a 61 y.o. female. Chief Complaint   Patient presents with    Hypertension    Anxiety     HPI:  Patient has had hypertension for several years. She has been compliant with taking medications, without side effects from it. She has been following a low-sodium, is not active and never exercises. Weight is stable, compared to last visit. Her blood pressure is elevated 160/90 at this time. She has an appointment with pain clinic November 14      Patient's medications, allergies, past medical, surgical, social and family histories were reviewed and updated as appropriate. Review of Systems   Constitutional: Negative. HENT: Negative. Eyes: Negative. Respiratory: Negative. Cardiovascular: Negative. Gastrointestinal: Negative. Endocrine: Negative. Genitourinary: Negative. Musculoskeletal:  Positive for arthralgias. Skin: Negative. Allergic/Immunologic: Negative. Neurological:  Positive for headaches. Hematological: Negative. Psychiatric/Behavioral: Negative.        OBJECTIVE:  BP (!) 140/82 (Site: Right Upper Arm, Position: Sitting, Cuff Size: Small Adult)   Pulse 79   Temp 97.2 °F (36.2 °C) (Temporal)   Resp 14   Ht 5' 6\" (1.676 m)   Wt 104 lb 12.8 oz (47.5 kg)   LMP 04/25/2011   SpO2 99% Comment: room air  BMI 16.92 kg/m²    Physical Exam  Vitals and nursing note reviewed. Constitutional:       Appearance: She is well-developed. HENT:      Head: Normocephalic and atraumatic. Eyes:      Conjunctiva/sclera: Conjunctivae normal.      Pupils: Pupils are equal, round, and reactive to light. Neck:      Thyroid: No thyromegaly. Vascular: No JVD. Cardiovascular:      Rate and Rhythm: Normal rate and regular rhythm. Heart sounds: No murmur heard. No friction rub. No gallop. Pulmonary:      Effort: Pulmonary effort is normal. No respiratory distress. Breath sounds: Normal breath sounds. No wheezing or rales. Abdominal:      General: Bowel sounds are normal. There is no distension. Palpations: Abdomen is soft. Tenderness: There is no abdominal tenderness. There is no guarding. Musculoskeletal:      Cervical back: Normal range of motion and neck supple. Lumbar back: Spasms and tenderness present. Decreased range of motion. Left knee: Tenderness present over the medial joint line. Skin:     General: Skin is warm and dry. Findings: No rash. Neurological:      Mental Status: She is alert and oriented to person, place, and time. Psychiatric:         Judgment: Judgment normal.       No results found for requested labs within last 30 days.        Hemoglobin A1C (%)   Date Value   03/12/2014 5.2     Microscopic Examination (no units)   Date Value   12/31/2016 YES     LDL Calculated (mg/dL)   Date Value   02/03/2021 111         Lab Results   Component Value Date/Time    WBC 9.9 08/30/2022 04:05 PM    NEUTROABS 6.5 08/30/2022 04:05 PM    HGB 13.9 08/30/2022 04:05 PM    HCT 43.0 08/30/2022 04:05 PM    MCV 92.5 08/30/2022 04:05 PM     08/30/2022 04:05 PM       Lab Results   Component Value Date    TSH 1.49 02/03/2021         ASSESSMENT/PLAN:     1. Primary hypertension  Increase dose and monitor   - lisinopril (PRINIVIL;ZESTRIL) 30 MG tablet; Take 1 tablet by mouth daily  Dispense: 30 tablet; Refill: 5    2. Chronic pain syndrome  Will prescribe medication until pain clinic appt on the 14 th.   - HYDROcodone-acetaminophen ([address])  MG per tablet; Take 1 tablet by mouth 3 times daily as needed for Pain for up to 14 days. Dispense: 42 tablet; Refill: 0    3. Chronic pain of left knee    - HYDROcodone-acetaminophen (NORCO)  MG per tablet; Take 1 tablet by mouth 3 times daily as needed for Pain for up to 14 days. Dispense: 42 tablet; Refill: 0    4.  Encounter for screening mammogram for breast cancer    - MORENO Digital Screen Bilateral; Future       Written by Arabella COLON, acting as a scribe for Iraj Ramos on 11/1/2022 at 1:57 PM.

## 2022-11-10 ENCOUNTER — HOSPITAL ENCOUNTER (OUTPATIENT)
Dept: MAMMOGRAPHY | Facility: HOSPITAL | Age: 57
Discharge: HOME | End: 2022-11-10
Payer: MEDICAID

## 2022-11-10 VITALS — BODY MASS INDEX: 16.79 KG/M2 | WEIGHT: 104 LBS

## 2022-11-10 DIAGNOSIS — Z12.31 ENCOUNTER FOR SCREENING MAMMOGRAM FOR BREAST CANCER: ICD-10-CM

## 2022-11-10 PROCEDURE — 77067 SCR MAMMO BI INCL CAD: CPT

## 2022-11-15 DIAGNOSIS — I10 PRIMARY HYPERTENSION: ICD-10-CM

## 2022-11-15 RX ORDER — AMLODIPINE BESYLATE 5 MG/1
5 TABLET ORAL EVERY EVENING
Qty: 30 TABLET | Refills: 1 | Status: SHIPPED | OUTPATIENT
Start: 2022-11-15

## 2022-11-15 RX ORDER — HYDROXYZINE HYDROCHLORIDE 25 MG/1
TABLET, FILM COATED ORAL
Qty: 90 TABLET | Refills: 0 | Status: SHIPPED | OUTPATIENT
Start: 2022-11-15

## 2022-11-23 NOTE — TELEPHONE ENCOUNTER
Elsa,    Agree and work her in tomorrow.  Are we here or in [place]?    Danny       SUBJECTIVE: Pt seen and examined on morning rounds. No current complaints   Denies CP, SOB, N/V, new onset motor/sensory deficits    Vital Signs Last 24 Hrs  T(C): 37.3 (2022 20:34), Max: 37.4 (2022 00:28)  T(F): 99.2 (2022 20:34), Max: 99.4 (2022 00:28)  HR: 76 (2022 20:34) (64 - 76)  BP: 147/71 (2022 20:34) (128/69 - 158/72)  BP(mean): --  RR: 18 (2022 20:34) (18 - 18)  SpO2: 95% (2022 20:34) (95% - 97%)    Parameters below as of 2022 20:34  Patient On (Oxygen Delivery Method): room air        Physical Exam:  General: NAD, resting comfortably in bed  R knee   incision well healing with benign scabbing, + shannon-incisional erythema but reduced compared to on admission  silt sural/saph/dpn/spn/tib   5/5 ehl/fhl/ta/gs   LABS/RADIOLOGY RESULTS:                          8.3    13.82 )-----------( 339      ( 2022 05:30 )             25.0   11-23    135  |  99  |  48<H>  ----------------------------<  108<H>  3.4<L>   |  23  |  2.99<H>    Ca    9.1      2022 05:30  Mg     2.2     11-    TPro  6.7  /  Alb  3.0<L>  /  TBili  0.3  /  DBili  x   /  AST  37  /  ALT  41  /  AlkPhos  103  11-22  Blood Cultures    Urinalysis Basic - ( 2022 09:53 )    Color: Yellow / Appearance: Clear / S.010 / pH:   Gluc:  / Ketone: NEGATIVE  / Bili: Negative / Urobili: 0.2 E.U./dL   Blood:  / Protein: Trace mg/dL / Nitrite: NEGATIVE   Leuk Esterase: NEGATIVE / RBC: < 5 /HPF / WBC < 5 /HPF   Sq Epi:  / Non Sq Epi: 5-10 /HPF / Bacteria: Present /HPF        A/P: 79yMale s/p right TKR 10/20 with post-op course complicated by RLE cellulitis, REMIGIO and was discharged home with PICC and IV vancomycin and cefepime. Pt course further complicated by homecare giving vancomycin 1g IV bid instead of q24 which caused kidney injury. Abx were temporarily stopped. Pt was switched to daptomycin in place of vancomycin yesterday, and developed fever T max of 101.0 and presented to ED for assessment. Blood and knee cultures were drawn.  - afebrile  o/n, nontoxic appearing - q3d esr/crp -> next one   - Pain Control  - DVT ppx: holding lvx 90 qd for bronch   - PT, WBS: WBAT  - ID consulted- hold all abx   - f/u nephro and ID recs - cr stable 2.9 from 2.9   - BPs stable without losartan, seen by cardiology Dr. Romo last admission, consulted for co-management this admission, to see Monday  - continue to follow-up internal medicine recs

## 2022-11-28 ENCOUNTER — HOSPITAL ENCOUNTER (OUTPATIENT)
Facility: HOSPITAL | Age: 57
Discharge: HOME | End: 2022-11-28
Payer: MEDICAID

## 2022-11-28 PROCEDURE — U0003 INFECTIOUS AGENT DETECTION BY NUCLEIC ACID (DNA OR RNA); SEVERE ACUTE RESPIRATORY SYNDROME CORONAVIRUS 2 (SARS-COV-2) (CORONAVIRUS DISEASE [COVID-19]), AMPLIFIED PROBE TECHNIQUE, MAKING USE OF HIGH THROUGHPUT TECHNOLOGIES AS DESCRIBED BY CMS-2020-01-R: HCPCS

## 2022-11-28 PROCEDURE — U0005 INFEC AGEN DETEC AMPLI PROBE: HCPCS

## 2022-11-29 LAB — SARS-COV-2, PCR: NOT DETECTED

## 2022-12-08 ENCOUNTER — HOSPITAL ENCOUNTER (OUTPATIENT)
Dept: PHYSICAL THERAPY | Facility: HOSPITAL | Age: 57
Setting detail: THERAPIES SERIES
Discharge: HOME | End: 2022-12-08
Payer: MEDICAID

## 2022-12-08 PROCEDURE — 97161 PT EVAL LOW COMPLEX 20 MIN: CPT

## 2022-12-08 PROCEDURE — 97110 THERAPEUTIC EXERCISES: CPT

## 2022-12-08 ASSESSMENT — PAIN SCALES - GENERAL: PAINLEVEL_OUTOF10: 4

## 2022-12-08 NOTE — PROGRESS NOTES
Physical Therapy: Initial Evaluation    Patient: Damian Avila (21 y.o. female)   Examination Date:   Plan of Care Certification Period: 2022 to 23      :  1965 ;    Confirmed: Yes MRN: [id number]  CSN: [id number]   Insurance: Payor: Jose Misael Munozzane / Plan: Charlet Schlatter / Product Type: *No Product type* /   Insurance ID: [id number] - (Medicaid Managed) Secondary Insurance (if applicable):    Referring Physician: MD Dr. Sonal Hammer   PCP: CHARLES Phillips Visits to Date/Visits Approved:   /      No Show/Cancelled Appts:   /       Medical Diagnosis: S/P total knee arthroplasty, left [Z96.652] s/p L TKA  Treatment Diagnosis: s/p L TKA     PERTINENT MEDICAL HISTORY   Patient Assessed for Rehabilitation Services: Yes       Medical History: Chart Reviewed: Yes   Past Medical History:   Diagnosis Date    Anxiety     Chronic knee pain     Depression     Hypertension     Hypocalcemia     Hypomagnesemia     Neuropathy     left leg and foot    Substance abuse ([facility] [address]     Thyroid disease      Surgical History:   Past Surgical History:   Procedure Laterality Date    Costella Section      Dr Marco Mullen at [address] ([address])          KNEE SURGERY      4 left . 2 right    PARATHYROID GLAND SURGERY      PARATHYROID GLAND SURGERY      TOTAL KNEE ARTHROPLASTY         Medications:   Current Outpatient Medications:     hydrOXYzine HCl (ATARAX) 25 MG tablet, TAKE 1 TABLET BY MOUTH 3 TIMES A DAY AS NEEDED FOR 30 DAYS., Disp: 90 tablet, Rfl: 0    amLODIPine (NORVASC) 5 MG tablet, TAKE 1 TABLET BY MOUTH EVERY EVENING, Disp: 30 tablet, Rfl: 1    LIDODERM 5 %, PLACE 1 PATCH ON SKIN.  LEAVE ON FOR 12 HOURS ON, THEN REMOVE 12 HOURS OFF., Disp: , Rfl:     HYDROcodone-acetaminophen (NORCO)  MG per tablet, TAKE 1 TABLET BY MOUTH EVERY 6 HOURS AS NEEDED, Disp: , Rfl:     traMADol (ULTRAM) 50 MG tablet, Take 50 mg by mouth every 6 hours as needed for Pain., Disp: , Rfl:     lisinopril (PRINIVIL;ZESTRIL) 30 MG tablet, Take 1 tablet by mouth daily, Disp: 30 tablet, Rfl: 5    lisinopril (PRINIVIL;ZESTRIL) 20 MG tablet, Take 1 tablet by mouth daily, Disp: 30 tablet, Rfl: 1    DULoxetine (CYMBALTA) 30 MG extended release capsule, Take 1 capsule by mouth daily, Disp: 30 capsule, Rfl: 3    diclofenac sodium (VOLTAREN) 1 % GEL, Place onto the skin 2 times daily, Disp: , Rfl:     gabapentin (NEURONTIN) 800 MG tablet, TAKE ONE TABLET BY MOUTH THREE TIMES A DAY AS NEEDED FOR 30 DAYS, Disp: , Rfl:     ibuprofen-famotidine 800-26.6 MG TABS, , Disp: , Rfl:     methocarbamol (ROBAXIN) 750 MG tablet, TAKE ONE TABLET 4 TIMES A DAY, Disp: , Rfl:   Allergies: Lorazepam, Pcn [penicillins], and Morphine and related      SUBJECTIVE EXAMINATION       Subjective History:    Subjective: Pt presents s/p L TKA performed by Dr. Mechelle Reyna at [facility] on 12/1/22. Pt has a history of infection ealier this year in April after a knee injection which required an I and D surgery and PICC line antibiotics. Pt states she was placed on oral antibiotics after this recent surgery for prevention. She reports she is pleased with her progress and ambulates into PT clinic using a small base quad cane.                 Pain Screening   Pain Screening  Patient Currently in Pain: Yes  Pain Assessment: 0-10  Pain Level: 4  Best Pain Level: 3  Worst Pain Level: 7    Functional Status         Social History:  Social History  Type of Home: House  Home Layout: Multi-level (6 steps to bedroom, 6 steps to living room)  Home Equipment: [employer], quad, Cane      OBJECTIVE EXAMINATION     Review of Systems:  Overall Orientation Status: Within Normal Limits      Observations:  General Observations  Description: surgical incision intact and dry, staples present, minimal erythema present, post op bruising noted grossly at thigh and calf region      Ambulation/Gait (if applicable):  Ambulation  Quality of Gait: Pt ambulates with a small base quad cane with mild gait deviations; decreased knee flexion during swing phase noted on LLE. Left AROM  Right AROM         AROM LLE (degrees)  L Knee Flexion (0-145): 93 deg  L Knee Extension (0): 0 deg          Left PROM  Right PROM         PROM LLE (degrees)  L Knee Flexion (0-145): 98 deg  L Knee Extension (0): 0 deg          Left Strength  Right Strength         Strength LLE  L Hip Flexion: 4/5  L Knee Flexion: 4+/5  L Knee Extension: 4/5  L Ankle Dorsiflexion: 4+/5          Special Tests:    LEFS: 31/80         ASSESSMENT     Impression: Assessment: Pt presents one week s/p L TKA. She demonstrates a good start to ROM at 0-98 deg PROM. She will benefit from skilled PT to address deficits and restore optimal functional level. Pt was issued an initial HEP to perform at home regularly between PT sessions. Body Structures, Functions, Activity Limitations Requiring Skilled Therapeutic Intervention: Decreased ROM, Decreased strength, Decreased high-level IADLs, Increased pain, Decreased endurance    Statement of Medical Necessity: Physical Therapy is both indicated and medically necessary as outlined in the POC to increase the likelihood of meeting the functionally related goals stated below. Patient's Activity Tolerance: Patient tolerated evaluation without incident      Patient's rehabilitation potential/prognosis is considered to be: Excellent          GOALS   Patient Goal(s):    Short Term Goals Completed by 4 weeks Goal Status   Pt to be I with HEP     Pt to demonstrate 0-115 deg or greater L knee AAROM. Pt to use a single point cane for longer distance community ambulation only. Pt to demonstrate 4+/5 L knee ext strength. Long Term Goals Completed by 8 weeks Goal Status   LEFS score to improve to 60/80 indicating improved function. Pt to ambulate without need for a cane and have trace to no gait deviations.      L knee AAROM to improve to 0-125 deg.     LLE strength to improve to 5/5 grossly. Pt to be transitioned to an advanced self care HEP. TREATMENT PLAN       Requires PT Follow-Up: Yes    Pt. actively involved in establishing Plan of Care and Goals: Yes  Patient/ Caregiver education and instruction:       HEP        Treatment may include any combination of the following: Current Treatment Recommendations: Strengthening, ROM, Balance training, Endurance training, Pain management, Neuromuscular re-education, Gait training, Home exercise program, Patient/Caregiver education & training, Equipment evaluation, education, & procurement, Modalities, Therapeutic activities     Frequency / Duration:  Patient to be seen 2-3x/week for 8 weeks weeks      Eval Complexity:    Decision Making: Low Complexity    PT Treatment Completed:  Exercises:      Treatment Reasoning    Exercise 1: QS 3 x10  Exercise 2: SAQ x 10 due to discomfort (small foam bolster)  Exercise 3: Heel slides - supine x10 (increase to 3x10 next visit)  Exercise 4: Ankle pumps 3x10  Exercise 5: Heel prop x 2' (increase to 2x3' next visit)  Exercise 6: Side lying hip abd 2x10 (increase to 3 sets)  Exercise 7: Prone hip ext 2x10 (increase to 3 sets)  Exercise 8: Prone TKE 2x10 (increase to 3 sets)     Next visit add:  Nustep: L4-5 x 6-8'  Calf raises 3x10  Standing hip abd / ext 2x10    May use ice pack following treatment. ROM, strength, mobility                      Manual Therapy: (CPT 56307) Treatment Reasoning     Joint Mobilization: patella mobs, PROM  ROM                         Therapist Signature: Bijan Love, PT    Date: 18/4/4723     I certify that the above Therapy Services are being furnished while the patient is under my care. I agree with the treatment plan and certify that this therapy is necessary.       Physician's Signature:  ___________________________   Date:_______                                                                   Gail Jamison MD Physician Comments: _______________________________________________    Please sign and return to [facility]. Please fax to the location listed below.  [facility] YOU for this referral!    [facility]  [address] [place] [address]  Dept: [address] [facility] [address]: [phone]

## 2022-12-12 RX ORDER — HYDROXYZINE HYDROCHLORIDE 25 MG/1
TABLET, FILM COATED ORAL
Qty: 90 TABLET | Refills: 0 | Status: SHIPPED | OUTPATIENT
Start: 2022-12-12

## 2022-12-13 ENCOUNTER — HOSPITAL ENCOUNTER (OUTPATIENT)
Dept: PHYSICAL THERAPY | Facility: HOSPITAL | Age: 57
Setting detail: THERAPIES SERIES
Discharge: HOME | End: 2022-12-13
Payer: MEDICAID

## 2022-12-13 PROCEDURE — 97016 VASOPNEUMATIC DEVICE THERAPY: CPT

## 2022-12-13 PROCEDURE — 97110 THERAPEUTIC EXERCISES: CPT

## 2022-12-13 PROCEDURE — 97140 MANUAL THERAPY 1/> REGIONS: CPT

## 2022-12-13 NOTE — FLOWSHEET NOTE
Physical Therapy Daily Treatment Note   Date:  2022    TIme In:    1104                  Time Out:  1214    Patient Name:  Nancy Ugalde    :  1965  MRN: [id number]    Restrictions/Precautions:    Pertinent Medical History:  TKA on 22. Medical/Treatment Diagnosis Information:  S/P total knee arthroplasty, left [Q91.342]     Insurance/Certification information:  Payor: Jose Douglas vd / Plan: [place] / Product Type: *No Product type* /   Physician Information:  Candi Waters MD  Plan of care signed (Y/N):    Visit# / total visits:     2 /    G-Code (if applicable):      Date / Visit # G-Code Applied:         Progress Note: []  Yes  [x]  No  Next due by: Visit #10       Pain level:   5/10  Subjective: Pt reports she did have a little more pain and soreness after her last visit but that's to be expected. She also expressed that had been back and forth to [facility] a few days in a row and done more walking than normal and knows that played a role in it. Pt returns to MD on Friday to have staples removed. Objective:  Observation: Edema and warmth to touch noted throughout left LE with mild redness around staples. No streaking, tenderness or signs of infection noted. Pt advised to stay aware of signs of infection due to prior history. Test measurements:  L knee PROM: 0-115 deg. Palpation:    Exercises:  Exercise Resistance/Repetitions Other comments   Nustep L5x8' 13   Calf raises 3x10 13   St hip abd/ext 2x10 13   QS 3x10 13   SAQ: small bolster 3x10 13   HS: supine 3x10 13   AP's 3x10 13   Heel Prop 2x3' 13   SL Hip abd 3x10 13   Prone hip eat  3x10 13   Prone TKE 3x10 13                    Other Therapeutic Activities:      Manual Treatments:  patella mobs, PROM x10'. Modalities: Ice pack prn x10'. Today: Game ready x 15' to Left knee with light compression. Pillowcase between wrap and skin.       Timed Code Treatment Minutes:  65      Total Treatment Minutes: 70    Treatment/Activity Tolerance:  [x] Patient tolerated treatment well [] Patient limited by fatigue  [] Patient limited by pain  [] Patient limited by other medical complications  [x] Other: Pt completed tx with increased pain after modalities but did have significant gain in left knee flex PROM.     Pain after treatment:      7/10    Prognosis: [x] Good [] Fair  [] Poor    Patient Requires Follow-up: [] Yes  [] No    Plan:   [x] Continue per plan of care [] Alter current plan (see comments)  [] Plan of care initiated [] Hold pending MD visit [] Discharge    Plan for Next Session:        Electronically signed by:  Faith Silvestre PTA

## 2022-12-15 ENCOUNTER — HOSPITAL ENCOUNTER (OUTPATIENT)
Dept: PHYSICAL THERAPY | Facility: HOSPITAL | Age: 57
Setting detail: THERAPIES SERIES
Discharge: HOME | End: 2022-12-15
Payer: MEDICAID

## 2022-12-15 PROCEDURE — 97140 MANUAL THERAPY 1/> REGIONS: CPT

## 2022-12-15 PROCEDURE — 97110 THERAPEUTIC EXERCISES: CPT

## 2022-12-15 PROCEDURE — 97016 VASOPNEUMATIC DEVICE THERAPY: CPT

## 2022-12-15 NOTE — FLOWSHEET NOTE
Physical Therapy Daily Treatment Note   Date:  12/15/2022    TIme In:    1509                  Time Out:  [address]    Patient Name:  Canda Simmonds    :  1965  MRN: [id number]    Restrictions/Precautions:    Pertinent Medical History:  TKA on 22. Medical/Treatment Diagnosis Information:  S/P total knee arthroplasty, left [E75.706]     Insurance/Certification information:  Payor: Jose Misael Aníbal / Plan: Verna Vazquez / Product Type: *No Product type* /   Physician Information:  Jonnathan Wood MD  Plan of care signed (Y/N):    Visit# / total visits:     3 /    G-Code (if applicable):      Date / Visit # G-Code Applied:         Progress Note: []  Yes  [x]  No  Next due by: Visit #10       Pain level:   6/10    Subjective: Pt reports she returns to MD on Friday to have staples removed. She reports significant pain today. Objective:  Observation: Pt presents without cane. Edema and warmth to touch noted throughout left LE with mild redness around staples. No streaking, tenderness or signs of infection noted. Pt advised to stay aware of signs of infection due to prior history. Test measurements:  L knee PROM: ___  PRE AROM_107_ Post AROM_113__  Palpation:    Exercises:  Exercise Resistance/Repetitions Other comments   Nustep L5x8' 15   Calf raises 3x10 15   St hip abd/ext 2x10 15   QS 3x10 15   SAQ: small bolster 3x10 15   HS: supine 3x10 15   AP's 3x10 15   Heel Prop 2x3' 15   SL Hip abd 3x10 15   Prone hip eat  3x10 15   Prone TKE 3x10 15                    Other Therapeutic Activities:      Manual Treatments:  patella mobs, PROM into knee flexion, light grade III into knee ext x15'. Modalities: Ice pack prn x10'. Today: Game ready x 15' to Left knee with light compression. Pillowcase between wrap and skin.       Timed Code Treatment Minutes:  69      Total Treatment Minutes:  73  Treatment/Activity Tolerance:  [x] Patient tolerated treatment well [] Patient limited by fatigue  [] Patient limited by pain  [] Patient limited by other medical complications  [x] Other: Pt completed tx with increased pain after manual, significant improvement with AROM post tx.      Pain after treatment:      7/10    Prognosis: [x] Good [] Fair  [] Poor    Patient Requires Follow-up: [] Yes  [] No    Plan:   [x] Continue per plan of care [] Alter current plan (see comments)  [] Plan of care initiated [] Hold pending MD visit [] Discharge    Plan for Next Session:        Electronically signed by:  Nawaf Toledo, PT

## 2022-12-20 ENCOUNTER — APPOINTMENT (OUTPATIENT)
Dept: PHYSICAL THERAPY | Facility: HOSPITAL | Age: 57
End: 2022-12-20
Payer: MEDICAID

## 2022-12-22 ENCOUNTER — HOSPITAL ENCOUNTER (OUTPATIENT)
Dept: PHYSICAL THERAPY | Facility: HOSPITAL | Age: 57
Setting detail: THERAPIES SERIES
Discharge: HOME | End: 2022-12-22
Payer: MEDICAID

## 2022-12-22 PROCEDURE — 97110 THERAPEUTIC EXERCISES: CPT

## 2022-12-22 PROCEDURE — 97016 VASOPNEUMATIC DEVICE THERAPY: CPT

## 2022-12-22 PROCEDURE — 97140 MANUAL THERAPY 1/> REGIONS: CPT

## 2022-12-22 NOTE — FLOWSHEET NOTE
Physical Therapy Daily Treatment Note   Date:  2022    TIme In:    1001                  Time Out:  [address]    Patient Name:  Serene Webber    :  1965  MRN: [id number]    Restrictions/Precautions:    Pertinent Medical History:  TKA on 22. Medical/Treatment Diagnosis Information:  S/P total knee arthroplasty, left [Y55.855]     Insurance/Certification information:  Payor: Jose [facility] / [facility] / Product Type: *No Product type* /   Physician Information:  Tawny Calhoun MD  Plan of care signed (Y/N):    Visit# / total visits:     4 /    G-Code (if applicable):      Date / Visit # G-Code Applied:         Progress Note: []  Yes  [x]  No  Next due by: Visit #10       Pain level:   7/10    Subjective: Pt reports she had her staples removed. Incision looks good, no signs of infection. Pt states her knee hurts a lot today d/t pain post removing staples and medial knee soreness for the past 2 days. Objective:  Observation: Pt presents without cane. Edema and warmth to touch noted throughout left LE with mild redness no staples. No streaking, tenderness or signs of infection noted. Pt advised to stay aware of signs of infection due to prior history. Test measurements:  L knee PROM: ___  PRE AROM_114_ Post AROM_119__  Palpation:    Exercises:  Exercise Resistance/Repetitions Other comments   Nustep L5x8' 22   Calf raises 3x10 22   St hip abd/ext 2x10 22   QS 3x10 22   SAQ: small bolster 3x10 22   HS: supine 3x10 22   AP's 3x10 22   Heel Prop 2x3' 22   SL Hip abd 3x10 22   Prone hip ext  3x10 22   Prone TKE 3x10 22                    Other Therapeutic Activities:      Manual Treatments:  patella mobs, PROM into knee flexion, light grade III into knee ext, seated knee flexion stretch x19'. Modalities: Ice pack prn x10'. Today: Game ready x 15' to Left knee with light compression. Pillowcase between wrap and skin.       Timed Code Treatment Minutes:  70      Total Treatment Minutes:  76    Treatment/Activity Tolerance:  [x] Patient tolerated treatment well [] Patient limited by fatigue  [] Patient limited by pain  [] Patient limited by other medical complications  [x] Other: Pt completed tx with decreased pain post tx and significant improvement with AROM pre and post tx.      Pain after treatment:      0/10    Prognosis: [x] Good [] Fair  [] Poor    Patient Requires Follow-up: [] Yes  [] No    Plan:   [x] Continue per plan of care [] Alter current plan (see comments)  [] Plan of care initiated [] Hold pending MD visit [] Discharge    Plan for Next Session:        Electronically signed by:  Karla Snell, PT

## 2022-12-27 ENCOUNTER — HOSPITAL ENCOUNTER (OUTPATIENT)
Dept: PHYSICAL THERAPY | Facility: HOSPITAL | Age: 57
Setting detail: THERAPIES SERIES
Discharge: HOME | End: 2022-12-27
Payer: MEDICAID

## 2022-12-27 PROCEDURE — 97140 MANUAL THERAPY 1/> REGIONS: CPT

## 2022-12-27 PROCEDURE — 97110 THERAPEUTIC EXERCISES: CPT

## 2022-12-27 NOTE — FLOWSHEET NOTE
Physical Therapy Daily Treatment Note   Date:  2022    TIme In:    1333                  Time Out:  9275    Patient Name:  Nancy Ugalde    :  1965  MRN: [id number]    Restrictions/Precautions:    Pertinent Medical History:  TKA on 22. Medical/Treatment Diagnosis Information:  S/P total knee arthroplasty, left [J71.358]     Insurance/Certification information:  Payor: Jose Douglas [facility] / Plan: [place] [facility] / Product Type: *No Product type* /   Physician Information:  Candi Waters MD  Plan of care signed (Y/N):    Visit# / total visits:     5 /    G-Code (if applicable):      Date / Visit # G-Code Applied:         Progress Note: []  Yes  [x]  No  Next due by: Visit #10       Pain level:   6/10    Subjective: Pt reports she has been having some shooting pain going down her LLE from lateral knee to ankle. Objective:  Observation: Pt presents without cane. Edema and warmth to touch noted throughout left LE with min redness no staples. No streaking, tenderness or signs of infection noted. Test measurements:  L knee PROM: ___  PRE AROM_121_ Post AROM___  Palpation:    Exercises:  Exercise Resistance/Repetitions Other comments   Nustep L5x8' 27 lvl 6 next time   Calf raises 3x10 27   St hip abd/ext 2x10 27   QS 3x10 27   SAQ: small bolster 3x10 27 #2 weight next time   HS: supine 3x10 27 #2 weight next time   AP's 3x10 27   Heel Prop 2x3' 27   SL Hip abd 3x10 27 #2 next time   Prone hip ext  3x10 27 OTB next time   Prone TKE 3x10 27   LAQ  x20 Next time               Other Therapeutic Activities:      Manual Treatments:  patella mobs, PROM into knee flexion, light grade III into knee ext, seated knee flexion stretch, light stm to around quad and fibular head x16'. Modalities: Ice pack prn x10'. Today: Game ready to Left knee with light compression. Pillowcase between wrap and skin.  x15'      Timed Code Treatment Minutes:  61'      Total Treatment Minutes: 59'    Treatment/Activity Tolerance:  [x] Patient tolerated treatment well [] Patient limited by fatigue  [] Patient limited by pain  [] Patient limited by other medical complications  [x] Other: Pt able to demonstrate significant improvement with PRE AROM in L knee post there-ex. Pt to increase resistance with exercises next session. Pt In agreement. Minimal change in pain post tx.       Pain after treatment:      5/10    Prognosis: [x] Good [] Fair  [] Poor    Patient Requires Follow-up: [x] Yes  [] No    Plan:   [x] Continue per plan of care [] Alter current plan (see comments)  [] Plan of care initiated [] Hold pending MD visit [] Discharge    Plan for Next Session:        Electronically signed by:  Justin Moreno PT

## 2022-12-29 ENCOUNTER — HOSPITAL ENCOUNTER (OUTPATIENT)
Dept: PHYSICAL THERAPY | Facility: HOSPITAL | Age: 57
Setting detail: THERAPIES SERIES
Discharge: HOME | End: 2022-12-29
Payer: MEDICAID

## 2022-12-29 PROCEDURE — 97110 THERAPEUTIC EXERCISES: CPT

## 2022-12-29 PROCEDURE — 97140 MANUAL THERAPY 1/> REGIONS: CPT

## 2022-12-29 PROCEDURE — 97016 VASOPNEUMATIC DEVICE THERAPY: CPT

## 2022-12-29 NOTE — FLOWSHEET NOTE
Physical Therapy Daily Treatment Note   Date:  2022    TIme In:    1505                  Time Out:  [address] [facility]    Patient Name:  Pam Sal    :  1965  MRN: [id number]    Restrictions/Precautions:    Pertinent Medical History:  TKA on 22. Medical/Treatment Diagnosis Information:  S/P total knee arthroplasty, left [Y58.472]     Insurance/Certification information:  Payor: Jose Douglas [facility] / Plan: Patricia Quesada / Product Type: *No Product type* /   Physician Information:  Candy Scanlon MD  Plan of care signed (Y/N):    Visit# / total visits:     6 /    G-Code (if applicable):      Date / Visit # G-Code Applied:         Progress Note: []  Yes  [x]  No  Next due by: Visit #10       Pain level:   6/10    Subjective: Pt reports periodic cramping in her quad today. Objective:  Observation: Pt presents without cane. Edema and warmth to touch noted throughout left LE with min redness no staples. No streaking, tenderness or signs of infection noted. Test measurements:  L knee PROM: ___  PRE AROM_123_ Post AROM___  Palpation:    Exercises:  Exercise Resistance/Repetitions Other comments   Nustep L6x8' 29   Calf raises 3x10 29   St hip abd/ext 2x10 29   QS 3x10 29   SAQ: small bolster 3x10 #2 29   HS: Long sitting supine 3x10 OTB 29   AP's 3x10 OTB 29   Heel Prop 2x3' 29   SL Hip abd 3x10 #2 29   Prone hip ext  3x10 OTB 29   Prone TKE 3x10 29   LAQ  x20 29 #2 next time               Other Therapeutic Activities:      Manual Treatments:  patella mobs, PROM into knee flexion in sitting and long sitting, light grade III into knee ext, seated knee flexion stretch, light stm to around quad and fibular head x16'. Modalities: Ice pack prn x10'. Today: Game ready to Left knee with light compression. Pillowcase between wrap and skin.  x15'      Timed Code Treatment Minutes:  76'      Total Treatment Minutes:  79'    Treatment/Activity Tolerance:  [x] Patient tolerated treatment well [] Patient limited by fatigue  [] Patient limited by pain  [] Patient limited by other medical complications  [x] Other: Pt able to demonstrate significant improvement with PRE AROM in L knee post there-ex. Pt able to tolerate increased resistance with exercises. Increased pain during end range ROM noted.      Pain after treatment:      7/10    Prognosis: [x] Good [] Fair  [] Poor    Patient Requires Follow-up: [x] Yes  [] No    Plan:   [x] Continue per plan of care [] Alter current plan (see comments)  [] Plan of care initiated [] Hold pending MD visit [] Discharge    Plan for Next Session:        Electronically signed by:  Sergio Chamorro, PT

## 2023-01-04 DIAGNOSIS — G89.4 CHRONIC PAIN SYNDROME: ICD-10-CM

## 2023-01-04 DIAGNOSIS — I10 PRIMARY HYPERTENSION: ICD-10-CM

## 2023-01-04 RX ORDER — DULOXETIN HYDROCHLORIDE 30 MG/1
CAPSULE, DELAYED RELEASE ORAL
Qty: 30 CAPSULE | Refills: 3 | Status: SHIPPED | OUTPATIENT
Start: 2023-01-04

## 2023-01-04 RX ORDER — AMLODIPINE BESYLATE 5 MG/1
5 TABLET ORAL EVERY EVENING
Qty: 30 TABLET | Refills: 1 | Status: SHIPPED | OUTPATIENT
Start: 2023-01-04

## 2023-02-13 RX ORDER — METHOCARBAMOL 750 MG/1
TABLET, FILM COATED ORAL
Qty: 90 TABLET | Refills: 0 | OUTPATIENT
Start: 2023-02-13

## 2023-02-27 ENCOUNTER — OFFICE VISIT (OUTPATIENT)
Dept: PRIMARY CARE CLINIC | Age: 58
End: 2023-02-27
Payer: MEDICAID

## 2023-02-27 VITALS
BODY MASS INDEX: 16.65 KG/M2 | WEIGHT: 103.6 LBS | DIASTOLIC BLOOD PRESSURE: 82 MMHG | HEIGHT: 66 IN | RESPIRATION RATE: 14 BRPM | SYSTOLIC BLOOD PRESSURE: 128 MMHG | OXYGEN SATURATION: 97 % | HEART RATE: 80 BPM | TEMPERATURE: 97.8 F

## 2023-02-27 DIAGNOSIS — F39 MOOD DISORDER (HCC): ICD-10-CM

## 2023-02-27 DIAGNOSIS — I10 PRIMARY HYPERTENSION: ICD-10-CM

## 2023-02-27 DIAGNOSIS — F41.0 PANIC ATTACK: Primary | ICD-10-CM

## 2023-02-27 PROCEDURE — 99214 OFFICE O/P EST MOD 30 MIN: CPT | Performed by: NURSE PRACTITIONER

## 2023-02-27 PROCEDURE — 3079F DIAST BP 80-89 MM HG: CPT | Performed by: NURSE PRACTITIONER

## 2023-02-27 PROCEDURE — 3074F SYST BP LT 130 MM HG: CPT | Performed by: NURSE PRACTITIONER

## 2023-02-27 RX ORDER — OMEPRAZOLE 20 MG/1
CAPSULE, DELAYED RELEASE ORAL
COMMUNITY
Start: 2022-12-02

## 2023-02-27 RX ORDER — MELOXICAM 15 MG/1
15 TABLET ORAL DAILY
COMMUNITY
Start: 2022-12-02

## 2023-02-27 RX ORDER — NALOXONE HYDROCHLORIDE 4 MG/.1ML
1 SPRAY NASAL PRN
COMMUNITY
Start: 2022-11-14

## 2023-02-27 RX ORDER — ALPRAZOLAM 0.5 MG/1
0.5 TABLET ORAL DAILY PRN
Qty: 20 TABLET | Refills: 0 | Status: SHIPPED | OUTPATIENT
Start: 2023-02-27 | End: 2023-03-29

## 2023-02-27 RX ORDER — LAMOTRIGINE 25 MG/1
25 TABLET ORAL 2 TIMES DAILY
Qty: 60 TABLET | Refills: 2 | Status: SHIPPED | OUTPATIENT
Start: 2023-02-27 | End: 2023-03-29

## 2023-02-27 SDOH — ECONOMIC STABILITY: FOOD INSECURITY: WITHIN THE PAST 12 MONTHS, THE FOOD YOU BOUGHT JUST DIDN'T LAST AND YOU DIDN'T HAVE MONEY TO GET MORE.: NEVER TRUE

## 2023-02-27 SDOH — ECONOMIC STABILITY: FOOD INSECURITY: WITHIN THE PAST 12 MONTHS, YOU WORRIED THAT YOUR FOOD WOULD RUN OUT BEFORE YOU GOT MONEY TO BUY MORE.: NEVER TRUE

## 2023-02-27 SDOH — ECONOMIC STABILITY: INCOME INSECURITY: HOW HARD IS IT FOR YOU TO PAY FOR THE VERY BASICS LIKE FOOD, HOUSING, MEDICAL CARE, AND HEATING?: NOT HARD AT ALL

## 2023-02-27 SDOH — ECONOMIC STABILITY: HOUSING INSECURITY
IN THE LAST 12 MONTHS, WAS THERE A TIME WHEN YOU DID NOT HAVE A STEADY PLACE TO SLEEP OR SLEPT IN A SHELTER (INCLUDING NOW)?: NO

## 2023-02-27 ASSESSMENT — PATIENT HEALTH QUESTIONNAIRE - PHQ9
SUM OF ALL RESPONSES TO PHQ QUESTIONS 1-9: 0
SUM OF ALL RESPONSES TO PHQ QUESTIONS 1-9: 0
SUM OF ALL RESPONSES TO PHQ9 QUESTIONS 1 & 2: 0
SUM OF ALL RESPONSES TO PHQ QUESTIONS 1-9: 0
SUM OF ALL RESPONSES TO PHQ QUESTIONS 1-9: 0
1. LITTLE INTEREST OR PLEASURE IN DOING THINGS: 0
2. FEELING DOWN, DEPRESSED OR HOPELESS: 0

## 2023-02-27 ASSESSMENT — ENCOUNTER SYMPTOMS
ALLERGIC/IMMUNOLOGIC NEGATIVE: 1
EYES NEGATIVE: 1
GASTROINTESTINAL NEGATIVE: 1
RESPIRATORY NEGATIVE: 1

## 2023-02-27 NOTE — PATIENT INSTRUCTIONS
Keep a list of your medicines with you. List all of the prescription medicines, nonprescription medicines, supplements, natural remedies, and vitamins that you take. Tell your healthcare providers who treat you about all of the products you are taking. Your provider can provide you with a form to keep track of them. Just ask. Follow the directions that come with your medicine, including information about food or alcohol. Make sure you know how and when to take your medicine. Do not take more or less than you are supposed to take. Keep all medicines out of the reach of children. Store medicines according to the directions on the label. Monitor yourself. Learn to know how your body reacts to your new medicine and keep track of how it makes you feel before attempting (If your provider has allowed you to do so) to drive or go to work. Seek emergency medical attention if you think you have used too much of this medicine. An overdose of any prescription medicine can be fatal. Overdose symptoms may include extreme drowsiness, muscle weakness, confusion, cold and clammy skin, pinpoint pupils, shallow breathing, slow heart rate, fainting, or coma. Don't share prescription medicines with others, even when they seem to have the same symptoms. What may be good for you may be harmful to others. If you are no longer taking a prescribed medication and you have pills left please take your pills out of their original containers. Mix crushed pills with an undesirable substance, such as cat litter or used coffee grounds. Put the mixture into a disposable container with a lid, such as an empty margarine tub, or into a sealable bag. Cover up or remove any of your personal information on the empty containers by covering it with black permanent marker or duct tape. Place the sealed container with the mixture, and the empty drug containers, in the trash.    If you use a medication that is in the form of a patch, dispose of used patches by folding them in half so that the sticky sides meet, and then flushing them down a toilet. They should not be placed in the household trash where children or pets can find them. If you have any questions, ask your provider or pharmacist for more information. Be sure to keep all appointments for provider visits or tests. ips to Help You Stop Smoking       Cigarette smoking is a preventable cause of death in the [place]. If you have thought about quitting but haven't been able to, here are some reasons why you should and some ways to do it. Here's Why   Quitting smoking now can decrease your risk of getting smoking-related illnesses like:   Heart disease   Stroke   Several types of cancer, including:   Lung   Mouth   Esophagus   Larynx   Bladder   Pancreas   Kidney   Chronic lung diseases:   Bronchitis   Emphysema   Asthma   Cataracts   Macular degeneration   Thyroid conditions   Hearing loss   Erectile dysfunction   Dementia   Osteoporosis   Here's How   Once you've decided to quit smoking, set your target quit date a few weeks away. In the time leading up to your quit day, try some of these ideas offered by the [address] to help you successfully quit smoking. For the best results, work with your doctor. Together, you can test your lung function and compare the results to those of a nonsmoking person. The results can be given to you as your lung age. Finding out your lung age right after having the test done may help you to stop smoking. Your doctor can also discuss with you all of your options and refer you to smoking-cessation support groups. You may wish to use nicotine replacement (gum, patches, inhaler) or one of the prescription medications that have been shown to increase quit rates and prolong abstinence from smoking. But whatever you and your doctor decide on these matters, it will still be you who decides when an how to quit. Here are some techniques:   Switch Brands   Switch to a brand you find distasteful. Change to a brand that is low in tar and nicotine a couple of weeks before your target quit date. This will help change your smoking behavior. However, do not smoke more cigarettes, inhale them more often or more deeply, or place your fingertips over the holes in the filters. All of these actions will increase your nicotine intake, and the idea is to get your body used to functioning without nicotine. Cut Down the Number of Cigarettes You Smoke   Smoke only half of each cigarette. Each day, postpone the lighting of your first cigarette by one hour. Decide you'll only smoke during odd or even hours of the day. Decide beforehand how many cigarettes you'll smoke during the day. For each additional cigarette, give a dollar to your favorite cris. Change your eating habits to help you cut down. For example, drink milk, which many people consider incompatible with smoking. End meals or snacks with something that won't lead to a cigarette. Reach for a glass of juice instead of a cigarette for a \"pick-me-up. \"   Remember: Cutting down can help you quit, but it's not a substitute for quitting. If you're down to about seven cigarettes a day, it's time to set your target quit date, and get ready to stick to it. Don't Smoke \"Automatically\"   Smoke only those cigarettes you really want. Catch yourself before you light up a cigarette out of pure habit. Don't empty your ashtrays. This will remind you of how many cigarettes you've smoked each day, and the sight and the smell of stale cigarettes butts will be very unpleasant. Make yourself aware of each cigarette by using the opposite hand or putting cigarettes in an unfamiliar location or a different pocket to break the automatic reach. If you light up many times during the day without even thinking about it, try to look in a mirror each time you put a match to your cigarette. You may decide you don't need it. Make Smoking Inconvenient   Stop buying cigarettes by the carton. Wait until one pack is empty before you buy another. Stop carrying cigarettes with you at home or at work. Make them difficult to get to. Make Smoking Unpleasant   Smoke only under circumstances that aren't especially pleasurable for you. If you like to smoke with others, smoke alone. Turn your chair to an empty corner and focus only on the cigarette you are smoking and all its many negative effects. Collect all your cigarette butts in one large glass container as a visual reminder of the filth made by smoking. Just Before Quitting   Practice going without cigarettes. Don't think of never smoking again. Think of quitting in terms of one day at a time . Tell yourself you won't smoke today, and then don't. Clean your clothes to rid them of the cigarette smell, which can linger a long time. On the Day You Quit   Throw away all your cigarettes and matches. Hide your lighters and ashtrays. Visit the dentist and have your teeth cleaned to get rid of tobacco stains. Notice how nice they look and resolve to keep them that way. Make a list of things you'd like to buy for yourself or someone else. Estimate the cost in terms of packs of cigarettes, and put the money aside to buy these presents. Keep very busy on the big day. Go to the movies, exercise, take long walks, or go bike riding. Remind your family and friends that this is your quit date, and ask them to help you over the rough spots of the first couple of days and weeks. Buy yourself a treat or do something special to celebrate. Telephone and Internet Support   Telephone, web-, and computer-based programs can offer you the support that you need to quit and to stay smoke-free. You can find many programs online, like the [employer] Association's [place] from Smoking .    Immediately After Quitting   Develop a clean, fresh, nonsmoking environment around yourselfat work and at home. Buy yourself flowersyou may be surprised how much you can enjoy their scent now. The first few days after you quit, spend as much free time as possible in places where smoking isn't allowed, such as [address] [place] [address], museums, theaters, department stores, and churches. Drink large quantities of water and fruit juice (but avoid sodas that contain caffeine). Try to avoid alcohol, coffee, and other beverages that you associate with cigarette smoking. Strike up conversation instead of a match for a cigarette. If you miss the sensation of having a cigarette in your hand, play with something elsea pencil, a paper clip, a marble. If you miss having something in your mouth, try toothpicks or a fake cigarette.

## 2023-03-06 DIAGNOSIS — G89.4 CHRONIC PAIN SYNDROME: Primary | ICD-10-CM

## 2023-03-06 RX ORDER — GABAPENTIN 800 MG/1
TABLET ORAL
Qty: 90 TABLET | Refills: 0 | Status: SHIPPED | OUTPATIENT
Start: 2023-03-06 | End: 2023-04-06

## 2023-03-10 DIAGNOSIS — I10 PRIMARY HYPERTENSION: ICD-10-CM

## 2023-03-10 RX ORDER — AMLODIPINE BESYLATE 5 MG/1
5 TABLET ORAL EVERY EVENING
Qty: 30 TABLET | Refills: 1 | Status: SHIPPED | OUTPATIENT
Start: 2023-03-10

## 2023-03-13 RX ORDER — HYDROXYZINE HYDROCHLORIDE 25 MG/1
TABLET, FILM COATED ORAL
Qty: 90 TABLET | Refills: 0 | Status: SHIPPED | OUTPATIENT
Start: 2023-03-13

## 2023-03-14 ENCOUNTER — OFFICE VISIT (OUTPATIENT)
Dept: PRIMARY CARE CLINIC | Age: 58
End: 2023-03-14
Payer: MEDICAID

## 2023-03-14 VITALS
TEMPERATURE: 98.5 F | OXYGEN SATURATION: 98 % | BODY MASS INDEX: 16.88 KG/M2 | SYSTOLIC BLOOD PRESSURE: 148 MMHG | HEART RATE: 110 BPM | WEIGHT: 104.6 LBS | DIASTOLIC BLOOD PRESSURE: 98 MMHG

## 2023-03-14 DIAGNOSIS — I10 PRIMARY HYPERTENSION: Primary | ICD-10-CM

## 2023-03-14 DIAGNOSIS — F41.0 PANIC ATTACK: ICD-10-CM

## 2023-03-14 DIAGNOSIS — F41.9 ANXIETY: ICD-10-CM

## 2023-03-14 PROCEDURE — 3074F SYST BP LT 130 MM HG: CPT | Performed by: NURSE PRACTITIONER

## 2023-03-14 PROCEDURE — 3078F DIAST BP <80 MM HG: CPT | Performed by: NURSE PRACTITIONER

## 2023-03-14 PROCEDURE — 99214 OFFICE O/P EST MOD 30 MIN: CPT | Performed by: NURSE PRACTITIONER

## 2023-03-14 RX ORDER — ALPRAZOLAM 0.5 MG/1
0.5 TABLET ORAL DAILY PRN
Qty: 20 TABLET | Refills: 1 | Status: SHIPPED | OUTPATIENT
Start: 2023-03-22 | End: 2023-04-21

## 2023-03-14 RX ORDER — BUSPIRONE HYDROCHLORIDE 5 MG/1
5 TABLET ORAL 2 TIMES DAILY
Qty: 60 TABLET | Refills: 0 | Status: SHIPPED | OUTPATIENT
Start: 2023-03-14 | End: 2023-04-13

## 2023-03-14 ASSESSMENT — ENCOUNTER SYMPTOMS
SHORTNESS OF BREATH: 0
NAUSEA: 0
GASTROINTESTINAL NEGATIVE: 1
BACK PAIN: 0
COUGH: 0
WHEEZING: 0
SORE THROAT: 0
ABDOMINAL PAIN: 0
VOMITING: 0
RESPIRATORY NEGATIVE: 1
SINUS PRESSURE: 0
EYE DISCHARGE: 0

## 2023-03-14 NOTE — PROGRESS NOTES
Chief Complaint   Patient presents with    Other     Needs Paperwork Completed    Knee Injury     SUBJECTIVE:    Patient ID: Gisela Razo is a 61 y. o.female. Chief Complaint   Patient presents with    Other     Needs Paperwork Completed    Knee Injury         HPI:  Patient is here today to have paperwork filled out for her disability. States that she had a knee injury back in 2021. Her Ortho doctor previously filled these out but she is no longer with their office. States she needs these completed for payment coverage through [employer] for a vehicle she purchased. She has insurance paperwork showing that she is disabled. She is struggling to get the loan resolved because she had the insurance has not covered. Patient's medications, allergies, past medical, surgical, social and family histories were reviewed and updated as appropriate in electronic medical record. Outpatient Medications Marked as Taking for the 3/14/23 encounter (Office Visit) with CHARLES Sarah   Medication Sig Dispense Refill    [START ON 3/22/2023] ALPRAZolam (XANAX) 0.5 MG tablet Take 1 tablet by mouth daily as needed for Sleep or Anxiety for up to 30 days. Max Daily Amount: 0.5 mg 20 tablet 1    busPIRone (BUSPAR) 5 MG tablet Take 1 tablet by mouth 2 times daily 60 tablet 0    hydrOXYzine HCl (ATARAX) 25 MG tablet TAKE 1 TABLET BY MOUTH 3 TIMES A DAY AS NEEDED FOR 30 DAYS.  90 tablet 0    amLODIPine (NORVASC) 5 MG tablet TAKE 1 TABLET BY MOUTH EVERY EVENING 30 tablet 1    gabapentin (NEURONTIN) 800 MG tablet TAKE ONE TABLET BY MOUTH THREE TIMES A DAY AS NEEDED FOR 30 DAYS 90 tablet 0    meloxicam (MOBIC) 15 MG tablet Take 15 mg by mouth daily      naloxone 4 MG/0.1ML LIQD nasal spray 1 spray by Nasal route as needed      omeprazole (PRILOSEC) 20 MG delayed release capsule       lamoTRIgine (LAMICTAL) 25 MG tablet Take 1 tablet by mouth 2 times daily 60 tablet 2    DULoxetine (CYMBALTA) 30 MG extended release capsule TAKE ONE CAPSULE BY MOUTH EVERY DAY 30 capsule 3    LIDODERM 5 % PLACE 1 PATCH ON SKIN. LEAVE ON FOR 12 HOURS ON, THEN REMOVE 12 HOURS OFF. HYDROcodone-acetaminophen (NORCO)  MG per tablet TAKE 1 TABLET BY MOUTH EVERY 6 HOURS AS NEEDED      traMADol (ULTRAM) 50 MG tablet Take 50 mg by mouth every 6 hours as needed for Pain. lisinopril (PRINIVIL;ZESTRIL) 30 MG tablet Take 1 tablet by mouth daily (Patient taking differently: Take 15 mg by mouth daily) 30 tablet 5    diclofenac sodium (VOLTAREN) 1 % GEL Place onto the skin 2 times daily      ibuprofen-famotidine 800-26.6 MG TABS Take 1 tablet by mouth daily      methocarbamol (ROBAXIN) 750 MG tablet TAKE ONE TABLET 4 TIMES A DAY          Review of Systems   Constitutional: Negative. Negative for chills and fever. HENT: Negative. Negative for congestion, sinus pressure and sore throat. Eyes:  Negative for discharge and visual disturbance. Respiratory: Negative. Negative for cough, shortness of breath and wheezing. Cardiovascular: Negative. Negative for chest pain and palpitations. Gastrointestinal: Negative. Negative for abdominal pain, nausea and vomiting. Endocrine: Negative for cold intolerance and heat intolerance. Genitourinary: Negative. Negative for dysuria, frequency and urgency. Musculoskeletal: Negative. Negative for arthralgias and back pain. Skin: Negative. Negative for rash and wound. Neurological: Negative. Negative for syncope, numbness and headaches. Hematological: Negative. Psychiatric/Behavioral:  Negative for agitation and sleep disturbance. The patient is nervous/anxious.       Past Medical History:   Diagnosis Date    Anxiety     Chronic knee pain     Depression     Hypertension     Hypocalcemia     Hypomagnesemia     Neuropathy     left leg and foot    Substance abuse ([facility] [address]     Thyroid disease      Past Surgical History:   Procedure Laterality Date    ARM SURGERY      CHOLECYSTECTOMY COLONOSCOPY      Dr Devi Fisher at [address] [place] ([facility])      2012    KNEE SURGERY      4 left . 2 right    PARATHYROID GLAND SURGERY      PARATHYROID GLAND SURGERY      TOTAL KNEE ARTHROPLASTY       Family History   Problem Relation Age of Onset    High Blood Pressure Mother     Heart Disease Mother     Cancer Father       Social History     Tobacco Use   Smoking Status Every Day    Packs/day: 0.50    Years: 30.00    Pack years: 15.00    Types: Cigarettes   Smokeless Tobacco Never       OBJECTIVE:   Wt Readings from Last 3 Encounters:   03/14/23 104 lb 9.6 oz (47.4 kg)   02/27/23 103 lb 9.6 oz (47 kg)   11/10/22 104 lb (47.2 kg)     BP Readings from Last 3 Encounters:   03/14/23 (!) 148/98   02/27/23 128/82   11/01/22 (!) 140/82       BP (!) 148/98 (Site: Right Upper Arm, Position: Sitting, Cuff Size: Medium Adult)   Pulse (!) 110   Temp 98.5 °F (36.9 °C) (Temporal)   Wt 104 lb 9.6 oz (47.4 kg)   LMP 04/25/2011   SpO2 98% Comment: room air  BMI 16.88 kg/m²      Physical Exam  Vitals and nursing note reviewed. Constitutional:       Appearance: Normal appearance. She is well-developed. HENT:      Head: Normocephalic and atraumatic. Right Ear: External ear normal.      Left Ear: External ear normal.      Nose: Nose normal.      Mouth/Throat:      Mouth: Mucous membranes are moist.      Pharynx: Oropharynx is clear. Eyes:      Conjunctiva/sclera: Conjunctivae normal.      Pupils: Pupils are equal, round, and reactive to light. Neck:      Thyroid: No thyromegaly. Vascular: No JVD. Cardiovascular:      Rate and Rhythm: Normal rate and regular rhythm. Heart sounds: Normal heart sounds. Pulmonary:      Effort: Pulmonary effort is normal.      Breath sounds: Normal breath sounds. No wheezing or rales. Abdominal:      General: Bowel sounds are normal. There is no distension. Palpations: Abdomen is soft. Tenderness: There is no abdominal tenderness.    Musculoskeletal: General: No tenderness. Cervical back: Neck supple. No rigidity. No muscular tenderness. Right lower leg: No edema. Left lower leg: No edema. Skin:     Findings: No erythema or rash. Neurological:      General: No focal deficit present. Mental Status: She is alert and oriented to person, place, and time. Psychiatric:         Mood and Affect: Mood is anxious. Affect is labile. Behavior: Behavior normal.         Judgment: Judgment normal.       Lab Results   Component Value Date/Time     08/30/2022 04:05 PM    K 3.8 08/30/2022 04:05 PM     08/30/2022 04:05 PM    CO2 28 08/30/2022 04:05 PM    GLUCOSE 88 08/30/2022 04:05 PM    BUN 15 08/30/2022 04:05 PM    CREATININE 1.1 08/30/2022 04:05 PM    CALCIUM 9.0 08/30/2022 04:05 PM    PROT 7.1 08/30/2022 04:05 PM    LABALBU 4.7 08/30/2022 04:05 PM    BILITOT 0.3 08/30/2022 04:05 PM    ALT 9 08/30/2022 04:05 PM    AST 17 08/30/2022 04:05 PM       Hemoglobin A1C (%)   Date Value   03/12/2014 5.2     Microscopic Examination (no units)   Date Value   12/31/2016 YES     LDL Calculated (mg/dL)   Date Value   02/03/2021 111         Lab Results   Component Value Date/Time    WBC 9.9 08/30/2022 04:05 PM    NEUTROABS 6.5 08/30/2022 04:05 PM    HGB 13.9 08/30/2022 04:05 PM    HCT 43.0 08/30/2022 04:05 PM    MCV 92.5 08/30/2022 04:05 PM     08/30/2022 04:05 PM       Lab Results   Component Value Date    TSH 1.49 02/03/2021         ASSESSMENT/PLAN:     1. Panic attack  Continue BuSpar 5 mg 3 times daily Vistaril 25 mg 3 times daily as needed and Cymbalta 30 mg daily.  - ALPRAZolam (XANAX) 0.5 MG tablet; Take 1 tablet by mouth daily as needed for Sleep or Anxiety for up to 30 days. Max Daily Amount: 0.5 mg  Dispense: 20 tablet; Refill: 1    2. Anxiety    - busPIRone (BUSPAR) 5 MG tablet; Take 1 tablet by mouth 2 times daily  Dispense: 60 tablet; Refill: 0    3. Primary hypertension  Lisinopril 30 mg daily.   Amlodipine 5 mg daily.      Orders Placed This Encounter   Medications    ALPRAZolam (XANAX) 0.5 MG tablet     Sig: Take 1 tablet by mouth daily as needed for Sleep or Anxiety for up to 30 days.  Max Daily Amount: 0.5 mg     Dispense:  20 tablet     Refill:  1    busPIRone (BUSPAR) 5 MG tablet     Sig: Take 1 tablet by mouth 2 times daily     Dispense:  60 tablet     Refill:  0        Huma URIOSTEGUI MA am scribing for and in the presence of CHARLES Orozco on this date of 03/17/23 at 10:10 PM

## 2023-04-05 DIAGNOSIS — F41.9 ANXIETY: ICD-10-CM

## 2023-04-05 DIAGNOSIS — I10 PRIMARY HYPERTENSION: ICD-10-CM

## 2023-04-05 RX ORDER — HYDROXYZINE HYDROCHLORIDE 25 MG/1
TABLET, FILM COATED ORAL
Qty: 90 TABLET | Refills: 0 | Status: SHIPPED | OUTPATIENT
Start: 2023-04-05

## 2023-04-05 RX ORDER — LISINOPRIL 30 MG/1
30 TABLET ORAL DAILY
Qty: 30 TABLET | Refills: 5 | Status: SHIPPED | OUTPATIENT
Start: 2023-04-05

## 2023-04-05 RX ORDER — BUSPIRONE HYDROCHLORIDE 5 MG/1
5 TABLET ORAL 2 TIMES DAILY
Qty: 60 TABLET | Refills: 0 | Status: SHIPPED | OUTPATIENT
Start: 2023-04-05 | End: 2023-05-05

## 2023-04-28 DIAGNOSIS — I10 PRIMARY HYPERTENSION: ICD-10-CM

## 2023-04-28 DIAGNOSIS — F39 MOOD DISORDER (HCC): ICD-10-CM

## 2023-04-28 DIAGNOSIS — G89.4 CHRONIC PAIN SYNDROME: ICD-10-CM

## 2023-04-28 RX ORDER — AMLODIPINE BESYLATE 5 MG/1
5 TABLET ORAL EVERY EVENING
Qty: 30 TABLET | Refills: 1 | Status: SHIPPED | OUTPATIENT
Start: 2023-04-28

## 2023-04-28 RX ORDER — LAMOTRIGINE 25 MG/1
25 TABLET ORAL 2 TIMES DAILY
Qty: 60 TABLET | Refills: 2 | Status: SHIPPED | OUTPATIENT
Start: 2023-04-28 | End: 2023-05-28

## 2023-04-28 RX ORDER — METHOCARBAMOL 750 MG/1
TABLET, FILM COATED ORAL
Qty: 90 TABLET | Refills: 0 | Status: SHIPPED | OUTPATIENT
Start: 2023-04-28

## 2023-04-28 RX ORDER — DULOXETIN HYDROCHLORIDE 30 MG/1
CAPSULE, DELAYED RELEASE ORAL
Qty: 30 CAPSULE | Refills: 3 | Status: SHIPPED | OUTPATIENT
Start: 2023-04-28

## 2023-05-02 DIAGNOSIS — G89.4 CHRONIC PAIN SYNDROME: ICD-10-CM

## 2023-05-10 RX ORDER — GABAPENTIN 800 MG/1
TABLET ORAL
Qty: 90 TABLET | Refills: 0 | Status: SHIPPED | OUTPATIENT
Start: 2023-05-10 | End: 2023-06-02

## 2023-05-17 DIAGNOSIS — F41.0 PANIC ATTACK: ICD-10-CM

## 2023-05-19 RX ORDER — ALPRAZOLAM 0.5 MG/1
TABLET ORAL
Qty: 20 TABLET | Refills: 1 | OUTPATIENT
Start: 2023-05-19 | End: 2023-06-18

## 2023-05-24 ENCOUNTER — OFFICE VISIT (OUTPATIENT)
Dept: PRIMARY CARE CLINIC | Age: 58
End: 2023-05-24
Payer: MEDICAID

## 2023-05-24 VITALS
HEIGHT: 66 IN | DIASTOLIC BLOOD PRESSURE: 79 MMHG | WEIGHT: 105.4 LBS | RESPIRATION RATE: 16 BRPM | TEMPERATURE: 98.4 F | BODY MASS INDEX: 16.94 KG/M2 | SYSTOLIC BLOOD PRESSURE: 116 MMHG | OXYGEN SATURATION: 98 % | HEART RATE: 87 BPM

## 2023-05-24 DIAGNOSIS — I10 PRIMARY HYPERTENSION: ICD-10-CM

## 2023-05-24 DIAGNOSIS — F39 MOOD DISORDER (HCC): ICD-10-CM

## 2023-05-24 DIAGNOSIS — F33.2 SEVERE EPISODE OF RECURRENT MAJOR DEPRESSIVE DISORDER, WITHOUT PSYCHOTIC FEATURES (HCC): Primary | ICD-10-CM

## 2023-05-24 PROCEDURE — 3078F DIAST BP <80 MM HG: CPT | Performed by: NURSE PRACTITIONER

## 2023-05-24 PROCEDURE — 99214 OFFICE O/P EST MOD 30 MIN: CPT | Performed by: NURSE PRACTITIONER

## 2023-05-24 PROCEDURE — 3074F SYST BP LT 130 MM HG: CPT | Performed by: NURSE PRACTITIONER

## 2023-05-24 RX ORDER — ALPRAZOLAM 0.25 MG/1
0.25 TABLET ORAL NIGHTLY PRN
COMMUNITY
End: 2023-05-24 | Stop reason: ALTCHOICE

## 2023-05-24 RX ORDER — AMANTADINE HYDROCHLORIDE 100 MG/1
TABLET ORAL
COMMUNITY

## 2023-05-24 RX ORDER — ALPRAZOLAM 0.25 MG/1
0.25 TABLET ORAL NIGHTLY PRN
Qty: 10 TABLET | Refills: 0 | Status: SHIPPED | OUTPATIENT
Start: 2023-05-24 | End: 2023-06-03

## 2023-05-24 ASSESSMENT — ENCOUNTER SYMPTOMS
ALLERGIC/IMMUNOLOGIC NEGATIVE: 1
RESPIRATORY NEGATIVE: 1
EYES NEGATIVE: 1
GASTROINTESTINAL NEGATIVE: 1

## 2023-05-24 NOTE — PATIENT INSTRUCTIONS
patches by folding them in half so that the sticky sides meet, and then flushing them down a toilet. They should not be placed in the household trash where children or pets can find them. If you have any questions, ask your provider or pharmacist for more information. Be sure to keep all appointments for provider visits or tests. We are committed to providing you with the best care possible. In order to help us achieve these goals please remember to bring all medications, herbal products, and over the counter supplements with you to each visit. If your provider has ordered testing for you, please be sure to follow up with our office if you have not received results within 7 days after the testing took place. *If you receive a survey after visiting one of our offices, please take time to share your experience concerning your physician office visit. These surveys are confidential and no health information about you is shared. We are eager to improve for you and we are counting on your feedback to help make that happen. Thank you for enrolling in [facility]. Please follow the instructions below to securely access your online medical record. [employer] allows you to send messages to your doctor, view your test results, renew your prescriptions, schedule appointments, and more. How Do I Sign Up? In your Internet browser, go to [email or web address]  Click on the Sign Up Now link in the Sign In box. You will see the New Member Sign Up page. Enter your [employer] Access Code exactly as it appears below. You will not need to use this code after youve completed the sign-up process. If you do not sign up before the expiration date, you must request a new code. [employer] Access Code: Activation code not generated  Current [employer] Status: Active    Enter your Social Security Number ([id number]) and Date of Birth (mm/dd/yyyy) as indicated and click Submit.  You will be taken to the next

## 2023-05-24 NOTE — PROGRESS NOTES
were reviewed and updated as appropriate. Review of Systems   Constitutional: Negative. HENT: Negative. Eyes: Negative. Respiratory: Negative. Cardiovascular: Negative. Gastrointestinal: Negative. Endocrine: Negative. Genitourinary: Negative. Musculoskeletal:  Positive for arthralgias. Skin: Negative. Allergic/Immunologic: Negative. Neurological: Negative. Hematological: Negative. Psychiatric/Behavioral:  Positive for agitation and sleep disturbance. The patient is nervous/anxious. OBJECTIVE:  /79 (Site: Right Upper Arm, Position: Sitting, Cuff Size: Small Adult)   Pulse 87   Temp 98.4 °F (36.9 °C) (Temporal)   Resp 16   Ht 5' 6\" (1.676 m)   Wt 105 lb 6.4 oz (47.8 kg)   LMP 04/25/2011   SpO2 98% Comment: room air  BMI 17.01 kg/m²    Physical Exam  Vitals and nursing note reviewed. Constitutional:       Appearance: She is well-developed. HENT:      Head: Normocephalic and atraumatic. Eyes:      Conjunctiva/sclera: Conjunctivae normal.      Pupils: Pupils are equal, round, and reactive to light. Neck:      Thyroid: No thyromegaly. Vascular: No JVD. Cardiovascular:      Rate and Rhythm: Normal rate and regular rhythm. Heart sounds: No murmur heard. No friction rub. No gallop. Pulmonary:      Effort: Pulmonary effort is normal. No respiratory distress. Breath sounds: Normal breath sounds. No wheezing or rales. Abdominal:      General: Bowel sounds are normal. There is no distension. Palpations: Abdomen is soft. Tenderness: There is no abdominal tenderness. There is no guarding. Musculoskeletal:         General: No tenderness. Cervical back: Normal range of motion and neck supple. Skin:     General: Skin is warm and dry. Findings: No rash. Neurological:      Mental Status: She is alert and oriented to person, place, and time. Psychiatric:         Mood and Affect: Mood is anxious.  Affect is

## 2023-05-25 RX ORDER — HYDROXYZINE HYDROCHLORIDE 25 MG/1
TABLET, FILM COATED ORAL
Qty: 90 TABLET | Refills: 0 | Status: SHIPPED | OUTPATIENT
Start: 2023-05-25

## 2023-06-20 ENCOUNTER — PATIENT MESSAGE (OUTPATIENT)
Dept: PRIMARY CARE CLINIC | Age: 58
End: 2023-06-20

## 2023-06-22 DIAGNOSIS — G89.4 CHRONIC PAIN SYNDROME: ICD-10-CM

## 2023-06-22 DIAGNOSIS — F33.2 SEVERE EPISODE OF RECURRENT MAJOR DEPRESSIVE DISORDER, WITHOUT PSYCHOTIC FEATURES (HCC): ICD-10-CM

## 2023-06-30 RX ORDER — ALPRAZOLAM 0.25 MG/1
0.25 TABLET ORAL NIGHTLY PRN
Qty: 10 TABLET | Refills: 0 | OUTPATIENT
Start: 2023-06-30 | End: 2023-07-10

## 2023-06-30 RX ORDER — GABAPENTIN 800 MG/1
TABLET ORAL
Qty: 90 TABLET | Refills: 0 | Status: SHIPPED | OUTPATIENT
Start: 2023-06-30 | End: 2023-07-21

## 2023-07-05 ENCOUNTER — TELEPHONE (OUTPATIENT)
Dept: PRIMARY CARE CLINIC | Age: 58
End: 2023-07-05

## 2023-07-05 NOTE — TELEPHONE ENCOUNTER
Called and Spoke to Darinel at [facility] and cancelled patient's gabapentin refills due to issue with drug screen.

## 2023-11-13 ENCOUNTER — HOSPITAL ENCOUNTER (OUTPATIENT)
Dept: MAMMOGRAPHY | Facility: HOSPITAL | Age: 58
Discharge: HOME | End: 2023-11-13
Payer: MEDICARE

## 2023-11-13 VITALS — BODY MASS INDEX: 17.27 KG/M2 | WEIGHT: 107 LBS

## 2023-11-13 DIAGNOSIS — Z12.31 BREAST CANCER SCREENING BY MAMMOGRAM: ICD-10-CM

## 2023-11-13 PROCEDURE — 77063 BREAST TOMOSYNTHESIS BI: CPT

## 2024-02-26 ENCOUNTER — APPOINTMENT (OUTPATIENT)
Dept: CT IMAGING | Facility: HOSPITAL | Age: 59
End: 2024-02-26
Attending: EMERGENCY MEDICINE
Payer: COMMERCIAL

## 2024-02-26 ENCOUNTER — HOSPITAL ENCOUNTER (EMERGENCY)
Facility: HOSPITAL | Age: 59
Discharge: HOME | End: 2024-02-26
Attending: EMERGENCY MEDICINE
Payer: COMMERCIAL

## 2024-02-26 ENCOUNTER — APPOINTMENT (OUTPATIENT)
Dept: GENERAL RADIOLOGY | Facility: HOSPITAL | Age: 59
End: 2024-02-26
Attending: EMERGENCY MEDICINE
Payer: COMMERCIAL

## 2024-02-26 VITALS
RESPIRATION RATE: 12 BRPM | TEMPERATURE: 98.7 F | WEIGHT: 110 LBS | DIASTOLIC BLOOD PRESSURE: 106 MMHG | HEART RATE: 82 BPM | OXYGEN SATURATION: 96 % | BODY MASS INDEX: 17.75 KG/M2 | SYSTOLIC BLOOD PRESSURE: 164 MMHG

## 2024-02-26 DIAGNOSIS — T14.8XXA ABRASION: ICD-10-CM

## 2024-02-26 DIAGNOSIS — J18.9 PNEUMONIA OF BOTH LUNGS DUE TO INFECTIOUS ORGANISM, UNSPECIFIED PART OF LUNG: ICD-10-CM

## 2024-02-26 DIAGNOSIS — I10 ESSENTIAL HYPERTENSION: ICD-10-CM
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
## 2024-02-26 DIAGNOSIS — S00.83XA CONTUSION OF FACE, INITIAL ENCOUNTER: ICD-10-CM

## 2024-02-26 LAB
ALBUMIN SERPL-MCNC: 3.8 G/DL (ref 3.4–4.8)
ALBUMIN/GLOB SERPL: 1.3 {RATIO} (ref 0.8–2)
ALP SERPL-CCNC: 146 U/L (ref 25–100)
ALT SERPL-CCNC: 12 U/L (ref 4–36)
ANION GAP SERPL CALCULATED.3IONS-SCNC: 12 MMOL/L (ref 3–16)
AST SERPL-CCNC: 22 U/L (ref 8–33)
BASOPHILS # BLD: 0.1 K/UL (ref 0–0.1)
BASOPHILS NFR BLD: 0.6 %
BILIRUB SERPL-MCNC: 0.3 MG/DL (ref 0.3–1.2)
BUN SERPL-MCNC: 23 MG/DL (ref 6–20)
CALCIUM SERPL-MCNC: 9.4 MG/DL (ref 8.5–10.5)
CHLORIDE SERPL-SCNC: 96 MMOL/L (ref 98–107)
CO2 SERPL-SCNC: 26 MMOL/L (ref 20–30)
CREAT SERPL-MCNC: 0.9 MG/DL (ref 0.4–1.2)
EOSINOPHIL # BLD: 0.2 K/UL (ref 0–0.4)
EOSINOPHIL NFR BLD: 2.5 %
ERYTHROCYTE [DISTWIDTH] IN BLOOD BY AUTOMATED COUNT: 14 % (ref 11–16)
GFR SERPLBLD CREATININE-BSD FMLA CKD-EPI: >60 ML/MIN/{1.73_M2}
GLOBULIN SER CALC-MCNC: 3 G/DL
GLUCOSE SERPL-MCNC: 83 MG/DL (ref 74–106)
HCT VFR BLD AUTO: 35.6 % (ref 37–47)
HGB BLD-MCNC: 11.5 G/DL (ref 11.5–16.5)
IMM GRANULOCYTES # BLD: 0 K/UL
IMM GRANULOCYTES NFR BLD: 0.2 % (ref 0–5)
LYMPHOCYTES # BLD: 1.8 K/UL (ref 1.5–4)
LYMPHOCYTES NFR BLD: 20.7 %
MCH RBC QN AUTO: 29.5 PG (ref 27–32)
MCHC RBC AUTO-ENTMCNC: 32.3 G/DL (ref 31–35)
MCV RBC AUTO: 91.3 FL (ref 80–100)
MONOCYTES # BLD: 0.5 K/UL (ref 0.2–0.8)
MONOCYTES NFR BLD: 6.1 %
NEUTROPHILS # BLD: 6.2 K/UL (ref 2–7.5)
NEUTS SEG NFR BLD: 69.9 %
PLATELET # BLD AUTO: 320 K/UL (ref 150–400)
PMV BLD AUTO: 9.5 FL (ref 6–10)
POTASSIUM SERPL-SCNC: 3.6 MMOL/L (ref 3.4–5.1)
PROT SERPL-MCNC: 6.8 G/DL (ref 6.4–8.3)
RBC # BLD AUTO: 3.9 M/UL (ref 3.8–5.8)
SODIUM SERPL-SCNC: 134 MMOL/L (ref 136–145)
WBC # BLD AUTO: 8.8 K/UL (ref 4–11)

## 2024-02-26 PROCEDURE — 71260 CT THORAX DX C+: CPT

## 2024-02-26 PROCEDURE — 85025 COMPLETE CBC W/AUTO DIFF WBC: CPT

## 2024-02-26 PROCEDURE — 72125 CT NECK SPINE W/O DYE: CPT

## 2024-02-26 PROCEDURE — 90471 IMMUNIZATION ADMIN: CPT | Performed by: EMERGENCY MEDICINE

## 2024-02-26 PROCEDURE — 6360000002 HC RX W HCPCS: Performed by: EMERGENCY MEDICINE

## 2024-02-26 PROCEDURE — 36415 COLL VENOUS BLD VENIPUNCTURE: CPT

## 2024-02-26 PROCEDURE — 96374 THER/PROPH/DIAG INJ IV PUSH: CPT

## 2024-02-26 PROCEDURE — 71045 X-RAY EXAM CHEST 1 VIEW: CPT

## 2024-02-26 PROCEDURE — 80053 COMPREHEN METABOLIC PANEL: CPT

## 2024-02-26 PROCEDURE — 73590 X-RAY EXAM OF LOWER LEG: CPT

## 2024-02-26 PROCEDURE — 90715 TDAP VACCINE 7 YRS/> IM: CPT | Performed by: EMERGENCY MEDICINE

## 2024-02-26 PROCEDURE — 99285 EMERGENCY DEPT VISIT HI MDM: CPT

## 2024-02-26 PROCEDURE — 70450 CT HEAD/BRAIN W/O DYE: CPT

## 2024-02-26 PROCEDURE — 6360000004 HC RX CONTRAST MEDICATION: Performed by: EMERGENCY MEDICINE

## 2024-02-26 PROCEDURE — 72170 X-RAY EXAM OF PELVIS: CPT

## 2024-02-26 PROCEDURE — 74177 CT ABD & PELVIS W/CONTRAST: CPT

## 2024-02-26 RX ORDER — FENTANYL CITRATE 50 UG/ML
50 INJECTION, SOLUTION INTRAMUSCULAR; INTRAVENOUS ONCE
Status: COMPLETED | OUTPATIENT
Start: 2024-02-26 | End: 2024-02-26

## 2024-02-26 RX ORDER — DOXYCYCLINE HYCLATE 100 MG
100 TABLET ORAL 2 TIMES DAILY
Qty: 14 TABLET | Refills: 0 | Status: SHIPPED | OUTPATIENT
Start: 2024-02-26 | End: 2024-03-04

## 2024-02-26 RX ORDER — TIZANIDINE 4 MG/1
4 TABLET ORAL EVERY 6 HOURS PRN
Qty: 30 TABLET | Refills: 0 | Status: SHIPPED | OUTPATIENT
Start: 2024-02-26

## 2024-02-26 RX ADMIN — FENTANYL CITRATE 50 MCG: 50 INJECTION INTRAMUSCULAR; INTRAVENOUS at 15:07

## 2024-02-26 RX ADMIN — TETANUS TOXOID, REDUCED DIPHTHERIA TOXOID AND ACELLULAR PERTUSSIS VACCINE, ADSORBED 0.5 ML: 5; 2.5; 8; 8; 2.5 SUSPENSION INTRAMUSCULAR at 15:06

## 2024-02-26 RX ADMIN — IOPAMIDOL 100 ML: 755 INJECTION, SOLUTION INTRAVENOUS at 14:53

## 2024-02-26 ASSESSMENT — PAIN DESCRIPTION - DESCRIPTORS: DESCRIPTORS: ACHING

## 2024-02-26 ASSESSMENT — PAIN SCALES - GENERAL: PAINLEVEL_OUTOF10: 6

## 2024-02-26 ASSESSMENT — PAIN DESCRIPTION - ORIENTATION: ORIENTATION: RIGHT;LEFT

## 2024-02-26 ASSESSMENT — PAIN DESCRIPTION - LOCATION: LOCATION: LEG;HEAD
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
## 2024-03-11 ENCOUNTER — HOSPITAL ENCOUNTER (EMERGENCY)
Facility: HOSPITAL | Age: 59
Discharge: HOME | End: 2024-03-11
Attending: EMERGENCY MEDICINE
Payer: MEDICARE

## 2024-03-11 VITALS
OXYGEN SATURATION: 98 % | HEART RATE: 66 BPM | WEIGHT: 104 LBS | DIASTOLIC BLOOD PRESSURE: 96 MMHG | BODY MASS INDEX: 16.71 KG/M2 | TEMPERATURE: 97.5 F | SYSTOLIC BLOOD PRESSURE: 151 MMHG | RESPIRATION RATE: 16 BRPM | HEIGHT: 66 IN

## 2024-03-11 DIAGNOSIS — I10 ESSENTIAL HYPERTENSION: ICD-10-CM

## 2024-03-11 DIAGNOSIS — R51.9 NONINTRACTABLE HEADACHE, UNSPECIFIED CHRONICITY PATTERN, UNSPECIFIED HEADACHE TYPE: Primary | ICD-10-CM

## 2024-03-11 DIAGNOSIS — R11.2 NAUSEA AND VOMITING, UNSPECIFIED VOMITING TYPE: ICD-10-CM

## 2024-03-11 PROCEDURE — 99284 EMERGENCY DEPT VISIT MOD MDM: CPT

## 2024-03-11 PROCEDURE — 6370000000 HC RX 637 (ALT 250 FOR IP): Performed by: EMERGENCY MEDICINE

## 2024-03-11 PROCEDURE — 96372 THER/PROPH/DIAG INJ SC/IM: CPT

## 2024-03-11 RX ORDER — LISINOPRIL 10 MG/1
10 TABLET ORAL DAILY
COMMUNITY

## 2024-03-11 RX ORDER — ONDANSETRON 4 MG/1
4 TABLET, ORALLY DISINTEGRATING ORAL 3 TIMES DAILY PRN
Qty: 21 TABLET | Refills: 0 | Status: SHIPPED | OUTPATIENT
Start: 2024-03-11

## 2024-03-11 RX ORDER — SUMATRIPTAN 6 MG/.5ML
6 INJECTION, SOLUTION SUBCUTANEOUS ONCE
Status: COMPLETED | OUTPATIENT
Start: 2024-03-11 | End: 2024-03-11

## 2024-03-11 RX ADMIN — SUMATRIPTAN 6 MG: 6 INJECTION, SOLUTION SUBCUTANEOUS at 15:14

## 2024-03-11 ASSESSMENT — PAIN DESCRIPTION - LOCATION
LOCATION: HEAD

## 2024-03-11 ASSESSMENT — PAIN SCALES - GENERAL
PAINLEVEL_OUTOF10: 10
PAINLEVEL_OUTOF10: 5
PAINLEVEL_OUTOF10: 3

## 2024-03-11 ASSESSMENT — LIFESTYLE VARIABLES
HOW MANY STANDARD DRINKS CONTAINING ALCOHOL DO YOU HAVE ON A TYPICAL DAY: PATIENT DOES NOT DRINK
HOW OFTEN DO YOU HAVE A DRINK CONTAINING ALCOHOL: NEVER
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
## 2024-03-11 NOTE — ED PROVIDER NOTES
breath, and difficulty breathing.    Nursing Notes were all reviewed and agreed with or any disagreements were addressed in the HPI.    REVIEW OF SYSTEMS :      Review of Systems    Review of systems reviewed and negative except as in HPI/MDM    PAST MEDICAL HISTORY     Past Medical History:   Diagnosis Date    Anxiety     Chronic knee pain     Depression     Hypertension     Hypocalcemia     Hypomagnesemia     Neuropathy     left leg and foot    Substance abuse (HCC)     Thyroid disease        SURGICAL HISTORY     Past Surgical History:   Procedure Laterality Date    ARM SURGERY      CHOLECYSTECTOMY      COLONOSCOPY      Dr Feng at     HYSTERECTOMY (CERVIX STATUS UNKNOWN)      2012    KNEE SURGERY      4 left .2 right    PARATHYROID GLAND SURGERY      PARATHYROID GLAND SURGERY      TOTAL KNEE ARTHROPLASTY         CURRENTMEDICATIONS       Discharge Medication List as of 3/11/2024  5:05 PM        CONTINUE these medications which have NOT CHANGED    Details   lisinopril (PRINIVIL;ZESTRIL) 10 MG tablet Take 1 tablet by mouth dailyHistorical Med      tiZANidine (ZANAFLEX) 4 MG tablet Take 1 tablet by mouth every 6 hours as needed (MUSCLE SPASM), Disp-30 tablet, R-0Normal      amLODIPine (NORVASC) 5 MG tablet TAKE ONE TABLET BY MOUTH EVERY EVENING, Disp-30 tablet, R-1Normal      hydrOXYzine HCl (ATARAX) 25 MG tablet TAKE ONE TABLET BY MOUTH THREE TIMES A DAY, Disp-90 tablet, R-0Normal      Amantadine (SYMMETREL) 100 MG TABS tablet amantadine HCl 100 mg tabletHistorical Med      cariprazine hcl (VRAYLAR) 1.5 MG capsule Take 1 capsule by mouth daily, Disp-30 capsule, R-2Normal      meloxicam (MOBIC) 15 MG tablet Take 1 tablet by mouth dailyHistorical Med      naloxone 4 MG/0.1ML LIQD nasal spray 1 spray by Nasal route as neededHistorical Med      HYDROcodone-acetaminophen (NORCO)  MG per tablet TAKE 1 TABLET BY MOUTH EVERY 6 HOURS AS NEEDEDHistorical Med      traMADol (ULTRAM) 50 MG tablet Take 1 tablet by mouth

## 2024-03-11 NOTE — ED NOTES
Patient reports that she has not been taking all of her home medications. She is in between providers.

## 2024-06-30 ENCOUNTER — HOSPITAL ENCOUNTER (EMERGENCY)
Facility: HOSPITAL | Age: 59
Discharge: HOME | End: 2024-06-30
Attending: EMERGENCY MEDICINE
Payer: MEDICARE

## 2024-06-30 VITALS
WEIGHT: 105 LBS | OXYGEN SATURATION: 97 % | HEART RATE: 71 BPM | RESPIRATION RATE: 16 BRPM | BODY MASS INDEX: 16.88 KG/M2 | TEMPERATURE: 98.4 F | DIASTOLIC BLOOD PRESSURE: 80 MMHG | HEIGHT: 66 IN | SYSTOLIC BLOOD PRESSURE: 136 MMHG

## 2024-06-30 DIAGNOSIS — M62.838 NECK MUSCLE SPASM: Primary | ICD-10-CM

## 2024-06-30 PROCEDURE — 6360000002 HC RX W HCPCS: Performed by: EMERGENCY MEDICINE

## 2024-06-30 PROCEDURE — 96372 THER/PROPH/DIAG INJ SC/IM: CPT

## 2024-06-30 PROCEDURE — 99284 EMERGENCY DEPT VISIT MOD MDM: CPT

## 2024-06-30 RX ORDER — IBUPROFEN 600 MG/1
600 TABLET ORAL 4 TIMES DAILY PRN
Qty: 40 TABLET | Refills: 0 | Status: SHIPPED | OUTPATIENT
Start: 2024-06-30

## 2024-06-30 RX ORDER — KETOROLAC TROMETHAMINE 15 MG/ML
15 INJECTION, SOLUTION INTRAMUSCULAR; INTRAVENOUS ONCE
Status: COMPLETED | OUTPATIENT
Start: 2024-06-30 | End: 2024-06-30

## 2024-06-30 RX ORDER — TIZANIDINE 4 MG/1
4 TABLET ORAL EVERY 6 HOURS PRN
Qty: 12 TABLET | Refills: 0 | Status: SHIPPED | OUTPATIENT
Start: 2024-06-30 | End: 2024-07-03

## 2024-06-30 RX ORDER — ORPHENADRINE CITRATE 30 MG/ML
60 INJECTION INTRAMUSCULAR; INTRAVENOUS EVERY 12 HOURS
Status: DISCONTINUED | OUTPATIENT
Start: 2024-06-30 | End: 2024-06-30 | Stop reason: HOSPADM

## 2024-06-30 RX ADMIN — KETOROLAC TROMETHAMINE 15 MG: 15 INJECTION, SOLUTION INTRAMUSCULAR; INTRAVENOUS at 17:47

## 2024-06-30 RX ADMIN — ORPHENADRINE CITRATE 60 MG: 60 INJECTION INTRAMUSCULAR; INTRAVENOUS at 17:49

## 2024-06-30 ASSESSMENT — PAIN DESCRIPTION - LOCATION
LOCATION: NECK

## 2024-06-30 ASSESSMENT — PAIN SCALES - GENERAL
PAINLEVEL_OUTOF10: 7
PAINLEVEL_OUTOF10: 9

## 2024-06-30 ASSESSMENT — PAIN DESCRIPTION - ORIENTATION: ORIENTATION: LEFT

## 2024-06-30 ASSESSMENT — PAIN - FUNCTIONAL ASSESSMENT: PAIN_FUNCTIONAL_ASSESSMENT: 0-10

## 2024-06-30 NOTE — ED PROVIDER NOTES
the skin 2 times daily    HYDROCODONE-ACETAMINOPHEN (NORCO)  MG PER TABLET    TAKE 1 TABLET BY MOUTH EVERY 6 HOURS AS NEEDED    HYDROXYZINE HCL (ATARAX) 25 MG TABLET    TAKE ONE TABLET BY MOUTH THREE TIMES A DAY    IBUPROFEN-FAMOTIDINE 800-26.6 MG TABS    Take 1 tablet by mouth daily    LISINOPRIL (PRINIVIL;ZESTRIL) 10 MG TABLET    Take 1 tablet by mouth daily    MELOXICAM (MOBIC) 15 MG TABLET    Take 1 tablet by mouth daily    NALOXONE 4 MG/0.1ML LIQD NASAL SPRAY    1 spray by Nasal route as needed    ONDANSETRON (ZOFRAN-ODT) 4 MG DISINTEGRATING TABLET    Take 1 tablet by mouth 3 times daily as needed for Nausea or Vomiting    TRAMADOL (ULTRAM) 50 MG TABLET    Take 1 tablet by mouth every 6 hours as needed for Pain.       ALLERGIES     Pcn [penicillins] and Morphine and codeine    FAMILYHISTORY       Family History   Problem Relation Age of Onset    High Blood Pressure Mother     Heart Disease Mother     Cancer Father         SOCIAL HISTORY       Social History     Tobacco Use    Smoking status: Every Day     Current packs/day: 0.50     Average packs/day: 0.5 packs/day for 30.0 years (15.0 ttl pk-yrs)     Types: Cigarettes    Smokeless tobacco: Never   Substance Use Topics    Alcohol use: Yes     Alcohol/week: 3.0 standard drinks of alcohol     Types: 3 Cans of beer per week     Comment: pt denies any this ETOH in lat 2 weeks    Drug use: Yes     Types: Cocaine       SCREENINGS        Sterling Coma Scale  Eye Opening: Spontaneous  Best Verbal Response: Oriented  Best Motor Response: Obeys commands  Dietrich Coma Scale Score: 15                CIWA Assessment  BP: (!) 154/90  Pulse: 71           PHYSICAL EXAM  1 or more Elements     ED Triage Vitals [06/30/24 1700]   BP Temp Temp Source Pulse Respirations SpO2 Height Weight - Scale   (!) 154/90 98.4 °F (36.9 °C) Oral 71 16 99 % 1.676 m (5' 6\") 47.6 kg (105 lb)       Physical Exam    My pulse oximetry interpretation is99% which is within the normal

## 2024-06-30 NOTE — ED NOTES
Discharge instructions and medications discussed with patient, verbalize understanding. Patient alert and oriented x 4, no further questions or concerns. Patient's pain improved. Patient's friend, Chika, present to transport home.

## 2024-06-30 NOTE — ED NOTES
Dr. Warner ordered Norflex, but patient does not have a ride home. She is attempting to contact a friend to come pcik her up. RN will give Norflex when friend is present. If friend cannot pick patient up, will give ketorolac only.
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
## 2024-10-26 ENCOUNTER — HOSPITAL ENCOUNTER (EMERGENCY)
Facility: HOSPITAL | Age: 59
Discharge: HOME | End: 2024-10-26
Attending: EMERGENCY MEDICINE
Payer: MEDICARE

## 2024-10-26 ENCOUNTER — APPOINTMENT (OUTPATIENT)
Dept: GENERAL RADIOLOGY | Facility: HOSPITAL | Age: 59
End: 2024-10-26
Attending: EMERGENCY MEDICINE
Payer: MEDICARE

## 2024-10-26 VITALS
WEIGHT: 105 LBS | HEIGHT: 66 IN | DIASTOLIC BLOOD PRESSURE: 77 MMHG | SYSTOLIC BLOOD PRESSURE: 149 MMHG | TEMPERATURE: 98.3 F | OXYGEN SATURATION: 99 % | HEART RATE: 92 BPM | BODY MASS INDEX: 16.88 KG/M2 | RESPIRATION RATE: 16 BRPM

## 2024-10-26 DIAGNOSIS — S46.311A STRAIN OF RIGHT TRICEPS, INITIAL ENCOUNTER: Primary | ICD-10-CM

## 2024-10-26 LAB
APTT BLD: 25 SEC (ref 23.4–35.4)
BASOPHILS # BLD: 0.1 K/UL (ref 0–0.1)
BASOPHILS NFR BLD: 0.6 %
EOSINOPHIL # BLD: 0.1 K/UL (ref 0–0.4)
EOSINOPHIL NFR BLD: 0.9 %
ERYTHROCYTE [DISTWIDTH] IN BLOOD BY AUTOMATED COUNT: 14.2 % (ref 11–16)
HCT VFR BLD AUTO: 41.6 % (ref 37–47)
HGB BLD-MCNC: 13.1 G/DL (ref 11.5–16.5)
IMM GRANULOCYTES # BLD: 0 K/UL
IMM GRANULOCYTES NFR BLD: 0.3 % (ref 0–5)
INR PPP: 0.92 (ref 0.9–1.1)
LYMPHOCYTES # BLD: 2.3 K/UL (ref 1.5–4)
LYMPHOCYTES NFR BLD: 17.5 %
MCH RBC QN AUTO: 29.4 PG (ref 27–32)
MCHC RBC AUTO-ENTMCNC: 31.5 G/DL (ref 31–35)
MCV RBC AUTO: 93.3 FL (ref 80–100)
MONOCYTES # BLD: 0.6 K/UL (ref 0.2–0.8)
MONOCYTES NFR BLD: 4.9 %
NEUTROPHILS # BLD: 10 K/UL (ref 2–7.5)
NEUTS SEG NFR BLD: 75.8 %
PLATELET # BLD AUTO: 249 K/UL (ref 150–400)
PMV BLD AUTO: 9.6 FL (ref 6–10)
PROTHROMBIN TIME: 12.3 SEC (ref 12.1–14)
RBC # BLD AUTO: 4.46 M/UL (ref 3.8–5.8)
WBC # BLD AUTO: 13.2 K/UL (ref 4–11)

## 2024-10-26 PROCEDURE — 96372 THER/PROPH/DIAG INJ SC/IM: CPT

## 2024-10-26 PROCEDURE — 6360000002 HC RX W HCPCS: Performed by: EMERGENCY MEDICINE

## 2024-10-26 PROCEDURE — 73060 X-RAY EXAM OF HUMERUS: CPT

## 2024-10-26 PROCEDURE — 36415 COLL VENOUS BLD VENIPUNCTURE: CPT

## 2024-10-26 PROCEDURE — 85025 COMPLETE CBC W/AUTO DIFF WBC: CPT

## 2024-10-26 PROCEDURE — 85730 THROMBOPLASTIN TIME PARTIAL: CPT

## 2024-10-26 PROCEDURE — 85610 PROTHROMBIN TIME: CPT

## 2024-10-26 PROCEDURE — 99284 EMERGENCY DEPT VISIT MOD MDM: CPT

## 2024-10-26 RX ORDER — KETOROLAC TROMETHAMINE 15 MG/ML
15 INJECTION, SOLUTION INTRAMUSCULAR; INTRAVENOUS ONCE
Status: COMPLETED | OUTPATIENT
Start: 2024-10-26 | End: 2024-10-26

## 2024-10-26 RX ADMIN — KETOROLAC TROMETHAMINE 15 MG: 15 INJECTION, SOLUTION INTRAMUSCULAR; INTRAVENOUS at 21:52

## 2024-10-26 ASSESSMENT — PAIN SCALES - GENERAL
PAINLEVEL_OUTOF10: 6
PAINLEVEL_OUTOF10: 6

## 2024-10-26 ASSESSMENT — PAIN - FUNCTIONAL ASSESSMENT: PAIN_FUNCTIONAL_ASSESSMENT: 0-10

## 2024-10-27 NOTE — ED PROVIDER NOTES
.        [facility]  EMERGENCY DEPARTMENT ENCOUNTER        Pt Name: Liliana Polanco  MRN: [id number]  Birthdate 1965  Date of evaluation: 10/26/2024  Provider: Amber Warner MD  PCP: Erika Cooley APRN - CNP  Note Started: 9:35 PM EDT 10/26/24    CHIEF COMPLAINT       Chief Complaint   Patient presents with    Arm Pain     C/o right arm pain,onset 2 nights ago,denies injuries.Pt. has bruising to right elbow region.Pt. has been hanging knotty pine to a building of hers.        HISTORY OF PRESENT ILLNESS: 1 or more Elements     History from : Patient    Limitations to history : None    Liliana Polanco is a 58 y.o. female who presents complaining of pain in her upper triceps area of the right arm which started about 2 days ago and then yesterday she noted some bruising and swelling along the medial posterior aspect of the upper arm down around the elbow and then today it seemed to be extending into the upper forearm which made her come in she has been doing a lot of work with her hands however does not remember any type of injury she is not on any anticoagulants but says that she has always bruised very easily.  She also has had an ORIF of the right humerus in the past from an injury.    Nursing Notes were all reviewed and agreed with or any disagreements were addressed in the HPI.    REVIEW OF SYSTEMS :      Review of Systems     systems reviewed and negative except as in HPI/MDM    SURGICAL HISTORY     Past Surgical History:   Procedure Laterality Date    ARM SURGERY      CHOLECYSTECTOMY      COLONOSCOPY      Dr Feng at     HYSTERECTOMY (CERVIX STATUS UNKNOWN)      2012    KNEE SURGERY      4 left .2 right    PARATHYROID GLAND SURGERY      PARATHYROID GLAND SURGERY      TOTAL KNEE ARTHROPLASTY         CURRENTMEDICATIONS       Previous Medications    AMANTADINE (SYMMETREL) 100 MG TABS TABLET    amantadine HCl 100 mg tablet    AMLODIPINE (NORVASC) 5 MG TABLET    TAKE ONE

## (undated) DEVICE — 5.5 MM INCISOR PLUS STRAIGHT                                    BLADES, POWER/EP-1, TEAL, PACKAGED 6                                    PER BOX, STERILE

## (undated) DEVICE — T-DRAPE,EXTREMITY,STERILE: Brand: MEDLINE

## (undated) DEVICE — INTENDED FOR TISSUE SEPARATION, AND OTHER PROCEDURES THAT REQUIRE A SHARP SURGICAL BLADE TO PUNCTURE OR CUT.: Brand: BARD-PARKER ® CARBON RIB-BACK BLADES

## (undated) DEVICE — FLEXIBLE YANKAUER,MEDIUM TIP, NO VACUUM CONTROL: Brand: ARGYLE

## (undated) DEVICE — GLV SURG TRIUMPH ORTHO W/ALOE PF LTX 8 STRL

## (undated) DEVICE — 4-PORT MANIFOLD: Brand: NEPTUNE 2

## (undated) DEVICE — RIMMED SPEED PIN 45MM STERILE

## (undated) DEVICE — PAD GRND REM POLYHESIVE A/ DISP

## (undated) DEVICE — SHEET,DRAPE,70X100,STERILE: Brand: MEDLINE

## (undated) DEVICE — GENESIS TROCHLEAR PIN 1/8 X 3: Brand: GENESIS

## (undated) DEVICE — PRECISION THIN (9.0 X 0.38 X 18.5MM)

## (undated) DEVICE — GLV SURG SENSICARE LT W/ALOE PF LF 6.5 STRL

## (undated) DEVICE — SUT ETHIB 2 CV V37 MS/4 30IN MX69G

## (undated) DEVICE — BIT DRL RNGLC QC 3.2X30MM

## (undated) DEVICE — DISPOSABLE TOURNIQUET CUFF SINGLE BLADDER, SINGLE PORT AND QUICK CONNECT CONNECTOR: Brand: COLOR CUFF

## (undated) DEVICE — GLV SURG SENSICARE GREEN W/ALOE PF LF 8 STRL

## (undated) DEVICE — BNDG ELAS MATRX V/CLS 6IN 5YD LF

## (undated) DEVICE — MAYO STAND COVER: Brand: CONVERTORS

## (undated) DEVICE — CUFF SCD HEMOFORCE SEQ CALF STD MD

## (undated) DEVICE — PATIENT RETURN ELECTRODE, SINGLE-USE, CONTACT QUALITY MONITORING, ADULT, WITH 9FT CORD, FOR PATIENTS WEIGING OVER 33LBS. (15KG): Brand: MEGADYNE

## (undated) DEVICE — TUBING, SUCTION, 1/4" X 12', STRAIGHT: Brand: MEDLINE

## (undated) DEVICE — SUT VIC 2/0 CT1 27IN J259H

## (undated) DEVICE — OCCLUSIVE GAUZE STRIP,3% BISMUTH TRIBROMOPHENATE IN PETROLATUM BLEND: Brand: XEROFORM

## (undated) DEVICE — DRSNG WND BORDR/ADHS NONADHR/GZ LF 4X10IN STRL

## (undated) DEVICE — THIN OSTEOTOME BLADE 10MM X 3 IN: Brand: RENOVATION

## (undated) DEVICE — GLV SURG SENSICARE W/ALOE PF LF 8 STRL

## (undated) DEVICE — BNDG ELAS CO-FLEX SLF ADHR 6IN 5YD LF STRL

## (undated) DEVICE — SPNG LAP 18X18IN LF STRL PK/5

## (undated) DEVICE — PENCL ES MEGADINE EZ/CLEAN BUTN W/HOLSTR 10FT

## (undated) DEVICE — ELECTRD MENISCAL STD 165MM

## (undated) DEVICE — VIOLET BRAIDED (POLYGLACTIN 910), SYNTHETIC ABSORBABLE SUTURE: Brand: COATED VICRYL

## (undated) DEVICE — SPNG GZ WOVN 4X4IN 12PLY 10/BX STRL

## (undated) DEVICE — SUT ETHLN 3/0 FSLX 30IN 1673H

## (undated) DEVICE — BLD SAW GIGLI 51CM

## (undated) DEVICE — GLV SURG BIOGEL M LTX PF 8

## (undated) DEVICE — SLV SCD CALF HEMOFORCE DVT THERP REPROC MD

## (undated) DEVICE — HANDPIECE SET WITH HIGH FLOW TIP AND SUCTION TUBE: Brand: INTERPULSE

## (undated) DEVICE — TBG INFLOW FMS DUO W/O 1WY VLV

## (undated) DEVICE — WRAP KNEE COLD THERAPY

## (undated) DEVICE — GOWN,SIRUS,NON REINFRCD,LARGE,SET IN SL: Brand: MEDLINE

## (undated) DEVICE — Device

## (undated) DEVICE — PENCL E/S HNDSWCH PUSHBTN HOLSTR 10FT

## (undated) DEVICE — KT ACL TRANSTIB W/SAWBLD

## (undated) DEVICE — INCISOR PLUS PLATINUM 4.5 MM BLADE: Brand: DYONICS INCISOR

## (undated) DEVICE — BIT DRL QC SS 2.5X110MM

## (undated) DEVICE — PAD,ABDOMINAL,5"X9",STERILE,LF,1/PK: Brand: MEDLINE INDUSTRIES, INC.

## (undated) DEVICE — PK KN ARTHSCP 20

## (undated) DEVICE — PROXIMATE SKIN STAPLERS (35 WIDE) CONTAINS 35 STAINLESS STEEL STAPLES (FIXED HEAD): Brand: PROXIMATE

## (undated) DEVICE — PK TOTAL KNEE DYNJ23473J

## (undated) DEVICE — DRSNG GZ PETROLTM XEROFORM CURAD 1X8IN STRL

## (undated) DEVICE — CAST PADDING 6 IN KIT: Brand: CARDINAL HEALTH